# Patient Record
Sex: FEMALE | Race: WHITE | NOT HISPANIC OR LATINO | Employment: UNEMPLOYED | URBAN - METROPOLITAN AREA
[De-identification: names, ages, dates, MRNs, and addresses within clinical notes are randomized per-mention and may not be internally consistent; named-entity substitution may affect disease eponyms.]

---

## 2017-01-01 ENCOUNTER — APPOINTMENT (OUTPATIENT)
Dept: GENERAL RADIOLOGY | Facility: CLINIC | Age: 32
End: 2017-01-01
Attending: FAMILY MEDICINE
Payer: COMMERCIAL

## 2017-01-01 ENCOUNTER — HOSPITAL ENCOUNTER (EMERGENCY)
Facility: CLINIC | Age: 32
Discharge: HOME | End: 2017-01-01
Attending: FAMILY MEDICINE | Admitting: FAMILY MEDICINE
Payer: COMMERCIAL

## 2017-01-01 VITALS
RESPIRATION RATE: 16 BRPM | HEART RATE: 108 BPM | DIASTOLIC BLOOD PRESSURE: 69 MMHG | OXYGEN SATURATION: 100 % | TEMPERATURE: 97.3 F | SYSTOLIC BLOOD PRESSURE: 108 MMHG

## 2017-01-01 DIAGNOSIS — M54.9 UPPER BACK PAIN ON RIGHT SIDE: ICD-10-CM

## 2017-01-01 PROCEDURE — 99283 EMERGENCY DEPT VISIT LOW MDM: CPT | Performed by: FAMILY MEDICINE

## 2017-01-01 PROCEDURE — 25000132 ZZH RX MED GY IP 250 OP 250 PS 637: Performed by: FAMILY MEDICINE

## 2017-01-01 PROCEDURE — 99283 EMERGENCY DEPT VISIT LOW MDM: CPT | Mod: 25

## 2017-01-01 PROCEDURE — 71020 XR CHEST 2 VW: CPT

## 2017-01-01 RX ORDER — OXYCODONE AND ACETAMINOPHEN 5; 325 MG/1; MG/1
2 TABLET ORAL ONCE
Status: COMPLETED | OUTPATIENT
Start: 2017-01-01 | End: 2017-01-01

## 2017-01-01 RX ORDER — OXYCODONE AND ACETAMINOPHEN 5; 325 MG/1; MG/1
1-2 TABLET ORAL EVERY 4 HOURS PRN
Qty: 20 TABLET | Refills: 0 | Status: SHIPPED | OUTPATIENT
Start: 2017-01-01 | End: 2017-01-03

## 2017-01-01 RX ADMIN — OXYCODONE HYDROCHLORIDE AND ACETAMINOPHEN 2 TABLET: 5; 325 TABLET ORAL at 20:50

## 2017-01-01 ASSESSMENT — ENCOUNTER SYMPTOMS
DIARRHEA: 0
CHILLS: 0
DIZZINESS: 0
JOINT SWELLING: 0
NECK STIFFNESS: 0
NERVOUS/ANXIOUS: 0
SHORTNESS OF BREATH: 0
BACK PAIN: 1
WEAKNESS: 0
LIGHT-HEADEDNESS: 0
CONFUSION: 0
MYALGIAS: 0
WHEEZING: 0
CHEST TIGHTNESS: 0
PALPITATIONS: 0
COUGH: 0
ARTHRALGIAS: 0
SORE THROAT: 0
ABDOMINAL PAIN: 0
HEADACHES: 0
FEVER: 0
VOMITING: 0
NAUSEA: 0
NECK PAIN: 0

## 2017-01-01 NOTE — ED AVS SNAPSHOT
[facility] Emergency Department    [address] [facility] [address]    Phone:  [phone]    Fax:  [phone]                                       Will Dodson   MRN: [id number]    Department:  [facility] Emergency Department   Date of Visit:  1/1/2017           Patient Information     Date Of Birth          1985        Your diagnoses for this visit were:     Upper back pain on right side        You were seen by Letty, Mart GONZALEZ MD.      Follow-up Information     Follow up with Clinic, [facility].    Why:  As needed    Contact information:    [facility] [id number] [phone]          Follow up with [facility] Emergency Department.    Specialty:  EMERGENCY MEDICINE    Why:  If symptoms worsen    Contact information:    [address] [place] [id number] [phone]    Additional information:    The medical center is located at   [address] [place] [address] (between [facility] and   [place] [address] in [place], [place] north   of [place]).        Discharge Instructions         Neck/Back Pain: General    Both neck and back pain are usually caused by injury to the muscles or ligaments of the spine. Sometimes the disks that separate each bone of the spine may cause pain by pressing on a nearby nerve. Back and neck pain may appear after a sudden twisting/bending force (such as in a car accident), or sometimes after a simple awkward movement. In either case, muscle spasm is often present and adds to the pain.  Acute neck and back pain usually gets better in 1 to 2 weeks. Pain related to disk disease, arthritis in the spinal joints or spinal stenosis (narrowing of the spinal canal) can become chronic and last for months or years.  Back and neck pain are common problems. Most people feel better in 1 or 2 weeks, and most of the rest in 1 to 2 months. Most people can remain active.  Symptoms  People experience and describe pain differently.    Pain  "can be sharp, stabbing, shooting, aching, cramping, or burning    Movement, standing, bending, lifting, sitting, or walking may worsen the pain    Pain can be localized to one spot or area, or it can be more generalized    Pain can spread or radiate upwards, downwards, to the front, or go down your arms    Muscle spasm may occur.  Cause  Most of the time \"mechanical problems\" with the muscles or spine cause the pain. it is usually caused by an injury, whether known or not, to the muscles or ligaments. While illnesses can cause back pain, it is usually not caused by a serious illness. Pain is usually related to physical activity, whether sports, exercise, work, or normal activity. Sometimes it can occur without an identifiable cause. This can happen simply by stretching or moving wrong, without noting pain at the time. Other causes include:    Overexertion, lifting, pushing, pulling incorrectly or too aggressively.    Sudden twisting, bending or stretching from an accident (car or fall), or accidental movement.    Poor posture    Poor conditioning, lack of regular exercise    Spinal disc disease or arthritis    Stress    Pregnancy, or illness like appendicitis, bladder or kidney infection, pelvic infections   Home care    For neck pain: Use a comfortable pillow that supports the head and keeps the spine in a neutral position. The position of the head should not be tilted forward or backward.    When in bed, try to find a position of comfort. A firm mattress is best. Try lying flat on your back with pillows under your knees. You can also try lying on your side with your knees bent up towards your chest and a pillow between your knees.    At first, do not try to stretch out the sore spots. If there is a strain, it is not like the good soreness you get after exercising without an injury. In this case, stretching may make it worse.    Avoid prolonged sitting, long car rides or travel. This puts more stress on the lower " back than standing or walking.    During the first 24 to 72 hours after an injury, apply an ice pack to the painful area for 20 minutes and then remove it for 20 minutes over a period of 60 to 90 minutes or several times a day. As a safety precaution, do not use a heating pad at bedtime. Sleeping with a heating pad can lead to skin burns or tissue damage.    Ice and heat therapies can be alternated. Talk with your health care provider about the best treatment for your back or neck pain.    Therapeutic massage can help relax the back and neck muscles without stretching them.    Be aware of safe lifting methods and do not lift anything over 15 pounds until all the pain is gone.  Medications  Talk to your health care provider before using medications, especially if you have other medical problems or are taking other medicines.    You may use acetaminophen (such as Tylenol) or ibuprofen (such as Advil or Motrin) to control pain, unless another pain medicine was prescribed. If you have chronic conditions like diabetes, liver or kidney disease, stomach ulcers,  gastrointestinal bleeding, or are taking blood thinner medications.    Be careful if you are given pain medicines, narcotics, or medication for muscle spasm. They can cause drowsiness, and can affect your coordination, reflexes, and judgment. Do not drive or operate heavy machinery.  Follow-up care  Follow up with your health care provider if your symptoms do not start to improve after one week. Physical therapy or further tests may be needed.  If X-rays were taken, they will be reviewed by a radiologist. You will be notified of any new findings that may affect your care.  Call 911  Seek emergency medical care if any of the following occur:    Trouble breathing    Confusion    Very drowsy or trouble awakening    Fainting or loss of consciousness    Rapid or very slow heart rate    Loss of bowel or bladder control  When to seek medical care  Get prompt medical  attention if any of the following occur:    Pain becomes worse or spreads into your arms or legs    Weakness, numbness or pain in one or both arms or legs    Numbness in the groin area    Difficulty walking    Fever of 100.4 F (38 C) or higher, or as directed by your healthcare provider    6351-5447 [employer]. [address] [place] [address]. All rights reserved. This information is not intended as a substitute for professional medical care. Always follow your healthcare professional's instructions.          Future Appointments        Provider Department Dept Phone Center    1/17/2017 2:00 PM Jose Matta [facility] [phone] [facility]    1/24/2017 5:00 PM Jose [facility]ton [facility] [phone] [facility]      24 Hour Appointment Hotline       To make an appointment at any [facility], call [phone] ([phone]). If you don't have a family doctor or clinic, we will help you find one. [facility] clinics are conveniently located to serve the needs of you and your family.             Review of your medicines      START taking        Dose / Directions Last dose taken    oxyCODONE-acetaminophen 5-325 MG per tablet   Commonly known as:  PERCOCET   Dose:  1-2 tablet   Quantity:  20 tablet        Take 1-2 tablets by mouth every 4 hours as needed for moderate to severe pain   Refills:  0          Our records show that you are taking the medicines listed below. If these are incorrect, please call your family doctor or clinic.        Dose / Directions Last dose taken    albuterol 108 (90 BASE) MCG/ACT Inhaler   Commonly known as:  albuterol   Dose:  2 puff   Quantity:  1 Inhaler        Inhale 2 puffs into the lungs every 4 hours as needed for shortness of breath / dyspnea   Refills:  0        guaiFENesin-codeine 100-10 MG/5ML Soln solution   Commonly known as:  ROBITUSSIN AC   Dose:  1 tsp.   Quantity:  120 mL        Take 5 mLs  by mouth every 4 hours as needed for cough   Refills:  0        LORazepam 0.5 MG tablet   Commonly known as:  ATIVAN   Dose:  0.25-0.5 mg   Quantity:  20 tablet        Take 0.5-1 tablets (0.25-0.5 mg) by mouth every 8 hours as needed for anxiety   Refills:  0        TRINESSA (28) 0.18/0.215/0.25 MG-35 MCG per tablet   Dose:  1 tablet   Generic drug:  norgestim-eth estrad triphasic        Take 1 tablet by mouth daily   Refills:  0        venlafaxine 37.5 MG tablet   Commonly known as:  EFFEXOR   Quantity:  90 tablet        Take 1 tablet daily for 3 days, then 1 tablet twice daily for 14 days, then 2 tablets twice daily   Refills:  1                Prescriptions were sent or printed at these locations (1 Prescription)                   Other Prescriptions                Printed at Department/Unit printer (1 of 1)         oxyCODONE-acetaminophen (PERCOCET) 5-325 MG per tablet                Procedures and tests performed during your visit     XR Chest 2 Views      Orders Needing Specimen Collection     None      Pending Results     Date and Time Order Name Status Description    1/1/2017 2047 XR Chest 2 Views Preliminary        Test Results from your hospital stay           1/1/2017  9:24 PM - Interface, Radiant Ib      Narrative     CHEST TWO VIEWS  1/1/2017 9:18 PM     COMPARISON: Two view chest x-ray 6/2/2016    HISTORY: Short of air    FINDINGS: The cardiac silhouette, pulmonary vasculature, lungs and  pleural spaces are within normal limits.        Impression     IMPRESSION: Clear lungs.                Thank you for choosing [facility]       Thank you for choosing [facility] for your care. Our goal is always to provide you with excellent care. Hearing back from our patients is one way we can continue to improve our services. Please take a few minutes to complete the written survey that you may receive in the mail after you visit with us. Thank you!        [employer] Information     [employer] lets you send messages to your  "doctor, view your test results, renew your prescriptions, schedule appointments and more. To sign up, go to [email or web address] . Click on \"Log in\" on the left side of the screen, which will take you to the Welcome page. Then click on \"Sign up Now\" on the right side of the page.     You will be asked to enter the access code listed below, as well as some personal information. Please follow the directions to create your username and password.     Your access code is: [email or web address]  Expires: 2017  7:06 AM     Your access code will  in 90 days. If you need help or a new code, please call your [facility] clinic or [phone].        After Visit Summary       This is your record. Keep this with you and show to your community pharmacist(s) and doctor(s) at your next visit.                  "

## 2017-01-01 NOTE — ED AVS SNAPSHOT
[facility] Emergency Department    [address] [facility] [address]    Phone:  [phone]    Fax:  [phone]                                       Will Dodson   MRN: [id number]    Department:  [facility] Emergency Department   Date of Visit:  1/1/2017           After Visit Summary Signature Page     I have received my discharge instructions, and my questions have been answered. I have discussed any challenges I see with this plan with the nurse or doctor.    ..........................................................................................................................................  Patient/Patient Representative Signature      ..........................................................................................................................................  Patient Representative Print Name and Relationship to Patient    ..................................................               ................................................  Date                                            Time    ..........................................................................................................................................  Reviewed by Signature/Title    ...................................................              ..............................................  Date                                                            Time

## 2017-01-02 NOTE — DISCHARGE INSTRUCTIONS
"  Neck/Back Pain: General    Both neck and back pain are usually caused by injury to the muscles or ligaments of the spine. Sometimes the disks that separate each bone of the spine may cause pain by pressing on a nearby nerve. Back and neck pain may appear after a sudden twisting/bending force (such as in a car accident), or sometimes after a simple awkward movement. In either case, muscle spasm is often present and adds to the pain.  Acute neck and back pain usually gets better in 1 to 2 weeks. Pain related to disk disease, arthritis in the spinal joints or spinal stenosis (narrowing of the spinal canal) can become chronic and last for months or years.  Back and neck pain are common problems. Most people feel better in 1 or 2 weeks, and most of the rest in 1 to 2 months. Most people can remain active.  Symptoms  People experience and describe pain differently.    Pain can be sharp, stabbing, shooting, aching, cramping, or burning    Movement, standing, bending, lifting, sitting, or walking may worsen the pain    Pain can be localized to one spot or area, or it can be more generalized    Pain can spread or radiate upwards, downwards, to the front, or go down your arms    Muscle spasm may occur.  Cause  Most of the time \"mechanical problems\" with the muscles or spine cause the pain. it is usually caused by an injury, whether known or not, to the muscles or ligaments. While illnesses can cause back pain, it is usually not caused by a serious illness. Pain is usually related to physical activity, whether sports, exercise, work, or normal activity. Sometimes it can occur without an identifiable cause. This can happen simply by stretching or moving wrong, without noting pain at the time. Other causes include:    Overexertion, lifting, pushing, pulling incorrectly or too aggressively.    Sudden twisting, bending or stretching from an accident (car or fall), or accidental movement.    Poor posture    Poor conditioning, " lack of regular exercise    Spinal disc disease or arthritis    Stress    Pregnancy, or illness like appendicitis, bladder or kidney infection, pelvic infections   Home care    For neck pain: Use a comfortable pillow that supports the head and keeps the spine in a neutral position. The position of the head should not be tilted forward or backward.    When in bed, try to find a position of comfort. A firm mattress is best. Try lying flat on your back with pillows under your knees. You can also try lying on your side with your knees bent up towards your chest and a pillow between your knees.    At first, do not try to stretch out the sore spots. If there is a strain, it is not like the good soreness you get after exercising without an injury. In this case, stretching may make it worse.    Avoid prolonged sitting, long car rides or travel. This puts more stress on the lower back than standing or walking.    During the first 24 to 72 hours after an injury, apply an ice pack to the painful area for 20 minutes and then remove it for 20 minutes over a period of 60 to 90 minutes or several times a day. As a safety precaution, do not use a heating pad at bedtime. Sleeping with a heating pad can lead to skin burns or tissue damage.    Ice and heat therapies can be alternated. Talk with your health care provider about the best treatment for your back or neck pain.    Therapeutic massage can help relax the back and neck muscles without stretching them.    Be aware of safe lifting methods and do not lift anything over 15 pounds until all the pain is gone.  Medications  Talk to your health care provider before using medications, especially if you have other medical problems or are taking other medicines.    You may use acetaminophen (such as Tylenol) or ibuprofen (such as Advil or Motrin) to control pain, unless another pain medicine was prescribed. If you have chronic conditions like diabetes, liver or kidney disease, stomach  ulcers,  gastrointestinal bleeding, or are taking blood thinner medications.    Be careful if you are given pain medicines, narcotics, or medication for muscle spasm. They can cause drowsiness, and can affect your coordination, reflexes, and judgment. Do not drive or operate heavy machinery.  Follow-up care  Follow up with your health care provider if your symptoms do not start to improve after one week. Physical therapy or further tests may be needed.  If X-rays were taken, they will be reviewed by a radiologist. You will be notified of any new findings that may affect your care.  Call 911  Seek emergency medical care if any of the following occur:    Trouble breathing    Confusion    Very drowsy or trouble awakening    Fainting or loss of consciousness    Rapid or very slow heart rate    Loss of bowel or bladder control  When to seek medical care  Get prompt medical attention if any of the following occur:    Pain becomes worse or spreads into your arms or legs    Weakness, numbness or pain in one or both arms or legs    Numbness in the groin area    Difficulty walking    Fever of 100.4 F (38 C) or higher, or as directed by your healthcare provider    2254-9360 [employer]. [address] [place] [address]. All rights reserved. This information is not intended as a substitute for professional medical care. Always follow your healthcare professional's instructions.

## 2017-01-02 NOTE — ED PROVIDER NOTES
History     Chief Complaint   Patient presents with     Back Pain     upper R side, started 2 hours ago     HPI  Will Dodson is a 31 year old female who presents to the emergency room with sudden onset of back pain. She has a long history of back pains. She does not recall any fall or injury or repetitive use recently. She was sitting when this started. It feels like a back spasm near underneath her right shoulder blade. She is having no difficulty breathing. No pain with deep inspiration. No fever chills cough or other respiratory symptoms. She has no history of DVT or PE and has no family history of such. She has not had any prolonged sitting or other risk for PE.    Patient Active Problem List   Diagnosis     Prenatal care, subsequent pregnancy     H/O:      Generalized anxiety disorder     Panic attack     No current facility-administered medications for this encounter.     Current Outpatient Prescriptions   Medication     oxyCODONE-acetaminophen (PERCOCET) 5-325 MG per tablet     guaiFENesin-codeine (ROBITUSSIN AC) 100-10 MG/5ML SOLN     albuterol (ALBUTEROL) 108 (90 BASE) MCG/ACT inhaler     venlafaxine (EFFEXOR) 37.5 MG tablet     LORazepam (ATIVAN) 0.5 MG tablet     norgestim-eth estrad triphasic (TRINESSA, 28,) 0.18/0.215/0.25 MG-35 MCG per tablet     Allergies   Allergen Reactions     Vicodin [Hydrocodone-Acetaminophen] Nausea and Vomiting     Social History     Social History     Marital Status: Single     Spouse Name: N/A     Number of Children: N/A     Years of Education: N/A     Occupational History     Not on file.     Social History Main Topics     Smoking status: Former Smoker -- 0.25 packs/day     Start date: 2015     Smokeless tobacco: Never Used     Alcohol Use: 0.0 oz/week     0 Standard drinks or equivalent per week      Comment: occas- quit with pregnancy     Drug Use: No     Sexual Activity: Yes     Other Topics Concern     Parent/Sibling W/ Cabg, Mi Or Angioplasty Before  65f 55m? No     Social History Narrative         I have reviewed the Medications, Allergies, Past Medical and Surgical History, and Social History in the Epic system.    Review of Systems   Constitutional: Negative for fever and chills.   HENT: Negative for congestion and sore throat.    Respiratory: Negative for cough, chest tightness, shortness of breath and wheezing.    Cardiovascular: Negative for chest pain and palpitations.   Gastrointestinal: Negative for nausea, vomiting, abdominal pain and diarrhea.   Musculoskeletal: Positive for back pain. Negative for myalgias, joint swelling, arthralgias, neck pain and neck stiffness.   Skin: Negative for rash.   Neurological: Negative for dizziness, weakness, light-headedness and headaches.   Psychiatric/Behavioral: Negative for confusion. The patient is not nervous/anxious.        Physical Exam   BP: 108/69 mmHg  Pulse: 108  Temp: 97.3  F (36.3  C)  Resp: 20  SpO2: 99 %  Physical Exam   Constitutional: She is oriented to person, place, and time. She appears well-developed and well-nourished. She appears distressed.   Alert female who appears to be having back spasms with sudden paroxysms of wincing and pain.   HENT:   Head: Normocephalic and atraumatic.   Eyes: Conjunctivae are normal.   Neck: Normal range of motion. Neck supple.   Cardiovascular: Normal rate, regular rhythm and normal heart sounds.    Pulmonary/Chest: Effort normal and breath sounds normal. No respiratory distress. She has no wheezes. She exhibits no tenderness.   Abdominal: Soft. She exhibits no distension. There is no tenderness.   Musculoskeletal: She exhibits tenderness.        Cervical back: She exhibits tenderness, pain and spasm. She exhibits no deformity.        Back:    Neurological: She is alert and oriented to person, place, and time.   Skin: Skin is warm and dry. No rash noted.   No evidence of rash-herpes zoster-shingles   Psychiatric: Her behavior is normal. Her mood appears anxious.    Nursing note and vitals reviewed.      ED Course   Procedures           Results for orders placed or performed during the hospital encounter of 01/01/17 (from the past 48 hour(s))   XR Chest 2 Views    Narrative    CHEST TWO VIEWS  1/1/2017 9:18 PM     COMPARISON: Two view chest x-ray 6/2/2016    HISTORY: Short of air    FINDINGS: The cardiac silhouette, pulmonary vasculature, lungs and  pleural spaces are within normal limits.      Impression    IMPRESSION: Clear lungs.       Critical Care time:  none               Labs Ordered and Resulted from Time of ED Arrival Up to the Time of Departure from the ED - No data to display    Assessments & Plan (with Medical Decision Making)   MDM--31-year-old female who presents with suspected back spasms around her right scapular upper back area. This area is tender to palpation. Pain characteristics consistent with a muscle spasm. She is having no breathing difficulties cough congestion or pleuritic-like symptoms. She is afebrile. She was medicated with Percocet 2 tablets in the emergency room. Chest x-ray shows no infiltrate or pneumothorax. Will discharge patient home with alternating ice packs and heating pad. Percocet for severe pain and spasms. Gentle stretching. Follow up in the clinic in the next couple of days if symptoms persist. Return to ER if she develops a fever or any difficulty breathing or any new symptoms.  I have reviewed the nursing notes.    I have reviewed the findings, diagnosis, plan and need for follow up with the patient.    New Prescriptions    OXYCODONE-ACETAMINOPHEN (PERCOCET) 5-325 MG PER TABLET    Take 1-2 tablets by mouth every 4 hours as needed for moderate to severe pain       Final diagnoses:   Upper back pain on right side       1/1/2017   [facility] EMERGENCY DEPARTMENT      Letty, Mart GONZALEZ MD  01/01/17 3882

## 2017-01-03 ENCOUNTER — HOSPITAL ENCOUNTER (EMERGENCY)
Facility: CLINIC | Age: 32
Discharge: HOME | End: 2017-01-03
Attending: FAMILY MEDICINE | Admitting: FAMILY MEDICINE
Payer: COMMERCIAL

## 2017-01-03 ENCOUNTER — APPOINTMENT (OUTPATIENT)
Dept: GENERAL RADIOLOGY | Facility: CLINIC | Age: 32
End: 2017-01-03
Attending: FAMILY MEDICINE
Payer: COMMERCIAL

## 2017-01-03 VITALS
OXYGEN SATURATION: 100 % | HEART RATE: 83 BPM | RESPIRATION RATE: 18 BRPM | DIASTOLIC BLOOD PRESSURE: 71 MMHG | TEMPERATURE: 98.1 F | SYSTOLIC BLOOD PRESSURE: 103 MMHG

## 2017-01-03 DIAGNOSIS — K59.00 CONSTIPATION, UNSPECIFIED CONSTIPATION TYPE: ICD-10-CM

## 2017-01-03 PROCEDURE — 99283 EMERGENCY DEPT VISIT LOW MDM: CPT

## 2017-01-03 PROCEDURE — 99283 EMERGENCY DEPT VISIT LOW MDM: CPT | Performed by: FAMILY MEDICINE

## 2017-01-03 PROCEDURE — 74020 XR ABDOMEN 2 VW: CPT

## 2017-01-03 RX ORDER — LACTULOSE 10 G/15ML
20 SOLUTION ORAL 2 TIMES DAILY
Qty: 300 ML | Refills: 0 | Status: SHIPPED | OUTPATIENT
Start: 2017-01-03 | End: 2017-01-08

## 2017-01-03 ASSESSMENT — ENCOUNTER SYMPTOMS
CONSTIPATION: 1
ALLERGIC/IMMUNOLOGIC NEGATIVE: 1
EYES NEGATIVE: 1
MUSCULOSKELETAL NEGATIVE: 1
ENDOCRINE NEGATIVE: 1
PSYCHIATRIC NEGATIVE: 1
ABDOMINAL PAIN: 1
RESPIRATORY NEGATIVE: 1
CARDIOVASCULAR NEGATIVE: 1
CONSTITUTIONAL NEGATIVE: 1
NEUROLOGICAL NEGATIVE: 1
HEMATOLOGIC/LYMPHATIC NEGATIVE: 1

## 2017-01-03 NOTE — ED AVS SNAPSHOT
[facility]    [address]    [facility] [place] [address]    Phone:  [phone]    Fax:  [phone]                                       Will Dodson   MRN: [id number]    Department:  [facility]   Date of Visit:  1/3/2017           Patient Information     Date Of Birth          1985        Your diagnoses for this visit were:     Constipation, unspecified constipation type        You were seen by Ced Neumann MD.      Follow-up Information     Schedule an appointment as soon as possible for a visit with Clinic, [facility].    Why:  As needed, If symptoms worsen    Contact information:    [address]  [facility] [place] [address] [phone]          Discharge Instructions         Constipation (Adult)  Constipation means that you have bowel movements that are less frequent than usual. Stools often become very hard and difficult to pass.  Constipation is very common. At some point in life it affects almost everyone. Since everyone's bowel habits are different, what is constipation to one person may not be to another. Your healthcare provider may do tests to diagnose constipation. It depends on what he or she finds when evaluating you.    Symptoms of constipation include:    Abdominal pain    Bloating    Vomiting    Painful bowel movements    Itching, swelling, bleeding, or pain around the anus  Causes  Constipation can have many causes. These include:    Diet low in fiber    Too much dairy    Not drinking enough liquids    Lack of exercise or physical activity. This is especially true for older adults.    Changes in lifestyle or daily routine, including pregnancy, aging, work, and travel    Frequent use or misuse of laxatives    Ignoring the urge to have a bowel movement or delaying it until later    Medicines, such as certain prescription pain medicines, iron supplements, antacids, certain antidepressants, and calcium  supplements    Diseases like irritable bowel syndrome, bowel obstructions, stroke, diabetes, thyroid disease, Parkinson disease, hemorrhoids, and colon cancer  Complications  Potential complications of constipation can include:    Hemorrhoids    Rectal bleeding from hemorrhoids or anal fissures (skin tears)    Hernias    Dependency on laxatives    Chronic constipation    Fecal impaction    Bowel obstruction or perforation  Home care  All treatment should be done after talking with your healthcare provider. This is especially true if you have another medical problems, are taking prescription medicines, or are an older adult. Treatment most often involves lifestyle changes. You may also need medicines. Your healthcare provider will tell you which will work best for you. Follow the advice below to help avoid this problem in the future.  Lifestyle changes  These lifestyle changes can help prevent constipation:    Diet. Eat a high-fiber diet, with fresh fruit and vegetables, and reduce dairy intake, meats, and processed foods    Fluids. It's important to get enough fluids each day. Drink plenty of water when you eat more fiber. If you are on diet that limits the amount of fluid you can have, talk about this with your healthcare provider.    Regular exercise. Check with your healthcare provider first.  Medications  Take any medicines as directed. Some laxatives are safe to use only every now and then. Others can be taken on a regular basis. Talk with your doctor or pharmacist if you have questions.  Prescription pain medicines can cause constipation. If you are taking this kind of medicine, ask your healthcare provider if you should also take a stool softener.  Medicines you may take to treat constipation include:    Fiber supplements    Stool softeners    Laxatives    Enemas    Rectal suppositories  Follow-up care  Follow up with your healthcare provider if symptoms don't get better in the next few days. You may need to  have more tests or see a specialist.  Call 911  Call 911 if any of these occur:    Trouble breathing    Stiff, rigid abdomen that is severely painful to touch    Confusion    Fainting or loss of consciousness    Rapid heart rate    Chest pain  When to seek medical advice  Call your healthcare provider right away if any of these occur:    Fever over 100.4 F (38 C)    Failure to resume normal bowel movements    Pain in your abdomen or back gets worse    Nausea or vomiting    Swelling in your abdomen    Blood in the stool    Black, tarry stool    Involuntary weight loss    Weakness    2387-8320 [employer]. [address] [place] [address] 45750. All rights reserved. This information is not intended as a substitute for professional medical care. Always follow your healthcare professional's instructions.          Future Appointments        Provider Department Dept Phone Center    1/17/2017 2:00 PM [phone] [facility]    1/24/2017 5:00 PM Jose WALDROP [facility] [phone] [facility]      24 Hour Appointment Hotline       To make an appointment at any [facility], call [phone] ([phone]). If you don't have a family doctor or clinic, we will help you find one. [facility] clinics are conveniently located to serve the needs of you and your family.             Review of your medicines      START taking        Dose / Directions Last dose taken    lactulose 10 GM/15ML solution   Commonly known as:  CHRONULAC   Dose:  20 g   Quantity:  300 mL        Take 30 mLs (20 g) by mouth 2 times daily for 10 doses   Refills:  0          Our records show that you are taking the medicines listed below. If these are incorrect, please call your family doctor or clinic.        Dose / Directions Last dose taken    albuterol 108 (90 BASE) MCG/ACT Inhaler   Commonly known as:  albuterol   Dose:  2 puff   Quantity:  1 Inhaler         Inhale 2 puffs into the lungs every 4 hours as needed for shortness of breath / dyspnea   Refills:  0        LORazepam 0.5 MG tablet   Commonly known as:  ATIVAN   Dose:  0.25-0.5 mg   Quantity:  20 tablet        Take 0.5-1 tablets (0.25-0.5 mg) by mouth every 8 hours as needed for anxiety   Refills:  0        oxyCODONE-acetaminophen 5-325 MG per tablet   Commonly known as:  PERCOCET   Dose:  1-2 tablet   Quantity:  20 tablet        Take 1-2 tablets by mouth every 4 hours as needed for moderate to severe pain   Refills:  0        SENNA LAXATIVE 25 MG Tabs   Dose:  1 tablet   Generic drug:  Sennosides        Take 1 tablet by mouth once   Refills:  0        TRINESSA (28) 0.18/0.215/0.25 MG-35 MCG per tablet   Dose:  1 tablet   Generic drug:  norgestim-eth estrad triphasic        Take 1 tablet by mouth daily   Refills:  0                Prescriptions were sent or printed at these locations (1 Prescription)                   Other Prescriptions                Printed at Department/Unit printer (1 of 1)         lactulose (CHRONULAC) 10 GM/15ML solution                Procedures and tests performed during your visit     Tap water enema    XR Abdomen 2 Views      Orders Needing Specimen Collection     None      Pending Results     No orders found from 1/2/2017 to 1/4/2017.            Pending Culture Results     No orders found from 1/2/2017 to 1/4/2017.       Test Results from your hospital stay           1/3/2017  9:04 PM - Interface, Radiant Ib      Narrative     ABDOMEN 2 VIEWS   1/3/2017 8:51 PM     HISTORY: Constipation.    COMPARISON: CT of the abdomen and pelvis performed 4/21/2014.        Impression     IMPRESSION: Moderate amount of stool in the sigmoid colon. Bowel gas  pattern is otherwise within normal limits. No convincing radiographic  evidence for bowel obstruction. No free intraperitoneal air.    SILVINO JEFFERS MD                Thank you for choosing [facility]       Thank you for choosing [facility]  "for your care. Our goal is always to provide you with excellent care. Hearing back from our patients is one way we can continue to improve our services. Please take a few minutes to complete the written survey that you may receive in the mail after you visit with us. Thank you!        [employer] Information     [employer] lets you send messages to your doctor, view your test results, renew your prescriptions, schedule appointments and more. To sign up, go to [email or web address] . Click on \"Log in\" on the left side of the screen, which will take you to the Welcome page. Then click on \"Sign up Now\" on the right side of the page.     You will be asked to enter the access code listed below, as well as some personal information. Please follow the directions to create your username and password.     Your access code is: [email or web address]  Expires: 2017  7:06 AM     Your access code will  in 90 days. If you need help or a new code, please call your [place] clinic or [phone].        Care EveryWhere ID     This is your Care EveryWhere ID. This could be used by other organizations to access your [facility] medical records  [id number]        After Visit Summary       This is your record. Keep this with you and show to your community pharmacist(s) and doctor(s) at your next visit.                  "

## 2017-01-03 NOTE — ED AVS SNAPSHOT
[facility] Emergency Department    [address] [address]    Phone:  [phone]    Fax:  [phone]                                       Will Dodson   MRN: [id number]    Department:  [facility] Emergency Department   Date of Visit:  1/3/2017           After Visit Summary Signature Page     I have received my discharge instructions, and my questions have been answered. I have discussed any challenges I see with this plan with the nurse or doctor.    ..........................................................................................................................................  Patient/Patient Representative Signature      ..........................................................................................................................................  Patient Representative Print Name and Relationship to Patient    ..................................................               ................................................  Date                                            Time    ..........................................................................................................................................  Reviewed by Signature/Title    ...................................................              ..............................................  Date                                                            Time

## 2017-01-04 NOTE — DISCHARGE INSTRUCTIONS

## 2017-01-04 NOTE — ED PROVIDER NOTES
History     Chief Complaint   Patient presents with     Abdominal Pain     constipation. took stool softeners today     HPI  Will Dodson is a 31 year old female, past medical history is significant for generalized anxiety disorder, panic attacks, nephrolithiasis, depression, persistently emergency department with concerns of constipation of approximately one week's duration but much worse over the last 3 days.  Patient has been eating and drinking normally, voiding without difficulty or abnormality.  She has had a few very small pellet-like stools but no substantial bowel movement in last week.  She tried a Senna based laxative earlier today and began having some cramping after it.  Prior to that she did not have abdominal pain.  There is been no fever chills or sweats.  I note from review of her chart and also from talking to her that she was seen 2 days ago in this emergency department with concerns of thoracic back pain and was prescribed Percocet which he has been taking.  She is aware that this is potentially constipating.     Active Ambulatory Problems     Diagnosis Date Noted     Prenatal care, subsequent pregnancy 2016     H/O:  2016     Generalized anxiety disorder 2016     Panic attack 10/25/2016     Resolved Ambulatory Problems     Diagnosis Date Noted     No Resolved Ambulatory Problems     Past Medical History   Diagnosis Date     Kidney stone      Chickenpox      Depression      Past Surgical History   Procedure Laterality Date     Gyn surgery       C/section, low transverse       , Low Transverse     Cystoscopy,remv calculus,simple       Social History     Social History     Marital Status: Single     Spouse Name: N/A     Number of Children: N/A     Years of Education: N/A     Occupational History     Not on file.     Social History Main Topics     Smoking status: Former Smoker -- 0.25 packs/day     Start date: 2015     Smokeless tobacco: Never Used      Alcohol Use: 0.0 oz/week     0 Standard drinks or equivalent per week      Comment: occas- quit with pregnancy     Drug Use: No     Sexual Activity: Yes     Other Topics Concern     Parent/Sibling W/ Cabg, Mi Or Angioplasty Before 65f 55m? No     Social History Narrative     Family History   Problem Relation Age of Onset     Breast Cancer Mother      Other - See Comments Sister      epilepsy     Substance Abuse Sister      A&D     Hypertension Father      Colon Cancer Paternal Grandfather      Colon Cancer Maternal Grandfather      Depression Mother      No current facility-administered medications for this encounter.     Current Outpatient Prescriptions   Medication     Sennosides (SENNA LAXATIVE) 25 MG TABS     lactulose (CHRONULAC) 10 GM/15ML solution     albuterol (ALBUTEROL) 108 (90 BASE) MCG/ACT inhaler     oxyCODONE-acetaminophen (PERCOCET) 5-325 MG per tablet     LORazepam (ATIVAN) 0.5 MG tablet     norgestim-eth estrad triphasic (TRINESSA, 28,) 0.18/0.215/0.25 MG-35 MCG per tablet        Allergies   Allergen Reactions     Blood-Group Specific Substance      Patient has Probable passiive anti D Antibody. Blood Product orders may be delayed.  Draw one red top and two purple top tubes for ALL Type and Screen/ Type and Crossmatch orders.       Vicodin [Hydrocodone-Acetaminophen] Nausea and Vomiting       I have reviewed the Medications, Allergies, Past Medical and Surgical History, and Social History in the Epic system.    Review of Systems   Constitutional: Negative.    HENT: Negative.    Eyes: Negative.    Respiratory: Negative.    Cardiovascular: Negative.    Gastrointestinal: Positive for abdominal pain and constipation.   Endocrine: Negative.    Genitourinary: Negative.    Musculoskeletal: Negative.    Skin: Negative.    Allergic/Immunologic: Negative.    Neurological: Negative.    Hematological: Negative.    Psychiatric/Behavioral: Negative.        Physical Exam   BP: 103/71 mmHg  Pulse: 83  Temp: 98.1  F  (36.7  C)  Resp: 18  SpO2: 100 %  Physical Exam   Constitutional: She is oriented to person, place, and time. She appears well-developed and well-nourished.   HENT:   Head: Normocephalic and atraumatic.   Right Ear: External ear normal.   Left Ear: External ear normal.   Nose: Nose normal.   Mouth/Throat: Oropharynx is clear and moist.   Eyes: Conjunctivae and EOM are normal. Pupils are equal, round, and reactive to light.   Neck: Normal range of motion. Neck supple.   Cardiovascular: Normal rate, regular rhythm, normal heart sounds and intact distal pulses.    Pulmonary/Chest: Effort normal and breath sounds normal.   Abdominal: Soft. She exhibits no distension. There is no tenderness. There is no rebound and no guarding.   Musculoskeletal: Normal range of motion.   Neurological: She is alert and oriented to person, place, and time.   Skin: Skin is warm and dry.   Psychiatric: She has a normal mood and affect. Her behavior is normal.   Nursing note and vitals reviewed.      ED Course   Procedures         10:44 PM  Excellent result with tapwater enema.  Results for orders placed or performed during the hospital encounter of 01/03/17   XR Abdomen 2 Views    Narrative    ABDOMEN 2 VIEWS   1/3/2017 8:51 PM     HISTORY: Constipation.    COMPARISON: CT of the abdomen and pelvis performed 4/21/2014.      Impression    IMPRESSION: Moderate amount of stool in the sigmoid colon. Bowel gas  pattern is otherwise within normal limits. No convincing radiographic  evidence for bowel obstruction. No free intraperitoneal air.    SILVINO JEFFERS MD     10:44 PM  Patient is requesting discharge to home.    Critical Care time:  none               Labs Ordered and Resulted from Time of ED Arrival Up to the Time of Departure from the ED - No data to display    Assessments & Plan (with Medical Decision Making)   31-year-old female presents with concerns of constipation as discussed in the HPI.  Physical exam reveals a benign abdominal  exam x-ray is consistent with moderate fecal burden primarily in the sigmoid colon.  Tapwater enema was successful in relieving much of the patient's discomfort.  Lactulose as prescribed to be used over the next 5 days at home for complete evacuation.  The patient is once again cautioned about the use of narcotic pain medication and the impact on intestinal motility and potential for constipation.  All questions were answered discharged home in stable condition.    Disclaimer: This note consists of symbols derived from keyboarding, dictation and/or voice recognition software. As a result, there may be errors in the script that have gone undetected. Please consider this when interpreting information found in this chart.      I have reviewed the nursing notes.    I have reviewed the findings, diagnosis, plan and need for follow up with the patient.    New Prescriptions    LACTULOSE (CHRONULAC) 10 GM/15ML SOLUTION    Take 30 mLs (20 g) by mouth 2 times daily for 10 doses       Final diagnoses:   Constipation, unspecified constipation type       1/3/2017   [facility] EMERGENCY DEPARTMENT      Ced Neumann MD  01/03/17 9542

## 2017-01-17 ENCOUNTER — OFFICE VISIT (OUTPATIENT)
Dept: PSYCHOLOGY | Facility: CLINIC | Age: 32
End: 2017-01-17
Attending: FAMILY MEDICINE
Payer: COMMERCIAL

## 2017-01-17 DIAGNOSIS — F90.2 ADHD (ATTENTION DEFICIT HYPERACTIVITY DISORDER), COMBINED TYPE: Primary | ICD-10-CM

## 2017-01-17 PROCEDURE — 90834 PSYTX W PT 45 MINUTES: CPT | Performed by: PSYCHOLOGIST

## 2017-01-19 ENCOUNTER — OFFICE VISIT (OUTPATIENT)
Dept: FAMILY MEDICINE | Facility: CLINIC | Age: 32
End: 2017-01-19
Payer: COMMERCIAL

## 2017-01-19 VITALS
HEART RATE: 83 BPM | WEIGHT: 121.8 LBS | HEIGHT: 60 IN | OXYGEN SATURATION: 98 % | SYSTOLIC BLOOD PRESSURE: 108 MMHG | DIASTOLIC BLOOD PRESSURE: 71 MMHG | BODY MASS INDEX: 23.91 KG/M2 | TEMPERATURE: 98.2 F

## 2017-01-19 DIAGNOSIS — Z30.430 ENCOUNTER FOR IUD INSERTION: Primary | ICD-10-CM

## 2017-01-19 DIAGNOSIS — Z11.3 SCREEN FOR STD (SEXUALLY TRANSMITTED DISEASE): ICD-10-CM

## 2017-01-19 PROBLEM — F90.2 ADHD (ATTENTION DEFICIT HYPERACTIVITY DISORDER), COMBINED TYPE: Status: ACTIVE | Noted: 2017-01-19

## 2017-01-19 LAB — BETA HCG QUAL IFA URINE: NEGATIVE

## 2017-01-19 PROCEDURE — 84703 CHORIONIC GONADOTROPIN ASSAY: CPT | Performed by: FAMILY MEDICINE

## 2017-01-19 PROCEDURE — 58300 INSERT INTRAUTERINE DEVICE: CPT | Performed by: FAMILY MEDICINE

## 2017-01-19 PROCEDURE — 87591 N.GONORRHOEAE DNA AMP PROB: CPT | Performed by: FAMILY MEDICINE

## 2017-01-19 PROCEDURE — 87491 CHLMYD TRACH DNA AMP PROBE: CPT | Performed by: FAMILY MEDICINE

## 2017-01-19 NOTE — NURSING NOTE
Chief Complaint   Patient presents with     Consult     IUD; patient is wanting to discuss placement of Mirena; she is currently using condoms but has had the Mirena before about 3 years ago       Initial /71 mmHg  Pulse 83  Temp(Src) 98.2  F (36.8  C) (Tympanic)  Ht 5' (1.524 m)  Wt 121 lb 12.8 oz (55.248 kg)  BMI 23.79 kg/m2  SpO2 98%  LMP 12/17/2016 (Approximate) Estimated body mass index is 23.79 kg/(m^2) as calculated from the following:    Height as of this encounter: 5' (1.524 m).    Weight as of this encounter: 121 lb 12.8 oz (55.248 kg).  BP completed using cuff size: regular

## 2017-01-19 NOTE — PATIENT INSTRUCTIONS
Thank you for choosing [facility].  You may be receiving a survey in the mail from Ian Purvis regarding your visit today.  Please take a few minutes to complete and return the survey to let us know how we are doing.      If you have questions or concerns, please contact us via [employer] or you can contact your care team at [phone].    Our Clinic hours are:  Monday 6:40 am  to 7:00 pm  Tuesday -Friday 6:40 am to 5:00 pm    The [place] outpatient lab hours are:  Monday - Friday 6:10 am to 4:45 pm  Saturdays 7:00 am to 11:00 am  Appointments are required, call [phone]    If you have clinical questions after hours or would like to schedule an appointment,  call the clinic at [phone].

## 2017-01-19 NOTE — PROGRESS NOTES
SUBJECTIVE:                                                    Will Dodson is a 31 year old female who presents to clinic today for the following health issues:      Chief Complaint   Patient presents with     Consult     IUD; patient is wanting to discuss placement of Mirena; she is currently using condoms but has had the Mirena before about 3 years ago     Will Dodson is a 31 year old  female   Here for contraception.  Periods are regular occuring every 30 days  Last Menstrual Period Patient's last menstrual period was 12/17/2016 (approximate).  Is sexually active, with male partner.  Has used Mirena 3 years ago and condoms recently in the past.  Denies hx of migraine, seizure, liver disease, high blood pressure or blood clots.   non Smoker.    Denies fam hx of cancers or blood clots.         Review of Systems:   CV: no chest pain, no palpitations, no new or worsening peripheral edema  GI: no nausea, no vomiting, no constipation, no diarrhea  : no frequency, no dysuria, no hematuria, no vaginal discharge or pain  CONSTITUTIONAL: no fever, no chills, no fatigue, no unexpected change in weight  SKIN: no worrisome rashes, no worrisome lesions          Physical Exam:     Filed Vitals:    01/19/17 0937   BP: 108/71   Pulse: 83   Temp: 98.2  F (36.8  C)   TempSrc: Tympanic   Height: 5' (1.524 m)   Weight: 121 lb 12.8 oz (55.248 kg)   SpO2: 98%     Body mass index is 23.79 kg/(m^2).  GENERAL:: healthy, alert and no distress  - female: cervix- normal,  no discharge    PROCEDURE: Mirena IUD Insertion.  Mirena and Fv consent signed. Time out performed.  Speculum placed. Cervix bathed with betadine three times. Tenaculum placed for cervical stabilization. Uterus sounded to 7cm.  Mirena IUD placed into the uterus.  String cut to 2cm from the os.  Patient tolerated procedure well.  No complications. Minimal bleeding.    Results for orders placed or performed in visit on 01/19/17 (from the past 24 hour(s))   Beta HCG  qual IFA urine   Result Value Ref Range    Beta HCG Qual IFA Urine Negative NEG     Assessment and Plan   Will was seen today for consult.    Diagnoses and all orders for this visit:    Encounter for IUD insertion  -     HC LEVONORGESTREL IU 52MG 5 YR  -     levonorgestrel (MIRENA) 20 MCG/24HR IUD; 1 each (20 mcg) by Intrauterine route continuous  -     INSERTION INTRAUTERINE DEVICE    Screen for STD (sexually transmitted disease)  -     Chlamydia trachomatis PCR  -     Neisseria gonorrhoeae PCR    Other orders  -     Beta HCG qual IFA urine      Patient interested in birth control.  Reviewed risks, benefits, of choices including abstinence, OCP, Patch, Nuvaring, Depo-Provera, IUD, and condoms.  Reviewed need for back-up contraception for the first month of hormonal methods. Reviewed that only abstinence and condoms provide protection from STD's.    Options for treatment and follow-up care were reviewed with the patient and/or guardian. Will Dodson and/or guardian engaged in the decision making process and verbalized understanding of the options discussed and agreed with the final plan.  Return to clinic as needed.  Norbert Wooten MD  [facility]

## 2017-01-19 NOTE — PROGRESS NOTES
Progress Note - Initial Session  Disclaimer: This note consists of symbols derived from keyboarding, dictation and/or voice recognition software. As a result, there may be errors in the script that have gone undetected. Please consider this when interpreting information found in this chart.    Client Name:  Will Dodson Date: 1/17/17         Service Type: Individual      Session Start Time: 2:00 PM  Session End Time: 2:45 PM      Session Length: 38 - 52      Session #: 1     Attendees: Client attended alone         Diagnostic Assessment in progress.  Unable to complete documentation at the conclusion of the first session due to meeting more information. Client is presenting for ADHD evaluation reports dropping out of school at age 18, currently working on her GED. She notes her son has been diagnosed with ADHD trouble with focus completing tasks becoming easily distracted  Problems with disorganization misplacing items managing her temper.      Mental Status Assessment:  Appearance:   Appropriate   Eye Contact:   Good   Psychomotor Behavior: Normal  Restless   Attitude:   Cooperative   Orientation:   All  Speech   Rate / Production: Normal    Volume:  Normal   Mood:    Normal  Affect:    Appropriate   Thought Content:  Clear   Thought Form:  Coherent  Logical   Insight:    Good       Safety Issues and Plan for Safety and Risk Management:  Client denies current fears or concerns for personal safety.  Client denies current or recent suicidal ideation or behaviors.  Client denies current or recent homicidal ideation or behaviors.  Client denies current or recent self injurious behavior or ideation.  Client denies other safety concerns.  A safety and risk management plan has not been developed at this time, however client was given the after-hours number / 911 should there be a change in any of these risk factors.  Client reports there are no firearms in the house.      Diagnostic Criteria:  Combined  Type       DSM5 Diagnoses: (Sustained by DSM5 Criteria Listed Above)  Diagnoses: Attention-Deficit/Hyperactivity Disorder  314.01 (F90.2) Combined presentation  Psychosocial & Contextual Factors: single mother of 2 children  WHODAS 2.0 (12 item)            This questionnaire asks about difficulties due to health conditions. Health conditions  include  disease or illnesses, other health problems that may be short or long lasting,  injuries, mental health or emotional problems, and problems with alcohol or drugs.                     Think back over the past 30 days and answer these questions, thinking about how much  difficulty you had doing the following activities. For each question, please [place] only  one response.    S1 Standing for long periods such as 30 minutes? None =         1   S2 Taking care of household responsibilities? Severe =       4   S3 Learning a new task, for example, learning how to get to a new place? Moderate =   3   S4 How much of a problem do you have joining community activities (for example, festivals, Rastafarian or other activities) in the same way as anyone else can? Mild =           2   S5 How much have you been emotionally affected by your health problems? Severe =       4     In the past 30 days, how much difficulty did you have in:   S6 Concentrating on doing something for ten minutes? Severe =       4   S7 Walking a long distance such as a kilometer (or equivalent)? None =         1   S8 Washing your whole body? None =         1   S9 Getting dressed? None =         1   S10 Dealing with people you do not know? Moderate =   3   S11 Maintaining a friendship? Mild =           2   S12 Your day to day work? Moderate =   3     H1 Overall, in the past 30 days, how many days were these difficulties present? Record number of days 30   H2 In the past 30 days, for how many days were you totally unable to carry out your usual activities or work because of any health condition? Record number of  days  3   H3 In the past 30 days, not counting the days that you were totally unable, for how many days did you cut back or reduce your usual activities or work because of any health condition? Record number of days 2       Collateral Reports Completed:  Not Applicable      PLAN: (Homework, other):  Client stated that she may follow up for ongoing services with [facility].  Follow-up appointment set for next week.      Jose Matta

## 2017-01-20 LAB
C TRACH DNA SPEC QL NAA+PROBE: NORMAL
N GONORRHOEA DNA SPEC QL NAA+PROBE: NORMAL
SPECIMEN SOURCE: NORMAL
SPECIMEN SOURCE: NORMAL

## 2017-01-21 ENCOUNTER — HOSPITAL ENCOUNTER (EMERGENCY)
Facility: CLINIC | Age: 32
Discharge: HOME | End: 2017-01-22
Attending: FAMILY MEDICINE | Admitting: FAMILY MEDICINE
Payer: COMMERCIAL

## 2017-01-21 ENCOUNTER — APPOINTMENT (OUTPATIENT)
Dept: CT IMAGING | Facility: CLINIC | Age: 32
End: 2017-01-21
Attending: FAMILY MEDICINE
Payer: COMMERCIAL

## 2017-01-21 DIAGNOSIS — R10.11 RUQ ABDOMINAL PAIN: ICD-10-CM

## 2017-01-21 LAB
ALBUMIN SERPL-MCNC: 3.7 G/DL (ref 3.4–5)
ALP SERPL-CCNC: 70 U/L (ref 40–150)
ALT SERPL W P-5'-P-CCNC: 16 U/L (ref 0–50)
ANION GAP SERPL CALCULATED.3IONS-SCNC: 10 MMOL/L (ref 3–14)
AST SERPL W P-5'-P-CCNC: 18 U/L (ref 0–45)
BASOPHILS # BLD AUTO: 0 10E9/L (ref 0–0.2)
BASOPHILS NFR BLD AUTO: 0.2 %
BILIRUB SERPL-MCNC: 0.1 MG/DL (ref 0.2–1.3)
BUN SERPL-MCNC: 15 MG/DL (ref 7–30)
CALCIUM SERPL-MCNC: 9.3 MG/DL (ref 8.5–10.1)
CHLORIDE SERPL-SCNC: 106 MMOL/L (ref 94–109)
CO2 SERPL-SCNC: 24 MMOL/L (ref 20–32)
CREAT SERPL-MCNC: 0.57 MG/DL (ref 0.52–1.04)
CRP SERPL-MCNC: 54.3 MG/L (ref 0–8)
DIFFERENTIAL METHOD BLD: NORMAL
EOSINOPHIL # BLD AUTO: 0.2 10E9/L (ref 0–0.7)
EOSINOPHIL NFR BLD AUTO: 1.4 %
ERYTHROCYTE [DISTWIDTH] IN BLOOD BY AUTOMATED COUNT: 13 % (ref 10–15)
GFR SERPL CREATININE-BSD FRML MDRD: ABNORMAL ML/MIN/1.7M2
GLUCOSE SERPL-MCNC: 77 MG/DL (ref 70–99)
HCT VFR BLD AUTO: 39.9 % (ref 35–47)
HGB BLD-MCNC: 13.3 G/DL (ref 11.7–15.7)
IMM GRANULOCYTES # BLD: 0 10E9/L (ref 0–0.4)
IMM GRANULOCYTES NFR BLD: 0.1 %
LIPASE SERPL-CCNC: 200 U/L (ref 73–393)
LYMPHOCYTES # BLD AUTO: 2.7 10E9/L (ref 0.8–5.3)
LYMPHOCYTES NFR BLD AUTO: 24.9 %
MCH RBC QN AUTO: 30.1 PG (ref 26.5–33)
MCHC RBC AUTO-ENTMCNC: 33.3 G/DL (ref 31.5–36.5)
MCV RBC AUTO: 90 FL (ref 78–100)
MONOCYTES # BLD AUTO: 1.1 10E9/L (ref 0–1.3)
MONOCYTES NFR BLD AUTO: 10 %
NEUTROPHILS # BLD AUTO: 6.8 10E9/L (ref 1.6–8.3)
NEUTROPHILS NFR BLD AUTO: 63.4 %
PLATELET # BLD AUTO: 269 10E9/L (ref 150–450)
POTASSIUM SERPL-SCNC: 3.7 MMOL/L (ref 3.4–5.3)
PROT SERPL-MCNC: 7.9 G/DL (ref 6.8–8.8)
RBC # BLD AUTO: 4.42 10E12/L (ref 3.8–5.2)
SODIUM SERPL-SCNC: 140 MMOL/L (ref 133–144)
WBC # BLD AUTO: 10.7 10E9/L (ref 4–11)

## 2017-01-21 PROCEDURE — 86140 C-REACTIVE PROTEIN: CPT | Performed by: FAMILY MEDICINE

## 2017-01-21 PROCEDURE — 25000125 ZZHC RX 250: Performed by: FAMILY MEDICINE

## 2017-01-21 PROCEDURE — 25000128 H RX IP 250 OP 636: Performed by: FAMILY MEDICINE

## 2017-01-21 PROCEDURE — 99284 EMERGENCY DEPT VISIT MOD MDM: CPT | Performed by: FAMILY MEDICINE

## 2017-01-21 PROCEDURE — 99285 EMERGENCY DEPT VISIT HI MDM: CPT | Mod: 25

## 2017-01-21 PROCEDURE — 80053 COMPREHEN METABOLIC PANEL: CPT | Performed by: FAMILY MEDICINE

## 2017-01-21 PROCEDURE — 85025 COMPLETE CBC W/AUTO DIFF WBC: CPT | Performed by: FAMILY MEDICINE

## 2017-01-21 PROCEDURE — 96361 HYDRATE IV INFUSION ADD-ON: CPT

## 2017-01-21 PROCEDURE — 83690 ASSAY OF LIPASE: CPT | Performed by: FAMILY MEDICINE

## 2017-01-21 PROCEDURE — 96375 TX/PRO/DX INJ NEW DRUG ADDON: CPT

## 2017-01-21 PROCEDURE — 96374 THER/PROPH/DIAG INJ IV PUSH: CPT

## 2017-01-21 PROCEDURE — 74176 CT ABD & PELVIS W/O CONTRAST: CPT

## 2017-01-21 RX ORDER — SODIUM CHLORIDE 9 MG/ML
1000 INJECTION, SOLUTION INTRAVENOUS CONTINUOUS
Status: DISCONTINUED | OUTPATIENT
Start: 2017-01-21 | End: 2017-01-22 | Stop reason: HOSPADM

## 2017-01-21 RX ORDER — ONDANSETRON 2 MG/ML
4 INJECTION INTRAMUSCULAR; INTRAVENOUS
Status: COMPLETED | OUTPATIENT
Start: 2017-01-21 | End: 2017-01-21

## 2017-01-21 RX ORDER — KETOROLAC TROMETHAMINE 30 MG/ML
30 INJECTION, SOLUTION INTRAMUSCULAR; INTRAVENOUS ONCE
Status: COMPLETED | OUTPATIENT
Start: 2017-01-21 | End: 2017-01-21

## 2017-01-21 RX ADMIN — KETOROLAC TROMETHAMINE 30 MG: 30 INJECTION, SOLUTION INTRAMUSCULAR at 23:24

## 2017-01-21 RX ADMIN — SODIUM CHLORIDE 1000 ML: 9 INJECTION, SOLUTION INTRAVENOUS at 23:24

## 2017-01-21 RX ADMIN — ONDANSETRON 4 MG: 2 INJECTION INTRAMUSCULAR; INTRAVENOUS at 23:25

## 2017-01-21 ASSESSMENT — ENCOUNTER SYMPTOMS
NEUROLOGICAL NEGATIVE: 1
ABDOMINAL PAIN: 1
RESPIRATORY NEGATIVE: 1
BACK PAIN: 1
ALLERGIC/IMMUNOLOGIC NEGATIVE: 1
PSYCHIATRIC NEGATIVE: 1
ENDOCRINE NEGATIVE: 1
HEMATOLOGIC/LYMPHATIC NEGATIVE: 1
CONSTITUTIONAL NEGATIVE: 1
CARDIOVASCULAR NEGATIVE: 1
EYES NEGATIVE: 1

## 2017-01-21 NOTE — ED AVS SNAPSHOT
[facility] Emergency Department    [address] [address]    Phone:  [phone]    Fax:  [phone]                                       Will Dodson   MRN: [id number]    Department:  [facility] Emergency Department   Date of Visit:  1/21/2017           After Visit Summary Signature Page     I have received my discharge instructions, and my questions have been answered. I have discussed any challenges I see with this plan with the nurse or doctor.    ..........................................................................................................................................  Patient/Patient Representative Signature      ..........................................................................................................................................  Patient Representative Print Name and Relationship to Patient    ..................................................               ................................................  Date                                            Time    ..........................................................................................................................................  Reviewed by Signature/Title    ...................................................              ..............................................  Date                                                            Time

## 2017-01-21 NOTE — ED AVS SNAPSHOT
[facility] Emergency Department    [address] [address]    Phone:  [phone]    Fax:  [phone]                                       Will Dodson   MRN: [id number]    Department:  [facility] Emergency Department   Date of Visit:  1/21/2017           Patient Information     Date Of Birth          1985        Your diagnoses for this visit were:     RUQ abdominal pain        You were seen by Ced Neumann MD.      Follow-up Information     Follow up with Clinic, [facility]. Schedule an appointment as soon as possible for a visit in 3 days.    Contact information:    [address] [address] [phone]          Discharge Instructions         *Abdominal Pain, Unknown Cause (Female)    The exact cause of your abdominal (stomach) pain is not certain. This does not mean that this is something to worry about, or the right tests were not done. Everyone likes to know the exact cause of the problem, but sometimes with abdominal pain, there is no clear-cut cause, and this could be a good thing. The good news is that your symptoms can be treated, and you will feel better.   Your condition does not seem serious now; however, sometimes the signs of a serious problem may take more time to appear. For this reason, it is important for you to watch for any new symptoms, problems, or worsening of your condition.  Over the next few days, the abdominal pain may come and go, or be continuous. Other common symptoms can include nausea and vomiting. Sometimes it can be difficult to tell if you feel nauseous, you may just feel bad and not associate that feeling with nausea. Constipation, diarrhea, and a fever may go along with the pain.  The pain may continue even if treated correctly over the following days. Depending on how things go, sometimes the cause can become clear and may require further or different treatment. Additional evaluations,  medications, or tests may be needed.  Home care  Your health care provider may prescribe medications for pain, symptoms, or an infection.  Follow the health care provider's instructions for taking these medications.  General care    Rest until your next exam. No strenuous activities.    Try to find positions that ease discomfort. A small pillow placed on the abdomen may help relieve pain.    Something warm on your abdomen (such as a heating pad) may help, but be careful not to burn yourself.  Diet    Do not force yourself to eat, especially if having cramps, vomiting, or diarrhea.    Water is important so you do not get dehydrated. Soup may also be good. Sports drinks may also help, especially if they are not too acidic. Make sure you don't drink sugary drinks as this can make things worse. Take liquids in small amounts. Do not guzzle them.    Caffeine sometimes makes the pain and cramping worse.    Avoid dairy products if you have vomiting or diarrhea.    Don't eat large amounts at a time. Wait a few minutes between bites.    Eat a diet low in fiber (called a low-residue diet). Foods allowed include refined breads, white rice, fruit and vegetable juices without pulp, tender meats. These foods will pass more easily through the intestine.    Avoid fried or fatty foods, dairy, alcohol and spicy foods until your symptoms go away.  Follow-up care  Follow up with your health care provider as instructed, or if your pain does not begin to improve in the next 24 hours.  When to seek medical care  Seek prompt medical care if any of the following occur:    Pain gets worse or moves to the right lower abdomen    New or worsening vomiting or diarrhea    Swelling of the abdomen    Unable to pass stool for more than three days    New fever over 101  F (38.3 C), or rising fever    Blood in vomit or bowel movements (dark red or black color)    Jaundice (yellow color of eyes and skin)    Weakness, dizziness    Chest, arm, back, neck  or jaw pain    Unexpected vaginal bleeding or missed period  Call 911  Call emergency services if any of the following occur:    Trouble breathing    Confusion    Fainting or loss of consciousness    Rapid heart rate    Seizure    [id number] [employer], [address], [place] [address]. All rights reserved. This information is not intended as a substitute for professional medical care. Always follow your healthcare professional's instructions.      Naproxen 500 mg 2 times daily with food.  Warm pack to affected area.  Return if not improving or worse.      Future Appointments        Provider Department Dept Phone Center    1/24/2017 5:00 PM Jose WRIGHTKevin [facility] [phone] [facility]    2/3/2017 10:45 AM Noah Deshpande MD [facility] [phone] [facility]      24 Hour Appointment Hotline       To make an appointment at any [facility], call [phone] ([phone]). If you don't have a family doctor or clinic, we will help you find one. [facility] are conveniently located to serve the needs of you and your family.             Review of your medicines      START taking        Dose / Directions Last dose taken    naproxen 500 MG tablet   Commonly known as:  NAPROSYN   Dose:  500 mg   Quantity:  20 tablet        Take 1 tablet (500 mg) by mouth 2 times daily (with meals) for 10 days   Refills:  0          Our records show that you are taking the medicines listed below. If these are incorrect, please call your family doctor or clinic.        Dose / Directions Last dose taken    albuterol 108 (90 BASE) MCG/ACT Inhaler   Commonly known as:  albuterol   Dose:  2 puff   Quantity:  1 Inhaler        Inhale 2 puffs into the lungs every 4 hours as needed for shortness of breath / dyspnea   Refills:  0        levonorgestrel 20 MCG/24HR IUD   Commonly known as:  MIRENA   Dose:  1 each        1 each (20 mcg) by Intrauterine route continuous    Refills:  0                Prescriptions were sent or printed at these locations (1 Prescription)                   Other Prescriptions                Printed at Department/Unit printer (1 of 1)         naproxen (NAPROSYN) 500 MG tablet                Procedures and tests performed during your visit     CBC with platelets differential    CRP inflammation    CT Abdomen Pelvis w/o Contrast    Comprehensive metabolic panel    Lipase      Orders Needing Specimen Collection     None      Pending Results     Date and Time Order Name Status Description    1/21/2017 7185 CT Abdomen Pelvis w/o Contrast Preliminary             Pending Culture Results     No orders found for last 2 day(s).       Test Results from your hospital stay           1/21/2017 11:36 PM - Interface, Flexilab Results      Component Results     Component Value Ref Range & Units Status    WBC 10.7 4.0 - 11.0 10e9/L Final    RBC Count 4.42 3.8 - 5.2 10e12/L Final    Hemoglobin 13.3 11.7 - 15.7 g/dL Final    Hematocrit 39.9 35.0 - 47.0 % Final    MCV 90 78 - 100 fl Final    MCH 30.1 26.5 - 33.0 pg Final    MCHC 33.3 31.5 - 36.5 g/dL Final    RDW 13.0 10.0 - 15.0 % Final    Platelet Count 269 150 - 450 10e9/L Final    Diff Method Automated Method  Final    % Neutrophils 63.4 % Final    % Lymphocytes 24.9 % Final    % Monocytes 10.0 % Final    % Eosinophils 1.4 % Final    % Basophils 0.2 % Final    % Immature Granulocytes 0.1 % Final    Absolute Neutrophil 6.8 1.6 - 8.3 10e9/L Final    Absolute Lymphocytes 2.7 0.8 - 5.3 10e9/L Final    Absolute Monocytes 1.1 0.0 - 1.3 10e9/L Final    Absolute Eosinophils 0.2 0.0 - 0.7 10e9/L Final    Absolute Basophils 0.0 0.0 - 0.2 10e9/L Final    Abs Immature Granulocytes 0.0 0 - 0.4 10e9/L Final         1/21/2017 11:54 PM - Interface, Flexilab Results      Component Results     Component Value Ref Range & Units Status    CRP Inflammation 54.3 (H) 0.0 - 8.0 mg/L Final         1/21/2017 11:54 PM - Interface, Flexilab  Results      Component Results     Component Value Ref Range & Units Status    Sodium 140 133 - 144 mmol/L Final    Potassium 3.7 3.4 - 5.3 mmol/L Final    Chloride 106 94 - 109 mmol/L Final    Carbon Dioxide 24 20 - 32 mmol/L Final    Anion Gap 10 3 - 14 mmol/L Final    Glucose 77 70 - 99 mg/dL Final    Urea Nitrogen 15 7 - 30 mg/dL Final    Creatinine 0.57 0.52 - 1.04 mg/dL Final    GFR Estimate >90  Non  GFR Calc   >60 mL/min/1.7m2 Final    GFR Estimate If Black >90   GFR Calc   >60 mL/min/1.7m2 Final    Calcium 9.3 8.5 - 10.1 mg/dL Final    Bilirubin Total 0.1 (L) 0.2 - 1.3 mg/dL Final    Albumin 3.7 3.4 - 5.0 g/dL Final    Protein Total 7.9 6.8 - 8.8 g/dL Final    Alkaline Phosphatase 70 40 - 150 U/L Final    ALT 16 0 - 50 U/L Final    AST 18 0 - 45 U/L Final         1/21/2017 11:51 PM - Interface, Flexilab Results      Component Results     Component Value Ref Range & Units Status    Lipase 200 73 - 393 U/L Final         1/22/2017 12:03 AM - Interface, Radiant Ib      Narrative     CT ABDOMEN PELVIS WITHOUT CONTRAST   1/21/2017 11:57 PM      HISTORY:  Right lateral abdominal pain.    TECHNIQUE:  Imaging performed from the diaphragm to the base of the  bladder using the renal stone protocol.  No oral or intravenous  contrast. Radiation dose for this scan was reduced using automated  exposure control, adjustment of the mA and/or kV according to patient  size, or iterative reconstruction technique.     COMPARISON:  4/21/2014.    FINDINGS:  Abdomen:  There is no renal or ureteral stone on either side. No  hydronephrosis. The kidneys appear normal on this noncontrast exam.    The lung bases are unremarkable. Evaluation of the solid abdominal  organs is limited by the lack of intravenous contrast. The liver,  spleen, pancreas and adrenal glands are normal in appearance. The  gallbladder is contracted. There is no abdominal or pelvic lymph node  enlargement.    Pelvis: There is a left  "ovarian cyst measuring 4.4 x 4.3 cm. There is  an IUD in place. No right adnexal mass. Bowel appears normal without  obstruction or inflammation. The appendix is normal. No free  intraperitoneal gas or fluid.        Impression     IMPRESSION:  1. No urinary stone or hydronephrosis.  2. 4.4 cm left ovarian cyst.  3. IUD in place.                Thank you for choosing [facility]       Thank you for choosing [facility] for your care. Our goal is always to provide you with excellent care. Hearing back from our patients is one way we can continue to improve our services. Please take a few minutes to complete the written survey that you may receive in the mail after you visit with us. Thank you!        [facility] Information     [employer] lets you send messages to your doctor, view your test results, renew your prescriptions, schedule appointments and more. To sign up, go to [email or web address] . Click on \"Log in\" on the left side of the screen, which will take you to the Welcome page. Then click on \"Sign up Now\" on the right side of the page.     You will be asked to enter the access code listed below, as well as some personal information. Please follow the directions to create your username and password.     Your access code is: [email or web address]  Expires: 2017  7:06 AM     Your access code will  in 90 days. If you need help or a new code, please call your [facility] clinic or [phone].        Care EveryWhere ID     This is your Care EveryWhere ID. This could be used by other organizations to access your [facility] medical records  [id number]        After Visit Summary       This is your record. Keep this with you and show to your community pharmacist(s) and doctor(s) at your next visit.                  "

## 2017-01-22 VITALS
SYSTOLIC BLOOD PRESSURE: 106 MMHG | WEIGHT: 121 LBS | RESPIRATION RATE: 18 BRPM | TEMPERATURE: 97.3 F | DIASTOLIC BLOOD PRESSURE: 81 MMHG | HEART RATE: 112 BPM | BODY MASS INDEX: 23.75 KG/M2 | HEIGHT: 60 IN | OXYGEN SATURATION: 97 %

## 2017-01-22 PROCEDURE — 25000125 ZZHC RX 250: Performed by: FAMILY MEDICINE

## 2017-01-22 RX ORDER — HYDROMORPHONE HYDROCHLORIDE 1 MG/ML
0.5 INJECTION, SOLUTION INTRAMUSCULAR; INTRAVENOUS; SUBCUTANEOUS ONCE
Status: COMPLETED | OUTPATIENT
Start: 2017-01-22 | End: 2017-01-22

## 2017-01-22 RX ORDER — NAPROXEN 500 MG/1
500 TABLET ORAL 2 TIMES DAILY WITH MEALS
Qty: 20 TABLET | Refills: 0 | Status: SHIPPED | OUTPATIENT
Start: 2017-01-22 | End: 2017-02-01

## 2017-01-22 RX ADMIN — HYDROMORPHONE HYDROCHLORIDE 0.5 MG: 1 INJECTION, SOLUTION INTRAMUSCULAR; INTRAVENOUS; SUBCUTANEOUS at 00:06

## 2017-01-22 NOTE — ED PROVIDER NOTES
History     Chief Complaint   Patient presents with     Abdominal Pain     RUQ.  x 2 days.  pt states she had IUD placed x 2 days ago and has had R flank and RUQ pain since later that day.     HPI  Will Dodson is a 31 year old female, past mental history is significant for generalized anxiety disorder, panic attack, ADHD, depression, nephrolithiasis, presents to the emergency department with concerns of right upper quadrant/right CVA area pain of sudden onset shortly after having an IUD placed 2 days ago with Dr. Wooten.  She states moderate discomfort with placement of the IUD but denies any vaginal discharge or bleeding since that time the procedure and has no lower abdominal discomfort whatsoever.  She had a normal bowel movement earlier today.  Denies any constipation or diarrhea recently.  The pain is described as sharp stabbing intermittent and does have some worsening with movement.  Eating actually made it feel better.  Has taken acetaminophen with minimal discomfort improvement.  No fever or chills or sweats.  She denies urinary frequency urgency or dysuria.  No hematuria.     Active Ambulatory Problems     Diagnosis Date Noted     Prenatal care, subsequent pregnancy 2016     H/O:  2016     Generalized anxiety disorder 2016     Panic attack 10/25/2016     ADHD (attention deficit hyperactivity disorder), combined type 2017     Resolved Ambulatory Problems     Diagnosis Date Noted     No Resolved Ambulatory Problems     Past Medical History   Diagnosis Date     Kidney stone      Chickenpox      Depression      Past Surgical History   Procedure Laterality Date     Gyn surgery       C/section, low transverse       , Low Transverse     Cystoscopy,remv calculus,simple       Social History     Social History     Marital Status: Single     Spouse Name: N/A     Number of Children: N/A     Years of Education: N/A     Occupational History     Not on file.     Social History  Main Topics     Smoking status: Former Smoker -- 0.25 packs/day     Start date: 06/09/2015     Smokeless tobacco: Never Used     Alcohol Use: 0.0 oz/week     0 Standard drinks or equivalent per week      Comment: occas- quit with pregnancy     Drug Use: No     Sexual Activity: Yes     Birth Control/ Protection: Condom     Other Topics Concern     Parent/Sibling W/ Cabg, Mi Or Angioplasty Before 65f 55m? No     Social History Narrative     Family History   Problem Relation Age of Onset     Breast Cancer Mother      Other - See Comments Sister      epilepsy     Substance Abuse Sister      A&D     Hypertension Father      Colon Cancer Paternal Grandfather      Colon Cancer Maternal Grandfather      Depression Mother      No current facility-administered medications for this encounter.     Current Outpatient Prescriptions   Medication     naproxen (NAPROSYN) 500 MG tablet     ciprofloxacin (CIPRO) 500 MG tablet     levonorgestrel (MIRENA) 20 MCG/24HR IUD     albuterol (ALBUTEROL) 108 (90 BASE) MCG/ACT inhaler        Allergies   Allergen Reactions     Blood-Group Specific Substance      Patient has Probable passiive anti D Antibody. Blood Product orders may be delayed.  Draw one red top and two purple top tubes for ALL Type and Screen/ Type and Crossmatch orders.       Vicodin [Hydrocodone-Acetaminophen] Nausea and Vomiting       I have reviewed the Medications, Allergies, Past Medical and Surgical History, and Social History in the Epic system.    Review of Systems   Constitutional: Negative.    HENT: Negative.    Eyes: Negative.    Respiratory: Negative.    Cardiovascular: Negative.    Gastrointestinal: Positive for abdominal pain.   Endocrine: Negative.    Genitourinary: Negative.    Musculoskeletal: Positive for back pain.   Skin: Negative.    Allergic/Immunologic: Negative.    Neurological: Negative.    Hematological: Negative.    Psychiatric/Behavioral: Negative.        Physical Exam   BP: 116/78 mmHg  Pulse:  112  Temp: 97.3  F (36.3  C)  Resp: 18  Height: 152.4 cm (5')  Weight: 54.885 kg (121 lb)  SpO2: 97 %  Physical Exam   Constitutional: She is oriented to person, place, and time. She appears well-developed and well-nourished.   HENT:   Head: Normocephalic and atraumatic.   Right Ear: External ear normal.   Left Ear: External ear normal.   Nose: Nose normal.   Mouth/Throat: Oropharynx is clear and moist.   Eyes: Conjunctivae and EOM are normal. Pupils are equal, round, and reactive to light.   Neck: Normal range of motion. Neck supple.   Cardiovascular: Normal rate, regular rhythm, normal heart sounds and intact distal pulses.    Pulmonary/Chest: Effort normal and breath sounds normal.   Abdominal: Soft. Bowel sounds are normal.   She has no tenderness to palpation in the right upper quadrant.  There is no CVA tenderness.   Musculoskeletal: Normal range of motion.   Neurological: She is alert and oriented to person, place, and time.   Skin: Skin is warm and dry.   Psychiatric: She has a normal mood and affect. Her behavior is normal.   Nursing note and vitals reviewed.      ED Course   Procedures             Critical Care time:  none     Results for orders placed or performed during the hospital encounter of 01/21/17   CT Abdomen Pelvis w/o Contrast    Narrative    CT ABDOMEN PELVIS WITHOUT CONTRAST   1/21/2017 11:57 PM      HISTORY:  Right lateral abdominal pain.    TECHNIQUE:  Imaging performed from the diaphragm to the base of the  bladder using the renal stone protocol.  No oral or intravenous  contrast. Radiation dose for this scan was reduced using automated  exposure control, adjustment of the mA and/or kV according to patient  size, or iterative reconstruction technique.     COMPARISON:  4/21/2014.    FINDINGS:  Abdomen:  There is no renal or ureteral stone on either side. No  hydronephrosis. The kidneys appear normal on this noncontrast exam.    The lung bases are unremarkable. Evaluation of the solid  abdominal  organs is limited by the lack of intravenous contrast. The liver,  spleen, pancreas and adrenal glands are normal in appearance. The  gallbladder is contracted. There is no abdominal or pelvic lymph node  enlargement.    Pelvis: There is a left ovarian cyst measuring 4.4 x 4.3 cm. There is  an IUD in place. No right adnexal mass. Bowel appears normal without  obstruction or inflammation. The appendix is normal. No free  intraperitoneal gas or fluid.      Impression    IMPRESSION:  1. No urinary stone or hydronephrosis.  2. 4.4 cm left ovarian cyst.  3. IUD in place.                 Labs Ordered and Resulted from Time of ED Arrival Up to the Time of Departure from the ED   CRP INFLAMMATION - Abnormal; Notable for the following:     CRP Inflammation 54.3 (*)     All other components within normal limits   COMPREHENSIVE METABOLIC PANEL - Abnormal; Notable for the following:     Bilirubin Total 0.1 (*)     All other components within normal limits   CBC WITH PLATELETS DIFFERENTIAL   LIPASE     12:34 AM  Results reviewed with the patient.  Her pain has had minimal improvement with medication in the emergency department.  Her abdominal examination is benign, she appears comfortable.   Assessments & Plan (with Medical Decision Making)   31-year-old female past medical history reviewed above, presentation the emergency department with concerns of right upper quadrant abdominal pain after placement of IUD approximately 2 days ago with her primary care provider.  Note for that visit was reviewed.  The patient has no lower abdominal pain at all.  She has no vaginal bleeding or unusual vaginal discharge.  She has no urinary tract symptoms. Lab diagnostics show a moderately elevated CRP but without elevation of CBC, CMP or lipase.  The patient had a negative pregnancy test 2 days prior to the emergency department visit and at the time of her IUD placement.  CT scan was obtained for suspicion of possible urolithiasis  based upon the acuity of her symptoms.  The location of pain and the behavior would not be consistent with placement of the IUD.  I would not expect symptoms in the area that she is described related to the IUD and I find no evidence for this.  CT scan was obtained for evaluation of possible urolithiasis primarily and is reviewed as above showing no evidence of urolithiasis, IUD in place and a 4.4 cm left ovarian cyst that would not explain her pain.    At this point I do not have a clear-cut explanation for the patient's right upper quadrant abdominal pain but there is no evidence for an acute abdomen at the present time.  I asked the patient to keep close track of the symptoms, use nonnarcotic pain medication and to return if not improving or worse.      Disclaimer: This note consists of symbols derived from keyboarding, dictation and/or voice recognition software. As a result, there may be errors in the script that have gone undetected. Please consider this when interpreting information found in this chart.      I have reviewed the nursing notes.    I have reviewed the findings, diagnosis, plan and need for follow up with the patient.    Discharge Medication List as of 1/22/2017 12:52 AM      START taking these medications    Details   naproxen (NAPROSYN) 500 MG tablet Take 1 tablet (500 mg) by mouth 2 times daily (with meals) for 10 days, Disp-20 tablet, R-0, Local Print             Final diagnoses:   RUQ abdominal pain       1/21/2017   [facility] EMERGENCY DEPARTMENT      Ced Neumann MD  01/27/17 [id number]

## 2017-01-22 NOTE — DISCHARGE INSTRUCTIONS
*Abdominal Pain, Unknown Cause (Female)    The exact cause of your abdominal (stomach) pain is not certain. This does not mean that this is something to worry about, or the right tests were not done. Everyone likes to know the exact cause of the problem, but sometimes with abdominal pain, there is no clear-cut cause, and this could be a good thing. The good news is that your symptoms can be treated, and you will feel better.   Your condition does not seem serious now; however, sometimes the signs of a serious problem may take more time to appear. For this reason, it is important for you to watch for any new symptoms, problems, or worsening of your condition.  Over the next few days, the abdominal pain may come and go, or be continuous. Other common symptoms can include nausea and vomiting. Sometimes it can be difficult to tell if you feel nauseous, you may just feel bad and not associate that feeling with nausea. Constipation, diarrhea, and a fever may go along with the pain.  The pain may continue even if treated correctly over the following days. Depending on how things go, sometimes the cause can become clear and may require further or different treatment. Additional evaluations, medications, or tests may be needed.  Home care  Your health care provider may prescribe medications for pain, symptoms, or an infection.  Follow the health care provider's instructions for taking these medications.  General care    Rest until your next exam. No strenuous activities.    Try to find positions that ease discomfort. A small pillow placed on the abdomen may help relieve pain.    Something warm on your abdomen (such as a heating pad) may help, but be careful not to burn yourself.  Diet    Do not force yourself to eat, especially if having cramps, vomiting, or diarrhea.    Water is important so you do not get dehydrated. Soup may also be good. Sports drinks may also help, especially if they are not too acidic. Make sure you  don't drink sugary drinks as this can make things worse. Take liquids in small amounts. Do not guzzle them.    Caffeine sometimes makes the pain and cramping worse.    Avoid dairy products if you have vomiting or diarrhea.    Don't eat large amounts at a time. Wait a few minutes between bites.    Eat a diet low in fiber (called a low-residue diet). Foods allowed include refined breads, white rice, fruit and vegetable juices without pulp, tender meats. These foods will pass more easily through the intestine.    Avoid fried or fatty foods, dairy, alcohol and spicy foods until your symptoms go away.  Follow-up care  Follow up with your health care provider as instructed, or if your pain does not begin to improve in the next 24 hours.  When to seek medical care  Seek prompt medical care if any of the following occur:    Pain gets worse or moves to the right lower abdomen    New or worsening vomiting or diarrhea    Swelling of the abdomen    Unable to pass stool for more than three days    New fever over 101  F (38.3 C), or rising fever    Blood in vomit or bowel movements (dark red or black color)    Jaundice (yellow color of eyes and skin)    Weakness, dizziness    Chest, arm, back, neck or jaw pain    Unexpected vaginal bleeding or missed period  Call 911  Call emergency services if any of the following occur:    Trouble breathing    Confusion    Fainting or loss of consciousness    Rapid heart rate    Seizure    [id number] [facility], [address] [place] [address], [place] [address]. All rights reserved. This information is not intended as a substitute for professional medical care. Always follow your healthcare professional's instructions.      Naproxen 500 mg 2 times daily with food.  Warm pack to affected area.  Return if not improving or worse.

## 2017-01-22 NOTE — ED NOTES
Pt presents to ER w c/o abdominal pain x 2 days.  pt states she had IUD placed x 2 days ago and has had R flank and RUQ pain since later that day.

## 2017-01-24 ENCOUNTER — OFFICE VISIT (OUTPATIENT)
Dept: PSYCHOLOGY | Facility: CLINIC | Age: 32
End: 2017-01-24
Attending: FAMILY MEDICINE
Payer: COMMERCIAL

## 2017-01-24 DIAGNOSIS — F90.2 ADHD (ATTENTION DEFICIT HYPERACTIVITY DISORDER), COMBINED TYPE: Primary | ICD-10-CM

## 2017-01-24 PROCEDURE — 90791 PSYCH DIAGNOSTIC EVALUATION: CPT | Performed by: PSYCHOLOGIST
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
## 2017-01-26 ENCOUNTER — TELEPHONE (OUTPATIENT)
Dept: FAMILY MEDICINE | Facility: CLINIC | Age: 32
End: 2017-01-26

## 2017-01-26 ENCOUNTER — HOSPITAL ENCOUNTER (OUTPATIENT)
Dept: CT IMAGING | Facility: CLINIC | Age: 32
End: 2017-01-26
Attending: FAMILY MEDICINE
Payer: COMMERCIAL

## 2017-01-26 ENCOUNTER — HOSPITAL ENCOUNTER (OUTPATIENT)
Dept: ULTRASOUND IMAGING | Facility: CLINIC | Age: 32
End: 2017-01-26
Attending: FAMILY MEDICINE
Payer: COMMERCIAL

## 2017-01-26 ENCOUNTER — OFFICE VISIT (OUTPATIENT)
Dept: FAMILY MEDICINE | Facility: CLINIC | Age: 32
End: 2017-01-26
Payer: COMMERCIAL

## 2017-01-26 VITALS
WEIGHT: 123.8 LBS | HEART RATE: 99 BPM | SYSTOLIC BLOOD PRESSURE: 96 MMHG | TEMPERATURE: 99.5 F | DIASTOLIC BLOOD PRESSURE: 60 MMHG | HEIGHT: 60 IN | OXYGEN SATURATION: 98 % | BODY MASS INDEX: 24.3 KG/M2

## 2017-01-26 DIAGNOSIS — R10.11 RUQ ABDOMINAL PAIN: ICD-10-CM

## 2017-01-26 DIAGNOSIS — R10.11 RUQ ABDOMINAL PAIN: Primary | ICD-10-CM

## 2017-01-26 DIAGNOSIS — N10 ACUTE PYELONEPHRITIS: Primary | ICD-10-CM

## 2017-01-26 DIAGNOSIS — N10 ACUTE PYELONEPHRITIS: ICD-10-CM

## 2017-01-26 LAB
ALBUMIN SERPL-MCNC: 3.5 G/DL (ref 3.4–5)
ALP SERPL-CCNC: 56 U/L (ref 40–150)
ALT SERPL W P-5'-P-CCNC: 13 U/L (ref 0–50)
ANION GAP SERPL CALCULATED.3IONS-SCNC: 11 MMOL/L (ref 3–14)
AST SERPL W P-5'-P-CCNC: 16 U/L (ref 0–45)
BASOPHILS # BLD AUTO: 0 10E9/L (ref 0–0.2)
BASOPHILS NFR BLD AUTO: 0.1 %
BILIRUB SERPL-MCNC: 0.4 MG/DL (ref 0.2–1.3)
BUN SERPL-MCNC: 6 MG/DL (ref 7–30)
CALCIUM SERPL-MCNC: 8.5 MG/DL (ref 8.5–10.1)
CHLORIDE SERPL-SCNC: 105 MMOL/L (ref 94–109)
CO2 SERPL-SCNC: 22 MMOL/L (ref 20–32)
CREAT SERPL-MCNC: 0.63 MG/DL (ref 0.52–1.04)
DIFFERENTIAL METHOD BLD: ABNORMAL
EOSINOPHIL # BLD AUTO: 0 10E9/L (ref 0–0.7)
EOSINOPHIL NFR BLD AUTO: 0.2 %
ERYTHROCYTE [DISTWIDTH] IN BLOOD BY AUTOMATED COUNT: 12.8 % (ref 10–15)
GFR SERPL CREATININE-BSD FRML MDRD: ABNORMAL ML/MIN/1.7M2
GLUCOSE SERPL-MCNC: 86 MG/DL (ref 70–99)
HCT VFR BLD AUTO: 36.3 % (ref 35–47)
HGB BLD-MCNC: 12 G/DL (ref 11.7–15.7)
LYMPHOCYTES # BLD AUTO: 2.1 10E9/L (ref 0.8–5.3)
LYMPHOCYTES NFR BLD AUTO: 13.9 %
MCH RBC QN AUTO: 30.5 PG (ref 26.5–33)
MCHC RBC AUTO-ENTMCNC: 33.1 G/DL (ref 31.5–36.5)
MCV RBC AUTO: 92 FL (ref 78–100)
MONOCYTES # BLD AUTO: 1.9 10E9/L (ref 0–1.3)
MONOCYTES NFR BLD AUTO: 12.6 %
NEUTROPHILS # BLD AUTO: 11.2 10E9/L (ref 1.6–8.3)
NEUTROPHILS NFR BLD AUTO: 73.2 %
PLATELET # BLD AUTO: 262 10E9/L (ref 150–450)
POTASSIUM SERPL-SCNC: 3.5 MMOL/L (ref 3.4–5.3)
PROT SERPL-MCNC: 7.1 G/DL (ref 6.8–8.8)
RBC # BLD AUTO: 3.94 10E12/L (ref 3.8–5.2)
SODIUM SERPL-SCNC: 138 MMOL/L (ref 133–144)
WBC # BLD AUTO: 15.3 10E9/L (ref 4–11)

## 2017-01-26 PROCEDURE — 76705 ECHO EXAM OF ABDOMEN: CPT

## 2017-01-26 PROCEDURE — 25500064 ZZH RX 255 OP 636: Performed by: RADIOLOGY

## 2017-01-26 PROCEDURE — 25000125 ZZHC RX 250: Performed by: RADIOLOGY

## 2017-01-26 PROCEDURE — 85025 COMPLETE CBC W/AUTO DIFF WBC: CPT | Performed by: FAMILY MEDICINE

## 2017-01-26 PROCEDURE — 99214 OFFICE O/P EST MOD 30 MIN: CPT | Performed by: FAMILY MEDICINE

## 2017-01-26 PROCEDURE — 36415 COLL VENOUS BLD VENIPUNCTURE: CPT | Performed by: FAMILY MEDICINE

## 2017-01-26 PROCEDURE — 80053 COMPREHEN METABOLIC PANEL: CPT | Performed by: FAMILY MEDICINE

## 2017-01-26 PROCEDURE — 74177 CT ABD & PELVIS W/CONTRAST: CPT

## 2017-01-26 RX ORDER — CIPROFLOXACIN 500 MG/1
500 TABLET, FILM COATED ORAL 2 TIMES DAILY
Qty: 20 TABLET | Refills: 0 | Status: SHIPPED | OUTPATIENT
Start: 2017-01-26 | End: 2017-02-03

## 2017-01-26 RX ORDER — CIPROFLOXACIN 500 MG/1
500 TABLET, FILM COATED ORAL 2 TIMES DAILY
Qty: 20 TABLET | Refills: 0 | Status: SHIPPED | OUTPATIENT
Start: 2017-01-26 | End: 2017-01-26

## 2017-01-26 RX ADMIN — IOPAMIDOL 61 ML: 755 INJECTION, SOLUTION INTRAVENOUS at 15:33

## 2017-01-26 RX ADMIN — SODIUM CHLORIDE 55 ML: 9 INJECTION, SOLUTION INTRAVENOUS at 15:33

## 2017-01-26 NOTE — NURSING NOTE
Chief Complaint   Patient presents with     Abdominal Pain     patient has been experiencing pain in her right upper quadrant of abdomen and radiating to back beginning 1/19/17; she had an IUD placed that day; went to ER 1/21/17 for similair pain with no relief - continues to have pain        Initial BP 96/60 mmHg  Pulse 99  Temp(Src) 99.5  F (37.5  C) (Tympanic)  Ht 5' (1.524 m)  Wt 123 lb 12.8 oz (56.155 kg)  BMI 24.18 kg/m2  SpO2 98%  LMP 12/17/2016 (Approximate) Estimated body mass index is 24.18 kg/(m^2) as calculated from the following:    Height as of this encounter: 5' (1.524 m).    Weight as of this encounter: 123 lb 12.8 oz (56.155 kg).  BP completed using cuff size: regular

## 2017-01-26 NOTE — PATIENT INSTRUCTIONS
To lab for blood work  Ultrasound at noon (don't eat) Diagnostic imaging  If that is normal then we'll do the CT scan at 3:30pm (Start drinking contrast 2 hours before 1:30 drink half of it then at 2:30 drink the second half)  If it is not normal then I'll call you to see the general surgeon to get the gallbladder out.      Thank you for choosing [facility].  You may be receiving a survey in the mail from [employer] regarding your visit today.  Please take a few minutes to complete and return the survey to let us know how we are doing.      If you have questions or concerns, please contact us via [employer] or you can contact your care team at [phone].    Our Clinic hours are:  Monday 6:40 am  to 7:00 pm  Tuesday -Friday 6:40 am to 5:00 pm    The [place] outpatient lab hours are:  Monday - Friday 6:10 am to 4:45 pm  Saturdays 7:00 am to 11:00 am  Appointments are required, call [phone]    If you have clinical questions after hours or would like to schedule an appointment,  call the clinic at [phone].

## 2017-01-26 NOTE — MR AVS SNAPSHOT
After Visit Summary   1/26/2017    Will Dodson    MRN: [id number]           Patient Information     Date Of Birth          1985        Visit Information        Provider Department      1/26/2017 9:40 AM Norbert Wooten MD [facility]        Today's Diagnoses     RUQ abdominal pain    -  1       Care Instructions    To lab for blood work  Ultrasound at noon (don't eat) Diagnostic imaging  If that is normal then we'll do the CT scan at 3:30pm (Start drinking contrast 2 hours before 1:30 drink half of it then at 2:30 drink the second half)  If it is not normal then I'll call you to see the general surgeon to get the gallbladder out.      Thank you for choosing [facility].  You may be receiving a survey in the mail from Ian Purvis regarding your visit today.  Please take a few minutes to complete and return the survey to let us know how we are doing.      If you have questions or concerns, please contact us via [employer]ode or you can contact your care team at [phone].    Our Clinic hours are:  Monday 6:40 am  to 7:00 pm  Tuesday -Friday 6:40 am to 5:00 pm    The [place] outpatient lab hours are:  Monday - Friday 6:10 am to 4:45 pm  Saturdays 7:00 am to 11:00 am  Appointments are required, call [phone]    If you have clinical questions after hours or would like to schedule an appointment,  call the clinic at [phone].        Follow-ups after your visit        Your next 10 appointments already scheduled     Jan 26, 2017 12:00 PM   US ABDOMEN LIMITED with [id number] [place] [address] Ultrasound ([facility])    [address] [facility] [address]   [phone]           Please bring a list of your medicines (including vitamins, minerals and over-the-counter drugs). Also, tell your doctor about any allergies you may have. Wear comfortable clothes and leave your valuables at home.  Adults: No eating or drinking for 8 hours before the exam. You may  take medicine with a small sip of water.  Children: - Children 6+ years: No food or drink for 6 hours before exam. - Children 1-5 years: No food or drink for 4 hours before exam. - Infants, breast-fed: may have breast milk up to 2 hours before exam. - Infants, formula: may have bottle until 4 hours before exam.  Please call the Imaging Department at your exam site with any questions.            Jan 26, 2017  3:30 PM   CT ABDOMEN PELVIS W CONTRAST with [id number]   [facility] CT ([facility])    [address] [facility] [facility] [address]   [phone]           Please bring any scans or X-rays taken at other hospitals, if similar tests were done. Also bring a list of your medicines, including vitamins, minerals and over-the-counter drugs. It is safest to leave personal items at home.  Be sure to tell your doctor:   If you have any allergies.   If there s any chance you are pregnant.   If you are breastfeeding.   If you have any special needs.  You may have contrast for this exam. To prepare:   Do not eat or drink for 2 hours before your exam. If you need to take medicine, you may take it with small sips of water. (We may ask you to take liquid medicine as well.)   The day before your exam, drink extra fluids at least six 8-ounce glasses (unless your doctor tells you to restrict your fluids).  Patients over 70 or patients with diabetes or kidney problems:   If you haven t had a blood test (creatinine test) within the last 30 days, go to your clinic or Diagnostic Imaging Department for this test.  If you have diabetes:   If your kidney function is normal, continue taking your metformin (Avandamet, Glucophage, Glucovance, Metaglip) on the day of your exam.   If your kidney function is abnormal, wait 48 hours before restarting this medicine.  You will have oral contrast for this exam:   You will drink the contrast at home. Get this from your clinic or Diagnostic Imaging Department. Please follow the  directions given.  Please wear loose clothing, such as a sweat suit or jogging clothes. Avoid snaps, zippers and other metal. We may ask you to undress and put on a hospital gown.  If you have any questions, please call the Imaging Department where you will have your exam.            Feb 03, 2017 10:45 AM   Office Visit with Noah Deshpande MD   [facility]    [address] [facility] [address] [phone]           Bring a current list of meds and any records pertaining to this visit.  For Physicals, please bring immunization records and any forms needing to be filled out.  Please arrive 10 minutes early to complete paperwork.            Feb 22, 2017  2:00 PM   Return Visit with Jose Matta   [facility] ([facility])    [address] [address]   [phone]              Future tests that were ordered for you today     Open Future Orders        Priority Expected Expires Ordered    US Abdomen Limited STAT  1/26/2018 1/26/2017    CT Abdomen Pelvis w Contrast STAT  1/26/2018 1/26/2017            Who to contact     If you have questions or need follow up information about today's clinic visit or your schedule please contact [facility] directly at [phone].  Normal or non-critical lab and imaging results will be communicated to you by Sepatonhart, letter or phone within 4 business days after the clinic has received the results. If you do not hear from us within 7 days, please contact the clinic through Sepatonhart or phone. If you have a critical or abnormal lab result, we will notify you by phone as soon as possible.  Submit refill requests through FoodyDirect or call your pharmacy and they will forward the refill request to us. Please allow 3 business days for your refill to be completed.          Additional Information About Your Visit        FoodyDirect Information     [employer]Direct lets you send messages  "to your doctor, view your test results, renew your prescriptions, schedule appointments and more. To sign up, go to [email or web address] . Click on \"Log in\" on the left side of the screen, which will take you to the Welcome page. Then click on \"Sign up Now\" on the right side of the page.     You will be asked to enter the access code listed below, as well as some personal information. Please follow the directions to create your username and password.     Your access code is: [email or web address]  Expires: 2017 11:05 AM     Your access code will  in 90 days. If you need help or a new code, please call your [facility] clinic or [phone].        Care EveryWhere ID     This is your Care EveryWhere ID. This could be used by other organizations to access your [facility] medical records  [id number]        Your Vitals Were     Pulse Temperature Height BMI (Body Mass Index) Pulse Oximetry Last Period    99 99.5  F (37.5  C) (Tympanic) 5' (1.524 m) 24.18 kg/m2 98% 2016 (Approximate)       Blood Pressure from Last 3 Encounters:   17 96/60   17 106/81   17 108/71    Weight from Last 3 Encounters:   17 123 lb 12.8 oz (56.155 kg)   17 121 lb (54.885 kg)   17 121 lb 12.8 oz (55.248 kg)              We Performed the Following     CBC with platelets differential     Comprehensive metabolic panel        Primary Care Provider Office Phone #    [facility] [phone] [address] [facility] [address]        Thank you!     Thank you for choosing [facility]  for your care. Our goal is always to provide you with excellent care. Hearing back from our patients is one way we can continue to improve our services. Please take a few minutes to complete the written survey that you may receive in the mail after your visit with us. Thank you!             Your Updated Medication List - Protect others around you: Learn how to safely use, store and " throw away your medicines at www.disposemymeds.org.          This list is accurate as of: 1/26/17 11:05 AM.  Always use your most recent med list.                   Brand Name Dispense Instructions for use    albuterol 108 (90 BASE) MCG/ACT Inhaler    albuterol    1 Inhaler    Inhale 2 puffs into the lungs every 4 hours as needed for shortness of breath / dyspnea       levonorgestrel 20 MCG/24HR IUD    MIRENA     1 each (20 mcg) by Intrauterine route continuous       naproxen 500 MG tablet    NAPROSYN    20 tablet    Take 1 tablet (500 mg) by mouth 2 times daily (with meals) for 10 days

## 2017-01-26 NOTE — PROGRESS NOTES
SUBJECTIVE:                                                    Will Dodson is a 31 year old female who presents to clinic today for the following health issues:      ABDOMINAL and FLANK PAIN     Onset: 1/19/17     Description:   Character: Dull ache that is constant and Stabbing intermittent pain worse with laying on side  Location: right upper quadrant right flank  Radiation: Back    Intensity: moderate, severe    Progression of Symptoms:  worsening and constant    Accompanying Signs & Symptoms:  Fever/Chills?: no   Gas/Bloating: no   Nausea: no   Vomitting: no   Diarrhea?: YES  Constipation:no   Dysuria or Hematuria: no    History:   Trauma: no   Previous similar pain: no    Previous tests done: CT    Precipitating factors:   Does the pain change with:     Food: no      BM: no     Urination: no     Alleviating factors:  none    Therapies Tried and outcome: heating pad, naproxen with very mild relief    LMP:  not applicable   Pain started 1/19/17 right under the rib on the right, now right flank and lower to groin and back now.   Last ate last night.  Had a little coffee this morning.    Problem list and histories reviewed & adjusted, as indicated.  Additional history: as documented    Problem list, Medication list, Allergies, and Medical/Social/Surgical histories reviewed in [facility] and updated as appropriate.    ROS:  C: NEGATIVE for fever, chills, change in weight  E/M: NEGATIVE for ear, mouth and throat problems  R: NEGATIVE for significant cough or SOB  CV: NEGATIVE for chest pain, palpitations or peripheral edema    OBJECTIVE:                                                    BP 96/60 mmHg  Pulse 99  Temp(Src) 99.5  F (37.5  C) (Tympanic)  Ht 5' (1.524 m)  Wt 123 lb 12.8 oz (56.155 kg)  BMI 24.18 kg/m2  SpO2 98%  LMP 12/17/2016 (Approximate)  Body mass index is 24.18 kg/(m^2).  GENERAL: healthy, alert and no distress  RESP: lungs clear to auscultation - no rales, rhonchi or wheezes  CV: regular rate  and rhythm, normal S1 S2, no S3 or S4, no murmur, click or rub, no peripheral edema and peripheral pulses strong  ABDOMEN: soft, nontender, no hepatosplenomegaly, positive Lazcano's sign no masses and bowel sounds normal     Has right Involuntary muscle guarding with abdominal palpation  No Rovsing sign (pain in the right lower quadrant with palpation of the left side)  No Obturator sign (pain on flexion and internal rotation of the right hip, which is seen when the inflamed appendix lies in the pelvis and causes irritation of the obturator internus muscle)  Has positive Iliopsoas sign (pain on extension of the right hip, which is found in retrocecal appendicitis)  No Rebound tenderness (elicited by the examiner placing steady pressure with his or her hand in the right lower quadrant for 10 to 15 seconds and then suddenly releasing the pressure; a positive finding consists of increased pain with removal of pressure)     Diagnostic Test Results:  Results for orders placed or performed in visit on 01/26/17 (from the past 24 hour(s))   Comprehensive metabolic panel   Result Value Ref Range    Sodium 138 133 - 144 mmol/L    Potassium 3.5 3.4 - 5.3 mmol/L    Chloride 105 94 - 109 mmol/L    Carbon Dioxide 22 20 - 32 mmol/L    Anion Gap 11 3 - 14 mmol/L    Glucose 86 70 - 99 mg/dL    Urea Nitrogen 6 (L) 7 - 30 mg/dL    Creatinine 0.63 0.52 - 1.04 mg/dL    GFR Estimate >90  Non  GFR Calc   >60 mL/min/1.7m2    GFR Estimate If Black >90   GFR Calc   >60 mL/min/1.7m2    Calcium 8.5 8.5 - 10.1 mg/dL    Bilirubin Total 0.4 0.2 - 1.3 mg/dL    Albumin 3.5 3.4 - 5.0 g/dL    Protein Total 7.1 6.8 - 8.8 g/dL    Alkaline Phosphatase 56 40 - 150 U/L    ALT 13 0 - 50 U/L    AST 16 0 - 45 U/L   CBC with platelets differential   Result Value Ref Range    WBC 15.3 (H) 4.0 - 11.0 10e9/L    RBC Count 3.94 3.8 - 5.2 10e12/L    Hemoglobin 12.0 11.7 - 15.7 g/dL    Hematocrit 36.3 35.0 - 47.0 %    MCV 92 78 - 100  fl    MCH 30.5 26.5 - 33.0 pg    MCHC 33.1 31.5 - 36.5 g/dL    RDW 12.8 10.0 - 15.0 %    Platelet Count 262 150 - 450 10e9/L    Diff Method Automated Method     % Neutrophils 73.2 %    % Lymphocytes 13.9 %    % Monocytes 12.6 %    % Eosinophils 0.2 %    % Basophils 0.1 %    Absolute Neutrophil 11.2 (H) 1.6 - 8.3 10e9/L    Absolute Lymphocytes 2.1 0.8 - 5.3 10e9/L    Absolute Monocytes 1.9 (H) 0.0 - 1.3 10e9/L    Absolute Eosinophils 0.0 0.0 - 0.7 10e9/L    Absolute Basophils 0.0 0.0 - 0.2 10e9/L        ASSESSMENT/PLAN:                                                        Will was seen today for abdominal pain.    Diagnoses and all orders for this visit:    RUQ abdominal pain: concern for gallbladder with RUQ tenderness or appendicitis with Right iliopsoas sign and right flank pain  -US normal gallbladder  -has elevated white count, normal liver tests.  -CT showed inflammation in renal pelvis suspicious for acute pyelonephritis  -patient not having any UTI symptoms  -after hours now so plan to treat acute pyelonephritis empirically without urine  -call to recheck in 5 days. Warned if any vomiting, fevers, unable to take medication or severe worsening pain then go to ER.  -     Comprehensive metabolic panel  -     CBC with platelets differential  -     US Abdomen Limited; Future  -     CT Abdomen Pelvis w Contrast; Future          Norbert Wooten MD  [facility]

## 2017-01-27 RX ORDER — SULFAMETHOXAZOLE/TRIMETHOPRIM 800-160 MG
1 TABLET ORAL 2 TIMES DAILY
Qty: 14 TABLET | Refills: 0 | Status: SHIPPED | OUTPATIENT
Start: 2017-01-27 | End: 2017-02-03

## 2017-01-27 RX ORDER — OXYCODONE AND ACETAMINOPHEN 5; 325 MG/1; MG/1
1 TABLET ORAL EVERY 4 HOURS PRN
Qty: 12 TABLET | Refills: 0 | Status: SHIPPED | OUTPATIENT
Start: 2017-01-27 | End: 2017-02-03

## 2017-01-27 NOTE — TELEPHONE ENCOUNTER
Ok please call patient.  Stop the cipro.  Start bactrim antibiotic one pill twice a day for 7 days.  I printed a prescription for percocet (allergy to vicodin)  Take 1/2 to 1 pill if needed for pain.  She will need to come  the pain medication Rx.  When she come please have her leave a clean catch urine sample as well.    Thanks,  Norbert Wooten MD

## 2017-01-27 NOTE — TELEPHONE ENCOUNTER
Dr. Wooten,    Patient reporting medication reaction after one dose of cipro.  Please advise,. Karishma AMEZQUITA RN

## 2017-01-27 NOTE — TELEPHONE ENCOUNTER
S-(situation): Will calling to report side effects from Cipro.    B-(background): Read in package insert, to contact  If any numbness of fingers/toes experienced.  I did not note any mention of this as a side effect in [facility].    A-(assessment): Toes and fingers turned white and numb after being outside (for only 10 seconds).  Submerged in warm water and color returned, remain slightly numb, able to move them normally, no swelling.    R-(recommendations): Advised to hold med (only took 1 dose so far) until advised otherwise.  If new script ordered, would like to [facility] in [place].  Could you please call and advise what Dr. Wooten would like her to do ?      Thanks  Viola Ventura RN  [facility]

## 2017-01-27 NOTE — TELEPHONE ENCOUNTER
Pt calling stating she is in pain and is wanting something for the pain.  Call transferred to SHALONDA Downey  [facility] [place]

## 2017-01-27 NOTE — PROGRESS NOTES
Quick Note:    Discussed after clinic visit.  Treat for pyelo and leave UA  Norbert Wooten MD    ______

## 2017-02-03 ENCOUNTER — OFFICE VISIT (OUTPATIENT)
Dept: FAMILY MEDICINE | Facility: CLINIC | Age: 32
End: 2017-02-03
Payer: COMMERCIAL

## 2017-02-03 VITALS
HEIGHT: 60 IN | TEMPERATURE: 97.3 F | SYSTOLIC BLOOD PRESSURE: 97 MMHG | OXYGEN SATURATION: 98 % | BODY MASS INDEX: 22.93 KG/M2 | HEART RATE: 84 BPM | WEIGHT: 116.8 LBS | DIASTOLIC BLOOD PRESSURE: 62 MMHG

## 2017-02-03 DIAGNOSIS — Z01.818 PREOP GENERAL PHYSICAL EXAM: Primary | ICD-10-CM

## 2017-02-03 DIAGNOSIS — Z67.91 RHD NEGATIVE: ICD-10-CM

## 2017-02-03 DIAGNOSIS — N64.89 SMALL BREAST: ICD-10-CM

## 2017-02-03 PROCEDURE — 99213 OFFICE O/P EST LOW 20 MIN: CPT | Performed by: FAMILY MEDICINE

## 2017-02-03 NOTE — PATIENT INSTRUCTIONS
To lab for blood work  Before Your Surgery      Call your surgeon if there is any change in your health. This includes signs of a cold or flu (such as a sore throat, runny nose, cough, rash or fever).    Do not smoke, drink alcohol or take over the counter medicine (unless your surgeon or primary care doctor tells you to) for the 24 hours before and after surgery.    If you take prescribed drugs: Follow your doctor s orders about which medicines to take and which to stop until after surgery.    Eating and drinking prior to surgery: follow the instructions from your surgeon  Take a shower or bath the night before surgery. Use the soap your surgeon gave you to gently clean your skin. If you do not have soap from your surgeon, use your regular soap. Do not shave or scrub the surgery site.  Wear clean pajamas and have clean sheets on your bed.           Thank you for choosing [facility].  You may be receiving a survey in the mail from [facility] your visit today.  Please take a few minutes to complete and return the survey to let us know how we are doing.      If you have questions or concerns, please contact us via Tailster or you can contact your care team at [phone].    Our Clinic hours are:  Monday 6:40 am  to 7:00 pm  Tuesday -Friday 6:40 am to 5:00 pm    The [place] outpatient lab hours are:  Monday - Friday 6:10 am to 4:45 pm  Saturdays 7:00 am to 11:00 am  Appointments are required, call [phone]    If you have clinical questions after hours or would like to schedule an appointment,  call the clinic at [phone].

## 2017-02-03 NOTE — PROGRESS NOTES
[facility]  [address] [address]  [phone]  Dept: [phone]    PRE-OP EVALUATION:  Today's date: 2/3/2017    Will Dodson (: 1985) presents for pre-operative evaluation assessment as requested by Dr. Vázquez.  She requires evaluation and anesthesia risk assessment prior to undergoing surgery/procedure for treatment of breast augmentation .  Proposed procedure: breast augmentation    Date of Surgery/ Procedure: 17  Time of Surgery/ Procedure: 9:30 am  Hospital/Surgical Facility: [facility]  Fax number for surgical facility: [phone]  Primary Physician: Aaliyah [facility]  Type of Anesthesia Anticipated: General    Patient has a Health Care Directive or Living Will:  NO    1. NO - Do you have a history of heart attack, stroke, stent, bypass or surgery on an artery in the head, neck, heart or legs?  2. NO - Do you ever have any pain or discomfort in your chest?  3. NO - Do you have a history of  Heart Failure?  4. NO - Are you troubled by shortness of breath when: walking on the level, up a slight hill or at night?  5. YES - Do you currently have a cold, bronchitis or other respiratory infection? Cold slightly runny nose started 4 days ago  6. NO - Do you have a cough, shortness of breath or wheezing?  7. NO - Do you sometimes get pains in the calves of your legs when you walk?  8. NO - Do you or anyone in your family have previous history of blood clots?  9. NO - Do you or does anyone in your family have a serious bleeding problem such as prolonged bleeding following surgeries or cuts?  10. NO - Have you ever had problems with anemia or been told to take iron pills?  11. NO - Have you had any abnormal blood loss such as black, tarry or bloody stools, or abnormal vaginal bleeding?  12. NO - Have you ever had a blood transfusion?  13. NO - Have you or any of your relatives ever had problems with anesthesia?  14. NO - Do you have sleep  apnea, excessive snoring or daytime drowsiness?  15. NO - Do you have any prosthetic heart valves?  16. NO - Do you have prosthetic joints?  17. NO - Is there any chance that you may be pregnant? Patient's last menstrual period was 2016 (approximate).  Mirena IUD    HPI:                                                      Brief HPI related to upcoming procedure: Desiring breast augmentation for the last 3 years since after having kids and weight loss.    MEDICAL HISTORY:                                                      Patient Active Problem List    Diagnosis Date Noted     ADHD (attention deficit hyperactivity disorder), combined type 2017     Priority: Medium     Panic attack 10/25/2016     Priority: Medium     Generalized anxiety disorder 2016     Priority: Medium     Prenatal care, subsequent pregnancy 2016     Priority: Medium     FOB- Leonid Pina       H/O:  2016     C/sec x 2        Past Medical History   Diagnosis Date     Kidney stone      Chickenpox      Depression      Past Surgical History   Procedure Laterality Date     Gyn surgery       C/section, low transverse       , Low Transverse     Cystoscopy,remv calculus,simple       Current Outpatient Prescriptions   Medication Sig Dispense Refill     levonorgestrel (MIRENA) 20 MCG/24HR IUD 1 each (20 mcg) by Intrauterine route continuous       albuterol (ALBUTEROL) 108 (90 BASE) MCG/ACT inhaler Inhale 2 puffs into the lungs every 4 hours as needed for shortness of breath / dyspnea 1 Inhaler 0     OTC products: None, except as noted above    Allergies   Allergen Reactions     Blood-Group Specific Substance      Patient has Probable passiive anti D Antibody. Blood Product orders may be delayed.  Draw one red top and two purple top tubes for ALL Type and Screen/ Type and Crossmatch orders.       Vicodin [Hydrocodone-Acetaminophen] Nausea and Vomiting      Latex Allergy: NO    Social History   Substance  Use Topics     Smoking status: Former Smoker -- 0.25 packs/day     Start date: 06/09/2015     Smokeless tobacco: Never Used     Alcohol Use: 0.0 oz/week     0 Standard drinks or equivalent per week      Comment: occas- quit with pregnancy     History   Drug Use No       REVIEW OF SYSTEMS:                                                    C: NEGATIVE for fever, chills, change in weight  I: NEGATIVE for worrisome rashes, moles or lesions  E: NEGATIVE for vision changes or irritation  E/M: NEGATIVE for ear, mouth and throat problems  R: NEGATIVE for significant cough or SOB  B: NEGATIVE for masses, tenderness or discharge  CV: NEGATIVE for chest pain, palpitations or peripheral edema  GI: NEGATIVE for nausea, abdominal pain, heartburn, or change in bowel habits  : NEGATIVE for frequency, dysuria, or hematuria  M: NEGATIVE for significant arthralgias or myalgia  N: NEGATIVE for weakness, dizziness or paresthesias  E: NEGATIVE for temperature intolerance, skin/hair changes  H: NEGATIVE for bleeding problems  P: NEGATIVE for changes in mood or affect    EXAM:                                                    BP 97/62 mmHg  Pulse 84  Temp(Src) 97.3  F (36.3  C) (Tympanic)  Ht 5' (1.524 m)  Wt 116 lb 12.8 oz (52.98 kg)  BMI 22.81 kg/m2  SpO2 98%  LMP 12/17/2016 (Approximate)    GENERAL APPEARANCE: healthy, alert and no distress     EYES: EOMI,- PERRL     HENT: ear canals and TM's normal and nose and mouth without ulcers or lesions     NECK: no adenopathy, no asymmetry, masses, or scars and thyroid normal to palpation     RESP: lungs clear to auscultation - no rales, rhonchi or wheezes     CV: regular rates and rhythm, normal S1 S2, no S3 or S4 and no murmur, click or rub -     ABDOMEN:  soft, nontender, no HSM or masses and bowel sounds normal     MS: extremities normal- no gross deformities noted, no evidence of inflammation in joints, FROM in all extremities.     SKIN: no suspicious lesions or rashes     NEURO:  Normal strength and tone, sensory exam grossly normal, mentation intact and speech normal     PSYCH: mentation appears normal. and affect normal/bright     LYMPHATICS: No axillary, cervical, inguinal, or supraclavicular nodes    DIAGNOSTICS:                                                    EKG: Not indicated due to non-vascular surgery and low risk of event (age <65 and without cardiac risk factors)    Recent Labs   Lab Test  01/26/17   1111  01/21/17   2320   HGB  12.0  13.3   PLT  262  269   NA  138  140   POTASSIUM  3.5  3.7   CR  0.63  0.57      IMPRESSION:                                                    Reason for surgery/procedure: Breast Augmentation   Diagnosis/reason for consult: Pre-op    The proposed surgical procedure is considered INTERMEDIATE risk.    REVISED CARDIAC RISK INDEX  The patient has the following serious cardiovascular risks for perioperative complications such as (MI, PE, VFib and 3  AV Block):  No serious cardiac risks  INTERPRETATION: 0 risks: Class I (very low risk - 0.4% complication rate)    The patient has the following additional risks for perioperative complications:  No identified additional risks      ICD-10-CM    1. Preop general physical exam Z01.818    2. Small breast N64.89    3. RhD negative Z67.91  with passive Antibody, so if requiring blood transfusion needs to be screened       RECOMMENDATIONS:                                                        --Patient is to take all scheduled medications on the day of surgery EXCEPT for modifications listed below.    APPROVAL GIVEN to proceed with proposed procedure, without further diagnostic evaluation       Signed Electronically by: Norbert Wooten MD    Copy of this evaluation report is provided to requesting physician.    [place] Preop Guidelines

## 2017-02-03 NOTE — NURSING NOTE
Chief Complaint   Patient presents with     Pre-Op Exam     surgery on 2/13/17 at [facility] surgery       Initial BP 97/62 mmHg  Pulse 84  Temp(Src) 97.3  F (36.3  C) (Tympanic)  Ht 5' (1.524 m)  Wt 116 lb 12.8 oz (52.98 kg)  BMI 22.81 kg/m2  SpO2 98%  LMP 12/17/2016 (Approximate) Estimated body mass index is 22.81 kg/(m^2) as calculated from the following:    Height as of this encounter: 5' (1.524 m).    Weight as of this encounter: 116 lb 12.8 oz (52.98 kg).  BP completed using cuff size: regular

## 2017-02-03 NOTE — MR AVS SNAPSHOT
After Visit Summary   2/3/2017    Will Dodson    MRN: [id number]           Patient Information     Date Of Birth          1985        Visit Information        Provider Department      2/3/2017 7:20 AM Norbert Wooten MD [facility]        Today's Diagnoses     Preop general physical exam    -  1     Small breast         RhD negative           Care Instructions    To lab for blood work  Before Your Surgery      Call your surgeon if there is any change in your health. This includes signs of a cold or flu (such as a sore throat, runny nose, cough, rash or fever).    Do not smoke, drink alcohol or take over the counter medicine (unless your surgeon or primary care doctor tells you to) for the 24 hours before and after surgery.    If you take prescribed drugs: Follow your doctor s orders about which medicines to take and which to stop until after surgery.    Eating and drinking prior to surgery: follow the instructions from your surgeon  Take a shower or bath the night before surgery. Use the soap your surgeon gave you to gently clean your skin. If you do not have soap from your surgeon, use your regular soap. Do not shave or scrub the surgery site.  Wear clean pajamas and have clean sheets on your bed.           Thank you for choosing [facility].  You may be receiving a survey in the mail from Dr. Z regarding your visit today.  Please take a few minutes to complete and return the survey to let us know how we are doing.      If you have questions or concerns, please contact us via [employer] or you can contact your care team at [phone].    Our Clinic hours are:  Monday 6:40 am  to 7:00 pm  Tuesday -Friday 6:40 am to 5:00 pm    The [place] outpatient lab hours are:  Monday - Friday 6:10 am to 4:45 pm  Saturdays 7:00 am to 11:00 am  Appointments are required, call [phone]    If you have clinical questions after hours or would like to schedule an appointment,  call  "the clinic at [phone].        Follow-ups after your visit        Your next 10 appointments already scheduled     Feb 03, 2017 10:45 AM   [facility]    [address] [address] [phone]           Bring a current list of meds and any records pertaining to this visit.  For Physicals, please bring immunization records and any forms needing to be filled out.  Please arrive 10 minutes early to complete paperwork.            Feb 22, 2017  2:00 PM   Return Visit with Jose Matta   [facility] ([facility])    [address] [address] [phone]              Who to contact     If you have questions or need follow up information about today's clinic visit or your schedule please contact [facility] directly at [phone].  Normal or non-critical lab and imaging results will be communicated to you by [employer], letter or phone within 4 business days after the clinic has received the results. If you do not hear from us within 7 days, please contact the clinic through [employer] or phone. If you have a critical or abnormal lab result, we will notify you by phone as soon as possible.  Submit refill requests through [employer] or call your pharmacy and they will forward the refill request to us. Please allow 3 business days for your refill to be completed.          Additional Information About Your Visit        [employer] Information     [employer] lets you send messages to your doctor, view your test results, renew your prescriptions, schedule appointments and more. To sign up, go to [email or web address] . Click on \"Log in\" on the left side of the screen, which will take you to the Welcome page. Then click on \"Sign up Now\" on the right side of the page.     You will be asked to enter the access code listed below, as well as some personal information. " Please follow the directions to create your username and password.     Your access code is: [email or web address]  Expires: 2017 11:05 AM     Your access code will  in 90 days. If you need help or a new code, please call your [place] clinic or [phone].        Care EveryWhere ID     This is your Care EveryWhere ID. This could be used by other organizations to access your [place] medical records  [id number]        Your Vitals Were     Pulse Temperature Height BMI (Body Mass Index) Pulse Oximetry Last Period    84 97.3  F (36.3  C) (Tympanic) 5' (1.524 m) 22.81 kg/m2 98% 2016 (Approximate)       Blood Pressure from Last 3 Encounters:   17 97/62   17 96/60   17 106/81    Weight from Last 3 Encounters:   17 116 lb 12.8 oz (52.98 kg)   17 123 lb 12.8 oz (56.155 kg)   17 121 lb (54.885 kg)              We Performed the Following     Hemoglobin          Today's Medication Changes          These changes are accurate as of: 2/3/17  7:51 AM.  If you have any questions, ask your nurse or doctor.               Stop taking these medicines if you haven't already. Please contact your care team if you have questions.     ciprofloxacin 500 MG tablet   Commonly known as:  CIPRO   Stopped by:  Norbert Wooten MD           sulfamethoxazole-trimethoprim 800-160 MG per tablet   Commonly known as:  BACTRIM DS/SEPTRA DS   Stopped by:  Norbert Wooten MD                    Primary Care Provider Office Phone #    [facility] [phone] [address] [address]        Thank you!     Thank you for choosing [facility]  for your care. Our goal is always to provide you with excellent care. Hearing back from our patients is one way we can continue to improve our services. Please take a few minutes to complete the written survey that you may receive in the mail after your visit with us. Thank you!             Your Updated  Medication List - Protect others around you: Learn how to safely use, store and throw away your medicines at www.disposemymeds.org.          This list is accurate as of: 2/3/17  7:51 AM.  Always use your most recent med list.                   Brand Name Dispense Instructions for use    albuterol 108 (90 BASE) MCG/ACT Inhaler    albuterol    1 Inhaler    Inhale 2 puffs into the lungs every 4 hours as needed for shortness of breath / dyspnea       levonorgestrel 20 MCG/24HR IUD    MIRENA     1 each (20 mcg) by Intrauterine route continuous       oxyCODONE-acetaminophen 5-325 MG per tablet    PERCOCET    12 tablet    Take 1 tablet by mouth every 4 hours as needed for pain maximum 4 tablet(s) per day

## 2017-02-22 ENCOUNTER — OFFICE VISIT (OUTPATIENT)
Dept: PSYCHOLOGY | Facility: CLINIC | Age: 32
End: 2017-02-22
Payer: COMMERCIAL

## 2017-02-22 ENCOUNTER — DOCUMENTATION ONLY (OUTPATIENT)
Dept: PSYCHOLOGY | Facility: CLINIC | Age: 32
End: 2017-02-22
Payer: COMMERCIAL

## 2017-02-22 DIAGNOSIS — F90.2 ADHD (ATTENTION DEFICIT HYPERACTIVITY DISORDER), COMBINED TYPE: Primary | ICD-10-CM

## 2017-02-22 PROCEDURE — 90834 PSYTX W PT 45 MINUTES: CPT | Performed by: PSYCHOLOGIST

## 2017-02-22 PROCEDURE — 96101 HC PSYCHOLOGICAL TEST BY PSYCHOLOGIST/MD, PER HR: CPT | Performed by: PSYCHOLOGIST

## 2017-02-22 NOTE — MR AVS SNAPSHOT
"                  MRN:[id number]                      After Visit Summary   2017    Will Dodson    MRN: [id number]           Visit Information        Provider Department      2017 2:00 PM Jose Matta [facility] ADHD      MyChart Information     MyChart lets you send messages to your doctor, view your test results, renew your prescriptions, schedule appointments and more. To sign up, go to [email or web address] . Click on \"Log in\" on the left side of the screen, which will take you to the Welcome page. Then click on \"Sign up Now\" on the right side of the page.     You will be asked to enter the access code listed below, as well as some personal information. Please follow the directions to create your username and password.     Your access code is: [email or web address]  Expires: 2017 11:05 AM     Your access code will  in 90 days. If you need help or a new code, please call your [place] clinic or [phone].        Care EveryWhere ID     This is your Care EveryWhere ID. This could be used by other organizations to access your [place] medical records  [id number]        "

## 2017-02-23 ENCOUNTER — FCC EXTENDED DOCUMENTATION (OUTPATIENT)
Dept: PSYCHOLOGY | Facility: CLINIC | Age: 32
End: 2017-02-23

## 2017-02-23 NOTE — PROGRESS NOTES
Progress Note  Disclaimer: This note consists of symbols derived from keyboarding, dictation and/or voice recognition software. As a result, there may be errors in the script that have gone undetected. Please consider this when interpreting information found in this chart.    Client Name: Will Dodson  Date: 2/22/2017         Service Type: Individual      Session Start Time: 2:00 PM  Session End Time: 2:45 PM      Session Length: 45     Session #: 3     Attendees: Client attended alone         DATA   ADHD Assessment feedback session. Reviewed results of testing. See [facility] extended documentation for results. Explained diagnosis and treatment options. Recommended medication evaluation be scheduled with PCP. Also provided and reviewed ADHD patient handout. Pt will schedule follow-up with me after seeing PCP.   Progress Since Last Session (Related to Symptoms / Goals / Homework):   Symptoms: Stable    Homework: Achieved / completed to satisfaction      Episode of Care Goals: Satisfactory progress - PREPARATION (Decided to change - considering how); Intervened by negotiating a change plan and determining options / strategies for behavior change, identifying triggers, exploring social supports, and working towards setting a date to begin behavior change     Current / Ongoing Stressors and Concerns:   mother of 2     Treatment Objective(s) Addressed in This Session:   reviewed testing  and made recommendations       Intervention:   psychoeducation        ASSESSMENT: Current Emotional / Mental Status (status of significant symptoms):   Risk status (Self / Other harm or suicidal ideation)   Client denies current fears or concerns for personal safety.   Client denies current or recent suicidal ideation or behaviors.   Client denies current or recent homicidal ideation or behaviors.   Client denies current or recent self injurious behavior or ideation.   Client denies other safety  concerns.   A safety and risk management plan has not been developed at this time, however client was given the after-hours number / 911 should there be a change in any of these risk factors.     Appearance:   Appropriate    Eye Contact:   Good    Psychomotor Behavior: Hyperactive    Attitude:   Cooperative    Orientation:   All   Speech    Rate / Production: Normal     Volume:  Normal    Mood:    Normal   Affect:    Appropriate    Thought Content:  Clear    Thought Form:  Coherent  Logical    Insight:    Good      Medication Review:   No current psychiatric medications prescribed     Medication Compliance:   NA     Changes in Health Issues:   None reported     Chemical Use Review:   Substance Use: Chemical use reviewed, no active concerns identified      Tobacco Use: No current tobacco use.       Collateral Reports Completed:   Not Applicable    PLAN: (Client Tasks / Therapist Tasks / Other)  Client to schedule medication evaluation with PCP.Plan to see me in about 6 weeks after starting medications..        Jose Matta

## 2017-03-09 ENCOUNTER — OFFICE VISIT (OUTPATIENT)
Dept: FAMILY MEDICINE | Facility: CLINIC | Age: 32
End: 2017-03-09
Payer: COMMERCIAL

## 2017-03-09 VITALS
TEMPERATURE: 98.1 F | SYSTOLIC BLOOD PRESSURE: 102 MMHG | HEART RATE: 77 BPM | WEIGHT: 126 LBS | OXYGEN SATURATION: 98 % | BODY MASS INDEX: 24.74 KG/M2 | HEIGHT: 60 IN | DIASTOLIC BLOOD PRESSURE: 70 MMHG

## 2017-03-09 DIAGNOSIS — F90.2 ADHD (ATTENTION DEFICIT HYPERACTIVITY DISORDER), COMBINED TYPE: Primary | ICD-10-CM

## 2017-03-09 PROCEDURE — 99213 OFFICE O/P EST LOW 20 MIN: CPT | Performed by: FAMILY MEDICINE

## 2017-03-09 RX ORDER — LISDEXAMFETAMINE DIMESYLATE 30 MG/1
30 CAPSULE ORAL EVERY MORNING
Qty: 30 CAPSULE | Refills: 0 | Status: SHIPPED | OUTPATIENT
Start: 2017-03-09 | End: 2017-03-16

## 2017-03-09 NOTE — NURSING NOTE
Chief Complaint   Patient presents with     Medication Request     ADHD meds; was seen by Dr. Matta for evaluation       Initial /70  Pulse 77  Temp 98.1  F (36.7  C) (Tympanic)  Ht 5' (1.524 m)  Wt 126 lb (57.2 kg)  SpO2 98%  BMI 24.61 kg/m2 Estimated body mass index is 24.61 kg/(m^2) as calculated from the following:    Height as of this encounter: 5' (1.524 m).    Weight as of this encounter: 126 lb (57.2 kg).  Medication Reconciliation: complete

## 2017-03-09 NOTE — PROGRESS NOTES
SUBJECTIVE:                                                    Will Dodson is a 31 year old female who presents to clinic today for the following health issues:      Chief Complaint   Patient presents with     Medication Request     ADHD meds; was seen by Dr. Matta for evaluation     Sister takes Vyvanse and works well for her.    Did ADHD adult assessment with Jose Matta 2017 diagnosed with ADHD combined.  Current symptoms include difficulty focusing and are present in the context of social, work, and home.      Patient Active Problem List    Diagnosis Date Noted     RhD negative 2017     Priority: Medium     With passive D antibody       ADHD (attention deficit hyperactivity disorder), combined type 2017     Priority: Medium     Panic attack 10/25/2016     Priority: Medium     Generalized anxiety disorder 2016     Priority: Medium     H/O:  2016     Priority: Medium     C/sec x 2       Prenatal care, subsequent pregnancy 2016     Priority: Medium     RACHELE Pina                Review of Systems:   EYES: no acute vision problems or changes  CV: no chest pain, no palpitations, no new or worsening peripheral edema  GI: no nausea, no vomiting, no constipation, no diarrhea  NEURO: no weakness, no dizziness, no syncope, no headaches, no tremors, no tics  PSYCHIATRIC: NEGATIVE for changes in mood or affect  CONSTITUTIONAL: no fever, no chills, no fatigue, no unexpected change in weight, no appetite changes.  SKIN: no worrisome rashes, no worrisome lesions       Current Outpatient Prescriptions   Medication Sig Dispense Refill     lisdexamfetamine (VYVANSE) 30 MG capsule Take 1 capsule (30 mg) by mouth every morning 30 capsule 0     levonorgestrel (MIRENA) 20 MCG/24HR IUD 1 each (20 mcg) by Intrauterine route continuous       albuterol (ALBUTEROL) 108 (90 BASE) MCG/ACT inhaler Inhale 2 puffs into the lungs every 4 hours as needed for shortness of breath / dyspnea  1 Inhaler 0        Allergies   Allergen Reactions     Blood-Group Specific Substance      Patient has Probable passiive anti D Antibody. Blood Product orders may be delayed.  Draw one red top and two purple top tubes for ALL Type and Screen/ Type and Crossmatch orders.       Vicodin [Hydrocodone-Acetaminophen] Nausea and Vomiting          Physical Exam:     Vitals:    03/09/17 0837   BP: 102/70   Pulse: 77   Temp: 98.1  F (36.7  C)   TempSrc: Tympanic   SpO2: 98%   Weight: 126 lb (57.2 kg)   Height: 5' (1.524 m)     Body mass index is 24.61 kg/(m^2).  GENERAL:: healthy, alert and no distress  CV: regular rates and rhythm, normal S1 S2, no S3 or S4 and no murmur, no click or rub   NEURO: strength and tone- normal, sensory exam- grossly normal, no tremor.  PSYCH: Alert and oriented times 3; speech- coherent , normal rate and volume; able to articulate logical thoughts, able to abstract reason, no tangential thoughts, no hallucinations or delusions, affect- normal  MENTAL STATUS EXAM:  Appearance/Behavior: No apparent distress, Casually groomed and Appears stated age  Speech: Normal  Mood/Affect: normal affect  Insight: Adequate  Assessment and Plan   Will was seen today for medication request.    Diagnoses and all orders for this visit:    ADHD (attention deficit hyperactivity disorder), combined type: new diagnosis after assessment 1/24/17  -plan to start vyvanse  --discussed risks, benefits, and side effects of this new medication. Patient understands and is in agreement.  Call if insurance will not cover  -next visit contract  -     lisdexamfetamine (VYVANSE) 30 MG capsule; Take 1 capsule (30 mg) by mouth every morning        Options for treatment and follow-up care were reviewed with the patient and/or guardian. Will Dodson and/or guardian engaged in the decision making process and verbalized understanding of the options discussed and agreed with the final plan.    Norbert Wooten MD  [facility]  [place]

## 2017-03-09 NOTE — PATIENT INSTRUCTIONS
Plan to start Vyvanse at the 30mg dose.  Next clinic visit in 1 months, call sooner if any issues with medication or insurance.  Have [employer] call if not covered by insurance.      Thank you for choosing [facility].  You may be receiving a survey in the mail from Ian Purvis regarding your visit today.  Please take a few minutes to complete and return the survey to let us know how we are doing.      If you have questions or concerns, please contact us via [employer] or you can contact your care team at [phone].    Our Clinic hours are:  Monday 6:40 am  to 7:00 pm  Tuesday -Friday 6:40 am to 5:00 pm    The [place] outpatient lab hours are:  Monday - Friday 6:10 am to 4:45 pm  Saturdays 7:00 am to 11:00 am  Appointments are required, call [phone]    If you have clinical questions after hours or would like to schedule an appointment,  call the clinic at [phone].

## 2017-03-09 NOTE — MR AVS SNAPSHOT
After Visit Summary   3/9/2017    Will Dodson    MRN: [id number]           Patient Information     Date Of Birth          1985        Visit Information        Provider Department      3/9/2017 8:20 AM Norbert Wooten MD [facility]        Today's Diagnoses     ADHD (attention deficit hyperactivity disorder), combined type    -  1      Care Instructions    Plan to start Vyvanse at the 30mg dose.  Next clinic visit in 1 months, call sooner if any issues with medication or insurance.  Have [employer] call if not covered by insurance.      Thank you for choosing [facility].  You may be receiving a survey in the mail from [employer] regarding your visit today.  Please take a few minutes to complete and return the survey to let us know how we are doing.      If you have questions or concerns, please contact us via [facility] or you can contact your care team at [phone].    Our Clinic hours are:  Monday 6:40 am  to 7:00 pm  Tuesday -Friday 6:40 am to 5:00 pm    The [place] outpatient lab hours are:  Monday - Friday 6:10 am to 4:45 pm  Saturdays 7:00 am to 11:00 am  Appointments are required, call [phone]    If you have clinical questions after hours or would like to schedule an appointment,  call the clinic at [phone].        Follow-ups after your visit        Who to contact     If you have questions or need follow up information about today's clinic visit or your schedule please contact [facility] directly at [phone].  Normal or non-critical lab and imaging results will be communicated to you by [employer], letter or phone within 4 business days after the clinic has received the results. If you do not hear from us within 7 days, please contact the clinic through [facility] or phone. If you have a critical or abnormal lab result, we will notify you by phone as soon as possible.  Submit refill requests through [facility] or call your pharmacy and they will  "forward the refill request to us. Please allow 3 business days for your refill to be completed.          Additional Information About Your Visit        EXENDISharAudanika Information     [employer] lets you send messages to your doctor, view your test results, renew your prescriptions, schedule appointments and more. To sign up, go to [email or web address] . Click on \"Log in\" on the left side of the screen, which will take you to the Welcome page. Then click on \"Sign up Now\" on the right side of the page.     You will be asked to enter the access code listed below, as well as some personal information. Please follow the directions to create your username and password.     Your access code is: [email or web address]  Expires: 2017 11:05 AM     Your access code will  in 90 days. If you need help or a new code, please call your [place] clinic or [phone].        Care EveryWhere ID     This is your Care EveryWhere ID. This could be used by other organizations to access your [place] medical records  [id number]        Your Vitals Were     Pulse Temperature Height Pulse Oximetry BMI (Body Mass Index)       77 98.1  F (36.7  C) (Tympanic) 5' (1.524 m) 98% 24.61 kg/m2        Blood Pressure from Last 3 Encounters:   17 102/70   17 97/62   17 96/60    Weight from Last 3 Encounters:   17 126 lb (57.2 kg)   17 116 lb 12.8 oz (53 kg)   17 123 lb 12.8 oz (56.2 kg)              Today, you had the following     No orders found for display         Today's Medication Changes          These changes are accurate as of: 3/9/17  9:11 AM.  If you have any questions, ask your nurse or doctor.               Start taking these medicines.        Dose/Directions    lisdexamfetamine 30 MG capsule   Commonly known as:  VYVANSE   Used for:  ADHD (attention deficit hyperactivity disorder), combined type   Started by:  Norbert Wooten MD        Dose:  30 mg   Take 1 capsule (30 mg) by mouth every morning "   Quantity:  30 capsule   Refills:  0            Where to get your medicines      Some of these will need a paper prescription and others can be bought over the counter.  Ask your nurse if you have questions.     Bring a paper prescription for each of these medications     lisdexamfetamine 30 MG capsule                Primary Care Provider Office Phone #    [facility] [phone] [address] [place]  [facility] [address]        Thank you!     Thank you for choosing [facility]  for your care. Our goal is always to provide you with excellent care. Hearing back from our patients is one way we can continue to improve our services. Please take a few minutes to complete the written survey that you may receive in the mail after your visit with us. Thank you!             Your Updated Medication List - Protect others around you: Learn how to safely use, store and throw away your medicines at www.disposemymeds.org.          This list is accurate as of: 3/9/17  9:11 AM.  Always use your most recent med list.                   Brand Name Dispense Instructions for use    albuterol 108 (90 BASE) MCG/ACT Inhaler    albuterol    1 Inhaler    Inhale 2 puffs into the lungs every 4 hours as needed for shortness of breath / dyspnea       levonorgestrel 20 MCG/24HR IUD    MIRENA     1 each (20 mcg) by Intrauterine route continuous       lisdexamfetamine 30 MG capsule    VYVANSE    30 capsule    Take 1 capsule (30 mg) by mouth every morning

## 2017-03-13 ENCOUNTER — TELEPHONE (OUTPATIENT)
Dept: FAMILY MEDICINE | Facility: CLINIC | Age: 32
End: 2017-03-13

## 2017-03-13 DIAGNOSIS — F90.2 ADHD (ATTENTION DEFICIT HYPERACTIVITY DISORDER), COMBINED TYPE: Primary | ICD-10-CM

## 2017-03-14 ENCOUNTER — MEDICAL CORRESPONDENCE (OUTPATIENT)
Dept: HEALTH INFORMATION MANAGEMENT | Facility: CLINIC | Age: 32
End: 2017-03-14

## 2017-03-15 NOTE — TELEPHONE ENCOUNTER
Notify patient.  Awaiting PA response currently to know what they will cover.  Thanks,  Norbert Wooten MD

## 2017-03-15 NOTE — TELEPHONE ENCOUNTER
Pt calling stating she received a call saying this will not be covered, looking for alternative medication, please advise.

## 2017-03-16 RX ORDER — DEXTROAMPHETAMINE SACCHARATE, AMPHETAMINE ASPARTATE MONOHYDRATE, DEXTROAMPHETAMINE SULFATE AND AMPHETAMINE SULFATE 2.5; 2.5; 2.5; 2.5 MG/1; MG/1; MG/1; MG/1
10 CAPSULE, EXTENDED RELEASE ORAL DAILY
Qty: 30 CAPSULE | Refills: 0 | Status: SHIPPED | OUTPATIENT
Start: 2017-03-16 | End: 2017-04-07

## 2017-03-16 NOTE — TELEPHONE ENCOUNTER
Spoke with pt and gave her message below   She gave verbal understanding and signed Rx was placed at  for    With apt reminder     Ava Kaur  Clinic Station

## 2017-03-16 NOTE — TELEPHONE ENCOUNTER
PA for Vyvanse has been denied.  Patient needs to have tried and failed amphetamine-dextroamphetamine extended -release, Methylphenidate extended-release, Dexmethylphenidate, Dextroamphetamine extended-release.

## 2017-03-16 NOTE — TELEPHONE ENCOUNTER
Please call patient.  Insurance will not cover Vyvanse, but will cover Adderall XR, or Ritalin XR.  I did print a prescription for the Adderall XR, start with one pill in the morning the first week, then if needed can increase to 2 pills in the morning (20mg).  Then see me in follow up in about 3 weeks, before run out of medication.  Notify patient when Rx ready to  from .  Thanks,  Norbert Wooten MD

## 2017-04-07 ENCOUNTER — TELEPHONE (OUTPATIENT)
Dept: FAMILY MEDICINE | Facility: CLINIC | Age: 32
End: 2017-04-07

## 2017-04-07 DIAGNOSIS — F90.2 ADHD (ATTENTION DEFICIT HYPERACTIVITY DISORDER), COMBINED TYPE: ICD-10-CM

## 2017-04-07 RX ORDER — DEXTROAMPHETAMINE SACCHARATE, AMPHETAMINE ASPARTATE MONOHYDRATE, DEXTROAMPHETAMINE SULFATE AND AMPHETAMINE SULFATE 2.5; 2.5; 2.5; 2.5 MG/1; MG/1; MG/1; MG/1
10 CAPSULE, EXTENDED RELEASE ORAL DAILY
Qty: 5 CAPSULE | Refills: 0 | Status: SHIPPED | OUTPATIENT
Start: 2017-04-07 | End: 2019-04-22

## 2017-04-07 NOTE — TELEPHONE ENCOUNTER
Pt calling wondering if she could get enough pills until her appt with you on Tuesday April 11th. Pt is coming in with her son and was wondering if she could pick this up yet today.    amphetamine-dextroamphetamine (ADDERALL XR) 10 MG per 24 hr capsule        Last Written Prescription Date:  03/16/17  Last Fill Quantity: 30,   # refills: 0  Last Office Visit with [facility] prescribing provider: 03/09/17  Future Office visit:    Next 5 appointments (look out 90 days)     Apr 11, 2017  7:40 AM CDT   Office Visit with Norbert Wooten MD   [facility]    [address] [address]   [phone]                   Routing refill request to provider for review/approval because:  Drug not on the [facility] [employer] refill protocol or controlled substance    [facility]

## 2017-04-07 NOTE — TELEPHONE ENCOUNTER
Prescription walked to the  for -tried pt but it says number changed or disconnected.    [facility]

## 2017-04-13 ENCOUNTER — OFFICE VISIT (OUTPATIENT)
Dept: FAMILY MEDICINE | Facility: CLINIC | Age: 32
End: 2017-04-13
Payer: COMMERCIAL

## 2017-04-13 VITALS
HEART RATE: 102 BPM | TEMPERATURE: 98.3 F | OXYGEN SATURATION: 100 % | BODY MASS INDEX: 22.62 KG/M2 | HEIGHT: 60 IN | DIASTOLIC BLOOD PRESSURE: 81 MMHG | SYSTOLIC BLOOD PRESSURE: 121 MMHG | WEIGHT: 115.2 LBS

## 2017-04-13 DIAGNOSIS — F90.2 ADHD (ATTENTION DEFICIT HYPERACTIVITY DISORDER), COMBINED TYPE: Primary | ICD-10-CM

## 2017-04-13 PROCEDURE — 99213 OFFICE O/P EST LOW 20 MIN: CPT | Performed by: FAMILY MEDICINE

## 2017-04-13 RX ORDER — DEXTROAMPHETAMINE SACCHARATE, AMPHETAMINE ASPARTATE MONOHYDRATE, DEXTROAMPHETAMINE SULFATE AND AMPHETAMINE SULFATE 7.5; 7.5; 7.5; 7.5 MG/1; MG/1; MG/1; MG/1
30 CAPSULE, EXTENDED RELEASE ORAL DAILY
Qty: 30 CAPSULE | Refills: 0 | Status: SHIPPED | OUTPATIENT
Start: 2017-04-13 | End: 2017-07-20

## 2017-04-13 NOTE — PATIENT INSTRUCTIONS
Next clinic visit in 1 month    ADHD (attention deficit hyperactivity disorder), combined type  -     amphetamine-dextroamphetamine (ADDERALL XR) 30 MG per 24 hr capsule; Take 1 capsule (30 mg) by mouth daily          Thank you for choosing [facility].  You may be receiving a survey in the mail from Ian Purvis regarding your visit today.  Please take a few minutes to complete and return the survey to let us know how we are doing.      If you have questions or concerns, please contact us via [employer] or you can contact your care team at [phone].    Our Clinic hours are:  Monday 6:40 am  to 7:00 pm  Tuesday -Friday 6:40 am to 5:00 pm    The [place] outpatient lab hours are:  Monday - Friday 6:10 am to 4:45 pm  Saturdays 7:00 am to 11:00 am  Appointments are required, call [phone]    If you have clinical questions after hours or would like to schedule an appointment,  call the clinic at [phone].

## 2017-04-13 NOTE — NURSING NOTE
Chief Complaint   Patient presents with     Recheck Medication     Adderall       Initial /81  Pulse 102  Temp 98.3  F (36.8  C) (Tympanic)  Ht 5' (1.524 m)  Wt 115 lb 3.2 oz (52.3 kg)  SpO2 100%  BMI 22.5 kg/m2 Estimated body mass index is 22.5 kg/(m^2) as calculated from the following:    Height as of this encounter: 5' (1.524 m).    Weight as of this encounter: 115 lb 3.2 oz (52.3 kg).  Medication Reconciliation: complete

## 2017-04-13 NOTE — PROGRESS NOTES
SUBJECTIVE:                                                    Will Dodson is a 31 year old female who presents to clinic today for the following health issues:      Medication Followup of Adderall XR    Taking Medication as prescribed: yes    Side Effects:  Dry mouth    Medication Helping Symptoms:  Yes when taking 2 tablets daily    Taking 20mg daily up from the 10mg     Did ADHD adult assessment with Jose Brasherifton 1/24/2017 diagnosed with ADHD combined.  Current symptoms include difficulty focusing and are present in the context of social, work, and home.    Problem list and histories reviewed & adjusted, as indicated.  Additional history: as documented    Reviewed and updated as needed this visit by clinical staff  Tobacco  Allergies  Med Hx  Surg Hx  Fam Hx  Soc Hx      Reviewed and updated as needed this visit by Provider         ROS:  C: NEGATIVE for fever, chills, change in weight, no appetite changes  E/M: NEGATIVE for ear, mouth and throat problems  R: NEGATIVE for significant cough or SOB  CV: NEGATIVE for chest pain, palpitations or peripheral edema  GI: NEGATIVE for nausea, abdominal pain, heartburn, or change in bowel habits    OBJECTIVE:                                                    /81  Pulse 102  Temp 98.3  F (36.8  C) (Tympanic)  Ht 5' (1.524 m)  Wt 115 lb 3.2 oz (52.3 kg)  SpO2 100%  BMI 22.5 kg/m2  Body mass index is 22.5 kg/(m^2).  GENERAL: healthy, alert and no distress  CV: regular rate and rhythm, normal S1 S2, no S3 or S4, no murmur, click or rub, no peripheral edema and peripheral pulses strong  PSYCH: mentation appears normal, affect normal/bright    Diagnostic Test Results:  none      ASSESSMENT/PLAN:                                                        Will was seen today for recheck medication.    Diagnoses and all orders for this visit:    ADHD (attention deficit hyperactivity disorder), combined type: feeling better control on 20mg XR, but wondering if could  get more improvement on the 30mg, so plan to increase.  -next clinic visit in 1 month  -     amphetamine-dextroamphetamine (ADDERALL XR) 30 MG per 24 hr capsule; Take 1 capsule (30 mg) by mouth daily          Norbert Wooten MD  [facility]

## 2017-05-02 ENCOUNTER — HOSPITAL ENCOUNTER (EMERGENCY)
Facility: CLINIC | Age: 32
Discharge: HOME | End: 2017-05-02
Attending: PHYSICIAN ASSISTANT | Admitting: PHYSICIAN ASSISTANT
Payer: COMMERCIAL

## 2017-05-02 VITALS
DIASTOLIC BLOOD PRESSURE: 82 MMHG | HEIGHT: 60 IN | HEART RATE: 90 BPM | SYSTOLIC BLOOD PRESSURE: 109 MMHG | TEMPERATURE: 97.6 F | WEIGHT: 110 LBS | RESPIRATION RATE: 18 BRPM | OXYGEN SATURATION: 98 % | BODY MASS INDEX: 21.6 KG/M2

## 2017-05-02 DIAGNOSIS — K08.89 PAIN, DENTAL: Primary | ICD-10-CM

## 2017-05-02 PROCEDURE — 99213 OFFICE O/P EST LOW 20 MIN: CPT | Performed by: PHYSICIAN ASSISTANT

## 2017-05-02 PROCEDURE — 99213 OFFICE O/P EST LOW 20 MIN: CPT

## 2017-05-02 RX ORDER — OXYCODONE AND ACETAMINOPHEN 5; 325 MG/1; MG/1
1-2 TABLET ORAL EVERY 6 HOURS PRN
Qty: 8 TABLET | Refills: 0 | Status: SHIPPED | OUTPATIENT
Start: 2017-05-02 | End: 2017-07-20

## 2017-05-02 RX ORDER — PENICILLIN V POTASSIUM 500 MG/1
500 TABLET, FILM COATED ORAL 4 TIMES DAILY
Qty: 28 TABLET | Refills: 0 | Status: SHIPPED | OUTPATIENT
Start: 2017-05-02 | End: 2017-05-09

## 2017-05-02 ASSESSMENT — ENCOUNTER SYMPTOMS
CONSTITUTIONAL NEGATIVE: 1
FACIAL SWELLING: 1
RESPIRATORY NEGATIVE: 1

## 2017-05-02 NOTE — ED AVS SNAPSHOT
[facility]    [address] [address]    Phone:  [phone]    Fax:  [phone]                                       Will Dodson   MRN: [id number]    Department:  [facility]   Date of Visit:  5/2/2017           After Visit Summary Signature Page     I have received my discharge instructions, and my questions have been answered. I have discussed any challenges I see with this plan with the nurse or doctor.    ..........................................................................................................................................  Patient/Patient Representative Signature      ..........................................................................................................................................  Patient Representative Print Name and Relationship to Patient    ..................................................               ................................................  Date                                            Time    ..........................................................................................................................................  Reviewed by Signature/Title    ...................................................              ..............................................  Date                                                            Time

## 2017-05-02 NOTE — ED AVS SNAPSHOT
[facility]    [address] [address]    Phone:  [phone]    Fax:  [phone]                                       Will Dodson   MRN: [id number]    Department:  [facility]   Date of Visit:  5/2/2017           Patient Information     Date Of Birth          1985        Your diagnoses for this visit were:     Pain, dental        You were seen by Izabella Alvarado PA-C.      Follow-up Information     Schedule an appointment as soon as possible for a visit with Dentist.        Follow up with [facility] Emergency [facility].    Specialty:  EMERGENCY MEDICINE    Why:  As needed, If symptoms worsen    Contact information:    [address] [address] [phone]    Additional information:    The medical center is located at   [address] in [place], [place] miles north   of [place]).        Discharge Instructions       Many of these clinics offer a sliding fee option for patients that qualify, and see patients on a walk-in or same day basis. Please call each clinic directly. As services, hours, fees and policies vary greatly.          [facility]  [phone]  Sees no insurance  [facility] [place]  [phone]  Preventive services only  [facility] (mult loc) [phone]  [facility]    ([facility]  [phone]  [facility]       [phone]  Preventive services only  [facility]  [address]  Accepts MA & sees no ins  [facility],      Accepts MA & sees no ins   [place]   [phone]; [phone]  [facility]   Accepts MA & sees no ins       [phone]  [facility]  [phone]   Accepts MA emergencies  [facility] [phone]  [facility],     Accepts [place]   [place]   [phone]    [facility] [phone]  Accepts MA & sees no ins   [facility]   Dental Clinic    [phone]  [facility]  [phone]   [facility] [phone]  [facility]  Preventive services only   [place]   [phone]  [facility]) [phone]  [facility]  [phone]  Same day [facility] [phone]  Same day [facility],      Same day [facility]   [phone]    [facility] [place] [phone]  Free clinic, walk-in only  [facility] (multiple locations) [phone]      [facility] [phone]    [facility] [phone]  Free clinic, walk-in only  [facility]  [phone]  [facility] [phone] (adults)       [phone] (children)  [facility] [phone]    Also, referral service for low cost dental and healthcare: [phone]  And 6-717-Dentist        24 Hour Appointment Hotline       To make an appointment at any [facility] call [phone]). If you don't have a family doctor or clinic, we will help you find one. [facility] are conveniently located to serve the needs of you and your family.             Review of your medicines      START taking        Dose / Directions Last dose taken    oxyCODONE-acetaminophen 5-325 MG per tablet   Commonly known as:  PERCOCET   Dose:  1-2 tablet   Quantity:  8 tablet        Take 1-2 tablets by mouth every 6 hours as needed for pain   Refills:  0        penicillin V potassium 500 MG tablet   Commonly known as:  VEETID   Dose:  500 mg   Quantity:  28 tablet        Take 1 tablet (500 mg) by mouth 4 times daily for 7 days   Refills:  0          Our records show that you are taking the  medicines listed below. If these are incorrect, please call your family doctor or clinic.        Dose / Directions Last dose taken    albuterol 108 (90 BASE) MCG/ACT Inhaler   Commonly known as:  albuterol   Dose:  2 puff   Quantity:  1 Inhaler        Inhale 2 puffs into the lungs every 4 hours as needed for shortness of breath / dyspnea   Refills:  0        amphetamine-dextroamphetamine 30 MG per 24 hr capsule   Commonly known as:  ADDERALL XR   Dose:  30 mg   Quantity:  30 capsule        Take 1 capsule (30 mg) by mouth daily   Refills:  0        levonorgestrel 20 MCG/24HR IUD   Commonly known as:  MIRENA   Dose:  1 each        1 each (20 mcg) by Intrauterine route continuous   Refills:  0                Prescriptions were sent or printed at these locations (2 Prescriptions)                   [facility] [address] [place] [address] [address]    [address] [facility] [address]    Telephone:  [phone]   Fax:  [phone]   Hours:                  E-Prescribed (1 of 2)         penicillin V potassium (VEETID) 500 MG tablet                 Printed at Department/Unit printer (1 of 2)         oxyCODONE-acetaminophen (PERCOCET) 5-325 MG per tablet                Orders Needing Specimen Collection     None      Pending Results     No orders found from 4/30/2017 to 5/3/2017.            Pending Culture Results     No orders found from 4/30/2017 to 5/3/2017.            Pending Results Instructions     If you had any lab results that were not finalized at the time of your Discharge, you can call the ED Lab Result RN at [phone]. You will be contacted by this team for any positive Lab results or changes in treatment. The nurses are available 7 days a week from 10A to 6:30P.  You can leave a message 24 hours per day and they will return your call.        Test Results From Your Hospital Stay               Thank you for choosing [facility]       Thank you for choosing [facility] for your care. Our goal is  "always to provide you with excellent care. Hearing back from our patients is one way we can continue to improve our services. Please take a few minutes to complete the written survey that you may receive in the mail after you visit with us. Thank you!        [employer] Information     [employer] lets you send messages to your doctor, view your test results, renew your prescriptions, schedule appointments and more. To sign up, go to [email or web address] . Click on \"Log in\" on the left side of the screen, which will take you to the Welcome page. Then click on \"Sign up Now\" on the right side of the page.     You will be asked to enter the access code listed below, as well as some personal information. Please follow the directions to create your username and password.     Your access code is: [email or web address]  Expires: 2017  6:48 PM     Your access code will  in 90 days. If you need help or a new code, please call your [place] clinic or [phone].        Care EveryWhere ID     This is your Care EveryWhere ID. This could be used by other organizations to access your [place] medical records  [id number]        After Visit Summary       This is your record. Keep this with you and show to your community pharmacist(s) and doctor(s) at your next visit.                  "

## 2017-05-02 NOTE — DISCHARGE INSTRUCTIONS
Many of these clinics offer a sliding fee option for patients that qualify, and see patients on a walk-in or same day basis. Please call each clinic directly. As services, hours, fees and policies vary greatly.          [facility], [facility]  [phone]  Sees no insurance  [facility], [place]  [phone]  Preventive services only  [facility] (mult loc) [phone]  [facility]    ([facility]), [facility]  [phone]  [facility], [place]       [phone]  Preventive services only  [facility]  [phone]  Accepts MA & sees no ins  [facility],      Accepts MA & sees no ins   [place]   [phone]; [phone]  [facility], [facility]   Accepts MA & sees no ins       [phone]  [facility], [facility]  [phone]   Accepts MA emergencies  [facility], [place] [phone]  [facility],     Accepts MA   [place]   [phone]    [facility] [phone]  Accepts MA & sees no ins   [facility]   Dental Clinic    [phone]  [facility], [facility]  [phone]   [facility] [phone]  [facility]  Preventive services only   [place]   [phone]  [facility] [phone]  [facility], [place]  [phone]  Same day [facility] [phone]  Same day [facility],      Same day [facility]   [phone]    [facility], [facility] [phone]  [facility], walk-in only  [facility] (multiple locations) [phone]      [facility] , [facility] [phone]    [facility] [phone]  Free clinic, walk-in only  [facility]  [phone]  [facility] [phone] (adults)       [phone]  (children)  [facility] [phone]    Also, referral service for low cost dental and healthcare: [phone]  And [phone]

## 2017-05-02 NOTE — ED PROVIDER NOTES
History     Chief Complaint   Patient presents with     Dental Pain     left lower  tooth pain   Patent pain off and on since noon       HPI  Will Dodson is a 31 year old female who presents with complaints of left lower tooth pain today.  States she has been intermittently dealing with pain in this tooth for quite come time now but pain became much more severe today.  She describes associated left-sided facial swelling over this lower jaw.  Denies neck pain/stiffness or difficulties handling secretions.  Denies gingival swelling.  Denies fevers, chills, sore throat, or cough.  She does not have a dentist.    I have reviewed the Medications, Allergies, Past Medical and Surgical History, and Social History in the Epic system.    Review of Systems   Constitutional: Negative.    HENT: Positive for dental problem and facial swelling.    Respiratory: Negative.    Skin: Negative.    All other systems reviewed and are negative.      Physical Exam   BP: 109/82  Pulse: 90  Temp: 97.6  F (36.4  C)  Resp: 18  Height: 152.4 cm (5')  Weight: 49.9 kg (110 lb)  SpO2: 98 %  Physical Exam   Constitutional: She is oriented to person, place, and time. She appears well-developed and well-nourished. No distress.   HENT:   Head: Normocephalic and atraumatic.   Right Ear: Tympanic membrane, external ear and ear canal normal.   Left Ear: Tympanic membrane, external ear and ear canal normal.   Nose: Nose normal. No rhinorrhea.   Mouth/Throat: Uvula is midline, oropharynx is clear and moist and mucous membranes are normal. No trismus in the jaw. No dental abscesses or uvula swelling. No oropharyngeal exudate, posterior oropharyngeal edema, posterior oropharyngeal erythema or tonsillar abscesses.   Tooth #17 is erupting through the gingiva and is tender to percussion.  No evidence of dental abscess.  Small amount of localized swelling overlying the left mandible.    Eyes: Conjunctivae and EOM are normal. Pupils are equal, round, and  reactive to light.   Neck: Normal range of motion. Neck supple. No rigidity.   Cardiovascular: Normal rate, regular rhythm and normal heart sounds.    Pulmonary/Chest: Effort normal and breath sounds normal. No stridor. No respiratory distress. She has no wheezes.   Lymphadenopathy:     She has no cervical adenopathy.   Neurological: She is alert and oriented to person, place, and time.   Skin: Skin is warm and dry.       ED Course     ED Course     Procedures      Assessments & Plan (with Medical Decision Making)     DDx: pulpitis, dental abscess, trauma, dry socket, gingivitis, Julián's Angina    Pt is a 31 year old female who presents with complaints of left lower tooth pain today.  States she has been intermittently dealing with pain in this tooth for quite come time now but pain became much more severe today.  She describes associated left-sided facial swelling over this lower jaw.  Pt is afebrile on arrival.  On exam, tooth #17 is erupting through the gingiva and is tender to percussion.  No evidence of dental abscess.  Small amount of localized swelling overlying the left mandible.  Hand-outs provided.    Patient was sent with PCN and Percocet #8 and was instructed to follow-up with a dentist within the next 2 days for continued care and management (she was given a list of local dentists to follow-up with).  She is to return to the ED for persistent and/or worsening symptoms.  Patient expressed understanding of the diagnosis and plan and was discharged home.    I have reviewed the nursing notes.    I have reviewed the findings, diagnosis, plan and need for follow up with the patient.      New Prescriptions    OXYCODONE-ACETAMINOPHEN (PERCOCET) 5-325 MG PER TABLET    Take 1-2 tablets by mouth every 6 hours as needed for pain    PENICILLIN V POTASSIUM (VEETID) 500 MG TABLET    Take 1 tablet (500 mg) by mouth 4 times daily for 7 days       Final diagnoses:   Pain, dental       5/2/2017   [facility]  DEPARTMENT     Izabella Alvarado PA-C  05/02/17 [id number]

## 2017-05-18 ENCOUNTER — OFFICE VISIT (OUTPATIENT)
Dept: FAMILY MEDICINE | Facility: CLINIC | Age: 32
End: 2017-05-18
Payer: COMMERCIAL

## 2017-05-18 VITALS
OXYGEN SATURATION: 100 % | WEIGHT: 114.4 LBS | SYSTOLIC BLOOD PRESSURE: 115 MMHG | DIASTOLIC BLOOD PRESSURE: 81 MMHG | BODY MASS INDEX: 22.46 KG/M2 | HEART RATE: 109 BPM | TEMPERATURE: 98 F | HEIGHT: 60 IN

## 2017-05-18 DIAGNOSIS — F33.1 MODERATE EPISODE OF RECURRENT MAJOR DEPRESSIVE DISORDER (H): ICD-10-CM

## 2017-05-18 DIAGNOSIS — F90.2 ADHD (ATTENTION DEFICIT HYPERACTIVITY DISORDER), COMBINED TYPE: Primary | ICD-10-CM

## 2017-05-18 DIAGNOSIS — F43.10 POSTTRAUMATIC STRESS DISORDER: ICD-10-CM

## 2017-05-18 DIAGNOSIS — F41.1 GENERALIZED ANXIETY DISORDER: ICD-10-CM

## 2017-05-18 PROCEDURE — 99214 OFFICE O/P EST MOD 30 MIN: CPT | Performed by: FAMILY MEDICINE

## 2017-05-18 RX ORDER — FLUOXETINE 20 MG/1
10 TABLET, FILM COATED ORAL DAILY
Qty: 60 TABLET | Refills: 1 | Status: SHIPPED | OUTPATIENT
Start: 2017-05-18 | End: 2017-07-20

## 2017-05-18 RX ORDER — DEXTROAMPHETAMINE SACCHARATE, AMPHETAMINE ASPARTATE MONOHYDRATE, DEXTROAMPHETAMINE SULFATE AND AMPHETAMINE SULFATE 7.5; 7.5; 7.5; 7.5 MG/1; MG/1; MG/1; MG/1
30 CAPSULE, EXTENDED RELEASE ORAL DAILY
Qty: 30 CAPSULE | Refills: 0 | Status: SHIPPED | OUTPATIENT
Start: 2017-06-18 | End: 2017-07-18

## 2017-05-18 RX ORDER — DEXTROAMPHETAMINE SACCHARATE, AMPHETAMINE ASPARTATE MONOHYDRATE, DEXTROAMPHETAMINE SULFATE AND AMPHETAMINE SULFATE 7.5; 7.5; 7.5; 7.5 MG/1; MG/1; MG/1; MG/1
30 CAPSULE, EXTENDED RELEASE ORAL DAILY
Qty: 30 CAPSULE | Refills: 0 | Status: SHIPPED | OUTPATIENT
Start: 2017-07-19 | End: 2017-08-18

## 2017-05-18 RX ORDER — DEXTROAMPHETAMINE SACCHARATE, AMPHETAMINE ASPARTATE MONOHYDRATE, DEXTROAMPHETAMINE SULFATE AND AMPHETAMINE SULFATE 7.5; 7.5; 7.5; 7.5 MG/1; MG/1; MG/1; MG/1
30 CAPSULE, EXTENDED RELEASE ORAL DAILY
Qty: 30 CAPSULE | Refills: 0 | Status: SHIPPED | OUTPATIENT
Start: 2017-05-18 | End: 2017-10-20 | Stop reason: ALTCHOICE

## 2017-05-18 RX ORDER — DEXTROAMPHETAMINE SACCHARATE, AMPHETAMINE ASPARTATE MONOHYDRATE, DEXTROAMPHETAMINE SULFATE AND AMPHETAMINE SULFATE 7.5; 7.5; 7.5; 7.5 MG/1; MG/1; MG/1; MG/1
30 CAPSULE, EXTENDED RELEASE ORAL DAILY
Qty: 30 CAPSULE | Refills: 0 | Status: CANCELLED | OUTPATIENT
Start: 2017-05-18

## 2017-05-18 RX ORDER — PRAZOSIN HYDROCHLORIDE 1 MG/1
1 CAPSULE ORAL AT BEDTIME
Qty: 90 CAPSULE | Refills: 1 | Status: ON HOLD | OUTPATIENT
Start: 2017-05-18 | End: 2017-08-31

## 2017-05-18 ASSESSMENT — ANXIETY QUESTIONNAIRES
5. BEING SO RESTLESS THAT IT IS HARD TO SIT STILL: SEVERAL DAYS
GAD7 TOTAL SCORE: 17
2. NOT BEING ABLE TO STOP OR CONTROL WORRYING: NEARLY EVERY DAY
1. FEELING NERVOUS, ANXIOUS, OR ON EDGE: MORE THAN HALF THE DAYS
7. FEELING AFRAID AS IF SOMETHING AWFUL MIGHT HAPPEN: MORE THAN HALF THE DAYS
3. WORRYING TOO MUCH ABOUT DIFFERENT THINGS: NEARLY EVERY DAY
6. BECOMING EASILY ANNOYED OR IRRITABLE: NEARLY EVERY DAY

## 2017-05-18 ASSESSMENT — PATIENT HEALTH QUESTIONNAIRE - PHQ9: 5. POOR APPETITE OR OVEREATING: NEARLY EVERY DAY

## 2017-05-18 NOTE — PROGRESS NOTES
SUBJECTIVE:                                                    Will Dodson is a 31 year old female who presents to clinic today for the following health issues:    Medication Followup of Adderall    Taking Medication as prescribed: NO-has been out    Side Effects:  None    Medication Helping Symptoms:  Yes    Has been helping with focus until about 2-3pm. Does not seem to make anxiety worse  Did ADHD adult assessment with Jose Paxton 1/24/2017 diagnosed with ADHD combined.  Current symptoms include difficulty focusing and are present in the context of social, work, and home.        Depression and Anxiety Follow-Up    Status since last visit: Worsened     Other associated symptoms:None    Complicating factors:     Significant life event: Yes-  Job loss in October, death in family     Current substance abuse: None    Anxiety gets worse at the end of the day    Has had depression in the past and had been on zoloft, paxil, prozac and most recently effexor and buspar without improvement.    Sleep is hard because having nightmares from PTSD    PHQ-9 SCORE 9/9/2015 8/26/2016 1/17/2017   Total Score - - 14   Total Score 20 8 -     ERYN-7 SCORE 8/11/2016 8/26/2016 10/25/2016   Total Score 11 19 20        PHQ-9  English      PHQ-9   Any Language     GAD7       Amount of exercise or physical activity: 2-3 days/week for an average of 30-45 minutes    Problems taking medications regularly: NA    Medication side effects: NA    Diet: regular (no restrictions)      Problem list and histories reviewed & adjusted, as indicated.  Additional history: as documented    Reviewed and updated as needed this visit by clinical staff  Tobacco  Allergies  Med Hx  Surg Hx  Fam Hx  Soc Hx      Reviewed and updated as needed this visit by Provider         ROS:  C: NEGATIVE for fever, chills, change in weight  R: NEGATIVE for significant cough or SOB  CV: NEGATIVE for chest pain, palpitations or peripheral edema    OBJECTIVE:                                                     /81  Pulse 109  Temp 98  F (36.7  C) (Tympanic)  Ht 5' (1.524 m)  Wt 114 lb 6.4 oz (51.9 kg)  SpO2 100%  BMI 22.34 kg/m2  Body mass index is 22.34 kg/(m^2).  GENERAL: healthy, alert and no distress  PSYCH: mentation appears normal, affect flat, tearful, anxious and judgement and insight intact    Diagnostic Test Results:  none      ASSESSMENT/PLAN:                                                        Will was seen today for recheck medication.    Diagnoses and all orders for this visit:    ADHD (attention deficit hyperactivity disorder), combined type: well controlled at 30mg XR, refill 3 months  -     amphetamine-dextroamphetamine (ADDERALL XR) 30 MG per 24 hr capsule; Take 1 capsule (30 mg) by mouth daily  -     amphetamine-dextroamphetamine (ADDERALL XR) 30 MG per 24 hr capsule; Take 1 capsule (30 mg) by mouth daily  -     amphetamine-dextroamphetamine (ADDERALL XR) 30 MG per 24 hr capsule; Take 1 capsule (30 mg) by mouth daily    Generalized anxiety disorder; worsened with situation    Moderate episode of recurrent major depressive disorder (H): worsened with life stress and death in family  -plan to restart SSRI, thinks prozac worked well in the past  --discussed risks, benefits, and side effects of this new medication. Patient understands and is in agreement.  -Has appt with therapy weekly and new psychiatrist in 2 weeks, so recheck medications then  -     FLUoxetine 20 MG tablet; Take 0.5 tablets (10 mg) by mouth daily For 8 days then increase to a full pill and stay at 20mgs daily    Posttraumatic stress disorder: worsening with nightmares 5-7 times per night  -     prazosin (MINIPRESS) 1 MG capsule; Take 1 capsule (1 mg) by mouth At Bedtime Can increase every 3 nights by one pill if no side effects. Up to 5mg (5 pills)        See Patient Instructions    Norbert Wooten MD  [facility]

## 2017-05-18 NOTE — NURSING NOTE
Chief Complaint   Patient presents with     Recheck Medication       Initial /81  Pulse 109  Temp 98  F (36.7  C) (Tympanic)  Ht 5' (1.524 m)  Wt 114 lb 6.4 oz (51.9 kg)  SpO2 100%  BMI 22.34 kg/m2 Estimated body mass index is 22.34 kg/(m^2) as calculated from the following:    Height as of this encounter: 5' (1.524 m).    Weight as of this encounter: 114 lb 6.4 oz (51.9 kg).  Medication Reconciliation: complete

## 2017-05-18 NOTE — PATIENT INSTRUCTIONS
Start prozac 20mg pill start with 1/2 pill for 8 days then increase to the full pill daily.  This gets your body used to the medication and minimized some of the common starting side effects.    Common starting side effects that usually wear off after 1-2 weeks when your body is used to the medication are nausea, diarrhea, and headaches.  Sometimes happen side effects: weight gain, low libido, restless legs, headaches, fatigue  Very rare but serious side effects: severe mood changes, psychosis, if these happen stop the medication right away and call me.    Start with taking it in the morning for 3 days but if it makes you too sleepy switch to taking it before bedtime.  This medication takes 3-4 weeks to start to see improvement and 6 weeks to see full effect at that dose.    Please schedule either a phone or Mychart or clinic visit in 2 weeks for check about side effect (you already have psychiatry visit so just keep that)  Please schedule a clinic visit in 6 weeks for recheck medication dosing.        ADHD (attention deficit hyperactivity disorder), combined type  I printed 3 months of ADHD medications so bring all 3 to the pharmacy and then get refills from pharmacy every month  -next clinic visit in 3 months for prescription    Generalized anxiety disorder    Moderate episode of recurrent major depressive disorder (H)  -     FLUoxetine 20 MG tablet; Take 0.5 tablets (10 mg) by mouth daily For 8 days then increase to a full pill and stay at 20mgs daily    Posttraumatic stress disorder  -     prazosin (MINIPRESS) 1 MG capsule; Take 1 capsule (1 mg) by mouth At Bedtime Can increase every 3 nights by one pill if no side effects. Up to 5mg (5 pills)      Thank you for choosing [facility].  You may be receiving a survey in the mail from [employer] regarding your visit today.  Please take a few minutes to complete and return the survey to let us know how we are doing.      If you have questions or concerns,  please contact us via [employer] or you can contact your care team at [phone].    Our Clinic hours are:  Monday 6:40 am  to 7:00 pm  Tuesday -Friday 6:40 am to 5:00 pm    The [place] outpatient lab hours are:  Monday - Friday 6:10 am to 4:45 pm  Saturdays 7:00 am to 11:00 am  Appointments are required, call [phone]    If you have clinical questions after hours or would like to schedule an appointment,  call the clinic at [phone].

## 2017-05-18 NOTE — MR AVS SNAPSHOT
After Visit Summary   5/18/2017    Will Dodson    MRN: [id number]           Patient Information     Date Of Birth          1985        Visit Information        Provider Department      5/18/2017 7:00 AM Norbert Wooten MD [facility]        Today's Diagnoses     ADHD (attention deficit hyperactivity disorder), combined type    -  1    Generalized anxiety disorder        Moderate episode of recurrent major depressive disorder (H)        Posttraumatic stress disorder          Care Instructions      Start prozac 20mg pill start with 1/2 pill for 8 days then increase to the full pill daily.  This gets your body used to the medication and minimized some of the common starting side effects.    Common starting side effects that usually wear off after 1-2 weeks when your body is used to the medication are nausea, diarrhea, and headaches.  Sometimes happen side effects: weight gain, low libido, restless legs, headaches, fatigue  Very rare but serious side effects: severe mood changes, psychosis, if these happen stop the medication right away and call me.    Start with taking it in the morning for 3 days but if it makes you too sleepy switch to taking it before bedtime.  This medication takes 3-4 weeks to start to see improvement and 6 weeks to see full effect at that dose.    Please schedule either a phone or Mychart or clinic visit in 2 weeks for check about side effect (you already have psychiatry visit so just keep that)  Please schedule a clinic visit in 6 weeks for recheck medication dosing.        ADHD (attention deficit hyperactivity disorder), combined type  I printed 3 months of ADHD medications so bring all 3 to the pharmacy and then get refills from pharmacy every month  -next clinic visit in 3 months for prescription    Generalized anxiety disorder    Moderate episode of recurrent major depressive disorder (H)  -     FLUoxetine 20 MG tablet; Take 0.5 tablets (10 mg) by mouth  daily For 8 days then increase to a full pill and stay at 20mgs daily    Posttraumatic stress disorder  -     prazosin (MINIPRESS) 1 MG capsule; Take 1 capsule (1 mg) by mouth At Bedtime Can increase every 3 nights by one pill if no side effects. Up to 5mg (5 pills)      Thank you for choosing [facility].  You may be receiving a survey in the mail from Ian Purvis regarding your visit today.  Please take a few minutes to complete and return the survey to let us know how we are doing.      If you have questions or concerns, please contact us via RealGravity or you can contact your care team at [phone].    Our Clinic hours are:  Monday 6:40 am  to 7:00 pm  Tuesday -Friday 6:40 am to 5:00 pm    The [place] outpatient lab hours are:  Monday - Friday 6:10 am to 4:45 pm  Saturdays 7:00 am to 11:00 am  Appointments are required, call [phone]    If you have clinical questions after hours or would like to schedule an appointment,  call the clinic at [phone].        Follow-ups after your visit        Who to contact     If you have questions or need follow up information about today's clinic visit or your schedule please contact [facility] directly at [phone].  Normal or non-critical lab and imaging results will be communicated to you by WeDuchart, letter or phone within 4 business days after the clinic has received the results. If you do not hear from us within 7 days, please contact the clinic through CloudAptitudet or phone. If you have a critical or abnormal lab result, we will notify you by phone as soon as possible.  Submit refill requests through RealGravity or call your pharmacy and they will forward the refill request to us. Please allow 3 business days for your refill to be completed.          Additional Information About Your Visit        RealGravity Information     RealGravity lets you send messages to your doctor, view your test results, renew your prescriptions, schedule appointments and more. To  "sign up, go to [email or web address] . Click on \"Log in\" on the left side of the screen, which will take you to the Welcome page. Then click on \"Sign up Now\" on the right side of the page.     You will be asked to enter the access code listed below, as well as some personal information. Please follow the directions to create your username and password.     Your access code is: [email or web address]  Expires: 2017  6:48 PM     Your access code will  in 90 days. If you need help or a new code, please call your [place] clinic or [phone].        Care EveryWhere ID     This is your Care EveryWhere ID. This could be used by other organizations to access your [place] medical records  [id number]        Your Vitals Were     Pulse Temperature Height Pulse Oximetry BMI (Body Mass Index)       109 98  F (36.7  C) (Tympanic) 5' (1.524 m) 100% 22.34 kg/m2        Blood Pressure from Last 3 Encounters:   17 115/81   17 109/82   17 121/81    Weight from Last 3 Encounters:   17 114 lb 6.4 oz (51.9 kg)   17 110 lb (49.9 kg)   17 115 lb 3.2 oz (52.3 kg)              Today, you had the following     No orders found for display         Today's Medication Changes          These changes are accurate as of: 17  7:46 AM.  If you have any questions, ask your nurse or doctor.               Start taking these medicines.        Dose/Directions    FLUoxetine 20 MG tablet   Used for:  Moderate episode of recurrent major depressive disorder (H)   Started by:  Norbert Wooten MD        Dose:  10 mg   Take 0.5 tablets (10 mg) by mouth daily For 8 days then increase to a full pill and stay at 20mgs daily   Quantity:  60 tablet   Refills:  1       prazosin 1 MG capsule   Commonly known as:  MINIPRESS   Used for:  Posttraumatic stress disorder   Started by:  Norbert Wooten MD        Dose:  1 mg   Take 1 capsule (1 mg) by mouth At Bedtime Can increase every 3 nights by one pill if no side " effects. Up to 5mg (5 pills)   Quantity:  90 capsule   Refills:  1         These medicines have changed or have updated prescriptions.        Dose/Directions    * amphetamine-dextroamphetamine 30 MG per 24 hr capsule   Commonly known as:  ADDERALL XR   This may have changed:  Another medication with the same name was added. Make sure you understand how and when to take each.   Used for:  ADHD (attention deficit hyperactivity disorder), combined type   Changed by:  Norbert Wooten MD        Dose:  30 mg   Take 1 capsule (30 mg) by mouth daily   Quantity:  30 capsule   Refills:  0       * amphetamine-dextroamphetamine 30 MG per 24 hr capsule   Commonly known as:  ADDERALL XR   This may have changed:  You were already taking a medication with the same name, and this prescription was added. Make sure you understand how and when to take each.   Used for:  ADHD (attention deficit hyperactivity disorder), combined type   Changed by:  Norbert Wooten MD        Dose:  30 mg   Take 1 capsule (30 mg) by mouth daily   Quantity:  30 capsule   Refills:  0       * amphetamine-dextroamphetamine 30 MG per 24 hr capsule   Commonly known as:  ADDERALL XR   This may have changed:  You were already taking a medication with the same name, and this prescription was added. Make sure you understand how and when to take each.   Used for:  ADHD (attention deficit hyperactivity disorder), combined type   Changed by:  Norbert Wooten MD        Dose:  30 mg   Start taking on:  6/18/2017   Take 1 capsule (30 mg) by mouth daily   Quantity:  30 capsule   Refills:  0       * amphetamine-dextroamphetamine 30 MG per 24 hr capsule   Commonly known as:  ADDERALL XR   This may have changed:  You were already taking a medication with the same name, and this prescription was added. Make sure you understand how and when to take each.   Used for:  ADHD (attention deficit hyperactivity disorder), combined type   Changed by:  Norbert Wooten  MD Marisela        Dose:  30 mg   Start taking on:  7/19/2017   Take 1 capsule (30 mg) by mouth daily   Quantity:  30 capsule   Refills:  0       * Notice:  This list has 4 medication(s) that are the same as other medications prescribed for you. Read the directions carefully, and ask your doctor or other care provider to review them with you.         Where to get your medicines      These medications were sent to [facility] [place], [place] - [address]   [address] [facility] [address]     Phone:  [phone]     FLUoxetine 20 MG tablet    prazosin 1 MG capsule         Some of these will need a paper prescription and others can be bought over the counter.  Ask your nurse if you have questions.     Bring a paper prescription for each of these medications     amphetamine-dextroamphetamine 30 MG per 24 hr capsule    amphetamine-dextroamphetamine 30 MG per 24 hr capsule    amphetamine-dextroamphetamine 30 MG per 24 hr capsule                Primary Care Provider Office Phone #    [facility] [phone] [address] [facility] [address]        Thank you!     Thank you for choosing [facility]  for your care. Our goal is always to provide you with excellent care. Hearing back from our patients is one way we can continue to improve our services. Please take a few minutes to complete the written survey that you may receive in the mail after your visit with us. Thank you!             Your Updated Medication List - Protect others around you: Learn how to safely use, store and throw away your medicines at www.disposemymeds.org.          This list is accurate as of: 5/18/17  7:46 AM.  Always use your most recent med list.                   Brand Name Dispense Instructions for use    albuterol 108 (90 BASE) MCG/ACT Inhaler    albuterol    1 Inhaler    Inhale 2 puffs into the lungs every 4 hours as needed for shortness of breath / dyspnea       *  amphetamine-dextroamphetamine 30 MG per 24 hr capsule    ADDERALL XR    30 capsule    Take 1 capsule (30 mg) by mouth daily       * amphetamine-dextroamphetamine 30 MG per 24 hr capsule    ADDERALL XR    30 capsule    Take 1 capsule (30 mg) by mouth daily       * amphetamine-dextroamphetamine 30 MG per 24 hr capsule   Start taking on:  6/18/2017    ADDERALL XR    30 capsule    Take 1 capsule (30 mg) by mouth daily       * amphetamine-dextroamphetamine 30 MG per 24 hr capsule   Start taking on:  7/19/2017    ADDERALL XR    30 capsule    Take 1 capsule (30 mg) by mouth daily       FLUoxetine 20 MG tablet     60 tablet    Take 0.5 tablets (10 mg) by mouth daily For 8 days then increase to a full pill and stay at 20mgs daily       levonorgestrel 20 MCG/24HR IUD    MIRENA     1 each (20 mcg) by Intrauterine route continuous       oxyCODONE-acetaminophen 5-325 MG per tablet    PERCOCET    8 tablet    Take 1-2 tablets by mouth every 6 hours as needed for pain       prazosin 1 MG capsule    MINIPRESS    90 capsule    Take 1 capsule (1 mg) by mouth At Bedtime Can increase every 3 nights by one pill if no side effects. Up to 5mg (5 pills)       * Notice:  This list has 4 medication(s) that are the same as other medications prescribed for you. Read the directions carefully, and ask your doctor or other care provider to review them with you.

## 2017-05-19 ASSESSMENT — ANXIETY QUESTIONNAIRES: GAD7 TOTAL SCORE: 17

## 2017-05-19 ASSESSMENT — PATIENT HEALTH QUESTIONNAIRE - PHQ9: SUM OF ALL RESPONSES TO PHQ QUESTIONS 1-9: 21

## 2017-05-25 ENCOUNTER — TELEPHONE (OUTPATIENT)
Dept: FAMILY MEDICINE | Facility: CLINIC | Age: 32
End: 2017-05-25

## 2017-05-25 DIAGNOSIS — F33.1 MODERATE EPISODE OF RECURRENT MAJOR DEPRESSIVE DISORDER (H): ICD-10-CM

## 2017-05-25 DIAGNOSIS — F41.1 GENERALIZED ANXIETY DISORDER: Primary | ICD-10-CM

## 2017-05-26 NOTE — TELEPHONE ENCOUNTER
Ok please call patient.  Insurance will not cover prozac.  They will cover zoloft so plan to start with a 1/2 tab for 1-2 weeks then increase to the full pill. Still follow up with psychiatry or with me in 2 weeks for side effect check.    -     sertraline (ZOLOFT) 50 MG tablet; Take 1/2 tablet (25 mg) for 1-2 weeks, then increase to 1 tablet orally daily        Norbert Wooten MD  [facility]

## 2017-05-26 NOTE — TELEPHONE ENCOUNTER
Can you please review the below information and advise.    This medication was prescribed during a 5/18/17 office visit. See visit notes below:  Generalized anxiety disorder; worsened with situation     Moderate episode of recurrent major depressive disorder (H): worsened with life stress and death in family  -plan to restart SSRI, thinks prozac worked well in the past  --discussed risks, benefits, and side effects of this new medication. Patient understands and is in agreement.  -Has appt with therapy weekly and new psychiatrist in 2 weeks, so recheck medications then  -     FLUoxetine 20 MG tablet; Take 0.5 tablets (10 mg) by mouth daily For 8 days then increase to a full pill and stay at 20mgs daily    Thank you,  Zeny Maya RN

## 2017-05-26 NOTE — TELEPHONE ENCOUNTER
Patient's insurance does not cover Fluoxetine.  Formulary alternatives include escitalopram oxalate, paroxetine hcl, sertraline hcl.

## 2017-06-08 ENCOUNTER — TRANSFERRED RECORDS (OUTPATIENT)
Dept: HEALTH INFORMATION MANAGEMENT | Facility: CLINIC | Age: 32
End: 2017-06-08

## 2017-06-20 ENCOUNTER — TRANSFERRED RECORDS (OUTPATIENT)
Dept: HEALTH INFORMATION MANAGEMENT | Facility: CLINIC | Age: 32
End: 2017-06-20

## 2017-07-20 ENCOUNTER — HOSPITAL ENCOUNTER (EMERGENCY)
Facility: CLINIC | Age: 32
Discharge: HOME | End: 2017-07-20
Attending: STUDENT IN AN ORGANIZED HEALTH CARE EDUCATION/TRAINING PROGRAM | Admitting: STUDENT IN AN ORGANIZED HEALTH CARE EDUCATION/TRAINING PROGRAM
Payer: COMMERCIAL

## 2017-07-20 VITALS
RESPIRATION RATE: 16 BRPM | WEIGHT: 116 LBS | OXYGEN SATURATION: 98 % | DIASTOLIC BLOOD PRESSURE: 82 MMHG | SYSTOLIC BLOOD PRESSURE: 116 MMHG | TEMPERATURE: 97.6 F | BODY MASS INDEX: 22.65 KG/M2

## 2017-07-20 DIAGNOSIS — H11.421 CHEMOSIS OF RIGHT CONJUNCTIVA: ICD-10-CM

## 2017-07-20 PROCEDURE — 99284 EMERGENCY DEPT VISIT MOD MDM: CPT | Performed by: STUDENT IN AN ORGANIZED HEALTH CARE EDUCATION/TRAINING PROGRAM

## 2017-07-20 PROCEDURE — 25000125 ZZHC RX 250: Performed by: EMERGENCY MEDICINE

## 2017-07-20 PROCEDURE — 99283 EMERGENCY DEPT VISIT LOW MDM: CPT

## 2017-07-20 RX ORDER — POLYMYXIN B SULFATE AND TRIMETHOPRIM 1; 10000 MG/ML; [USP'U]/ML
1 SOLUTION OPHTHALMIC
Qty: 1 BOTTLE | Refills: 0 | Status: SHIPPED | OUTPATIENT
Start: 2017-07-20 | End: 2017-07-27

## 2017-07-20 RX ORDER — TETRACAINE HYDROCHLORIDE 5 MG/ML
1-2 SOLUTION OPHTHALMIC ONCE
Status: COMPLETED | OUTPATIENT
Start: 2017-07-20 | End: 2017-07-20

## 2017-07-20 RX ADMIN — TETRACAINE HYDROCHLORIDE 2 DROP: 5 SOLUTION OPHTHALMIC at 13:56

## 2017-07-20 ASSESSMENT — VISUAL ACUITY
OD: 20/200
OS: 20/200

## 2017-07-20 NOTE — ED PROVIDER NOTES
History     Chief Complaint   Patient presents with     Eye Pain     started this am     HPI  Will Dodson is a 32 year old female who presents for complaint of right eye irritation and foreign body sensation which she noted upon awakening this morning. She explains that she awoke to the right eye irritation, exacerbated with blinking her right eye. She does wear active glasses but not contact lenses. She does not recall any foreign body or debris getting into her eye, no ocular complaint last evening. Patient also denies pain with extraocular movements, periorbital swelling, headache, double vision, or diminished visual acuity from baseline.    I have reviewed the Medications, Allergies, Past Medical and Surgical History, and Social History in the Epic system.    Patient Active Problem List   Diagnosis     H/O:      Generalized anxiety disorder     Panic attack     ADHD (attention deficit hyperactivity disorder), combined type     RhD negative     Moderate episode of recurrent major depressive disorder (H)       Past Surgical History:   Procedure Laterality Date     C/SECTION, LOW TRANSVERSE  ,     , Low Transverse     CYSTOSCOPY,REMV CALCULUS,SIMPLE         Social History     Social History     Marital status: Single     Spouse name: N/A     Number of children: N/A     Years of education: N/A     Occupational History     Not on file.     Social History Main Topics     Smoking status: Former Smoker     Packs/day: 0.25     Start date: 2015     Smokeless tobacco: Never Used     Alcohol use 0.0 oz/week     0 Standard drinks or equivalent per week      Comment: occas- quit with pregnancy     Drug use: No     Sexual activity: Yes     Birth control/ protection: Condom     Other Topics Concern     Parent/Sibling W/ Cabg, Mi Or Angioplasty Before 65f 55m? No     Social History Narrative       Family History   Problem Relation Age of Onset     Breast Cancer Mother      Depression Mother       Hypertension Father      Colon Cancer Maternal Grandfather      Colon Cancer Paternal Grandfather      Other - See Comments Sister      epilepsy     Substance Abuse Sister      A&D       Most Recent Immunizations   Administered Date(s) Administered     Hepatitis A Vac Ped/Adol-2 Dose 03/01/2004     Influenza Vaccine IM 3yrs+ 4 Valent IIV4 10/25/2016     Tdap (Adacel,Boostrix) 06/09/2016     Typhoid IM 03/01/2004         Review of Systems  Constitutional: Negative for fever or recent illness.  Eye: Positive for irritation of right eye.  Musculoskeletal: Negative for recent injuries.  Neurological: Negative for headache.    All others reviewed and are negative.      Physical Exam   BP: 116/82  Heart Rate: 92  Temp: 97.6  F (36.4  C)  Resp: 16  Weight: 52.6 kg (116 lb)  SpO2: 98 %  Physical Exam  Constitutional: Well developed, well nourished. Appears nontoxic and in no acute distress.   Head: Normocephalic and atraumatic. Symmetrical in appearance.  Eye: Vision is 20/200 of each individual eye without corrective lenses. Eyelids appear symmetrical without ptosis. No proptosis or subconjunctival hemorrhage. Mild right-sided erythema without ocular discharge. EOMI and patient denies diplopia or pain with movement. PERRLA without pain, no consensual photophobia. Anterior chamber depth equal bilaterally. No FB with eyelid eversion. Tetracaine completely resolved discomfort, Fluorescein stain does not reveal abrasion/ulceration but with ecchymosis. Cornea appears clear without hyphema or hypopyon via slit lamp, subtle ciliary flushing. Intraocular pressure average 21 x2.  Neck: Neck supple.  Cardiovascular: No cyanosis.   Respiratory/Chest: Effort normal, no respiratory distress.   Musculoskeletal: Moves all extremities spontaneously and without complaint.  Neuro: Patient is alert.  Skin: Skin is warm and dry, not diaphoretic.  Psych: Appears to have a normal mood and affect.      ED Course     ED Course      Procedures             Critical Care time:  none               Labs Ordered and Resulted from Time of ED Arrival Up to the Time of Departure from the ED - No data to display    Assessments & Plan (with Medical Decision Making)   Will Dodson is a 32 year old female who presents to the department for complaint of right eye irritation and foreign body sensation upon awakening this morning. Patient does not have typical signs/symptoms of optic neuritis, foreign body, acute glaucoma, hyphema, hypopyon, corneal abrasion or ulceration. She does have chemosis of the eye as well as faint ciliary flushing, but without consensual photophobia, hypopyon or floaters. Clinical impression is that she appears to be suffering from early conjunctivitis but we did discuss symptoms and concern for developing uveitis. She will be prescribed ophthalmologic antibiotic drops Polytrim but also instructed to return to the emergency department or rectally to ophthalmology clinic if she develops deep boring ocular pain, red ring around her iris, photophobia or consensual photophobia. She seems comfortable with the discharge plan we discussed including follow up.    Disclaimer: This note consists of symbols derived from keyboarding, dictation, and/or voice recognition software. As a result, there may be errors in the script that have gone undetected.  Please consider this when interpreting information found in the chart.        I have reviewed the nursing notes.    I have reviewed the findings, diagnosis, plan and need for follow up with the patient.       New Prescriptions    TRIMETHOPRIM-POLYMYXIN B (POLYTRIM) OPHTHALMIC SOLUTION    Apply 1 drop to eye every 3 hours for 7 days       Final diagnoses:   Chemosis of right conjunctiva       7/20/2017   [facility] EMERGENCY DEPARTMENT     Paul Driver,   07/20/17 6700

## 2017-07-20 NOTE — ED AVS SNAPSHOT
[facility] Emergency Department    [address] [address]    Phone:  [phone]    Fax:  [phone]                                       Will Dodson   MRN: [id number]    Department:  [facility] Emergency Department   Date of Visit:  7/20/2017           After Visit Summary Signature Page     I have received my discharge instructions, and my questions have been answered. I have discussed any challenges I see with this plan with the nurse or doctor.    ..........................................................................................................................................  Patient/Patient Representative Signature      ..........................................................................................................................................  Patient Representative Print Name and Relationship to Patient    ..................................................               ................................................  Date                                            Time    ..........................................................................................................................................  Reviewed by Signature/Title    ...................................................              ..............................................  Date                                                            Time

## 2017-07-20 NOTE — ED AVS SNAPSHOT
[facility]    [address] [facility] [address]    Phone:  [phone]    Fax:  [phone]                                       Will Dodson   MRN: [id number]    Department:  [facility]   Date of Visit:  7/20/2017           Patient Information     Date Of Birth          1985        Your diagnoses for this visit were:     Chemosis of right conjunctiva        You were seen by Paul Driver DO.      Follow-up Information     Follow up with [facility]. Schedule an appointment as soon as possible for a visit in 2 days.    Why:  Followup for reevaluation if symptoms persist.    Contact information:    [address] [facility] [address] [phone]      Discharge References/Attachments     CONJUNCTIVITIS, NON-SPECIFIC (ENGLISH)      24 Hour Appointment Hotline       To make an appointment at any [place] clinic, call [phone] ([phone]). If you don't have a family doctor or clinic, we will help you find one. [place] clinics are conveniently located to serve the needs of you and your family.             Review of your medicines      START taking        Dose / Directions Last dose taken    trimethoprim-polymyxin b ophthalmic solution   Commonly known as:  POLYTRIM   Dose:  1 drop   Quantity:  1 Bottle        Apply 1 drop to eye every 3 hours for 7 days   Refills:  0          Our records show that you are taking the medicines listed below. If these are incorrect, please call your family doctor or clinic.        Dose / Directions Last dose taken    albuterol 108 (90 BASE) MCG/ACT Inhaler   Commonly known as:  albuterol   Dose:  2 puff   Quantity:  1 Inhaler        Inhale 2 puffs into the lungs every 4 hours as needed for shortness of breath / dyspnea   Refills:  0        amphetamine-dextroamphetamine 30 MG per 24 hr capsule   Commonly known as:  ADDERALL XR   Dose:  30 mg   Quantity:  30 capsule        Take 1 capsule (30  mg) by mouth daily   Refills:  0        levonorgestrel 20 MCG/24HR IUD   Commonly known as:  MIRENA   Dose:  1 each        1 each (20 mcg) by Intrauterine route continuous   Refills:  0        prazosin 1 MG capsule   Commonly known as:  MINIPRESS   Dose:  1 mg   Quantity:  90 capsule        Take 1 capsule (1 mg) by mouth At Bedtime Can increase every 3 nights by one pill if no side effects. Up to 5mg (5 pills)   Refills:  1        sertraline 50 MG tablet   Commonly known as:  ZOLOFT   Quantity:  30 tablet        Take 1/2 tablet (25 mg) for 1-2 weeks, then increase to 1 tablet orally daily   Refills:  1                Prescriptions were sent or printed at these locations (1 Prescription)                   [facility] [place], [place] - [address] [facility]   [address] [facility] [address]    Telephone:  [phone]   Fax:  [phone]   Hours:                  E-Prescribed (1 of 1)         trimethoprim-polymyxin b (POLYTRIM) ophthalmic solution                Orders Needing Specimen Collection     None      Pending Results     No orders found from 7/18/2017 to 7/21/2017.            Pending Culture Results     No orders found from 7/18/2017 to 7/21/2017.            Pending Results Instructions     If you had any lab results that were not finalized at the time of your Discharge, you can call the ED Lab Result RN at [phone]. You will be contacted by this team for any positive Lab results or changes in treatment. The nurses are available 7 days a week from 10A to 6:30P.  You can leave a message 24 hours per day and they will return your call.        Test Results From Your Hospital Stay               Thank you for choosing [facility]       Thank you for choosing [facility] for your care. Our goal is always to provide you with excellent care. Hearing back from our patients is one way we can continue to improve our services. Please take a few minutes to complete the written survey that you may receive in  "the mail after you visit with us. Thank you!        [employer] Information     [employer] lets you send messages to your doctor, view your test results, renew your prescriptions, schedule appointments and more. To sign up, go to [email or web address] . Click on \"Log in\" on the left side of the screen, which will take you to the Welcome page. Then click on \"Sign up Now\" on the right side of the page.     You will be asked to enter the access code listed below, as well as some personal information. Please follow the directions to create your username and password.     Your access code is: [email or web address]  Expires: 2017  6:48 PM     Your access code will  in 90 days. If you need help or a new code, please call your [facility] clinic or [phone].        Care EveryWhere ID     This is your Care EveryWhere ID. This could be used by other organizations to access your [facility] medical records  [id number]        Equal Access to Services     ELLYN SALINAS : Hadii jessica quinno Sozita, waaxda luqadaha, qaybta kaalmada aderossy, ashley campos . So [facility] [phone].    ATENCIÓN: Si habla español, tiene a jarrett disposición servicios gratuitos de asistencia lingüística. Llame al [phone].    We comply with applicable federal civil rights laws and [place] laws. We do not discriminate on the basis of race, color, national origin, age, disability sex, sexual orientation or gender identity.            After Visit Summary       This is your record. Keep this with you and show to your community pharmacist(s) and doctor(s) at your next visit.                  "

## 2017-08-11 ENCOUNTER — HOSPITAL ENCOUNTER (EMERGENCY)
Facility: CLINIC | Age: 32
Discharge: HOME | End: 2017-08-11
Attending: EMERGENCY MEDICINE | Admitting: EMERGENCY MEDICINE
Payer: COMMERCIAL

## 2017-08-11 ENCOUNTER — APPOINTMENT (OUTPATIENT)
Dept: ULTRASOUND IMAGING | Facility: CLINIC | Age: 32
End: 2017-08-11
Attending: EMERGENCY MEDICINE
Payer: COMMERCIAL

## 2017-08-11 VITALS
TEMPERATURE: 98.6 F | DIASTOLIC BLOOD PRESSURE: 88 MMHG | RESPIRATION RATE: 16 BRPM | OXYGEN SATURATION: 98 % | SYSTOLIC BLOOD PRESSURE: 112 MMHG

## 2017-08-11 DIAGNOSIS — R10.11 ABDOMINAL PAIN, RIGHT UPPER QUADRANT: ICD-10-CM

## 2017-08-11 LAB
ALBUMIN SERPL-MCNC: 3.6 G/DL (ref 3.4–5)
ALBUMIN UR-MCNC: NEGATIVE MG/DL
ALP SERPL-CCNC: 88 U/L (ref 40–150)
ALT SERPL W P-5'-P-CCNC: 48 U/L (ref 0–50)
ANION GAP SERPL CALCULATED.3IONS-SCNC: 7 MMOL/L (ref 3–14)
APPEARANCE UR: CLEAR
AST SERPL W P-5'-P-CCNC: 31 U/L (ref 0–45)
BASOPHILS # BLD AUTO: 0.1 10E9/L (ref 0–0.2)
BASOPHILS NFR BLD AUTO: 1.6 %
BILIRUB SERPL-MCNC: 0.2 MG/DL (ref 0.2–1.3)
BILIRUB UR QL STRIP: NEGATIVE
BUN SERPL-MCNC: 15 MG/DL (ref 7–30)
CALCIUM SERPL-MCNC: 8.7 MG/DL (ref 8.5–10.1)
CHLORIDE SERPL-SCNC: 105 MMOL/L (ref 94–109)
CO2 SERPL-SCNC: 26 MMOL/L (ref 20–32)
COLOR UR AUTO: NORMAL
CREAT SERPL-MCNC: 0.71 MG/DL (ref 0.52–1.04)
DIFFERENTIAL METHOD BLD: ABNORMAL
EOSINOPHIL # BLD AUTO: 0.1 10E9/L (ref 0–0.7)
EOSINOPHIL NFR BLD AUTO: 1.4 %
ERYTHROCYTE [DISTWIDTH] IN BLOOD BY AUTOMATED COUNT: 13.3 % (ref 10–15)
GFR SERPL CREATININE-BSD FRML MDRD: NORMAL ML/MIN/1.7M2
GLUCOSE SERPL-MCNC: 94 MG/DL (ref 70–99)
GLUCOSE UR STRIP-MCNC: NEGATIVE MG/DL
HCG UR QL: NEGATIVE
HCT VFR BLD AUTO: 39.1 % (ref 35–47)
HGB BLD-MCNC: 13.4 G/DL (ref 11.7–15.7)
HGB UR QL STRIP: NEGATIVE
IMM GRANULOCYTES # BLD: 0 10E9/L (ref 0–0.4)
IMM GRANULOCYTES NFR BLD: 0.3 %
KETONES UR STRIP-MCNC: NEGATIVE MG/DL
LEUKOCYTE ESTERASE UR QL STRIP: NEGATIVE
LIPASE SERPL-CCNC: 332 U/L (ref 73–393)
LYMPHOCYTES # BLD AUTO: 0.7 10E9/L (ref 0.8–5.3)
LYMPHOCYTES NFR BLD AUTO: 17.7 %
MCH RBC QN AUTO: 30.9 PG (ref 26.5–33)
MCHC RBC AUTO-ENTMCNC: 34.3 G/DL (ref 31.5–36.5)
MCV RBC AUTO: 90 FL (ref 78–100)
MONOCYTES # BLD AUTO: 0.8 10E9/L (ref 0–1.3)
MONOCYTES NFR BLD AUTO: 21.5 %
NEUTROPHILS # BLD AUTO: 2.1 10E9/L (ref 1.6–8.3)
NEUTROPHILS NFR BLD AUTO: 57.5 %
NITRATE UR QL: NEGATIVE
PH UR STRIP: 6 PH (ref 5–7)
PLATELET # BLD AUTO: 240 10E9/L (ref 150–450)
PLATELET # BLD EST: NORMAL 10*3/UL
POTASSIUM SERPL-SCNC: 4 MMOL/L (ref 3.4–5.3)
PROT SERPL-MCNC: 6.9 G/DL (ref 6.8–8.8)
RBC # BLD AUTO: 4.33 10E12/L (ref 3.8–5.2)
RBC MORPH BLD: NORMAL
SODIUM SERPL-SCNC: 138 MMOL/L (ref 133–144)
SP GR UR STRIP: 1.01 (ref 1–1.03)
URN SPEC COLLECT METH UR: NORMAL
UROBILINOGEN UR STRIP-MCNC: NORMAL MG/DL (ref 0–2)
WBC # BLD AUTO: 3.7 10E9/L (ref 4–11)

## 2017-08-11 PROCEDURE — 99284 EMERGENCY DEPT VISIT MOD MDM: CPT | Performed by: EMERGENCY MEDICINE

## 2017-08-11 PROCEDURE — 96375 TX/PRO/DX INJ NEW DRUG ADDON: CPT

## 2017-08-11 PROCEDURE — 96374 THER/PROPH/DIAG INJ IV PUSH: CPT

## 2017-08-11 PROCEDURE — 81003 URINALYSIS AUTO W/O SCOPE: CPT | Performed by: EMERGENCY MEDICINE

## 2017-08-11 PROCEDURE — 76705 ECHO EXAM OF ABDOMEN: CPT

## 2017-08-11 PROCEDURE — 80053 COMPREHEN METABOLIC PANEL: CPT | Performed by: EMERGENCY MEDICINE

## 2017-08-11 PROCEDURE — 85025 COMPLETE CBC W/AUTO DIFF WBC: CPT | Performed by: EMERGENCY MEDICINE

## 2017-08-11 PROCEDURE — 25000128 H RX IP 250 OP 636: Performed by: EMERGENCY MEDICINE

## 2017-08-11 PROCEDURE — 96361 HYDRATE IV INFUSION ADD-ON: CPT

## 2017-08-11 PROCEDURE — 99284 EMERGENCY DEPT VISIT MOD MDM: CPT | Mod: 25

## 2017-08-11 PROCEDURE — 81025 URINE PREGNANCY TEST: CPT | Performed by: EMERGENCY MEDICINE

## 2017-08-11 PROCEDURE — 83690 ASSAY OF LIPASE: CPT | Performed by: EMERGENCY MEDICINE

## 2017-08-11 RX ORDER — KETOROLAC TROMETHAMINE 15 MG/ML
15 INJECTION, SOLUTION INTRAMUSCULAR; INTRAVENOUS ONCE
Status: COMPLETED | OUTPATIENT
Start: 2017-08-11 | End: 2017-08-11

## 2017-08-11 RX ORDER — HYDROCODONE BITARTRATE AND ACETAMINOPHEN 5; 325 MG/1; MG/1
1-2 TABLET ORAL EVERY 6 HOURS PRN
Qty: 10 TABLET | Refills: 0 | Status: SHIPPED | OUTPATIENT
Start: 2017-08-11 | End: 2017-09-12

## 2017-08-11 RX ORDER — SODIUM CHLORIDE 9 MG/ML
1000 INJECTION, SOLUTION INTRAVENOUS CONTINUOUS
Status: DISCONTINUED | OUTPATIENT
Start: 2017-08-11 | End: 2017-08-11 | Stop reason: HOSPADM

## 2017-08-11 RX ORDER — ONDANSETRON 2 MG/ML
4 INJECTION INTRAMUSCULAR; INTRAVENOUS EVERY 30 MIN PRN
Status: DISCONTINUED | OUTPATIENT
Start: 2017-08-11 | End: 2017-08-11 | Stop reason: HOSPADM

## 2017-08-11 RX ORDER — ONDANSETRON 4 MG/1
4 TABLET, ORALLY DISINTEGRATING ORAL EVERY 6 HOURS PRN
Qty: 10 TABLET | Refills: 0 | Status: SHIPPED | OUTPATIENT
Start: 2017-08-11 | End: 2017-08-14

## 2017-08-11 RX ADMIN — ONDANSETRON 4 MG: 2 INJECTION INTRAMUSCULAR; INTRAVENOUS at 20:07

## 2017-08-11 RX ADMIN — KETOROLAC TROMETHAMINE 15 MG: 15 INJECTION, SOLUTION INTRAMUSCULAR; INTRAVENOUS at 20:07

## 2017-08-11 RX ADMIN — SODIUM CHLORIDE 1000 ML: 9 INJECTION, SOLUTION INTRAVENOUS at 20:07

## 2017-08-11 NOTE — ED AVS SNAPSHOT
[facility] Emergency [facility]    [address] [place] [address]    Phone:  [phone]    Fax:  [phone]                                       Will Dodson   MRN: [id number]    Department:  [facility]   Date of Visit:  8/11/2017           Patient Information     Date Of Birth          1985        Your diagnoses for this visit were:     Abdominal pain, right upper quadrant        You were seen by Evans Lu MD.      Follow-up Information     Follow up with Clinic, [facility].    Contact information:    [address] [place] [id number] [phone]          Discharge Instructions         *Abdominal Pain, Unknown Cause (Female)    The exact cause of your abdominal (stomach) pain is not certain. This does not mean that this is something to worry about, or the right tests were not done. Everyone likes to know the exact cause of the problem, but sometimes with abdominal pain, there is no clear-cut cause, and this could be a good thing. The good news is that your symptoms can be treated, and you will feel better.   Your condition does not seem serious now; however, sometimes the signs of a serious problem may take more time to appear. For this reason, it is important for you to watch for any new symptoms, problems, or worsening of your condition.  Over the next few days, the abdominal pain may come and go, or be continuous. Other common symptoms can include nausea and vomiting. Sometimes it can be difficult to tell if you feel nauseous, you may just feel bad and not associate that feeling with nausea. Constipation, diarrhea, and a fever may go along with the pain.  The pain may continue even if treated correctly over the following days. Depending on how things go, sometimes the cause can become clear and may require further or different treatment. Additional evaluations, medications, or tests may be needed.  Home care  Your health care  provider may prescribe medications for pain, symptoms, or an infection.  Follow the health care provider's instructions for taking these medications.  General care    Rest until your next exam. No strenuous activities.    Try to find positions that ease discomfort. A small pillow placed on the abdomen may help relieve pain.    Something warm on your abdomen (such as a heating pad) may help, but be careful not to burn yourself.  Diet    Do not force yourself to eat, especially if having cramps, vomiting, or diarrhea.    Water is important so you do not get dehydrated. Soup may also be good. Sports drinks may also help, especially if they are not too acidic. Make sure you don't drink sugary drinks as this can make things worse. Take liquids in small amounts. Do not guzzle them.    Caffeine sometimes makes the pain and cramping worse.    Avoid dairy products if you have vomiting or diarrhea.    Don't eat large amounts at a time. Wait a few minutes between bites.    Eat a diet low in fiber (called a low-residue diet). Foods allowed include refined breads, white rice, fruit and vegetable juices without pulp, tender meats. These foods will pass more easily through the intestine.    Avoid fried or fatty foods, dairy, alcohol and spicy foods until your symptoms go away.  Follow-up care  Follow up with your health care provider as instructed, or if your pain does not begin to improve in the next 24 hours.  When to seek medical care  Seek prompt medical care if any of the following occur:    Pain gets worse or moves to the right lower abdomen    New or worsening vomiting or diarrhea    Swelling of the abdomen    Unable to pass stool for more than three days    New fever over 101  F (38.3 C), or rising fever    Blood in vomit or bowel movements (dark red or black color)    Jaundice (yellow color of eyes and skin)    Weakness, dizziness    Chest, arm, back, neck or jaw pain    Unexpected vaginal bleeding or missed period  Call  911  Call emergency services if any of the following occur:    Trouble breathing    Confusion    Fainting or loss of consciousness    Rapid heart rate    Seizure    0730-2008 [employer], [address] [place] [address], [place] [address]. All rights reserved. This information is not intended as a substitute for professional medical care. Always follow your healthcare professional's instructions.      Norco for pain, Zofran for nausea, drink plenty of clear liquids, call [phone] to schedule gallbladder functional study, then follow up with primary care.    24 Hour Appointment Hotline       To make an appointment at any [facility] clinic, call [phone] ([phone]). If you don't have a family doctor or clinic, we will help you find one. [facility] clinics are conveniently located to serve the needs of you and your family.          ED Discharge Orders     NM Hepatobiliary Scan w GB EF                    Review of your medicines      START taking        Dose / Directions Last dose taken    HYDROcodone-acetaminophen 5-325 MG per tablet   Commonly known as:  NORCO   Dose:  1-2 tablet   Quantity:  10 tablet        Take 1-2 tablets by mouth every 6 hours as needed for moderate to severe pain   Refills:  0        ondansetron 4 MG ODT tab   Commonly known as:  ZOFRAN ODT   Dose:  4 mg   Quantity:  10 tablet        Take 1 tablet (4 mg) by mouth every 6 hours as needed for nausea   Refills:  0          Our records show that you are taking the medicines listed below. If these are incorrect, please call your family doctor or clinic.        Dose / Directions Last dose taken    albuterol 108 (90 BASE) MCG/ACT Inhaler   Commonly known as:  albuterol   Dose:  2 puff   Quantity:  1 Inhaler        Inhale 2 puffs into the lungs every 4 hours as needed for shortness of breath / dyspnea   Refills:  0        amphetamine-dextroamphetamine 30 MG per 24 hr capsule   Commonly known as:  ADDERALL XR   Dose:  30 mg   Quantity:  30 capsule         Take 1 capsule (30 mg) by mouth daily   Refills:  0        levonorgestrel 20 MCG/24HR IUD   Commonly known as:  MIRENA   Dose:  1 each        1 each (20 mcg) by Intrauterine route continuous   Refills:  0        prazosin 1 MG capsule   Commonly known as:  MINIPRESS   Dose:  1 mg   Quantity:  90 capsule        Take 1 capsule (1 mg) by mouth At Bedtime Can increase every 3 nights by one pill if no side effects. Up to 5mg (5 pills)   Refills:  1        sertraline 50 MG tablet   Commonly known as:  ZOLOFT   Quantity:  30 tablet        Take 1/2 tablet (25 mg) for 1-2 weeks, then increase to 1 tablet orally daily   Refills:  1                Prescriptions were sent or printed at these locations (2 Prescriptions)                   Other Prescriptions                Printed at Department/Unit printer (2 of 2)         ondansetron (ZOFRAN ODT) 4 MG ODT tab               HYDROcodone-acetaminophen (NORCO) 5-325 MG per tablet                Procedures and tests performed during your visit     Abdomen US, limited (RUQ only)    CBC with platelets differential    Comprehensive metabolic panel    HCG qualitative urine    Lipase    UA reflex to Microscopic and Culture      Orders Needing Specimen Collection     None      Pending Results     Date and Time Order Name Status Description    8/11/2017 1952 CBC with platelets differential In process             Pending Culture Results     No orders found from 8/9/2017 to 8/12/2017.            Pending Results Instructions     If you had any lab results that were not finalized at the time of your Discharge, you can call the ED Lab Result RN at [phone]. You will be contacted by this team for any positive Lab results or changes in treatment. The nurses are available 7 days a week from 10A to 6:30P.  You can leave a message 24 hours per day and they will return your call.        Test Results From Your Hospital Stay        8/11/2017  7:57 PM      Component Results     Component Value  Ref Range & Units Status    Color Urine Light Yellow  Final    Appearance Urine Clear  Final    Glucose Urine Negative NEG mg/dL Final    Bilirubin Urine Negative NEG Final    Ketones Urine Negative NEG mg/dL Final    Specific Gravity Urine 1.012 1.003 - 1.035 Final    Blood Urine Negative NEG Final    pH Urine 6.0 5.0 - 7.0 pH Final    Protein Albumin Urine Negative NEG mg/dL Final    Urobilinogen mg/dL Normal 0.0 - 2.0 mg/dL Final    Nitrite Urine Negative NEG Final    Leukocyte Esterase Urine Negative NEG Final    Source Midstream Urine  Final         8/11/2017  8:00 PM      Component Results     Component Value Ref Range & Units Status    HCG Qual Urine Negative NEG Final         8/11/2017  8:20 PM      Component Results     Component Value Ref Range & Units Status    WBC 3.7 (L) 4.0 - 11.0 10e9/L Final    RBC Count 4.33 3.8 - 5.2 10e12/L Final    Hemoglobin 13.4 11.7 - 15.7 g/dL Final    Hematocrit 39.1 35.0 - 47.0 % Final    MCV 90 78 - 100 fl Final    MCH 30.9 26.5 - 33.0 pg Final    MCHC 34.3 31.5 - 36.5 g/dL Final    RDW 13.3 10.0 - 15.0 % Final    Platelet Count 240 150 - 450 10e9/L Final    Diff Method Pending  Incomplete         8/11/2017  8:33 PM      Component Results     Component Value Ref Range & Units Status    Sodium 138 133 - 144 mmol/L Final    Potassium 4.0 3.4 - 5.3 mmol/L Final    Chloride 105 94 - 109 mmol/L Final    Carbon Dioxide 26 20 - 32 mmol/L Final    Anion Gap 7 3 - 14 mmol/L Final    Glucose 94 70 - 99 mg/dL Final    Urea Nitrogen 15 7 - 30 mg/dL Final    Creatinine 0.71 0.52 - 1.04 mg/dL Final    GFR Estimate >90  Non  GFR Calc   >60 mL/min/1.7m2 Final    GFR Estimate If Black >90   GFR Calc   >60 mL/min/1.7m2 Final    Calcium 8.7 8.5 - 10.1 mg/dL Final    Bilirubin Total 0.2 0.2 - 1.3 mg/dL Final    Albumin 3.6 3.4 - 5.0 g/dL Final    Protein Total 6.9 6.8 - 8.8 g/dL Final    Alkaline Phosphatase 88 40 - 150 U/L Final    ALT 48 0 - 50 U/L Final     "AST 31 0 - 45 U/L Final         2017  8:32 PM      Component Results     Component Value Ref Range & Units Status    Lipase 332 73 - 393 U/L Final         2017  8:49 PM      Narrative     US ABDOMEN LIMITED   2017 8:37 PM     HISTORY: Right quadrant tenderness, history of kidney stone, family  history of gallbladder disease.    COMPARISON: None.    FINDINGS:    Gallbladder: Contracted-appearing gallbladder. Negative sonographic  Lazcano's sign. No visualized gallstones.    Bile ducts: CHD is normal diameter. No intrahepatic biliary  dilatation.    Liver: Normal.     Pancreas: Partially obscured but grossly unremarkable.     Right kidney: Normal.     Aorta and IVC: Not specifically assessed.         Impression     IMPRESSION: Contracted gallbladder. No acute abnormality is seen.    KIN FIELD MD                Thank you for choosing [facility]       Thank you for choosing [facility] for your care. Our goal is always to provide you with excellent care. Hearing back from our patients is one way we can continue to improve our services. Please take a few minutes to complete the written survey that you may receive in the mail after you visit with us. Thank you!        [employer] Information     [employer] lets you send messages to your doctor, view your test results, renew your prescriptions, schedule appointments and more. To sign up, go to [email or web address] . Click on \"Log in\" on the left side of the screen, which will take you to the Welcome page. Then click on \"Sign up Now\" on the right side of the page.     You will be asked to enter the access code listed below, as well as some personal information. Please follow the directions to create your username and password.     Your access code is: [email or web address]  Expires: 2017  8:57 PM     Your access code will  in 90 days. If you need help or a new code, please call your [facility] clinic or [phone].        Care EveryWhere ID     This is your Care " EveryWhere ID. This could be used by other organizations to access your [facility] medical records  [id number]        Equal Access to Services     JEFFERY SALINAS : Vidhi Andino, nathaniel bazzi, kevin hoffman, ashley quigley. So [facility] [phone].    ATENCIÓN: Si habla español, tiene a jarrett disposición servicios gratuitos de asistencia lingüística. Llame al [phone].    We comply with applicable federal civil rights laws and [place] laws. We do not discriminate on the basis of race, color, national origin, age, disability sex, sexual orientation or gender identity.            After Visit Summary       This is your record. Keep this with you and show to your community pharmacist(s) and doctor(s) at your next visit.

## 2017-08-11 NOTE — ED AVS SNAPSHOT
[facility]    [address] [facility] [address]    Phone:  [phone]    Fax:  [phone]                                       Will Dodson   MRN: [id number]    Department:  [facility]   Date of Visit:  8/11/2017           After Visit Summary Signature Page     I have received my discharge instructions, and my questions have been answered. I have discussed any challenges I see with this plan with the nurse or doctor.    ..........................................................................................................................................  Patient/Patient Representative Signature      ..........................................................................................................................................  Patient Representative Print Name and Relationship to Patient    ..................................................               ................................................  Date                                            Time    ..........................................................................................................................................  Reviewed by Signature/Title    ...................................................              ..............................................  Date                                                            Time

## 2017-08-12 NOTE — DISCHARGE INSTRUCTIONS
*Abdominal Pain, Unknown Cause (Female)    The exact cause of your abdominal (stomach) pain is not certain. This does not mean that this is something to worry about, or the right tests were not done. Everyone likes to know the exact cause of the problem, but sometimes with abdominal pain, there is no clear-cut cause, and this could be a good thing. The good news is that your symptoms can be treated, and you will feel better.   Your condition does not seem serious now; however, sometimes the signs of a serious problem may take more time to appear. For this reason, it is important for you to watch for any new symptoms, problems, or worsening of your condition.  Over the next few days, the abdominal pain may come and go, or be continuous. Other common symptoms can include nausea and vomiting. Sometimes it can be difficult to tell if you feel nauseous, you may just feel bad and not associate that feeling with nausea. Constipation, diarrhea, and a fever may go along with the pain.  The pain may continue even if treated correctly over the following days. Depending on how things go, sometimes the cause can become clear and may require further or different treatment. Additional evaluations, medications, or tests may be needed.  Home care  Your health care provider may prescribe medications for pain, symptoms, or an infection.  Follow the health care provider's instructions for taking these medications.  General care    Rest until your next exam. No strenuous activities.    Try to find positions that ease discomfort. A small pillow placed on the abdomen may help relieve pain.    Something warm on your abdomen (such as a heating pad) may help, but be careful not to burn yourself.  Diet    Do not force yourself to eat, especially if having cramps, vomiting, or diarrhea.    Water is important so you do not get dehydrated. Soup may also be good. Sports drinks may also help, especially if they are not too acidic. Make sure you  don't drink sugary drinks as this can make things worse. Take liquids in small amounts. Do not guzzle them.    Caffeine sometimes makes the pain and cramping worse.    Avoid dairy products if you have vomiting or diarrhea.    Don't eat large amounts at a time. Wait a few minutes between bites.    Eat a diet low in fiber (called a low-residue diet). Foods allowed include refined breads, white rice, fruit and vegetable juices without pulp, tender meats. These foods will pass more easily through the intestine.    Avoid fried or fatty foods, dairy, alcohol and spicy foods until your symptoms go away.  Follow-up care  Follow up with your health care provider as instructed, or if your pain does not begin to improve in the next 24 hours.  When to seek medical care  Seek prompt medical care if any of the following occur:    Pain gets worse or moves to the right lower abdomen    New or worsening vomiting or diarrhea    Swelling of the abdomen    Unable to pass stool for more than three days    New fever over 101  F (38.3 C), or rising fever    Blood in vomit or bowel movements (dark red or black color)    Jaundice (yellow color of eyes and skin)    Weakness, dizziness    Chest, arm, back, neck or jaw pain    Unexpected vaginal bleeding or missed period  Call 911  Call emergency services if any of the following occur:    Trouble breathing    Confusion    Fainting or loss of consciousness    Rapid heart rate    Seizure    0409-9812 [facility], [address] [place] [address], [place] [address]. All rights reserved. This information is not intended as a substitute for professional medical care. Always follow your healthcare professional's instructions.      Norco for pain, Zofran for nausea, drink plenty of clear liquids, call [phone] to schedule gallbladder functional study, then follow up with primary care.

## 2017-08-12 NOTE — ED PROVIDER NOTES
History     Chief Complaint   Patient presents with     Abdominal Pain     started two days ago with abd/flank pain, was seen in July with kidney infection and feels its the same thing      Back Pain     HPI  Will Dodson is a 32 year old female who presents from home complaining of bilateral flank pain nausea vomiting.  Symptoms began approximately a week ago without inciting trauma or strain.  Seem to feel better but then worsened over the past 2 days.  Denies associated fever.  She's had watery stools ×4 daily for the past week.  Denies black or bloody component.  Past medical history significant for pyelonephritis, kidney stone.  She denies urinary frequency or urgency or any dysuria.  Denies vaginal bleeding or discharge.  She is monogamous and sexually active with IUD, no risks for STI.  Nonsmoker, infrequent alcohol use denies illicit drug use.  She reports taking a left over antibiotic prescription several weeks ago for dental pain, she believes it may have been penicillin.    I have reviewed the Medications, Allergies, Past Medical and Surgical History, and Social History in the Epic system.    Allergies:   Allergies   Allergen Reactions     Blood-Group Specific Substance      Patient has Probable passiive anti D Antibody. Blood Product orders may be delayed.  Draw one red top and two purple top tubes for ALL Type and Screen/ Type and Crossmatch orders.       Vicodin [Hydrocodone-Acetaminophen] Nausea and Vomiting         No current facility-administered medications on file prior to encounter.   Current Outpatient Prescriptions on File Prior to Encounter:  sertraline (ZOLOFT) 50 MG tablet Take 1/2 tablet (25 mg) for 1-2 weeks, then increase to 1 tablet orally daily   amphetamine-dextroamphetamine (ADDERALL XR) 30 MG per 24 hr capsule Take 1 capsule (30 mg) by mouth daily   prazosin (MINIPRESS) 1 MG capsule Take 1 capsule (1 mg) by mouth At Bedtime Can increase every 3 nights by one pill if no side  effects. Up to 5mg (5 pills)   levonorgestrel (MIRENA) 20 MCG/24HR IUD 1 each (20 mcg) by Intrauterine route continuous   albuterol (ALBUTEROL) 108 (90 BASE) MCG/ACT inhaler Inhale 2 puffs into the lungs every 4 hours as needed for shortness of breath / dyspnea       Patient Active Problem List   Diagnosis     H/O:      Generalized anxiety disorder     Panic attack     ADHD (attention deficit hyperactivity disorder), combined type     RhD negative     Moderate episode of recurrent major depressive disorder (H)       Past Surgical History:   Procedure Laterality Date     C/SECTION, LOW TRANSVERSE  ,     , Low Transverse     CYSTOSCOPY,REMV CALCULUS,SIMPLE         Social History   Substance Use Topics     Smoking status: Former Smoker     Packs/day: 0.25     Start date: 2015     Smokeless tobacco: Never Used     Alcohol use 0.0 oz/week     0 Standard drinks or equivalent per week      Comment: occas- quit with pregnancy       Most Recent Immunizations   Administered Date(s) Administered     Hepatitis A Vac Ped/Adol-2 Dose 2004     Influenza Vaccine IM 3yrs+ 4 Valent IIV4 10/25/2016     Tdap (Adacel,Boostrix) 2016     Typhoid IM 2004       BMI: Estimated body mass index is 22.65 kg/(m^2) as calculated from the following:    Height as of 17: 1.524 m (5').    Weight as of 17: 52.6 kg (116 lb).      Review of Systems  ROS: All other systems reviewed and are negative.    Physical Exam   BP: 115/81  Heart Rate: 98  Temp: 98.6  F (37  C)  Resp: 16  SpO2: 100 %  Physical Exam  Nontoxic appearing no respiratory distress alert and oriented ×3  Head atraumatic normocephalic  Conjunctiva noninjected, oropharynx moist without lesions or erythema  Neck supple full active painless range of motion  Lungs clear to auscultation  Heart regular no murmur  Mild bilateral CVA tenderness, and bilateral paralumbar muscular tenderness  Abdomen soft moderate right upper  quadrant/epigastric tenderness without guarding or rebound bowel sounds positive no masses or HSM  Strength and sensation grossly intact throughout the extremities, gait and station normal  Speech is fluent, good eye contact, thought processes are rational    ED Course     ED Course     Procedures             Critical Care time:  none               Labs Ordered and Resulted from Time of ED Arrival Up to the Time of Departure from the ED   CBC WITH PLATELETS DIFFERENTIAL - Abnormal; Notable for the following:        Result Value    WBC 3.7 (*)     All other components within normal limits   UA MACROSCOPIC WITH REFLEX TO MICRO AND CULTURE   HCG QUALITATIVE URINE   COMPREHENSIVE METABOLIC PANEL   LIPASE       Assessments & Plan (with Medical Decision Making)  32-year-old female presents with bilateral flank pain, right upper quadrant tenderness.  Family history of gallbladder disease in sister.  Right upper quadrant ultrasound shows contracted gallbladder, lab workup unremarkable.  Findings consistent with biliary colic.  Ordered gallbladder functional study outpatient.  Prescription for 10 hydrocodone, recommend Zofran for nausea.  Return criteria reviewed.       I have reviewed the nursing notes.    I have reviewed the findings, diagnosis, plan and need for follow up with the patient.       New Prescriptions    HYDROCODONE-ACETAMINOPHEN (NORCO) 5-325 MG PER TABLET    Take 1-2 tablets by mouth every 6 hours as needed for moderate to severe pain    ONDANSETRON (ZOFRAN ODT) 4 MG ODT TAB    Take 1 tablet (4 mg) by mouth every 6 hours as needed for nausea       Final diagnoses:   Abdominal pain, right upper quadrant       8/11/2017   [facility] EMERGENCY DEPARTMENT     Evans Lu MD  08/11/17 5845

## 2017-08-12 NOTE — ED NOTES
Pt states her pain started a week ago and went away and now the past 2 days she started with low back pain and abd pain, she now states this happened in Feb, not this last July. She has abd pain with urination. She is eating and drinking normal but eating increases the pain. Is having some nausea on and off mainly due to pain. Pt drove herself here

## 2017-08-22 ENCOUNTER — HOSPITAL ENCOUNTER (OUTPATIENT)
Dept: NUCLEAR MEDICINE | Facility: CLINIC | Age: 32
Setting detail: NUCLEAR MEDICINE
Discharge: HOME | End: 2017-08-22
Attending: EMERGENCY MEDICINE | Admitting: EMERGENCY MEDICINE
Payer: COMMERCIAL

## 2017-08-22 VITALS — WEIGHT: 120 LBS | BODY MASS INDEX: 23.44 KG/M2

## 2017-08-22 DIAGNOSIS — R10.11 ABDOMINAL PAIN, RIGHT UPPER QUADRANT: ICD-10-CM

## 2017-08-22 PROCEDURE — 78227 HEPATOBIL SYST IMAGE W/DRUG: CPT

## 2017-08-22 PROCEDURE — 34300033 ZZH RX 343: Performed by: EMERGENCY MEDICINE

## 2017-08-22 PROCEDURE — 25000128 H RX IP 250 OP 636: Performed by: EMERGENCY MEDICINE

## 2017-08-22 PROCEDURE — A9537 TC99M MEBROFENIN: HCPCS | Performed by: EMERGENCY MEDICINE

## 2017-08-22 RX ORDER — KIT FOR THE PREPARATION OF TECHNETIUM TC 99M MEBROFENIN 45 MG/10ML
5 INJECTION, POWDER, LYOPHILIZED, FOR SOLUTION INTRAVENOUS ONCE
Status: COMPLETED | OUTPATIENT
Start: 2017-08-22 | End: 2017-08-22

## 2017-08-22 RX ADMIN — SODIUM CHLORIDE 1.1 MCG: 9 INJECTION INTRAMUSCULAR; INTRAVENOUS; SUBCUTANEOUS at 09:34

## 2017-08-22 RX ADMIN — MEBROFENIN 6 MILLICURIE: 45 INJECTION, POWDER, LYOPHILIZED, FOR SOLUTION INTRAVENOUS at 08:10

## 2017-08-23 ENCOUNTER — TELEPHONE (OUTPATIENT)
Dept: FAMILY MEDICINE | Facility: CLINIC | Age: 32
End: 2017-08-23

## 2017-08-23 DIAGNOSIS — K82.8 DYSFUNCTIONAL GALLBLADDER: Primary | ICD-10-CM

## 2017-08-23 NOTE — TELEPHONE ENCOUNTER
Dr. Wooten,    Patient had these DI tests done.  Looks like they were ordered through ED. Please advise. Karishma AMEZQUITA RN

## 2017-08-23 NOTE — TELEPHONE ENCOUNTER
Reason for Call:  Request for results:    Name of test or procedure: Ultrasound - Pt had an US of her gallbladder yesterday and she is calling today for the results.      Date of test of procedure: 08/22/17    Location of the test or procedure: [facility] to leave the result message on voice mail or with a family member? YES    Phone number Patient can be reached at:  Home number on file [phone] (home)    Additional comments: any    Call taken on 8/23/2017 at 3:42 PM by Mallorie Herrera

## 2017-08-24 NOTE — TELEPHONE ENCOUNTER
Patient returns our call and she is given the number to call to follow up on her GB. Karishma AMEZQUITA RN

## 2017-08-24 NOTE — TELEPHONE ENCOUNTER
The HIDA scan which shows the function of the gallbladder showed that yours is not functioning well.  Following CCK  administration, there was only 10.8% gallbladder ejection. This was  measured over 24 minutes. No other abnormal radiotracer accumulation  was seen.     IMPRESSION: Decreased contractility of the gallbladder suggesting  gallbladder dysfunction. The remainder of the scan is unremarkable.     The next step is to schedule a clinic visit with the general surgeon to discuss getting the gallbladder removed surgically.    Please call [facility] [phone] to schedule.  [email or web address]""[email or web address]/    Norbert Wooten MD

## 2017-08-29 ENCOUNTER — OFFICE VISIT (OUTPATIENT)
Dept: SURGERY | Facility: CLINIC | Age: 32
End: 2017-08-29
Payer: COMMERCIAL

## 2017-08-29 VITALS
HEIGHT: 60 IN | HEART RATE: 93 BPM | BODY MASS INDEX: 23.56 KG/M2 | WEIGHT: 120 LBS | TEMPERATURE: 98.4 F | DIASTOLIC BLOOD PRESSURE: 67 MMHG | SYSTOLIC BLOOD PRESSURE: 112 MMHG

## 2017-08-29 DIAGNOSIS — G89.18 POST-OP PAIN: Primary | ICD-10-CM

## 2017-08-29 PROCEDURE — 99204 OFFICE O/P NEW MOD 45 MIN: CPT | Performed by: SURGERY

## 2017-08-29 RX ORDER — OXYCODONE AND ACETAMINOPHEN 5; 325 MG/1; MG/1
1 TABLET ORAL EVERY 4 HOURS PRN
Qty: 18 TABLET | Refills: 0 | Status: SHIPPED | OUTPATIENT
Start: 2017-08-29 | End: 2017-09-12

## 2017-08-29 RX ORDER — ONDANSETRON 8 MG/1
8 TABLET, FILM COATED ORAL EVERY 8 HOURS PRN
Qty: 20 TABLET | Refills: 0 | Status: SHIPPED | OUTPATIENT
Start: 2017-08-29 | End: 2017-09-20

## 2017-08-29 NOTE — NURSING NOTE
Initial /67 (BP Location: Right arm, Patient Position: Chair, Cuff Size: Adult Regular)  Pulse 93  Temp 98.4  F (36.9  C) (Oral)  Ht 1.524 m (5')  Wt 54.4 kg (120 lb)  BMI 23.44 kg/m2 Estimated body mass index is 23.44 kg/(m^2) as calculated from the following:    Height as of this encounter: 1.524 m (5').    Weight as of this encounter: 54.4 kg (120 lb). .

## 2017-08-29 NOTE — LETTER
SURGERYPLANNING/SCHEDULING WORKSHEET                              [facility]  [address]  [facility] [address] [phone]                          Will Dodson                :  1985  MRN:  [id number]  Phone: [phone] (home)     Same Day Surgery   Surgeon: Carson Rojas MD  Diagnosis:   Biliary dyskinesia  Allergies:  Blood-group specific substance and Vicodin [hydrocodone-acetaminophen]   A preoperative evaluation and physical will be done by this office.   ====================================================  Surgical Procedure:  General Surgery:laparoscopic cholecystectomy  Length of Procedure:  45  Type of anesthesia:  General  The proposed surgical procedure is considered INTERMEDIATE risk.  Date of Procedure:___17____    Time: __8:45am___________________       Special Equipment: None  Informed Consent Obtained and Signed:  NO  ====================================================  Instructions to Same Day Surgery Staff  LAWRENCE Stockings Knee High  Preop Antibiotic:  Ancef 2 gm IV  pre-op within one hour prior to incision For > 80 kg  Preop Pain Meds:  None  Preop Orders:  Routine Standing Orders.  ====================================================  Instructions to the patient:  Preop physical exam scheduled (within 30 days or 7 days prior) with:  Dr. [facility]:  ____________________                                         Date______________Time_________________________  Come to the hospital at: __[facility] will call with arrival time____  HOME PREPARATION:   Shower with Hibiclens the night before or the morning of surgery, gently cleaning skin from neck to feet  Bathe and brush teeth the morning of surgery.  Take medications with a sip of water the morning of surgery:   Check with  if taking insulin.  May have  a light meal, toast and clear liquids, up to 8 hrs before surgery  May have clear liquids  (liquids one can read through) up to 4 hrs before surgery  NOTHING after 4 hrs before surgery  Stop aspirin 7-10 days before surgery  Stop NSAIDS (Ibuproven, Naproxen, etc) 5 days before surgery  Stop Plavix 7-10 days before surgery  Need to arrange for a     Carson Rojas MD    8/29/2017  This form was electronically signed at chart closure                                                                        Chart Copy

## 2017-08-29 NOTE — MR AVS SNAPSHOT
"              After Visit Summary   8/29/2017    Will Dodson    MRN: [id number]           Patient Information     Date Of Birth          1985        Visit Information        Provider Department      8/29/2017 1:30 PM Carson Rojas MD [facility]        Today's Diagnoses     Post-op pain    -  1      Care Instructions    Per Dr. Rojas's instructions          Follow-ups after your visit        Your next 10 appointments already scheduled     Aug 31, 2017   Procedure with Carson Rojas MD   [facility] (--)    [address] [address] [phone]           The medical center is located at [address] (between [address] in [place], [place] north of [place]).              Who to contact     If you have questions or need follow up information about today's clinic visit or your schedule please contact [facility] directly at [phone].  Normal or non-critical lab and imaging results will be communicated to you by [employer]art, letter or phone within 4 business days after the clinic has received the results. If you do not hear from us within 7 days, please contact the clinic through [employer] or phone. If you have a critical or abnormal lab result, we will notify you by phone as soon as possible.  Submit refill requests through [employer] or call your pharmacy and they will forward the refill request to us. Please allow 3 business days for your refill to be completed.          Additional Information About Your Visit        STEGOSYSTEMSharDatameer Information     [employer] lets you send messages to your doctor, view your test results, renew your prescriptions, schedule appointments and more. To sign up, go to [email or web address] . Click on \"Log in\" on the left side of the screen, which will take you to the Welcome page. Then click on \"Sign up Now\" on the right side of the page.     You will be asked to enter the access code listed " below, as well as some personal information. Please follow the directions to create your username and password.     Your access code is: [email or web address]  Expires: 2017  8:57 PM     Your access code will  in 90 days. If you need help or a new code, please call your [facility] or [phone].        Care EveryWhere ID     This is your Care EveryWhere ID. This could be used by other organizations to access your [place] medical records  [id number]        Your Vitals Were     Pulse Temperature Height BMI (Body Mass Index)          93 98.4  F (36.9  C) (Oral) 1.524 m (5') 23.44 kg/m2         Blood Pressure from Last 3 Encounters:   17 112/67   17 112/88   17 116/82    Weight from Last 3 Encounters:   17 54.4 kg (120 lb)   17 54.4 kg (120 lb)   17 52.6 kg (116 lb)              Today, you had the following     No orders found for display         Today's Medication Changes          These changes are accurate as of: 17  2:27 PM.  If you have any questions, ask your nurse or doctor.               Start taking these medicines.        Dose/Directions    ondansetron 8 MG tablet   Commonly known as:  ZOFRAN   Used for:  Post-op pain   Started by:  Carson Rojas MD        Dose:  8 mg   Take 1 tablet (8 mg) by mouth every 8 hours as needed for nausea   Quantity:  20 tablet   Refills:  0       oxyCODONE-acetaminophen 5-325 MG per tablet   Commonly known as:  PERCOCET   Used for:  Post-op pain   Started by:  Carson Rojas MD        Dose:  1 tablet   Take 1 tablet by mouth every 4 hours as needed for pain   Quantity:  18 tablet   Refills:  0            Where to get your medicines      These medications were sent to [facility] [address] [place] [address] - [address] [facility] [address]     Phone:  [phone]     ondansetron 8 MG tablet         Some of these will need a paper prescription and others can be bought over the counter.   Ask your nurse if you have questions.     Bring a paper prescription for each of these medications     oxyCODONE-acetaminophen 5-325 MG per tablet                Primary Care Provider Office Phone #    [facility] [phone] [address] [address]        Equal Access to Services     JEFFERY SALINAS : Vidhi whatley hadasho Soomaali, waaxda luqadaha, qaybta kaalmada adeegyada, ashley quigley. So [facility] [phone].    ATENCIÓN: Si habla español, tiene a jarrett disposición servicios gratuitos de asistencia lingüística. Clementina al [phone].    We comply with applicable federal civil rights laws and [place] laws. We do not discriminate on the basis of race, color, national origin, age, disability sex, sexual orientation or gender identity.            Thank you!     Thank you for choosing [facility]  for your care. Our goal is always to provide you with excellent care. Hearing back from our patients is one way we can continue to improve our services. Please take a few minutes to complete the written survey that you may receive in the mail after your visit with us. Thank you!             Your Updated Medication List - Protect others around you: Learn how to safely use, store and throw away your medicines at www.disposemymeds.org.          This list is accurate as of: 8/29/17  2:27 PM.  Always use your most recent med list.                   Brand Name Dispense Instructions for use Diagnosis    albuterol 108 (90 BASE) MCG/ACT Inhaler    PROAIR HFA    1 Inhaler    Inhale 2 puffs into the lungs every 4 hours as needed for shortness of breath / dyspnea        HYDROcodone-acetaminophen 5-325 MG per tablet    NORCO    10 tablet    Take 1-2 tablets by mouth every 6 hours as needed for moderate to severe pain        levonorgestrel 20 MCG/24HR IUD    MIRENA     1 each (20 mcg) by Intrauterine route continuous    Encounter for IUD insertion        ondansetron 8 MG tablet    ZOFRAN    20 tablet    Take 1 tablet (8 mg) by mouth every 8 hours as needed for nausea    Post-op pain       oxyCODONE-acetaminophen 5-325 MG per tablet    PERCOCET    18 tablet    Take 1 tablet by mouth every 4 hours as needed for pain    Post-op pain       prazosin 1 MG capsule    MINIPRESS    90 capsule    Take 1 capsule (1 mg) by mouth At Bedtime Can increase every 3 nights by one pill if no side effects. Up to 5mg (5 pills)    Posttraumatic stress disorder       sertraline 50 MG tablet    ZOLOFT    30 tablet    Take 1/2 tablet (25 mg) for 1-2 weeks, then increase to 1 tablet orally daily    Generalized anxiety disorder, Moderate episode of recurrent major depressive disorder (H)

## 2017-08-29 NOTE — PROGRESS NOTES
Asked by Will Wooten MD to see this patient with symptomatic biliary dyskinesia.  She has had RUQ pain associated with nausea for a few months.  Originally the symptoms would come on with eating, and now she has the symptoms even without eating.    She had an U/S that was negative, and a HIDA scan that showed biliary dyskinesia and reproduced her symptoms.    Past Medical History:   Diagnosis Date     Chickenpox      Depression      Kidney stone      Past Surgical History:   Procedure Laterality Date     C/SECTION, LOW TRANSVERSE  ,     , Low Transverse     CYSTOSCOPY,REMV CALCULUS,SIMPLE       Family History   Problem Relation Age of Onset     Breast Cancer Mother      Depression Mother      Hypertension Father      Colon Cancer Maternal Grandfather      Colon Cancer Paternal Grandfather      Other - See Comments Sister      epilepsy     Substance Abuse Sister      A&D     Social History     Social History     Marital status: Single     Spouse name: N/A     Number of children: N/A     Years of education: N/A     Occupational History     Not on file.     Social History Main Topics     Smoking status: Former Smoker     Packs/day: 0.25     Start date: 2015     Smokeless tobacco: Never Used     Alcohol use 0.0 oz/week     0 Standard drinks or equivalent per week      Comment: occas- quit with pregnancy     Drug use: No     Sexual activity: Yes     Birth control/ protection: Condom     Other Topics Concern     Parent/Sibling W/ Cabg, Mi Or Angioplasty Before 65f 55m? No     Social History Narrative     Current Outpatient Prescriptions   Medication     oxyCODONE-acetaminophen (PERCOCET) 5-325 MG per tablet     ondansetron (ZOFRAN) 8 MG tablet     HYDROcodone-acetaminophen (NORCO) 5-325 MG per tablet     sertraline (ZOLOFT) 50 MG tablet     prazosin (MINIPRESS) 1 MG capsule     levonorgestrel (MIRENA) 20 MCG/24HR IUD     albuterol (ALBUTEROL) 108 (90 BASE) MCG/ACT inhaler     No current  facility-administered medications for this visit.         Allergies   Allergen Reactions     Blood-Group Specific Substance      Patient has Probable passiive anti D Antibody. Blood Product orders may be delayed.  Draw one red top and two purple top tubes for ALL Type and Screen/ Type and Crossmatch orders.       Vicodin [Hydrocodone-Acetaminophen] Nausea and Vomiting      ROS: 10 point ROS neg other than the symptoms noted above in the HPI.    Physical Exam   Constitutional: She is oriented to person, place, and time and well-developed, well-nourished, and in no distress. No distress.   HENT:   Head: Normocephalic and atraumatic.   Eyes: EOM are normal.   Neck: Normal range of motion. No tracheal deviation present.   Cardiovascular: Normal rate, regular rhythm and normal heart sounds.    Pulmonary/Chest: Effort normal and breath sounds normal. No respiratory distress.   Abdominal: Soft. She exhibits no distension. There is no tenderness.   pfannenstiel incision from previous c-sections.   Musculoskeletal: Normal range of motion.   Neurological: She is alert and oriented to person, place, and time.   Skin: Skin is warm and dry. She is not diaphoretic.   Psychiatric: Mood, memory, affect and judgment normal.     Her U/S and HIDA scan were reviewed.    A/P:  Biliary dyskinesia.  I discussed laparoscopic cholecystectomy with her for treatment.  Risks and benefits were explained, including but not limited to bleeding, infection, and anesthesia. She seems to understand and consented to proceed with the procedure. Surgery will be scheduled as soon as possible.    She is cleared for anesthesia.    Delmer Rojas MD, FACS

## 2017-08-30 ENCOUNTER — ANESTHESIA EVENT (OUTPATIENT)
Dept: SURGERY | Facility: CLINIC | Age: 32
End: 2017-08-30
Payer: COMMERCIAL

## 2017-08-30 ASSESSMENT — LIFESTYLE VARIABLES: TOBACCO_USE: 1

## 2017-08-30 NOTE — ANESTHESIA PREPROCEDURE EVALUATION
Anesthesia Evaluation     . Pt has had prior anesthetic. Type: General and Regional           ROS/MED HX    ENT/Pulmonary:     (+)tobacco use, Past use , . .    Neurologic:  - neg neurologic ROS     Cardiovascular:     (+) ----. : . . . :. . Previous cardiac testing date:results:date: results:ECG reviewed date:6-2-16 results:SR; WNL date: results:          METS/Exercise Tolerance:  >4 METS   Hematologic:  - neg hematologic  ROS       Musculoskeletal:  - neg musculoskeletal ROS       GI/Hepatic: Comment: Biliary dyskinesia       (-) liver disease   Renal/Genitourinary:     (+) Nephrolithiasis ,       Endo:  - neg endo ROS       Psychiatric: Comment: ADHD  Panic attack    (+) psychiatric history anxiety and depression      Infectious Disease:  - neg infectious disease ROS       Malignancy:      - no malignancy   Other:    - neg other ROS                 Physical Exam  Normal systems: cardiovascular, pulmonary and dental    Airway   Mallampati: II    Dental     Cardiovascular       Pulmonary                     Anesthesia Plan      History & Physical Review  History and physical reviewed and following examination; no interval change.    ASA Status:  2 .    NPO Status:  > 6 hours    Plan for General with Intravenous and Propofol induction. Maintenance will be Inhalation.    PONV prophylaxis:  Ondansetron (or other 5HT-3) and Dexamethasone or Solumedrol  Additional equipment: Videolaryngoscope      Postoperative Care  Postoperative pain management:  IV analgesics.      Consents  Anesthetic plan, risks, benefits and alternatives discussed with:  Patient..                          .

## 2017-08-31 ENCOUNTER — HOSPITAL ENCOUNTER (OUTPATIENT)
Facility: CLINIC | Age: 32
Discharge: HOME | End: 2017-08-31
Attending: SURGERY | Admitting: SURGERY
Payer: COMMERCIAL

## 2017-08-31 ENCOUNTER — SURGERY (OUTPATIENT)
Age: 32
End: 2017-08-31

## 2017-08-31 ENCOUNTER — ANESTHESIA (OUTPATIENT)
Dept: SURGERY | Facility: CLINIC | Age: 32
End: 2017-08-31
Payer: COMMERCIAL

## 2017-08-31 VITALS
TEMPERATURE: 98.5 F | RESPIRATION RATE: 16 BRPM | OXYGEN SATURATION: 99 % | WEIGHT: 120 LBS | SYSTOLIC BLOOD PRESSURE: 100 MMHG | BODY MASS INDEX: 23.56 KG/M2 | DIASTOLIC BLOOD PRESSURE: 61 MMHG | HEIGHT: 60 IN

## 2017-08-31 DIAGNOSIS — G89.18 POST-OP PAIN: Primary | ICD-10-CM

## 2017-08-31 LAB — HCG UR QL: NEGATIVE

## 2017-08-31 PROCEDURE — 81025 URINE PREGNANCY TEST: CPT | Performed by: NURSE ANESTHETIST, CERTIFIED REGISTERED

## 2017-08-31 PROCEDURE — 25000564 ZZH DESFLURANE, EA 15 MIN: Performed by: SURGERY

## 2017-08-31 PROCEDURE — 71000027 ZZH RECOVERY PHASE 2 EACH 15 MINS: Performed by: SURGERY

## 2017-08-31 PROCEDURE — 40000306 ZZH STATISTIC PRE PROC ASSESS II: Performed by: SURGERY

## 2017-08-31 PROCEDURE — 36000058 ZZH SURGERY LEVEL 3 EA 15 ADDTL MIN: Performed by: SURGERY

## 2017-08-31 PROCEDURE — 25000125 ZZHC RX 250: Performed by: NURSE ANESTHETIST, CERTIFIED REGISTERED

## 2017-08-31 PROCEDURE — 25000125 ZZHC RX 250: Performed by: SURGERY

## 2017-08-31 PROCEDURE — 71000014 ZZH RECOVERY PHASE 1 LEVEL 2 FIRST HR: Performed by: SURGERY

## 2017-08-31 PROCEDURE — 37000009 ZZH ANESTHESIA TECHNICAL FEE, EACH ADDTL 15 MIN: Performed by: SURGERY

## 2017-08-31 PROCEDURE — 47562 LAPAROSCOPIC CHOLECYSTECTOMY: CPT | Mod: AS | Performed by: PHYSICIAN ASSISTANT

## 2017-08-31 PROCEDURE — 25000128 H RX IP 250 OP 636: Performed by: NURSE ANESTHETIST, CERTIFIED REGISTERED

## 2017-08-31 PROCEDURE — 36000056 ZZH SURGERY LEVEL 3 1ST 30 MIN: Performed by: SURGERY

## 2017-08-31 PROCEDURE — 27110028 ZZH OR GENERAL SUPPLY NON-STERILE: Performed by: SURGERY

## 2017-08-31 PROCEDURE — 71000015 ZZH RECOVERY PHASE 1 LEVEL 2 EA ADDTL HR: Performed by: SURGERY

## 2017-08-31 PROCEDURE — 88304 TISSUE EXAM BY PATHOLOGIST: CPT | Performed by: SURGERY

## 2017-08-31 PROCEDURE — 37000008 ZZH ANESTHESIA TECHNICAL FEE, 1ST 30 MIN: Performed by: SURGERY

## 2017-08-31 PROCEDURE — 27210794 ZZH OR GENERAL SUPPLY STERILE: Performed by: SURGERY

## 2017-08-31 PROCEDURE — 88304 TISSUE EXAM BY PATHOLOGIST: CPT | Mod: 26 | Performed by: SURGERY

## 2017-08-31 PROCEDURE — 25000132 ZZH RX MED GY IP 250 OP 250 PS 637: Performed by: NURSE ANESTHETIST, CERTIFIED REGISTERED

## 2017-08-31 PROCEDURE — 25000132 ZZH RX MED GY IP 250 OP 250 PS 637: Performed by: SURGERY

## 2017-08-31 PROCEDURE — 47562 LAPAROSCOPIC CHOLECYSTECTOMY: CPT | Performed by: SURGERY

## 2017-08-31 PROCEDURE — 25000128 H RX IP 250 OP 636: Performed by: SURGERY

## 2017-08-31 RX ORDER — NEOSTIGMINE METHYLSULFATE 1 MG/ML
VIAL (ML) INJECTION PRN
Status: DISCONTINUED | OUTPATIENT
Start: 2017-08-31 | End: 2017-08-31

## 2017-08-31 RX ORDER — ONDANSETRON 4 MG/1
4-8 TABLET, ORALLY DISINTEGRATING ORAL EVERY 8 HOURS PRN
Qty: 20 TABLET | Refills: 0 | Status: SHIPPED | OUTPATIENT
Start: 2017-08-31 | End: 2017-09-20

## 2017-08-31 RX ORDER — DEXAMETHASONE SODIUM PHOSPHATE 4 MG/ML
INJECTION, SOLUTION INTRA-ARTICULAR; INTRALESIONAL; INTRAMUSCULAR; INTRAVENOUS; SOFT TISSUE PRN
Status: DISCONTINUED | OUTPATIENT
Start: 2017-08-31 | End: 2017-08-31

## 2017-08-31 RX ORDER — FENTANYL CITRATE 50 UG/ML
INJECTION, SOLUTION INTRAMUSCULAR; INTRAVENOUS PRN
Status: DISCONTINUED | OUTPATIENT
Start: 2017-08-31 | End: 2017-08-31

## 2017-08-31 RX ORDER — SODIUM CHLORIDE, SODIUM LACTATE, POTASSIUM CHLORIDE, CALCIUM CHLORIDE 600; 310; 30; 20 MG/100ML; MG/100ML; MG/100ML; MG/100ML
INJECTION, SOLUTION INTRAVENOUS CONTINUOUS
Status: DISCONTINUED | OUTPATIENT
Start: 2017-08-31 | End: 2017-08-31 | Stop reason: HOSPADM

## 2017-08-31 RX ORDER — OXYCODONE AND ACETAMINOPHEN 5; 325 MG/1; MG/1
1-2 TABLET ORAL EVERY 4 HOURS PRN
Qty: 30 TABLET | Refills: 0 | Status: SHIPPED | OUTPATIENT
Start: 2017-08-31 | End: 2017-09-12

## 2017-08-31 RX ORDER — CEFAZOLIN SODIUM 2 G/100ML
2 INJECTION, SOLUTION INTRAVENOUS
Status: COMPLETED | OUTPATIENT
Start: 2017-08-31 | End: 2017-08-31

## 2017-08-31 RX ORDER — CHLORHEXIDINE GLUCONATE 40 MG/ML
SOLUTION TOPICAL ONCE
Status: DISCONTINUED | OUTPATIENT
Start: 2017-08-31 | End: 2017-08-31 | Stop reason: HOSPADM

## 2017-08-31 RX ORDER — FENTANYL CITRATE 50 UG/ML
25-50 INJECTION, SOLUTION INTRAMUSCULAR; INTRAVENOUS
Status: DISCONTINUED | OUTPATIENT
Start: 2017-08-31 | End: 2017-08-31 | Stop reason: HOSPADM

## 2017-08-31 RX ORDER — MEPERIDINE HYDROCHLORIDE 25 MG/ML
12.5 INJECTION INTRAMUSCULAR; INTRAVENOUS; SUBCUTANEOUS
Status: DISCONTINUED | OUTPATIENT
Start: 2017-08-31 | End: 2017-08-31 | Stop reason: HOSPADM

## 2017-08-31 RX ORDER — HYDROXYZINE HYDROCHLORIDE 50 MG/1
50 TABLET, FILM COATED ORAL ONCE
Status: COMPLETED | OUTPATIENT
Start: 2017-08-31 | End: 2017-08-31

## 2017-08-31 RX ORDER — CEFAZOLIN SODIUM 1 G/3ML
1 INJECTION, POWDER, FOR SOLUTION INTRAMUSCULAR; INTRAVENOUS SEE ADMIN INSTRUCTIONS
Status: DISCONTINUED | OUTPATIENT
Start: 2017-08-31 | End: 2017-08-31 | Stop reason: HOSPADM

## 2017-08-31 RX ORDER — ONDANSETRON 2 MG/ML
4 INJECTION INTRAMUSCULAR; INTRAVENOUS EVERY 30 MIN PRN
Status: DISCONTINUED | OUTPATIENT
Start: 2017-08-31 | End: 2017-08-31 | Stop reason: HOSPADM

## 2017-08-31 RX ORDER — KETOROLAC TROMETHAMINE 30 MG/ML
30 INJECTION, SOLUTION INTRAMUSCULAR; INTRAVENOUS ONCE
Status: COMPLETED | OUTPATIENT
Start: 2017-08-31 | End: 2017-08-31

## 2017-08-31 RX ORDER — ONDANSETRON 2 MG/ML
INJECTION INTRAMUSCULAR; INTRAVENOUS PRN
Status: DISCONTINUED | OUTPATIENT
Start: 2017-08-31 | End: 2017-08-31

## 2017-08-31 RX ORDER — HYDROMORPHONE HYDROCHLORIDE 1 MG/ML
.3-.5 INJECTION, SOLUTION INTRAMUSCULAR; INTRAVENOUS; SUBCUTANEOUS EVERY 10 MIN PRN
Status: DISCONTINUED | OUTPATIENT
Start: 2017-08-31 | End: 2017-08-31 | Stop reason: HOSPADM

## 2017-08-31 RX ORDER — OXYCODONE AND ACETAMINOPHEN 5; 325 MG/1; MG/1
1-2 TABLET ORAL
Status: COMPLETED | OUTPATIENT
Start: 2017-08-31 | End: 2017-08-31

## 2017-08-31 RX ORDER — GLYCOPYRROLATE 0.2 MG/ML
INJECTION, SOLUTION INTRAMUSCULAR; INTRAVENOUS PRN
Status: DISCONTINUED | OUTPATIENT
Start: 2017-08-31 | End: 2017-08-31

## 2017-08-31 RX ORDER — PROPOFOL 10 MG/ML
INJECTION, EMULSION INTRAVENOUS PRN
Status: DISCONTINUED | OUTPATIENT
Start: 2017-08-31 | End: 2017-08-31

## 2017-08-31 RX ORDER — ONDANSETRON 4 MG/1
4 TABLET, ORALLY DISINTEGRATING ORAL EVERY 30 MIN PRN
Status: DISCONTINUED | OUTPATIENT
Start: 2017-08-31 | End: 2017-08-31 | Stop reason: HOSPADM

## 2017-08-31 RX ORDER — BUPIVACAINE HYDROCHLORIDE AND EPINEPHRINE 5; 5 MG/ML; UG/ML
INJECTION, SOLUTION PERINEURAL PRN
Status: DISCONTINUED | OUTPATIENT
Start: 2017-08-31 | End: 2017-08-31 | Stop reason: HOSPADM

## 2017-08-31 RX ORDER — NALOXONE HYDROCHLORIDE 0.4 MG/ML
.1-.4 INJECTION, SOLUTION INTRAMUSCULAR; INTRAVENOUS; SUBCUTANEOUS
Status: DISCONTINUED | OUTPATIENT
Start: 2017-08-31 | End: 2017-08-31 | Stop reason: HOSPADM

## 2017-08-31 RX ORDER — LIDOCAINE 40 MG/G
CREAM TOPICAL
Status: DISCONTINUED | OUTPATIENT
Start: 2017-08-31 | End: 2017-08-31 | Stop reason: HOSPADM

## 2017-08-31 RX ADMIN — FENTANYL CITRATE 50 MCG: 50 INJECTION INTRAMUSCULAR; INTRAVENOUS at 09:43

## 2017-08-31 RX ADMIN — Medication 0.5 MG: at 09:51

## 2017-08-31 RX ADMIN — SODIUM CHLORIDE, POTASSIUM CHLORIDE, SODIUM LACTATE AND CALCIUM CHLORIDE: 600; 310; 30; 20 INJECTION, SOLUTION INTRAVENOUS at 08:18

## 2017-08-31 RX ADMIN — KETOROLAC TROMETHAMINE 30 MG: 30 INJECTION, SOLUTION INTRAMUSCULAR at 09:48

## 2017-08-31 RX ADMIN — GLYCOPYRROLATE 0.5 MG: 0.2 INJECTION, SOLUTION INTRAMUSCULAR; INTRAVENOUS at 09:17

## 2017-08-31 RX ADMIN — FENTANYL CITRATE 50 MCG: 50 INJECTION INTRAMUSCULAR; INTRAVENOUS at 09:35

## 2017-08-31 RX ADMIN — GLYCOPYRROLATE 0.2 MG: 0.2 INJECTION, SOLUTION INTRAMUSCULAR; INTRAVENOUS at 08:39

## 2017-08-31 RX ADMIN — LIDOCAINE HYDROCHLORIDE 1 ML: 10 INJECTION, SOLUTION EPIDURAL; INFILTRATION; INTRACAUDAL; PERINEURAL at 08:18

## 2017-08-31 RX ADMIN — ONDANSETRON 4 MG: 2 INJECTION INTRAMUSCULAR; INTRAVENOUS at 08:38

## 2017-08-31 RX ADMIN — Medication 2.5 MG: at 09:17

## 2017-08-31 RX ADMIN — MIDAZOLAM HYDROCHLORIDE 2 MG: 1 INJECTION, SOLUTION INTRAMUSCULAR; INTRAVENOUS at 08:35

## 2017-08-31 RX ADMIN — ROCURONIUM BROMIDE 30 MG: 10 INJECTION INTRAVENOUS at 08:40

## 2017-08-31 RX ADMIN — FENTANYL CITRATE 100 MCG: 50 INJECTION, SOLUTION INTRAMUSCULAR; INTRAVENOUS at 08:35

## 2017-08-31 RX ADMIN — CEFAZOLIN SODIUM 2 G: 2 INJECTION, SOLUTION INTRAVENOUS at 08:35

## 2017-08-31 RX ADMIN — FENTANYL CITRATE 50 MCG: 50 INJECTION, SOLUTION INTRAMUSCULAR; INTRAVENOUS at 09:15

## 2017-08-31 RX ADMIN — BUPIVACAINE HYDROCHLORIDE AND EPINEPHRINE BITARTRATE 40 ML: 5; .005 INJECTION, SOLUTION PERINEURAL at 09:15

## 2017-08-31 RX ADMIN — PROPOFOL 150 MG: 10 INJECTION, EMULSION INTRAVENOUS at 08:40

## 2017-08-31 RX ADMIN — FENTANYL CITRATE 100 MCG: 50 INJECTION, SOLUTION INTRAMUSCULAR; INTRAVENOUS at 08:51

## 2017-08-31 RX ADMIN — OXYCODONE HYDROCHLORIDE AND ACETAMINOPHEN 1 TABLET: 5; 325 TABLET ORAL at 10:07

## 2017-08-31 RX ADMIN — HYDROXYZINE HYDROCHLORIDE 50 MG: 50 TABLET, FILM COATED ORAL at 09:47

## 2017-08-31 RX ADMIN — DEXAMETHASONE SODIUM PHOSPHATE 4 MG: 4 INJECTION, SOLUTION INTRA-ARTICULAR; INTRALESIONAL; INTRAMUSCULAR; INTRAVENOUS; SOFT TISSUE at 08:38

## 2017-08-31 NOTE — IP AVS SNAPSHOT
MRN:[id number]                      After Visit Summary   8/31/2017    Will Dodson    MRN: [id number]           Thank you!     Thank you for choosing [facility] for your care. Our goal is always to provide you with excellent care. Hearing back from our patients is one way we can continue to improve our services. Please take a few minutes to complete the written survey that you may receive in the mail after you visit with us. Thank you!        Patient Information     Date Of Birth          1985        About your hospital stay     You were admitted on:  August 31, 2017 You last received care in the:  [facility] PreOP/Phase II    You were discharged on:  August 31, 2017       Who to Call     For medical emergencies, please call 911.  For non-urgent questions about your medical care, please call your primary care provider or clinic, [phone]  For questions related to your surgery, please call your surgery clinic        Attending Provider     Provider Specialty    Carsno Rojas MD General Surgery       Primary Care Provider Office Phone #    [facility] [phone]      After Care Instructions     Diet Instructions       Resume pre-procedure diet            Discharge Instructions       Patient to follow up with appointment in 1-2 weeks            No Alcohol       For 24 hours post procedure            No driving or operating machinery        until the day after procedure            Shower       No shower for 24 hours post procedure. May shower Postoperative Day (POD)  1            Weight bearing status - As tolerated                 Further instructions from your care team         Post-operative Infection  What is a wound infection?    watch for signs of infection. Signs that the wound/incision is infected include:   Pus or cloudy fluid draining from the incision.   A pimple or yellow crust forming on the incision   The scab is increasing in size.   Increasing  redness occurs around the incisions  A red streak is spreading from the wound toward the heart.   The incision has become extremely tender and/or hot   The lymph node draining that area of skin may become large and tender.   You may develop a fever over 100?F (37.8?C).     What is the cause?   Most skin infections follow breaks in the skin (for example, surgery,  from cuts, puncture wounds, animal bites, splinters, thorns, or burns). Bacteria (especially staphylococcus or streptococcus) then invade the wound and cause the infection.    Deeper wounds (like surgical incisions) are much more likely to become infected than superficial wounds (for example, scrapes).     What is the treatment?   Call your doctor's clinic if you feel you have the beginnings of an infection   Antibiotics You will probably need antibiotics prescribed by your healthcare provider. This medicine will kill the germs that are causing the wound infection. Try not to forget any of the doses.  Even if you feel better in a few days, take the antibiotic until it is completely gone to keep the infection from flaring up again.    Fever and pain relief Take acetaminophen or ibuprofen if you develop a fever over 102?F (39?C)    How can I help prevent infections?   Wash around all new incisions vigorously with soap and water for 5 to 10 minutes to remove dirt and bacteria.                  Breakthrough Bleeding    How/Why does it occur?   There are many causes of breakthrough bleeding onto your dressing. The two most common causes are increased activity and increased NSAID use.     How is it treated?   The treatment for breakthrough dressing bleeding depends on the cause. For simple problems such as a saturated dressing, you may need to reinforce the dressing with more gauze and tape and put slight pressure on the site.  Call your healthcare provider if something else is causing bleeding.        Breakthrough Bleeding    How/Why does it occur?   There are  many causes of breakthrough bleeding onto your dressing. The two most common causes are increased activity and increased NSAID use.                 Nausea and Vomiting  What are nausea and vomiting?   Nausea is the queasy feeling you usually have before you vomit. Vomiting is the forceful emptying (throwing up) of the stomach's contents through the mouth.   What causes nausea and vomiting?   Nausea and vomiting are symptoms that may occur with many conditions, such as:   Anesthesia medications   side effect of narcotic medicines  exposure to unpleasant odors or sights   stress and anxiety     How is it treated?   At first you should rest your stomach for a few hours by eating nothing solid and sipping only clear liquids. A little later you can eat soft bland foods that are easy to digest.   It is important to drink small amounts (1 to 4 ounces) often so that you do not become dehydrated. Gradually drink larger amounts of the clear fluids. If you vomit, wait an hour, then start over with a smaller amount of fluid.   Eat slowly and avoid foods that are acidic, spicy, fatty, or fibrous (such as meats, coarse grains, and raw vegetables). Also avoid extremely hot or cold food. In addition, avoid dairy products if you have diarrhea. You may start eating your normal diet again in 3 days or so, when all signs of illness have passed.   Rest as much as possible. Sit or lie down with your head propped up. Do not lie flat for at least 2 hours after eating. Nausea and vomiting usually last only a short period of time. If you have cramping or pain in your belly you can try putting a heating pad set at low or a covered hot water bottle on your belly. Never set a heating pad on high because you could get burned.   If you have been vomiting for more than a day or have had diarrhea for over 3 days, you may need to have an exam by your provider, including a check for dehydration. If you are very dehydrated, you may need to be given  fluids intravenously (IV). In children and older adults dehydration can quickly become life threatening.   When should I call my healthcare provider?   Talk with your provider if you are unable to keep fluids down for more than 12 hours or if you have any of the following symptoms with nausea and vomiting:   severe headache or neck ache, or stiff neck   severe abdominal pain   diarrhea and vomiting that last more than 24 hours   blood in the vomited material that may look red, brown, or black, or like coffee grounds   bloody diarrhea   very forceful vomiting   signs of dehydration such as dry mouth, excessive thirst, little or no urination, severe weakness, dizziness, or lightheadedness.   If you have nausea and pain in the jaw, arm, shoulder, chest, or back; sweating; shortness of breath; or lightheadedness; call 911 for emergency care.                       Same Day Surgery Discharge Instructions  Special Precautions After Surgery - Adult    1. It is not unusual to feel lightheaded or faint, up to 24 hours after surgery or while taking pain medication.  If you have these symptoms; sit for a few minutes before standing and have someone assist you when getting up.  2. You should rest and relax for the next 24 hours and must have someone stay with you for at least 24 hours after your discharge.  3. DO NOT DRIVE any vehicle or operate mechanical equipment for 24 hours following the end of your surgery.  DO NOT DRIVE while taking narcotic pain medications that have been prescribed by your physician.  If you had a limb operated on, you must be able to use it fully to drive.  4. DO NOT drink alcoholic beverages for 24 hours following surgery or while taking prescription pain medication.  5. Drink clear liquids (apple juice, ginger ale, broth, 7-Up, etc.).  Progress to your regular diet as you feel able.  6. Any questions call your physician and do not make important decisions for 24 hours.    ACTIVITY  ? Rest today.  "Activity as tolerated tomorrow.     INCISIONAL CARE    Keep clean and dry. May shower tomorrow. No tub baths. Pools. Lakes, hot-tubs until seen by Dr. Wilson.     Call for an appointment to return to the clinic Call to make your appt.    Medications:    Take as needed. Per Dr. Wilson's order.       __________________________________________________________________________________________________________________________________  IMPORTANT NUMBERS:    [facility] Main Number:  [phone], [phone]  Pharmacy:  [phone]  Same Day Surgery:  [phone], Monday - Friday until 8:30 p.m.  Urgent Care:  [phone]  Emergency Room:  [phone]      [facility]:  [phone]                                                                             [facility]:  [phone] option 1  [facility]:  [phone]     [facility] Clinic:  [phone]   Surgery Specialty Clinic:  [phone]   Home Medical Equipment: [phone]  [facility]:  [phone]        Pending Results     Date and Time Order Name Status Description    8/31/2017 0908 Surgical pathology exam In process             Admission Information     Date & Time Provider Department Dept. Phone    8/31/2017 Carson Rojas MD [facility] PreOP/Phase -[phone]      Your Vitals Were     Blood Pressure Temperature Respirations Height Weight Pulse Oximetry    99/60 98.5  F (36.9  C) (Oral) 16 1.524 m (5') 54.4 kg (120 lb) 98%    BMI (Body Mass Index)                   23.44 kg/m2           MyChart Information     [facility] lets you send messages to your doctor, view your test results, renew your prescriptions, schedule appointments and more. To sign up, go to [email or web address] . Click on \"Log in\" on the left side of the screen, which will take you to the Welcome page. Then click on \"Sign up Now\" on the right side of the page.     You will be asked to enter the access code listed below, as well " as some personal information. Please follow the directions to create your username and password.     Your access code is: [email or web address]  Expires: 2017  8:57 PM     Your access code will  in 90 days. If you need help or a new code, please call your [place] clinic or [phone].        Care EveryWhere ID     This is your Care EveryWhere ID. This could be used by other organizations to access your [place] medical records  [id number]        Equal Access to Services     JEFFERY SALINAS : Hadii jessica whatley hadasho Soomaali, waaxda luqadaha, qaybta kaalmada adeegyada, ashley campos . So [facility] [phone].    ATENCIÓN: Si habla español, tiene a jarrett disposición servicios gratuitos de asistencia lingüística. Llame al [phone].    We comply with applicable federal civil rights laws and [place] laws. We do not discriminate on the basis of race, color, national origin, age, disability sex, sexual orientation or gender identity.               Review of your medicines      START taking        Dose / Directions    ondansetron 4 MG ODT tab   Commonly known as:  ZOFRAN-ODT   Used for:  Post-op pain        Dose:  4-8 mg   Take 1-2 tablets (4-8 mg) by mouth every 8 hours as needed for nausea Dissolve ON the tongue.   Quantity:  20 tablet   Refills:  0         CONTINUE these medicines which may have CHANGED, or have new prescriptions. If we are uncertain of the size of tablets/capsules you have at home, strength may be listed as something that might have changed.        Dose / Directions    * oxyCODONE-acetaminophen 5-325 MG per tablet   Commonly known as:  PERCOCET   This may have changed:  Another medication with the same name was added. Make sure you understand how and when to take each.   Used for:  Post-op pain        Dose:  1 tablet   Take 1 tablet by mouth every 4 hours as needed for pain   Quantity:  18 tablet   Refills:  0       * oxyCODONE-acetaminophen 5-325 MG per tablet   Commonly  known as:  PERCOCET   This may have changed:  You were already taking a medication with the same name, and this prescription was added. Make sure you understand how and when to take each.   Used for:  Post-op pain        Dose:  1-2 tablet   Take 1-2 tablets by mouth every 4 hours as needed for pain (moderate to severe)   Quantity:  30 tablet   Refills:  0       * Notice:  This list has 2 medication(s) that are the same as other medications prescribed for you. Read the directions carefully, and ask your doctor or other care provider to review them with you.      CONTINUE these medicines which have NOT CHANGED        Dose / Directions    albuterol 108 (90 BASE) MCG/ACT Inhaler   Commonly known as:  PROAIR HFA        Dose:  2 puff   Inhale 2 puffs into the lungs every 4 hours as needed for shortness of breath / dyspnea   Quantity:  1 Inhaler   Refills:  0       HYDROcodone-acetaminophen 5-325 MG per tablet   Commonly known as:  NORCO        Dose:  1-2 tablet   Take 1-2 tablets by mouth every 6 hours as needed for moderate to severe pain   Quantity:  10 tablet   Refills:  0       levonorgestrel 20 MCG/24HR IUD   Commonly known as:  MIRENA   Used for:  Encounter for IUD insertion        Dose:  1 each   1 each (20 mcg) by Intrauterine route continuous   Refills:  0       ondansetron 8 MG tablet   Commonly known as:  ZOFRAN   Used for:  Post-op pain        Dose:  8 mg   Take 1 tablet (8 mg) by mouth every 8 hours as needed for nausea   Quantity:  20 tablet   Refills:  0       sertraline 50 MG tablet   Commonly known as:  ZOLOFT   Used for:  Generalized anxiety disorder, Moderate episode of recurrent major depressive disorder (H)        Take 1/2 tablet (25 mg) for 1-2 weeks, then increase to 1 tablet orally daily   Quantity:  30 tablet   Refills:  1            Where to get your medicines      These medications were sent to [facility] [place], [place] - [address]  [address] [address]      Phone:  [phone]     ondansetron 4 MG ODT tab         Some of these will need a paper prescription and others can be bought over the counter. Ask your nurse if you have questions.     Bring a paper prescription for each of these medications     oxyCODONE-acetaminophen 5-325 MG per tablet                Protect others around you: Learn how to safely use, store and throw away your medicines at www.disposemymeds.org.             Medication List: This is a list of all your medications and when to take them. Check marks below indicate your daily home schedule. Keep this list as a reference.      Medications           Morning Afternoon Evening Bedtime As Needed    albuterol 108 (90 BASE) MCG/ACT Inhaler   Commonly known as:  PROAIR HFA   Inhale 2 puffs into the lungs every 4 hours as needed for shortness of breath / dyspnea                                HYDROcodone-acetaminophen 5-325 MG per tablet   Commonly known as:  NORCO   Take 1-2 tablets by mouth every 6 hours as needed for moderate to severe pain                                levonorgestrel 20 MCG/24HR IUD   Commonly known as:  MIRENA   1 each (20 mcg) by Intrauterine route continuous                                ondansetron 4 MG ODT tab   Commonly known as:  ZOFRAN-ODT   Take 1-2 tablets (4-8 mg) by mouth every 8 hours as needed for nausea Dissolve ON the tongue.                                ondansetron 8 MG tablet   Commonly known as:  ZOFRAN   Take 1 tablet (8 mg) by mouth every 8 hours as needed for nausea                                * oxyCODONE-acetaminophen 5-325 MG per tablet   Commonly known as:  PERCOCET   Take 1 tablet by mouth every 4 hours as needed for pain   Last time this was given:  1 tablet on 8/31/2017 10:07 AM                                * oxyCODONE-acetaminophen 5-325 MG per tablet   Commonly known as:  PERCOCET   Take 1-2 tablets by mouth every 4 hours as needed for pain (moderate to severe)   Last time this was given:  1  tablet on 8/31/2017 10:07 AM                                sertraline 50 MG tablet   Commonly known as:  ZOLOFT   Take 1/2 tablet (25 mg) for 1-2 weeks, then increase to 1 tablet orally daily                                * Notice:  This list has 2 medication(s) that are the same as other medications prescribed for you. Read the directions carefully, and ask your doctor or other care provider to review them with you.

## 2017-08-31 NOTE — ANESTHESIA POSTPROCEDURE EVALUATION
Patient: Will Dodson    Procedure(s):  Laparoscopic Cholecystectomy - Wound Class: II-Clean Contaminated    Diagnosis:Biliary dyskinesia  Diagnosis Additional Information: No value filed.    Anesthesia Type:  General    Note:  Anesthesia Post Evaluation    Patient location during evaluation: Bedside  Patient participation: Able to fully participate in evaluation  Level of consciousness: awake and alert  Pain management: adequate  Airway patency: patent  Cardiovascular status: acceptable  Respiratory status: acceptable  Hydration status: acceptable  PONV: none     Anesthetic complications: None          Last vitals:  Vitals:    08/31/17 1045 08/31/17 1055 08/31/17 1103   BP: 93/53 95/60 98/62   Resp: 12 12 14   Temp:      SpO2: 95% 95% 96%         Electronically Signed By: YURI Rosario CRNA  August 31, 2017  11:10 AM

## 2017-08-31 NOTE — OP NOTE
Pre op diagnosis: Biliary dyskinesia   Post op diagnosis: Same  Procedure:   Laparoscopic cholecystectomy  Anesthesia:   BRIANA  Surgeon:   Bob  Assistant:  LOLIS Che.  His assistance was needed for the camera operation.    Indication for surgery.  This is a 32 year old female with symptomatic biliary disease.  I discussed lap olinda with her.  Risks and benefits were explained, including but not limited to bleeding, infection, and anesthesia. She seems to understand and consented to proceed with the procedure.     Under general anesthesia, the patient's abdomen was prepped and draped in the usual manner.  1% lidocaine was used to infiltrate above the umbilicus.  An incision was made and the veress needle was placed in the abdomen.  The abdomen was insufflated to a constant pressure and zero flow.  A 5 mm port was placed in this incision and a 5 mm 30% camera was placed.   The patient was positioned head up and right side up.  Three other ports were placed in the abdomen after infiltration of the incision sites in the right subcostal area, a 10 mm in the mid epigastric area, two 5 mm ports in the midclavicular site and the anterior axillary line.      The gallbladder was retracted superiorly and the neck retracted laterally.  The cystic duct was isolated and clips were placed.  The duct was divided with scissors.  The cystic artery was then isolated and clips were placed.  The artery was then divided with scissors.  The gallbladder was removed from the gallbladder bed using electrocautery and removed from the abdomen through the upper midline incision.    The abdomen was irrigated with saline and the fluid was evacuated.  The liver bed was inspected for hemostatis.  The upper midline port was removed and the incision was closed with a deep fascial stitch closure and all the other ports were removed under direct vision and the skin closed with 4-0 subcuticular vicryl sutures.  Dermabond was applied.  The  patient tolerated the procedure well and went to PACU in stable condition.    Delmer Rojas MD, FACS

## 2017-08-31 NOTE — IP AVS SNAPSHOT
[facility] PreOP/Phase II    [address] [address]    Phone:  [phone]    Fax:  [phone]                                       After Visit Summary   8/31/2017    Will Dodson    MRN: [id number]           After Visit Summary Signature Page     I have received my discharge instructions, and my questions have been answered. I have discussed any challenges I see with this plan with the nurse or doctor.    ..........................................................................................................................................  Patient/Patient Representative Signature      ..........................................................................................................................................  Patient Representative Print Name and Relationship to Patient    ..................................................               ................................................  Date                                            Time    ..........................................................................................................................................  Reviewed by Signature/Title    ...................................................              ..............................................  Date                                                            Time

## 2017-08-31 NOTE — ANESTHESIA CARE TRANSFER NOTE
Patient: Will Dodson    Procedure(s):  Laparoscopic Cholecystectomy - Wound Class: II-Clean Contaminated    Diagnosis: Biliary dyskinesia  Diagnosis Additional Information: No value filed.    Anesthesia Type:   General     Note:  Airway :Nasal Cannula  Patient transferred to:PACU        Vitals: (Last set prior to Anesthesia Care Transfer)    CRNA VITALS  8/31/2017 0859 - 8/31/2017 0931      8/31/2017             Pulse: 101    SpO2: 98 %    Resp Rate (observed): (!)  6                Electronically Signed By: Papo Goldstein CRNA, APRN CRNA  August 31, 2017  9:31 AM

## 2017-08-31 NOTE — DISCHARGE INSTRUCTIONS
Post-operative Infection  What is a wound infection?    watch for signs of infection. Signs that the wound/incision is infected include:   Pus or cloudy fluid draining from the incision.   A pimple or yellow crust forming on the incision   The scab is increasing in size.   Increasing redness occurs around the incisions  A red streak is spreading from the wound toward the heart.   The incision has become extremely tender and/or hot   The lymph node draining that area of skin may become large and tender.   You may develop a fever over 100?F (37.8?C).     What is the cause?   Most skin infections follow breaks in the skin (for example, surgery,  from cuts, puncture wounds, animal bites, splinters, thorns, or burns). Bacteria (especially staphylococcus or streptococcus) then invade the wound and cause the infection.    Deeper wounds (like surgical incisions) are much more likely to become infected than superficial wounds (for example, scrapes).     What is the treatment?   Call your doctor's clinic if you feel you have the beginnings of an infection   Antibiotics You will probably need antibiotics prescribed by your healthcare provider. This medicine will kill the germs that are causing the wound infection. Try not to forget any of the doses.  Even if you feel better in a few days, take the antibiotic until it is completely gone to keep the infection from flaring up again.    Fever and pain relief Take acetaminophen or ibuprofen if you develop a fever over 102?F (39?C)    How can I help prevent infections?   Wash around all new incisions vigorously with soap and water for 5 to 10 minutes to remove dirt and bacteria.                  Breakthrough Bleeding    How/Why does it occur?   There are many causes of breakthrough bleeding onto your dressing. The two most common causes are increased activity and increased NSAID use.     How is it treated?   The treatment for breakthrough dressing bleeding depends on the cause.  For simple problems such as a saturated dressing, you may need to reinforce the dressing with more gauze and tape and put slight pressure on the site.  Call your healthcare provider if something else is causing bleeding.        Breakthrough Bleeding    How/Why does it occur?   There are many causes of breakthrough bleeding onto your dressing. The two most common causes are increased activity and increased NSAID use.                 Nausea and Vomiting  What are nausea and vomiting?   Nausea is the queasy feeling you usually have before you vomit. Vomiting is the forceful emptying (throwing up) of the stomach's contents through the mouth.   What causes nausea and vomiting?   Nausea and vomiting are symptoms that may occur with many conditions, such as:   Anesthesia medications   side effect of narcotic medicines  exposure to unpleasant odors or sights   stress and anxiety     How is it treated?   At first you should rest your stomach for a few hours by eating nothing solid and sipping only clear liquids. A little later you can eat soft bland foods that are easy to digest.   It is important to drink small amounts (1 to 4 ounces) often so that you do not become dehydrated. Gradually drink larger amounts of the clear fluids. If you vomit, wait an hour, then start over with a smaller amount of fluid.   Eat slowly and avoid foods that are acidic, spicy, fatty, or fibrous (such as meats, coarse grains, and raw vegetables). Also avoid extremely hot or cold food. In addition, avoid dairy products if you have diarrhea. You may start eating your normal diet again in 3 days or so, when all signs of illness have passed.   Rest as much as possible. Sit or lie down with your head propped up. Do not lie flat for at least 2 hours after eating. Nausea and vomiting usually last only a short period of time. If you have cramping or pain in your belly you can try putting a heating pad set at low or a covered hot water bottle on your  belly. Never set a heating pad on high because you could get burned.   If you have been vomiting for more than a day or have had diarrhea for over 3 days, you may need to have an exam by your provider, including a check for dehydration. If you are very dehydrated, you may need to be given fluids intravenously (IV). In children and older adults dehydration can quickly become life threatening.   When should I call my healthcare provider?   Talk with your provider if you are unable to keep fluids down for more than 12 hours or if you have any of the following symptoms with nausea and vomiting:   severe headache or neck ache, or stiff neck   severe abdominal pain   diarrhea and vomiting that last more than 24 hours   blood in the vomited material that may look red, brown, or black, or like coffee grounds   bloody diarrhea   very forceful vomiting   signs of dehydration such as dry mouth, excessive thirst, little or no urination, severe weakness, dizziness, or lightheadedness.   If you have nausea and pain in the jaw, arm, shoulder, chest, or back; sweating; shortness of breath; or lightheadedness; call 911 for emergency care.                       Same Day Surgery Discharge Instructions  Special Precautions After Surgery - Adult    1. It is not unusual to feel lightheaded or faint, up to 24 hours after surgery or while taking pain medication.  If you have these symptoms; sit for a few minutes before standing and have someone assist you when getting up.  2. You should rest and relax for the next 24 hours and must have someone stay with you for at least 24 hours after your discharge.  3. DO NOT DRIVE any vehicle or operate mechanical equipment for 24 hours following the end of your surgery.  DO NOT DRIVE while taking narcotic pain medications that have been prescribed by your physician.  If you had a limb operated on, you must be able to use it fully to drive.  4. DO NOT drink alcoholic beverages for 24 hours following  surgery or while taking prescription pain medication.  5. Drink clear liquids (apple juice, ginger ale, broth, 7-Up, etc.).  Progress to your regular diet as you feel able.  6. Any questions call your physician and do not make important decisions for 24 hours.    ACTIVITY  ? Rest today. Activity as tolerated tomorrow.     INCISIONAL CARE    Keep clean and dry. May shower tomorrow. No tub baths. Pools. Lakes, hot-tubs until seen by Dr. Wilson.     Call for an appointment to return to the clinic Call to make your appt.    Medications:    Take as needed. Per Dr. Wilson's order.       __________________________________________________________________________________________________________________________________  IMPORTANT NUMBERS:    [facility] Main Number:  [phone], [phone]  Pharmacy:  [phone]  Same Day Surgery:  [phone], Monday - Friday until 8:30 p.m.  Urgent Care:  [phone]  Emergency Room:  [phone]      [facility]:  [phone]                                                                             [facility]:  [phone] option 1  [facility]:  [phone]     [facility]:  [phone]   [facility]:  [phone]   Home Medical Equipment: [phone]  [facility]:  [phone]

## 2017-09-01 LAB — COPATH REPORT: NORMAL

## 2017-09-06 ENCOUNTER — TELEPHONE (OUTPATIENT)
Dept: FAMILY MEDICINE | Facility: CLINIC | Age: 32
End: 2017-09-06

## 2017-09-06 DIAGNOSIS — F33.1 MODERATE EPISODE OF RECURRENT MAJOR DEPRESSIVE DISORDER (H): Primary | ICD-10-CM

## 2017-09-06 NOTE — TELEPHONE ENCOUNTER
Dr. Wooten,    Patient is contacted to verify what medication the patient is taking.  Patient is very very vague.  She does not have her bottles with her right now.  What ever she is taking she called [facility] for a refill.  Looks like [facility] wants prozac capsules.  Patient states she has a psychiatrist too that prescribes for her but Dr. Wooten does her refills.  Our records show we did prescribe prozac on 5/28/17 with an office visit but her insurance did not cover it so we changed it to zoloft.  Patient states her insurance has changed.  I called [facility] and the patient picked up prozac on 5/18/17, and again 7/18/17.  Per [facility] the patient never filled the zoloft.  Please advise.  Prozac capsules?  Requested the patient call us back with her medications to compare to our medication list. Karishma AMEZQUITA RN

## 2017-09-07 NOTE — TELEPHONE ENCOUNTER
Dr. Wooten,    Contacted the patient.  She is taking prozac 20 mg daily.  She is told of the small supply and the need to follow up in clinic. Karishma AMEZQUITA RN

## 2017-09-07 NOTE — TELEPHONE ENCOUNTER
I need to know what dosage of the prozac capsules.  I would need to see her in clinic again for refills, but could send a short course until she is seen.  Norbert Wooten MD

## 2017-09-12 ENCOUNTER — APPOINTMENT (OUTPATIENT)
Dept: GENERAL RADIOLOGY | Facility: CLINIC | Age: 32
End: 2017-09-12
Attending: EMERGENCY MEDICINE
Payer: COMMERCIAL

## 2017-09-12 ENCOUNTER — HOSPITAL ENCOUNTER (EMERGENCY)
Facility: CLINIC | Age: 32
Discharge: HOME | End: 2017-09-12
Attending: EMERGENCY MEDICINE | Admitting: EMERGENCY MEDICINE
Payer: COMMERCIAL

## 2017-09-12 VITALS
SYSTOLIC BLOOD PRESSURE: 110 MMHG | RESPIRATION RATE: 16 BRPM | BODY MASS INDEX: 24.19 KG/M2 | TEMPERATURE: 97.6 F | HEIGHT: 59 IN | DIASTOLIC BLOOD PRESSURE: 77 MMHG | WEIGHT: 120 LBS | OXYGEN SATURATION: 98 %

## 2017-09-12 DIAGNOSIS — R07.9 RIGHT-SIDED CHEST PAIN: ICD-10-CM

## 2017-09-12 DIAGNOSIS — R07.1 PAINFUL RESPIRATION: ICD-10-CM

## 2017-09-12 LAB
BASOPHILS # BLD AUTO: 0 10E9/L (ref 0–0.2)
BASOPHILS NFR BLD AUTO: 0.2 %
D DIMER PPP FEU-MCNC: <0.3 UG/ML FEU (ref 0–0.5)
DIFFERENTIAL METHOD BLD: NORMAL
EOSINOPHIL # BLD AUTO: 0.2 10E9/L (ref 0–0.7)
EOSINOPHIL NFR BLD AUTO: 2.3 %
ERYTHROCYTE [DISTWIDTH] IN BLOOD BY AUTOMATED COUNT: 12.9 % (ref 10–15)
HCT VFR BLD AUTO: 42.6 % (ref 35–47)
HGB BLD-MCNC: 14.4 G/DL (ref 11.7–15.7)
IMM GRANULOCYTES # BLD: 0 10E9/L (ref 0–0.4)
IMM GRANULOCYTES NFR BLD: 0 %
LYMPHOCYTES # BLD AUTO: 2.4 10E9/L (ref 0.8–5.3)
LYMPHOCYTES NFR BLD AUTO: 25.6 %
MCH RBC QN AUTO: 30.8 PG (ref 26.5–33)
MCHC RBC AUTO-ENTMCNC: 33.8 G/DL (ref 31.5–36.5)
MCV RBC AUTO: 91 FL (ref 78–100)
MONOCYTES # BLD AUTO: 0.8 10E9/L (ref 0–1.3)
MONOCYTES NFR BLD AUTO: 8.9 %
NEUTROPHILS # BLD AUTO: 5.9 10E9/L (ref 1.6–8.3)
NEUTROPHILS NFR BLD AUTO: 63 %
PLATELET # BLD AUTO: 305 10E9/L (ref 150–450)
RBC # BLD AUTO: 4.67 10E12/L (ref 3.8–5.2)
TROPONIN I SERPL-MCNC: <0.015 UG/L (ref 0–0.04)
WBC # BLD AUTO: 9.4 10E9/L (ref 4–11)

## 2017-09-12 PROCEDURE — 96375 TX/PRO/DX INJ NEW DRUG ADDON: CPT

## 2017-09-12 PROCEDURE — 93005 ELECTROCARDIOGRAM TRACING: CPT

## 2017-09-12 PROCEDURE — 85379 FIBRIN DEGRADATION QUANT: CPT | Performed by: EMERGENCY MEDICINE

## 2017-09-12 PROCEDURE — 85025 COMPLETE CBC W/AUTO DIFF WBC: CPT | Performed by: EMERGENCY MEDICINE

## 2017-09-12 PROCEDURE — 96376 TX/PRO/DX INJ SAME DRUG ADON: CPT

## 2017-09-12 PROCEDURE — 84484 ASSAY OF TROPONIN QUANT: CPT | Performed by: EMERGENCY MEDICINE

## 2017-09-12 PROCEDURE — 99285 EMERGENCY DEPT VISIT HI MDM: CPT | Mod: 25 | Performed by: EMERGENCY MEDICINE

## 2017-09-12 PROCEDURE — 71020 XR CHEST 2 VW: CPT

## 2017-09-12 PROCEDURE — 93010 ELECTROCARDIOGRAM REPORT: CPT | Performed by: EMERGENCY MEDICINE

## 2017-09-12 PROCEDURE — 99285 EMERGENCY DEPT VISIT HI MDM: CPT | Mod: 25

## 2017-09-12 PROCEDURE — 25000128 H RX IP 250 OP 636: Performed by: EMERGENCY MEDICINE

## 2017-09-12 PROCEDURE — 96374 THER/PROPH/DIAG INJ IV PUSH: CPT

## 2017-09-12 RX ORDER — KETOROLAC TROMETHAMINE 30 MG/ML
30 INJECTION, SOLUTION INTRAMUSCULAR; INTRAVENOUS ONCE
Status: COMPLETED | OUTPATIENT
Start: 2017-09-12 | End: 2017-09-12

## 2017-09-12 RX ORDER — HYDROMORPHONE HYDROCHLORIDE 1 MG/ML
0.5 INJECTION, SOLUTION INTRAMUSCULAR; INTRAVENOUS; SUBCUTANEOUS
Status: COMPLETED | OUTPATIENT
Start: 2017-09-12 | End: 2017-09-12

## 2017-09-12 RX ORDER — OXYCODONE AND ACETAMINOPHEN 5; 325 MG/1; MG/1
1-2 TABLET ORAL EVERY 4 HOURS PRN
Qty: 15 TABLET | Refills: 0 | Status: SHIPPED | OUTPATIENT
Start: 2017-09-12 | End: 2017-09-20

## 2017-09-12 RX ADMIN — HYDROMORPHONE HYDROCHLORIDE 0.5 MG: 1 INJECTION, SOLUTION INTRAMUSCULAR; INTRAVENOUS; SUBCUTANEOUS at 17:48

## 2017-09-12 RX ADMIN — KETOROLAC TROMETHAMINE 30 MG: 30 INJECTION, SOLUTION INTRAMUSCULAR at 17:19

## 2017-09-12 RX ADMIN — HYDROMORPHONE HYDROCHLORIDE 0.5 MG: 1 INJECTION, SOLUTION INTRAMUSCULAR; INTRAVENOUS; SUBCUTANEOUS at 17:18

## 2017-09-12 RX ADMIN — HYDROMORPHONE HYDROCHLORIDE 0.5 MG: 1 INJECTION, SOLUTION INTRAMUSCULAR; INTRAVENOUS; SUBCUTANEOUS at 18:30

## 2017-09-12 ASSESSMENT — ENCOUNTER SYMPTOMS
ABDOMINAL PAIN: 0
SHORTNESS OF BREATH: 1
PALPITATIONS: 0
FEVER: 0
ABDOMINAL DISTENTION: 0

## 2017-09-12 NOTE — ED AVS SNAPSHOT
[facility]    [address] [address]    Phone:  [phone]    Fax:  [phone]                                       Will Dodson   MRN: [id number]    Department:  [facility]   Date of Visit:  9/12/2017           After Visit Summary Signature Page     I have received my discharge instructions, and my questions have been answered. I have discussed any challenges I see with this plan with the nurse or doctor.    ..........................................................................................................................................  Patient/Patient Representative Signature      ..........................................................................................................................................  Patient Representative Print Name and Relationship to Patient    ..................................................               ................................................  Date                                            Time    ..........................................................................................................................................  Reviewed by Signature/Title    ...................................................              ..............................................  Date                                                            Time

## 2017-09-12 NOTE — ED PROVIDER NOTES
History     Chief Complaint   Patient presents with     Post-op Problem     Patient had  surgery   2 weeks ago  pain acute onset  pain in right upper  back up into shoulder blade  is getting worse  Difficulty  breathing  getting worse      HPI  Will Dodson is a 32 year old female who is 2 weeks status post laparoscopic cholecystectomy.  Abrupt onset of pleuritic type chest discomfort Saturday afternoon.  Right posterior mid thorax.  No hemoptysis.  Denies fever or chills.  No cough or congestion.  Is a smoker.  No previous spontaneous pneumothorax.  No calf or thigh swelling or tenderness in surgery.  States it's not painful to move her upper extremities or neck.  Very painful to take a deep breath in, cough, sneeze, laugh.    I have reviewed the Medications, Allergies, Past Medical and Surgical History, and Social History in the Epic system.    Allergies:   Allergies   Allergen Reactions     Blood-Group Specific Substance      Patient has Probable passiive anti D Antibody. Blood Product orders may be delayed.  Draw one red top and two purple top tubes for ALL Type and Screen/ Type and Crossmatch orders.       Vicodin [Hydrocodone-Acetaminophen] Nausea and Vomiting         No current facility-administered medications on file prior to encounter.   Current Outpatient Prescriptions on File Prior to Encounter:  FLUoxetine (PROZAC) 20 MG capsule Take 1 capsule (20 mg) by mouth daily   oxyCODONE-acetaminophen (PERCOCET) 5-325 MG per tablet Take 1-2 tablets by mouth every 4 hours as needed for pain (moderate to severe)   ondansetron (ZOFRAN-ODT) 4 MG ODT tab Take 1-2 tablets (4-8 mg) by mouth every 8 hours as needed for nausea Dissolve ON the tongue.   oxyCODONE-acetaminophen (PERCOCET) 5-325 MG per tablet Take 1 tablet by mouth every 4 hours as needed for pain   ondansetron (ZOFRAN) 8 MG tablet Take 1 tablet (8 mg) by mouth every 8 hours as needed for nausea   HYDROcodone-acetaminophen (NORCO) 5-325 MG per tablet Take  "1-2 tablets by mouth every 6 hours as needed for moderate to severe pain   sertraline (ZOLOFT) 50 MG tablet Take 1/2 tablet (25 mg) for 1-2 weeks, then increase to 1 tablet orally daily   levonorgestrel (MIRENA) 20 MCG/24HR IUD 1 each (20 mcg) by Intrauterine route continuous   albuterol (ALBUTEROL) 108 (90 BASE) MCG/ACT inhaler Inhale 2 puffs into the lungs every 4 hours as needed for shortness of breath / dyspnea       Patient Active Problem List   Diagnosis     H/O:      Generalized anxiety disorder     Panic attack     ADHD (attention deficit hyperactivity disorder), combined type     RhD negative     Moderate episode of recurrent major depressive disorder (H)       Past Surgical History:   Procedure Laterality Date     C/SECTION, LOW TRANSVERSE  ,     , Low Transverse     CYSTOSCOPY,REMV CALCULUS,SIMPLE       LAPAROSCOPIC CHOLECYSTECTOMY N/A 2017    Procedure: LAPAROSCOPIC CHOLECYSTECTOMY;  Laparoscopic Cholecystectomy;  Surgeon: Carson Rojas MD;  Location: [facility] OR       Social History   Substance Use Topics     Smoking status: Former Smoker     Packs/day: 0.25     Start date: 2015     Smokeless tobacco: Never Used     Alcohol use 0.0 oz/week     0 Standard drinks or equivalent per week      Comment: occas- quit with pregnancy       Most Recent Immunizations   Administered Date(s) Administered     HEPA 2004     Influenza Vaccine IM 3yrs+ 4 Valent IIV4 10/25/2016     Tdap (Adacel,Boostrix) 2016     Typhoid IM 2004       BMI: Estimated body mass index is 24.24 kg/(m^2) as calculated from the following:    Height as of this encounter: 1.499 m (4' 11\").    Weight as of this encounter: 54.4 kg (120 lb).      Review of Systems   Constitutional: Negative for fever.   HENT: Negative.    Respiratory: Positive for shortness of breath.    Cardiovascular: Positive for chest pain. Negative for palpitations and leg swelling.   Gastrointestinal: Negative for " "abdominal distention and abdominal pain.   All other systems reviewed and are negative.      Physical Exam   BP: 117/78  Heart Rate: 79  Temp: 97.6  F (36.4  C)  Resp: 20  Height: 149.9 cm (4' 11\")  Weight: 54.4 kg (120 lb)  SpO2: 98 %  Physical Exam  Head:  Normocephalic.    Eyes:  PERRLA, full EOM.  External exams normal.    Ears:  Normal pinnae, canals, and TM's.    Nose:  Patent, without deformity.    Throat:  Moist mucous membranes without lesions, erythema, or exudate.    Neck:  Supple, without masses, lymphadenopathy or tenderness.    Respiratory:  Normal respiratory effort.  Lungs are clear with good breath sounds.  Positive respiratory splinting  Heart:  RR without murmurs, rubs, or gallops.  No JVD  Abdomen: Upper scope incisions healing well with no erythema or drainage.  Abdomen is nontender in the right upper quadrant and in the epigastric area.  There is no guarding or rebound.        ED Course     ED Course     Procedures             EKG Interpretation:      Interpreted by Davey Lu  Time reviewed: 17:19  Symptoms at time of EKG: Pleuritic chest pain  Rhythm: normal sinus   Rate: normal  Axis: normal  Ectopy: none  Conduction: normal  ST Segments/ T Waves: No ST-T wave changes  Q Waves: none  Comparison to prior: Unchanged 2016    Clinical Impression: normal EKG         Assessments & Plan (with Medical Decision Making)32-year-old female -2 weeks status post laparoscopic cholecystectomy . presents with three-day history for pleuritic right posterior thoracic chest pain . no calf or thigh swelling or discomfort . cardiovascular examination normal . lungs were clear to auscultation except for the presence of respiratory splinting . calf compression was nontender bilaterally there's no swelling.  Negative Homans test bilaterally.    Postoperative setting with development of pleuritic type chest pain -fracture diagnosis includes pulmonary embolism , postoperative infection , spontaneous " pneumothorax , pleurisy .  6:27 PM  EKG normal . d-dimer normal . troponin normal . chest x-ray 2 view pending . repeat examination revealed no reproducible abdominal pain in the right upper quadrant epigastric area that could be causing referred pain into the chest/right periscapular area .  Plan is to discharge home chest x-ray is unremarkable . Norco available . recheck surgeon or her PMD in 3 days if symptoms persist .    Note: Uncertain etiology for her pain as a pleuritic type pain .  Chest x-ray reviewed and normal .     I have reviewed the nursing notes.    I have reviewed the findings, diagnosis, plan and need for follow up with the patient.      New Prescriptions    No medications on file       Final diagnoses:   Right-sided chest pain   Painful respiration       9/12/2017   [facility] EMERGENCY DEPARTMENT     Davey Lu DO  09/12/17 1900

## 2017-09-12 NOTE — ED AVS SNAPSHOT
[facility] Emergency Department    [address]    [facility] [place] [address]    Phone:  [phone]    Fax:  [phone]                                       Will Dodson   MRN: [id number]    Department:  [facility] Emergency Department   Date of Visit:  9/12/2017           Patient Information     Date Of Birth          1985        Your diagnoses for this visit were:     Right-sided chest pain     Painful respiration        You were seen by Davey Lu DO.      Follow-up Information     Follow up with Clinic, [facility]. Schedule an appointment as soon as possible for a visit in 3 days.    Why:  If symptoms worsen    Contact information:    [address]  [facility] [address] [phone]          Discharge Instructions       Chest pain evaluation:  Identified no cardiac concerns       EKG was normal       Cardiac enzyme troponin was normal       Cardiac examination normal    Identified no postoperative complications within the upper abdomen that would cause referred pain into chest    Chest x-ray was normal    Evaluation for blood clots/pulmonary embolism-screening blood test was negative( d-dimer)    Plan:    Avoid tobacco use  Activity as tolerated  Oxycodone as directed for severe pain  Monitor for fever, increased shortness of breath, leg swelling, increasing chest pain-if noted return    Future Appointments        Provider Department Dept Phone Center    9/14/2017 8:20 AM Norbert Wooten MD [facility] [phone] [facility]    9/19/2017 3:15 PM Carson Rojas MD [facility] [phone] [facility]      24 Hour Appointment Hotline       To make an appointment at any [facility], call [phone] ([phone]). If you don't have a family doctor or clinic, we will help you find one. [facility] are conveniently located to serve the needs of you and your family.             Review of your medicines      CONTINUE these  medicines which may have CHANGED, or have new prescriptions. If we are uncertain of the size of tablets/capsules you have at home, strength may be listed as something that might have changed.        Dose / Directions Last dose taken    oxyCODONE-acetaminophen 5-325 MG per tablet   Commonly known as:  PERCOCET   Dose:  1-2 tablet   What changed:  how much to take   Quantity:  15 tablet        Take 1-2 tablets by mouth every 4 hours as needed for pain   Refills:  0          Our records show that you are taking the medicines listed below. If these are incorrect, please call your family doctor or clinic.        Dose / Directions Last dose taken    albuterol 108 (90 BASE) MCG/ACT Inhaler   Commonly known as:  PROAIR HFA   Dose:  2 puff   Quantity:  1 Inhaler        Inhale 2 puffs into the lungs every 4 hours as needed for shortness of breath / dyspnea   Refills:  0        FLUoxetine 20 MG capsule   Commonly known as:  PROzac   Dose:  20 mg   Quantity:  90 capsule        Take 1 capsule (20 mg) by mouth daily   Refills:  0        levonorgestrel 20 MCG/24HR IUD   Commonly known as:  MIRENA   Dose:  1 each        1 each (20 mcg) by Intrauterine route continuous   Refills:  0        ondansetron 4 MG ODT tab   Commonly known as:  ZOFRAN-ODT   Dose:  4-8 mg   Quantity:  20 tablet        Take 1-2 tablets (4-8 mg) by mouth every 8 hours as needed for nausea Dissolve ON the tongue.   Refills:  0        ondansetron 8 MG tablet   Commonly known as:  ZOFRAN   Dose:  8 mg   Quantity:  20 tablet        Take 1 tablet (8 mg) by mouth every 8 hours as needed for nausea   Refills:  0                Prescriptions were sent or printed at these locations (1 Prescription)                   [facility] [place], [place] - [address] [facility]   [address] [place] [address]    Telephone:  [phone]   Fax:  [phone]   Hours:                  Printed at Department/Unit printer (1 of 1)         oxyCODONE-acetaminophen  (PERCOCET) 5-325 MG per tablet                Procedures and tests performed during your visit     CBC with platelets differential    D dimer quantitative    EKG 12-lead, tracing only    Troponin I    XR Chest 2 Views      Orders Needing Specimen Collection     None      Pending Results     Date and Time Order Name Status Description    9/12/2017 1824 XR Chest 2 Views Preliminary             Pending Culture Results     No orders found from 9/10/2017 to 9/13/2017.            Pending Results Instructions     If you had any lab results that were not finalized at the time of your Discharge, you can call the ED Lab Result RN at [phone]. You will be contacted by this team for any positive Lab results or changes in treatment. The nurses are available 7 days a week from 10A to 6:30P.  You can leave a message 24 hours per day and they will return your call.        Test Results From Your Hospital Stay        9/12/2017  5:52 PM      Component Results     Component Value Ref Range & Units Status    WBC 9.4 4.0 - 11.0 10e9/L Final    RBC Count 4.67 3.8 - 5.2 10e12/L Final    Hemoglobin 14.4 11.7 - 15.7 g/dL Final    Hematocrit 42.6 35.0 - 47.0 % Final    MCV 91 78 - 100 fl Final    MCH 30.8 26.5 - 33.0 pg Final    MCHC 33.8 31.5 - 36.5 g/dL Final    RDW 12.9 10.0 - 15.0 % Final    Platelet Count 305 150 - 450 10e9/L Final    Diff Method Automated Method  Final    % Neutrophils 63.0 % Final    % Lymphocytes 25.6 % Final    % Monocytes 8.9 % Final    % Eosinophils 2.3 % Final    % Basophils 0.2 % Final    % Immature Granulocytes 0.0 % Final    Absolute Neutrophil 5.9 1.6 - 8.3 10e9/L Final    Absolute Lymphocytes 2.4 0.8 - 5.3 10e9/L Final    Absolute Monocytes 0.8 0.0 - 1.3 10e9/L Final    Absolute Eosinophils 0.2 0.0 - 0.7 10e9/L Final    Absolute Basophils 0.0 0.0 - 0.2 10e9/L Final    Abs Immature Granulocytes 0.0 0 - 0.4 10e9/L Final         9/12/2017  6:03 PM      Component Results     Component Value Ref Range & Units  "Status    Troponin I ES <0.015 0.000 - 0.045 ug/L Final    The 99th percentile for upper reference range is 0.045 ug/L.  Troponin values   in the range of 0.045 - 0.120 ug/L may be associated with risks of adverse   clinical events.           9/12/2017  5:59 PM      Component Results     Component Value Ref Range & Units Status    D Dimer <0.3 0.0 - 0.50 ug/ml FEU Final    This D-dimer assay is intended for use in conjunction with a clinical pretest   probability assessment model to exclude pulmonary embolism (PE) and deep   venous thrombosis (DVT) in outpatients suspected of PE or DVT. The cut-off   value is 0.5 ug/mL FEU.           9/12/2017  6:51 PM      Narrative     CHEST TWO VIEW   9/12/2017 6:39 PM     INDICATION: Right-sided pleuritic chest pain.    COMPARISON: 1/1/2017        Impression     IMPRESSION: No infiltrates or other acute findings. Heart size is  within normal limits. No pneumothorax. Surgical clips in the right  upper quadrant of the abdomen.                Thank you for choosing [facility]       Thank you for choosing [facility] for your care. Our goal is always to provide you with excellent care. Hearing back from our patients is one way we can continue to improve our services. Please take a few minutes to complete the written survey that you may receive in the mail after you visit with us. Thank you!        howsimple Information     howsimple lets you send messages to your doctor, view your test results, renew your prescriptions, schedule appointments and more. To sign up, go to [email or web address] . Click on \"Log in\" on the left side of the screen, which will take you to the Welcome page. Then click on \"Sign up Now\" on the right side of the page.     You will be asked to enter the access code listed below, as well as some personal information. Please follow the directions to create your username and password.     Your access code is: [email or web address]  Expires: 11/9/2017  8:57 PM     Your access code " will  in 90 days. If you need help or a new code, please call your [facility] clinic or [phone].        Care EveryWhere ID     This is your Care EveryWhere ID. This could be used by other organizations to access your [facility] medical records  [id number]        Equal Access to Services     JEFFERY SALINAS : Vidhi Andino, waselvin luqadaha, qaybhalina kaalmasangita hoffman, ashley quigley. So [facility] [phone].    ATENCIÓN: Si habla español, tiene a jarrett disposición servicios gratuitos de asistencia lingüística. Llame al [phone].    We comply with applicable federal civil rights laws and [place] laws. We do not discriminate on the basis of race, color, national origin, age, disability sex, sexual orientation or gender identity.            After Visit Summary       This is your record. Keep this with you and show to your community pharmacist(s) and doctor(s) at your next visit.

## 2017-09-12 NOTE — ED NOTES
Pt presents to ED with complaints of right scapular pain. Pt denies trauma, no exertion out of the ordinary. Pt reports the pain is not on the surface, feels like it's deeper. Pt reports the pain prevents her from sleeping. Pt in tears during interview. Pain worse with deep breath. Pt reports she has been sedentary recently due to gallbladder surgery on Aug 31st.

## 2017-09-13 NOTE — DISCHARGE INSTRUCTIONS
Chest pain evaluation:  Identified no cardiac concerns       EKG was normal       Cardiac enzyme troponin was normal       Cardiac examination normal    Identified no postoperative complications within the upper abdomen that would cause referred pain into chest    Chest x-ray was normal    Evaluation for blood clots/pulmonary embolism-screening blood test was negative( d-dimer)    Plan:    Avoid tobacco use  Activity as tolerated  Oxycodone as directed for severe pain  Monitor for fever, increased shortness of breath, leg swelling, increasing chest pain-if noted return

## 2017-09-20 ENCOUNTER — OFFICE VISIT (OUTPATIENT)
Dept: FAMILY MEDICINE | Facility: CLINIC | Age: 32
End: 2017-09-20
Payer: COMMERCIAL

## 2017-09-20 VITALS
HEART RATE: 98 BPM | TEMPERATURE: 97.5 F | BODY MASS INDEX: 25.04 KG/M2 | DIASTOLIC BLOOD PRESSURE: 80 MMHG | OXYGEN SATURATION: 99 % | WEIGHT: 124 LBS | SYSTOLIC BLOOD PRESSURE: 102 MMHG

## 2017-09-20 DIAGNOSIS — J06.9 VIRAL URI WITH COUGH: Primary | ICD-10-CM

## 2017-09-20 PROCEDURE — 99213 OFFICE O/P EST LOW 20 MIN: CPT | Performed by: NURSE PRACTITIONER

## 2017-09-20 RX ORDER — BENZONATATE 100 MG/1
100 CAPSULE ORAL 3 TIMES DAILY PRN
Qty: 42 CAPSULE | Refills: 0 | Status: SHIPPED | OUTPATIENT
Start: 2017-09-20 | End: 2017-10-20

## 2017-09-20 RX ORDER — IPRATROPIUM BROMIDE 42 UG/1
2 SPRAY, METERED NASAL 4 TIMES DAILY PRN
Qty: 1 BOX | Refills: 1 | Status: SHIPPED | OUTPATIENT
Start: 2017-09-20 | End: 2017-10-10

## 2017-09-20 NOTE — MR AVS SNAPSHOT
After Visit Summary   9/20/2017    Will Dodson    MRN: [id number]           Patient Information     Date Of Birth          1985        Visit Information        Provider Department      9/20/2017 2:20 PM Reyna Rojas APRN [facility]        Today's Diagnoses     Viral URI with cough    -  1      Care Instructions    1. Drink plenty of fluids.  2. May use tylenol 1000 mg every 8 hours or ibuprofen 600 mg every 6 hours for any discomfort you are having.  3. Use Neti pot or nasal saline if you are having nasal or sinus congestion  4. For cough, dextromethorphan/guaifenesin combinations help loosen secretions and suppress cough safely without significant risk of sedation. Tessalon perles one capsule three times daily as needed.  5. For nasal congestion and sinus pressure, pseudoephedrine (Sudafed) or phenylephrine is often helpful but it can cause elevations in blood pressure and insomnia.  Use Coricidin as a decongestant if you have a history of hypertension.  6. For runny nose and nasal congestion, use over-the-counter oxymetazoline (i.e. Afrin - use 2 sprays of 0.05% in each nostril every 12 hours; stop after 3-5 days) and prescription nasal ipratropium (2 sprays of 0.06% in each nostril four times daily)  7. Suggest humidifier in room at night  8. Call clinic if no improvement in 1 week        Thank you for choosing [facility].  You may be receiving a survey in the mail from [facility] regarding your visit today.  Please take a few minutes to complete and return the survey to let us know how we are doing.      If you have questions or concerns, please contact us via [employer] or you can contact your care team at [phone].    Our Clinic hours are:  Monday 6:40 am  to 7:00 pm  Tuesday -Friday 6:40 am to 5:00 pm    The [place] outpatient lab hours are:  Monday - Friday 6:10 am to 4:45 pm  Saturdays 7:00 am to 11:00 am  Appointments are required, call  "[phone]    If you have clinical questions after hours or would like to schedule an appointment,  call the clinic at [phone].            Follow-ups after your visit        Who to contact     If you have questions or need follow up information about today's clinic visit or your schedule please contact [facility] directly at [phone].  Normal or non-critical lab and imaging results will be communicated to you by MyChart, letter or phone within 4 business days after the clinic has received the results. If you do not hear from us within 7 days, please contact the clinic through [employer] or phone. If you have a critical or abnormal lab result, we will notify you by phone as soon as possible.  Submit refill requests through [employer] or call your pharmacy and they will forward the refill request to us. Please allow 3 business days for your refill to be completed.          Additional Information About Your Visit        [employer] Information     [employer] lets you send messages to your doctor, view your test results, renew your prescriptions, schedule appointments and more. To sign up, go to [email or web address] . Click on \"Log in\" on the left side of the screen, which will take you to the Welcome page. Then click on \"Sign up Now\" on the right side of the page.     You will be asked to enter the access code listed below, as well as some personal information. Please follow the directions to create your username and password.     Your access code is: [email or web address]  Expires: 2017  8:57 PM     Your access code will  in 90 days. If you need help or a new code, please call your [facility] clinic or [phone].        Care EveryWhere ID     This is your Care EveryWhere ID. This could be used by other organizations to access your [facility] medical records  [id number]        Your Vitals Were     Pulse Temperature Pulse Oximetry BMI (Body Mass Index)          98 97.5  F (36.4  C) (Oral) 99% 25.04 " kg/m2         Blood Pressure from Last 3 Encounters:   09/20/17 102/80   09/12/17 110/77   08/31/17 100/61    Weight from Last 3 Encounters:   09/20/17 124 lb (56.2 kg)   09/12/17 120 lb (54.4 kg)   08/31/17 120 lb (54.4 kg)              Today, you had the following     No orders found for display         Today's Medication Changes          These changes are accurate as of: 9/20/17  2:40 PM.  If you have any questions, ask your nurse or doctor.               Start taking these medicines.        Dose/Directions    benzonatate 100 MG capsule   Commonly known as:  TESSALON   Used for:  Viral URI with cough   Started by:  Reyna Rojas APRN CNP        Dose:  100 mg   Take 1 capsule (100 mg) by mouth 3 times daily as needed   Quantity:  42 capsule   Refills:  0       ipratropium 0.06 % spray   Commonly known as:  ATROVENT   Used for:  Viral URI with cough   Started by:  Reyna Rojas APRN CNP        Dose:  2 spray   Spray 2 sprays into both nostrils 4 times daily as needed for rhinitis   Quantity:  1 Box   Refills:  1         Stop taking these medicines if you haven't already. Please contact your care team if you have questions.     ondansetron 8 MG tablet   Commonly known as:  ZOFRAN   Stopped by:  Reyna Rojas APRN CNP                Where to get your medicines      These medications were sent to [facility] [address] [place] [address] [address]   [address] [facility] [address]     Phone:  [phone]     benzonatate 100 MG capsule    ipratropium 0.06 % spray                Primary Care Provider Office Phone #    [facility] [phone] [address] [facility] [address]        Equal Access to Services     JEFFERY SALINAS AH: Vidhi Andino, waselvin bazzi, qageovannata kaalashley marin. So [facility] [phone].    ATENCIÓN: Si habla español, tiene a jarrett disposición  servicios gratuitos de asistencia lingüística. Clementina livingston [phone].    We comply with applicable federal civil rights laws and [place] laws. We do not discriminate on the basis of race, color, national origin, age, disability sex, sexual orientation or gender identity.            Thank you!     Thank you for choosing [facility]  for your care. Our goal is always to provide you with excellent care. Hearing back from our patients is one way we can continue to improve our services. Please take a few minutes to complete the written survey that you may receive in the mail after your visit with us. Thank you!             Your Updated Medication List - Protect others around you: Learn how to safely use, store and throw away your medicines at www.disposemymeds.org.          This list is accurate as of: 9/20/17  2:40 PM.  Always use your most recent med list.                   Brand Name Dispense Instructions for use Diagnosis    albuterol 108 (90 BASE) MCG/ACT Inhaler    PROAIR HFA    1 Inhaler    Inhale 2 puffs into the lungs every 4 hours as needed for shortness of breath / dyspnea        benzonatate 100 MG capsule    TESSALON    42 capsule    Take 1 capsule (100 mg) by mouth 3 times daily as needed    Viral URI with cough       FLUoxetine 20 MG capsule    PROzac    90 capsule    Take 1 capsule (20 mg) by mouth daily    Moderate episode of recurrent major depressive disorder (H)       ipratropium 0.06 % spray    ATROVENT    1 Box    Spray 2 sprays into both nostrils 4 times daily as needed for rhinitis    Viral URI with cough       levonorgestrel 20 MCG/24HR IUD    MIRENA     1 each (20 mcg) by Intrauterine route continuous    Encounter for IUD insertion

## 2017-09-20 NOTE — PROGRESS NOTES
SUBJECTIVE:   Will Dodson is a 32 year old female who presents to clinic today for the following health issues:      ENT Symptoms             Symptoms: cc Present Absent Comment   Fever/Chills  x  Chills last night   Fatigue       Muscle Aches   x    Eye Irritation   x    Sneezing  x     Nasal John/Drg x x     Sinus Pressure/Pain  x     Loss of smell       Dental pain       Sore Throat  x     Swollen Glands   x    Ear Pain/Fullness  x     Cough x x     Wheeze   x    Chest Pain  x  Chest hurts   Shortness of breath  x     Rash       Other         Symptom duration:  6 days   Symptom severity:  moderate to severe   Treatments tried:  nyquil and sudafed- helps her sleep   Contacts:  unknown           Problem list and histories reviewed & adjusted, as indicated.  Additional history: as documented      Reviewed and updated as needed this visit by clinical staff  Tobacco  Allergies  Meds       Reviewed and updated as needed this visit by Provider         ROS:  Constitutional, HEENT, cardiovascular, pulmonary, gi and gu systems are negative, except as otherwise noted.      OBJECTIVE:   /80  Pulse 98  Temp 97.5  F (36.4  C) (Oral)  Wt 124 lb (56.2 kg)  SpO2 99%  BMI 25.04 kg/m2  Body mass index is 25.04 kg/(m^2).  GENERAL: healthy, alert and no distress  HENT: ear canals and TM's normal, nose and mouth without ulcers or lesions  NECK: no adenopathy, no asymmetry, masses, or scars and thyroid normal to palpation  RESP: lungs clear to auscultation - no rales, rhonchi or wheezes  CV: regular rate and rhythm, normal S1 S2, no S3 or S4, no murmur, click or rub, no peripheral edema and peripheral pulses strong      ASSESSMENT/PLAN:       ICD-10-CM    1. Viral URI with cough J06.9 ipratropium (ATROVENT) 0.06 % spray    B97.89 benzonatate (TESSALON) 100 MG capsule       Patient Instructions   1. Drink plenty of fluids.  2. May use tylenol 1000 mg every 8 hours or ibuprofen 600 mg every 6 hours for any discomfort you  are having.  3. Use Neti pot or nasal saline if you are having nasal or sinus congestion  4. For cough, dextromethorphan/guaifenesin combinations help loosen secretions and suppress cough safely without significant risk of sedation. Tessalon perles one capsule three times daily as needed.  5. For nasal congestion and sinus pressure, pseudoephedrine (Sudafed) or phenylephrine is often helpful but it can cause elevations in blood pressure and insomnia.  Use Coricidin as a decongestant if you have a history of hypertension.  6. For runny nose and nasal congestion, use over-the-counter oxymetazoline (i.e. Afrin - use 2 sprays of 0.05% in each nostril every 12 hours; stop after 3-5 days) and prescription nasal ipratropium (2 sprays of 0.06% in each nostril four times daily)  7. Suggest humidifier in room at night  8. Call clinic if no improvement in 1 week      The risks, benefits and treatment options of prescribed medications or other treatments have been discussed with the patient. The patient verbalized their understanding and should call or follow up if no improvement or if they develop further problems.    YURI Gage [facility]

## 2017-09-20 NOTE — PATIENT INSTRUCTIONS
1. Drink plenty of fluids.  2. May use tylenol 1000 mg every 8 hours or ibuprofen 600 mg every 6 hours for any discomfort you are having.  3. Use Neti pot or nasal saline if you are having nasal or sinus congestion  4. For cough, dextromethorphan/guaifenesin combinations help loosen secretions and suppress cough safely without significant risk of sedation. Tessalon perles one capsule three times daily as needed.  5. For nasal congestion and sinus pressure, pseudoephedrine (Sudafed) or phenylephrine is often helpful but it can cause elevations in blood pressure and insomnia.  Use Coricidin as a decongestant if you have a history of hypertension.  6. For runny nose and nasal congestion, use over-the-counter oxymetazoline (i.e. Afrin - use 2 sprays of 0.05% in each nostril every 12 hours; stop after 3-5 days) and prescription nasal ipratropium (2 sprays of 0.06% in each nostril four times daily)  7. Suggest humidifier in room at night  8. Call clinic if no improvement in 1 week        Thank you for choosing [facility].  You may be receiving a survey in the mail from [facility] regarding your visit today.  Please take a few minutes to complete and return the survey to let us know how we are doing.      If you have questions or concerns, please contact us via [employer] or you can contact your care team at [phone].    Our Clinic hours are:  Monday 6:40 am  to 7:00 pm  Tuesday -Friday 6:40 am to 5:00 pm    The [place] outpatient lab hours are:  Monday - Friday 6:10 am to 4:45 pm  Saturdays 7:00 am to 11:00 am  Appointments are required, call [phone]    If you have clinical questions after hours or would like to schedule an appointment,  call the clinic at [phone].

## 2017-09-20 NOTE — LETTER
[facility]  [address] [facility] [address]  [phone]          September 20, 2017    RE:  Will Dodson                                                                                                                                                       [address] [facility] [address]            To whom it may concern:    Will Dodson was seen in my clinic today. Please excuse her from work yesterday for medical reasons.        Sincerely,        Reyna Rojas, CNP

## 2017-09-20 NOTE — NURSING NOTE
"Chief Complaint   Patient presents with     URI       Initial /80  Pulse 98  Temp 97.5  F (36.4  C) (Oral)  Wt 124 lb (56.2 kg)  SpO2 99%  BMI 25.04 kg/m2 Estimated body mass index is 25.04 kg/(m^2) as calculated from the following:    Height as of 9/12/17: 4' 11\" (1.499 m).    Weight as of this encounter: 124 lb (56.2 kg).  Medication Reconciliation: complete  "

## 2017-10-10 ENCOUNTER — HOSPITAL ENCOUNTER (EMERGENCY)
Facility: CLINIC | Age: 32
Discharge: HOME | End: 2017-10-10
Attending: EMERGENCY MEDICINE | Admitting: EMERGENCY MEDICINE
Payer: COMMERCIAL

## 2017-10-10 VITALS
WEIGHT: 120 LBS | OXYGEN SATURATION: 98 % | BODY MASS INDEX: 24.24 KG/M2 | DIASTOLIC BLOOD PRESSURE: 66 MMHG | RESPIRATION RATE: 18 BRPM | TEMPERATURE: 97.5 F | SYSTOLIC BLOOD PRESSURE: 95 MMHG

## 2017-10-10 DIAGNOSIS — G43.809 OTHER MIGRAINE WITHOUT STATUS MIGRAINOSUS, NOT INTRACTABLE: ICD-10-CM

## 2017-10-10 PROCEDURE — 99284 EMERGENCY DEPT VISIT MOD MDM: CPT | Mod: Z6 | Performed by: EMERGENCY MEDICINE

## 2017-10-10 PROCEDURE — 25000132 ZZH RX MED GY IP 250 OP 250 PS 637: Performed by: EMERGENCY MEDICINE

## 2017-10-10 PROCEDURE — 96375 TX/PRO/DX INJ NEW DRUG ADDON: CPT

## 2017-10-10 PROCEDURE — 96374 THER/PROPH/DIAG INJ IV PUSH: CPT

## 2017-10-10 PROCEDURE — 99284 EMERGENCY DEPT VISIT MOD MDM: CPT | Mod: 25

## 2017-10-10 PROCEDURE — 25000128 H RX IP 250 OP 636: Performed by: EMERGENCY MEDICINE

## 2017-10-10 RX ORDER — METOCLOPRAMIDE HYDROCHLORIDE 5 MG/ML
10 INJECTION INTRAMUSCULAR; INTRAVENOUS ONCE
Status: COMPLETED | OUTPATIENT
Start: 2017-10-10 | End: 2017-10-10

## 2017-10-10 RX ORDER — ACETAMINOPHEN 500 MG
1000 TABLET ORAL ONCE
Status: COMPLETED | OUTPATIENT
Start: 2017-10-10 | End: 2017-10-10

## 2017-10-10 RX ORDER — DIPHENHYDRAMINE HYDROCHLORIDE 50 MG/ML
25 INJECTION INTRAMUSCULAR; INTRAVENOUS ONCE
Status: COMPLETED | OUTPATIENT
Start: 2017-10-10 | End: 2017-10-10

## 2017-10-10 RX ORDER — SODIUM CHLORIDE 9 MG/ML
1000 INJECTION, SOLUTION INTRAVENOUS CONTINUOUS
Status: DISCONTINUED | OUTPATIENT
Start: 2017-10-10 | End: 2017-10-10 | Stop reason: HOSPADM

## 2017-10-10 RX ADMIN — DIPHENHYDRAMINE HYDROCHLORIDE 25 MG: 50 INJECTION, SOLUTION INTRAMUSCULAR; INTRAVENOUS at 19:37

## 2017-10-10 RX ADMIN — ACETAMINOPHEN 1000 MG: 500 TABLET ORAL at 19:41

## 2017-10-10 RX ADMIN — METOCLOPRAMIDE 10 MG: 5 INJECTION, SOLUTION INTRAMUSCULAR; INTRAVENOUS at 19:38

## 2017-10-10 RX ADMIN — SODIUM CHLORIDE 1000 ML: 9 INJECTION, SOLUTION INTRAVENOUS at 19:36

## 2017-10-10 NOTE — ED AVS SNAPSHOT
[facility] Emergency Department    [address] [address]    Phone:  [phone]    Fax:  [phone]                                       Will Dodson   MRN: [id number]    Department:  [facility] Emergency Department   Date of Visit:  10/10/2017           Patient Information     Date Of Birth          1985        Your diagnoses for this visit were:     Other migraine without status migrainosus, not intractable        You were seen by Evans Lu MD.      Follow-up Information     Follow up with Clinic, [facility].    Contact information:    [address] [facility] [address] [phone]          Discharge Instructions         * Migraine Headache  Migraine headaches are related to changes in blood flow to the brain. This causes throbbing or constant pain on one or both sides of the head. The pain may last from a few hours to several days. There is usually nausea, vomiting, sensitivity to light and sound, and blurred vision. A migraine attack may be triggered by emotional stress, hormone changes during the menstrual cycle, oral contraceptives, alcohol use, certain foods containing tyramine, eye strain, weather changes, missing meals, or too little or too much sleep.  Home Care For This Headache:  1) If you were given pain medicine for this headache, do not drive yourself home . Arrange for a ride, instead. When you get home, try to sleep. You should feel much better when you wake up.  2) Migraine headaches may improve with an ice pack on the forehead or at the base of the skull. Heat to the back of your neck may relieve any neck spasm.  3) Drink only clear liquids or eat a very light diet to avoid nausea/vomiting until symptoms improve.  Preventing Future Headaches:  1) Pay attention to those factors that seem to trigger your headache. Try to avoid them when you can. If you have frequent headaches, it is useful to keep a diary of what you were doing, feeling  or eating in the hours before each attack. Show this to your doctor to help find the cause of your headaches.  a) If you feel that stress is a factor in your headaches, look at the sources of stress in your life. Find ways to release the build-up of those stresses by using regular exercise, relaxation methods (yoga, meditation), bio-feedback or simply taking time-out for yourself. For more information about this, consult your doctor or go to a local bookstore and review books and tapes on this subject.  b) Tyramine is a substance present in the following foods : chocolate, yogurt, all cheeses except cottage cheese and cream cheese. smoked or pickled fish and meat (including herring, caviar, bologna, pepperoni, salami), liver, avocados, bananas, figs, raisins, and red wine. Be aware that these foods may trigger a migraine in some persons. Try taking these foods out of your diet for 1-2 months to see if this reduces headache frequency.  Treating Future Attacks:  1) At the first sign of a migraine headache, take a medicine to stop it if one has been prescribed for you. If not, take acetaminophen (Tylenol) or ibuprofen (Motrin, Advil) if you are able to take these. The sooner you take medicine, the better it will work.  2) You may also want to find a quiet, dark, comfortable place to sit or lie down. Let yourself relax or sleep.  3) An ice pack on the forehead or area of greatest pain may also help.   Follow Up  with your doctor if the headache is not better within the next 24 hours. If you have frequent headaches you should discuss a treatment plan with your primary care doctor. Ask if you can have medicine to take at home the next time you get a bad headache. Poorly controlled chronic headaches may require a referral to a neurologist (headache specialist).  Get Prompt Medical Attention  if any of the following occur:    Your head pain gets worse, or does not improve within 24 hours    Repeated vomiting (can t keep  liquids down)    Sinus or ear or throat pain (not already reported)    Fever of 101  F (38.3  C) or higher, or as directed by your healthcare provider    Stiff neck    Extreme drowsiness, confusion or fainting    Weakness of an arm or leg or one side of the face    Difficulty with speech or vision    [id number] [employer]. [address] [place] [address]. All rights reserved. This information is not intended as a substitute for professional medical care. Always follow your healthcare professional's instructions.          24 Hour Appointment Hotline       To make an appointment at any [facility], call [phone] ([phone]). If you don't have a family doctor or clinic, we will help you find one. [facility] clinics are conveniently located to serve the needs of you and your family.             Review of your medicines      Our records show that you are taking the medicines listed below. If these are incorrect, please call your family doctor or clinic.        Dose / Directions Last dose taken    albuterol 108 (90 BASE) MCG/ACT Inhaler   Commonly known as:  PROAIR HFA   Dose:  2 puff   Quantity:  1 Inhaler        Inhale 2 puffs into the lungs every 4 hours as needed for shortness of breath / dyspnea   Refills:  0        benzonatate 100 MG capsule   Commonly known as:  TESSALON   Dose:  100 mg   Quantity:  42 capsule        Take 1 capsule (100 mg) by mouth 3 times daily as needed   Refills:  0        FLUoxetine 20 MG capsule   Commonly known as:  PROzac   Dose:  20 mg   Quantity:  90 capsule        Take 1 capsule (20 mg) by mouth daily   Refills:  0        levonorgestrel 20 MCG/24HR IUD   Commonly known as:  MIRENA   Dose:  1 each        1 each (20 mcg) by Intrauterine route continuous   Refills:  0                Orders Needing Specimen Collection     None      Pending Results     No orders found from 10/8/2017 to 10/11/2017.            Pending Culture Results     No orders found from  "10/8/2017 to 10/11/2017.            Pending Results Instructions     If you had any lab results that were not finalized at the time of your Discharge, you can call the ED Lab Result RN at [phone]. You will be contacted by this team for any positive Lab results or changes in treatment. The nurses are available 7 days a week from 10A to 6:30P.  You can leave a message 24 hours per day and they will return your call.        Test Results From Your Hospital Stay               Thank you for choosing [facility]       Thank you for choosing [facility] for your care. Our goal is always to provide you with excellent care. Hearing back from our patients is one way we can continue to improve our services. Please take a few minutes to complete the written survey that you may receive in the mail after you visit with us. Thank you!        [employer] Information     [employer] lets you send messages to your doctor, view your test results, renew your prescriptions, schedule appointments and more. To sign up, go to [email or web address] . Click on \"Log in\" on the left side of the screen, which will take you to the Welcome page. Then click on \"Sign up Now\" on the right side of the page.     You will be asked to enter the access code listed below, as well as some personal information. Please follow the directions to create your username and password.     Your access code is: [email or web address]  Expires: 2017  8:57 PM     Your access code will  in 90 days. If you need help or a new code, please call your [facility] clinic or [phone].        Care EveryWhere ID     This is your Care EveryWhere ID. This could be used by other organizations to access your [facility] medical records  [id number]        Equal Access to Services     JEFFERY SALINAS : nathaniel Guadarrama, ashley babb. So [facility] [phone].    ATENCIÓN: Si habla español, tiene a jarrett disposición " servicios gratuitos de asistencia lingüística. Clementina livingston [phone].    We comply with applicable federal civil rights laws and [place] laws. We do not discriminate on the basis of race, color, national origin, age, disability, sex, sexual orientation, or gender identity.            After Visit Summary       This is your record. Keep this with you and show to your community pharmacist(s) and doctor(s) at your next visit.

## 2017-10-10 NOTE — ED AVS SNAPSHOT
[facility] Emergency Department    [address] [facility] [address]    Phone:  [phone]    Fax:  [phone]                                       Will Dodson   MRN: [id number]    Department:  [facility] Emergency Department   Date of Visit:  10/10/2017           After Visit Summary Signature Page     I have received my discharge instructions, and my questions have been answered. I have discussed any challenges I see with this plan with the nurse or doctor.    ..........................................................................................................................................  Patient/Patient Representative Signature      ..........................................................................................................................................  Patient Representative Print Name and Relationship to Patient    ..................................................               ................................................  Date                                            Time    ..........................................................................................................................................  Reviewed by Signature/Title    ...................................................              ..............................................  Date                                                            Time

## 2017-10-11 NOTE — ED PROVIDER NOTES
Chief complaint headache    32-year-old female history of migraine presents with headache post orbital bilateral that began this morning upon awakening.  She took 5 ibuprofen earlier today without relief.  She describes nausea with vomiting.  Denies any fever, neck pain or stiffness.  No recent head injury.  Denies focal neurologic change.  Similar to headaches in the past.    Past medical history, medications, allergies, social history, family history all reviewed.  Patient Active Problem List   Diagnosis     H/O:      Generalized anxiety disorder     Panic attack     ADHD (attention deficit hyperactivity disorder), combined type     RhD negative     Moderate episode of recurrent major depressive disorder (H)     ROS: All other systems reviewed and are negative.    /73  Temp 97.5  F (36.4  C) (Oral)  Resp 18  Wt 54.4 kg (120 lb)  SpO2 98%  BMI 24.24 kg/m2  Nontoxic-appearing no respiratory distress alert and oriented x3.  Head atraumatic normocephalic  Cranial nerves; vision baseline fields intact, PERRL, EOMI, facial sensation intact to light touch, facial muscle tone intact and symmetrical, hearing grossly intact,swallowing without difficulty, voice baseline, SCM  strength intact, tongue protrudes midline.  TM's unremarkable, EACs clear, oropharynx moist without lesions or erythema, palatal elevation symmetric, neck supple full active painless range of motion no posterior midline tenderness.  Lungs clear to auscultation no rales rhonchi or wheezes  Strength and sensation intact throughout the extremities, skin clear from rash or lesion.    Medications   0.9% sodium chloride BOLUS (0 mLs Intravenous Stopped 10/10/17 2055)     Followed by   0.9% sodium chloride infusion (not administered)   diphenhydrAMINE (BENADRYL) injection 25 mg (25 mg Intravenous Given 10/10/17 1937)   metoclopramide (REGLAN) injection 10 mg (10 mg Intravenous Given 10/10/17 1938)   acetaminophen (TYLENOL) tablet 1,000 mg  (1,000 mg Oral Given 10/10/17 1941)     MDM: 32-year-old female history of migraine presents with same.  Moderate relief with the above regimen.  Return criteria reviewed.    Impression: Migraine     Evans Lu MD  10/10/17 2058

## 2017-10-11 NOTE — DISCHARGE INSTRUCTIONS
* Migraine Headache  Migraine headaches are related to changes in blood flow to the brain. This causes throbbing or constant pain on one or both sides of the head. The pain may last from a few hours to several days. There is usually nausea, vomiting, sensitivity to light and sound, and blurred vision. A migraine attack may be triggered by emotional stress, hormone changes during the menstrual cycle, oral contraceptives, alcohol use, certain foods containing tyramine, eye strain, weather changes, missing meals, or too little or too much sleep.  Home Care For This Headache:  1) If you were given pain medicine for this headache, do not drive yourself home . Arrange for a ride, instead. When you get home, try to sleep. You should feel much better when you wake up.  2) Migraine headaches may improve with an ice pack on the forehead or at the base of the skull. Heat to the back of your neck may relieve any neck spasm.  3) Drink only clear liquids or eat a very light diet to avoid nausea/vomiting until symptoms improve.  Preventing Future Headaches:  1) Pay attention to those factors that seem to trigger your headache. Try to avoid them when you can. If you have frequent headaches, it is useful to keep a diary of what you were doing, feeling or eating in the hours before each attack. Show this to your doctor to help find the cause of your headaches.  a) If you feel that stress is a factor in your headaches, look at the sources of stress in your life. Find ways to release the build-up of those stresses by using regular exercise, relaxation methods (yoga, meditation), bio-feedback or simply taking time-out for yourself. For more information about this, consult your doctor or go to a local bookstore and review books and tapes on this subject.  b) Tyramine is a substance present in the following foods : chocolate, yogurt, all cheeses except cottage cheese and cream cheese. smoked or pickled fish and meat (including herring,  caviar, bologna, pepperoni, salami), liver, avocados, bananas, figs, raisins, and red wine. Be aware that these foods may trigger a migraine in some persons. Try taking these foods out of your diet for 1-2 months to see if this reduces headache frequency.  Treating Future Attacks:  1) At the first sign of a migraine headache, take a medicine to stop it if one has been prescribed for you. If not, take acetaminophen (Tylenol) or ibuprofen (Motrin, Advil) if you are able to take these. The sooner you take medicine, the better it will work.  2) You may also want to find a quiet, dark, comfortable place to sit or lie down. Let yourself relax or sleep.  3) An ice pack on the forehead or area of greatest pain may also help.   Follow Up  with your doctor if the headache is not better within the next 24 hours. If you have frequent headaches you should discuss a treatment plan with your primary care doctor. Ask if you can have medicine to take at home the next time you get a bad headache. Poorly controlled chronic headaches may require a referral to a neurologist (headache specialist).  Get Prompt Medical Attention  if any of the following occur:    Your head pain gets worse, or does not improve within 24 hours    Repeated vomiting (can t keep liquids down)    Sinus or ear or throat pain (not already reported)    Fever of 101  F (38.3  C) or higher, or as directed by your healthcare provider    Stiff neck    Extreme drowsiness, confusion or fainting    Weakness of an arm or leg or one side of the face    Difficulty with speech or vision    [id number] [employer]. [address] [place] [address], [place] [address]. All rights reserved. This information is not intended as a substitute for professional medical care. Always follow your healthcare professional's instructions.

## 2017-10-18 ENCOUNTER — TRANSFERRED RECORDS (OUTPATIENT)
Dept: HEALTH INFORMATION MANAGEMENT | Facility: CLINIC | Age: 32
End: 2017-10-18

## 2017-10-20 ENCOUNTER — OFFICE VISIT (OUTPATIENT)
Dept: FAMILY MEDICINE | Facility: CLINIC | Age: 32
End: 2017-10-20
Payer: COMMERCIAL

## 2017-10-20 VITALS
DIASTOLIC BLOOD PRESSURE: 82 MMHG | WEIGHT: 125 LBS | SYSTOLIC BLOOD PRESSURE: 126 MMHG | TEMPERATURE: 98.2 F | HEART RATE: 118 BPM | BODY MASS INDEX: 24.54 KG/M2 | HEIGHT: 60 IN

## 2017-10-20 DIAGNOSIS — F90.2 ADHD (ATTENTION DEFICIT HYPERACTIVITY DISORDER), COMBINED TYPE: Primary | ICD-10-CM

## 2017-10-20 DIAGNOSIS — J01.01 ACUTE RECURRENT MAXILLARY SINUSITIS: ICD-10-CM

## 2017-10-20 DIAGNOSIS — F33.1 MODERATE EPISODE OF RECURRENT MAJOR DEPRESSIVE DISORDER (H): ICD-10-CM

## 2017-10-20 PROCEDURE — 99214 OFFICE O/P EST MOD 30 MIN: CPT | Performed by: FAMILY MEDICINE

## 2017-10-20 RX ORDER — DEXTROAMPHETAMINE SACCHARATE, AMPHETAMINE ASPARTATE, DEXTROAMPHETAMINE SULFATE AND AMPHETAMINE SULFATE 5; 5; 5; 5 MG/1; MG/1; MG/1; MG/1
20 TABLET ORAL 2 TIMES DAILY
Qty: 60 TABLET | Refills: 0 | Status: SHIPPED | OUTPATIENT
Start: 2017-11-20 | End: 2017-12-20

## 2017-10-20 RX ORDER — FLUOXETINE 40 MG/1
40 CAPSULE ORAL DAILY
Qty: 90 CAPSULE | Refills: 3 | Status: SHIPPED | OUTPATIENT
Start: 2017-10-20 | End: 2018-03-01

## 2017-10-20 RX ORDER — DEXTROAMPHETAMINE SACCHARATE, AMPHETAMINE ASPARTATE, DEXTROAMPHETAMINE SULFATE AND AMPHETAMINE SULFATE 5; 5; 5; 5 MG/1; MG/1; MG/1; MG/1
20 TABLET ORAL 2 TIMES DAILY
Qty: 60 TABLET | Refills: 0 | Status: SHIPPED | OUTPATIENT
Start: 2017-12-21 | End: 2018-01-20

## 2017-10-20 RX ORDER — DEXTROAMPHETAMINE SACCHARATE, AMPHETAMINE ASPARTATE, DEXTROAMPHETAMINE SULFATE AND AMPHETAMINE SULFATE 5; 5; 5; 5 MG/1; MG/1; MG/1; MG/1
20 TABLET ORAL 2 TIMES DAILY
Qty: 60 TABLET | Refills: 0 | Status: SHIPPED | OUTPATIENT
Start: 2017-10-20 | End: 2017-12-19

## 2017-10-20 NOTE — NURSING NOTE
Chief Complaint   Patient presents with     Refill Request     adderall refill     Sick     cough and sinus pressure       Initial /82 (BP Location: Left arm, Patient Position: Chair, Cuff Size: Adult Regular)  Pulse 118  Temp 98.2  F (36.8  C) (Tympanic)  Ht 5' (1.524 m)  Wt 125 lb (56.7 kg)  BMI 24.41 kg/m2 Estimated body mass index is 24.41 kg/(m^2) as calculated from the following:    Height as of this encounter: 5' (1.524 m).    Weight as of this encounter: 125 lb (56.7 kg).  Medication Reconciliation: complete

## 2017-10-20 NOTE — MR AVS SNAPSHOT
After Visit Summary   10/20/2017    Will Dodson    MRN: [id number]           Patient Information     Date Of Birth          1985        Visit Information        Provider Department      10/20/2017 11:00 AM Norbert Wooten MD [facility]        Today's Diagnoses     ADHD (attention deficit hyperactivity disorder), combined type    -  1    Acute recurrent maxillary sinusitis          Care Instructions    Start augmentin antibiotic for sinusitis    Adderall to 20mg immediate release morning and noon/early afternoon    Common Cold/Viral Upper Respiratory Infection:   Last about 5 days and cough can last 2-3 weeks.  Come back to clinic or go to ER if difficulty breathing, persistent fevers over 101, or not able to stay hydrated.  Treating the Common Cold  Starting zinc lozenges (zinc acetate or zinc gluconate) within the first 24 hours of symptom onset reduces the severity and duration of cold.  Early use of Echinacea purpurea shortens duration and decreases severity of cold symptoms.  Decongestants With or Without Antihistamines: Pseudoephedrine(at the pharmacy counter) or phenylephrine decrease nasal swelling to improve air intake and antihistamine (like Benadryl, doxyllamine, or chloripramine) help decrease sneezing and cough, but can make you sleepy so these are commonly in the nighttime cold/flu medications.  Ibuprofen 200mg-400mg every 4 hours can help with body aches, fevers, cough, and sneezing.  Nasal saline rinse or NediPot for congestion, as often as needed.  Increase fluid intake, hot tea with honey can help with a sore throat or cough.  Cold Prevention:  Frequent hand washing can reduce the spread of respiratory viruses in all ages and can reduce transmission from children to other household members  The prophylactic use of vitamin C does not reduce the amount of colds a person gets, but decreases how long the cold lasts by about a day.              Thank you for  "choosing [facility].  You may be receiving a survey in the mail from Ian Purvis regarding your visit today.  Please take a few minutes to complete and return the survey to let us know how we are doing.      If you have questions or concerns, please contact us via [employer] or you can contact your care team at [phone].    Our Clinic hours are:  Monday 6:40 am  to 7:00 pm  Tuesday -Friday 6:40 am to 5:00 pm    The [place] outpatient lab hours are:  Monday - Friday 6:10 am to 4:45 pm  Saturdays 7:00 am to 11:00 am  Appointments are required, call [phone]    If you have clinical questions after hours or would like to schedule an appointment,  call the clinic at [phone].            Follow-ups after your visit        Who to contact     If you have questions or need follow up information about today's clinic visit or your schedule please contact [facility] directly at [phone].  Normal or non-critical lab and imaging results will be communicated to you by [employer], letter or phone within 4 business days after the clinic has received the results. If you do not hear from us within 7 days, please contact the clinic through [employer] or phone. If you have a critical or abnormal lab result, we will notify you by phone as soon as possible.  Submit refill requests through [employer] or call your pharmacy and they will forward the refill request to us. Please allow 3 business days for your refill to be completed.          Additional Information About Your Visit        [employer] Information     [employer] lets you send messages to your doctor, view your test results, renew your prescriptions, schedule appointments and more. To sign up, go to [email or web address] . Click on \"Log in\" on the left side of the screen, which will take you to the Welcome page. Then click on \"Sign up Now\" on the right side of the page.     You will be asked to enter the access code listed below, as well as some personal " information. Please follow the directions to create your username and password.     Your access code is: [email or web address]  Expires: 2017  8:57 PM     Your access code will  in 90 days. If you need help or a new code, please call your [place] clinic or [phone].        Care EveryWhere ID     This is your Care EveryWhere ID. This could be used by other organizations to access your [place] medical records  [id number]        Your Vitals Were     Pulse Temperature Height BMI (Body Mass Index)          118 98.2  F (36.8  C) (Tympanic) 5' (1.524 m) 24.41 kg/m2         Blood Pressure from Last 3 Encounters:   10/20/17 126/82   10/10/17 95/66   17 102/80    Weight from Last 3 Encounters:   10/20/17 125 lb (56.7 kg)   10/10/17 120 lb (54.4 kg)   17 124 lb (56.2 kg)              Today, you had the following     No orders found for display         Today's Medication Changes          These changes are accurate as of: 10/20/17 12:11 PM.  If you have any questions, ask your nurse or doctor.               Start taking these medicines.        Dose/Directions    amoxicillin-clavulanate 875-125 MG per tablet   Commonly known as:  AUGMENTIN   Used for:  Acute recurrent maxillary sinusitis   Started by:  Norbert Wooten MD        Dose:  1 tablet   Take 1 tablet by mouth 2 times daily for 10 days   Quantity:  20 tablet   Refills:  0       * amphetamine-dextroamphetamine 20 MG per tablet   Commonly known as:  ADDERALL   Used for:  ADHD (attention deficit hyperactivity disorder), combined type   Replaces:  amphetamine-dextroamphetamine 30 MG per 24 hr capsule   Started by:  Norbert Wooten MD        Dose:  20 mg   Take 1 tablet (20 mg) by mouth 2 times daily   Quantity:  60 tablet   Refills:  0       * amphetamine-dextroamphetamine 20 MG per tablet   Commonly known as:  ADDERALL   Used for:  ADHD (attention deficit hyperactivity disorder), combined type   Started by:  Norbert Wooten MD        Dose:   20 mg   Start taking on:  11/20/2017   Take 1 tablet (20 mg) by mouth 2 times daily   Quantity:  60 tablet   Refills:  0       * amphetamine-dextroamphetamine 20 MG per tablet   Commonly known as:  ADDERALL   Used for:  ADHD (attention deficit hyperactivity disorder), combined type   Started by:  Norbert Wooten MD        Dose:  20 mg   Start taking on:  12/21/2017   Take 1 tablet (20 mg) by mouth 2 times daily   Quantity:  60 tablet   Refills:  0       * Notice:  This list has 3 medication(s) that are the same as other medications prescribed for you. Read the directions carefully, and ask your doctor or other care provider to review them with you.      Stop taking these medicines if you haven't already. Please contact your care team if you have questions.     amphetamine-dextroamphetamine 30 MG per 24 hr capsule   Commonly known as:  ADDERALL XR   Replaced by:  amphetamine-dextroamphetamine 20 MG per tablet   Stopped by:  Norbert Wooten MD                Where to get your medicines      These medications were sent to [facility] [address] [place] [address] [address]   [address] [facility] [address]     Phone:  [phone]     amoxicillin-clavulanate 875-125 MG per tablet         Some of these will need a paper prescription and others can be bought over the counter.  Ask your nurse if you have questions.     Bring a paper prescription for each of these medications     amphetamine-dextroamphetamine 20 MG per tablet    amphetamine-dextroamphetamine 20 MG per tablet    amphetamine-dextroamphetamine 20 MG per tablet                Primary Care Provider Office Phone # Fax #    [facility] [phone] [address] [facility] [address]        Equal Access to Services     ELLYN SALINAS AH: Vidhi Andino, nathaniel bazzi, ashley babb. So [facility] [phone].    ATENCIÓN: Felipe galdamez,  tiene a jarrett disposición servicios gratuitos de asistencia lingüística. Clementina livingston [phone].    We comply with applicable federal civil rights laws and [place] laws. We do not discriminate on the basis of race, color, national origin, age, disability, sex, sexual orientation, or gender identity.            Thank you!     Thank you for choosing [facility]  for your care. Our goal is always to provide you with excellent care. Hearing back from our patients is one way we can continue to improve our services. Please take a few minutes to complete the written survey that you may receive in the mail after your visit with us. Thank you!             Your Updated Medication List - Protect others around you: Learn how to safely use, store and throw away your medicines at www.disposemymeds.org.          This list is accurate as of: 10/20/17 12:11 PM.  Always use your most recent med list.                   Brand Name Dispense Instructions for use Diagnosis    albuterol 108 (90 BASE) MCG/ACT Inhaler    PROAIR HFA    1 Inhaler    Inhale 2 puffs into the lungs every 4 hours as needed for shortness of breath / dyspnea        amoxicillin-clavulanate 875-125 MG per tablet    AUGMENTIN    20 tablet    Take 1 tablet by mouth 2 times daily for 10 days    Acute recurrent maxillary sinusitis       * amphetamine-dextroamphetamine 20 MG per tablet    ADDERALL    60 tablet    Take 1 tablet (20 mg) by mouth 2 times daily    ADHD (attention deficit hyperactivity disorder), combined type       * amphetamine-dextroamphetamine 20 MG per tablet   Start taking on:  11/20/2017    ADDERALL    60 tablet    Take 1 tablet (20 mg) by mouth 2 times daily    ADHD (attention deficit hyperactivity disorder), combined type       * amphetamine-dextroamphetamine 20 MG per tablet   Start taking on:  12/21/2017    ADDERALL    60 tablet    Take 1 tablet (20 mg) by mouth 2 times daily    ADHD (attention deficit hyperactivity disorder), combined  type       FLUoxetine 20 MG capsule    PROzac    90 capsule    Take 1 capsule (20 mg) by mouth daily    Moderate episode of recurrent major depressive disorder (H)       levonorgestrel 20 MCG/24HR IUD    MIRENA     1 each (20 mcg) by Intrauterine route continuous    Encounter for IUD insertion       * Notice:  This list has 3 medication(s) that are the same as other medications prescribed for you. Read the directions carefully, and ask your doctor or other care provider to review them with you.

## 2017-10-20 NOTE — PATIENT INSTRUCTIONS
Start augmentin antibiotic for sinusitis    Adderall to 20mg immediate release morning and noon/early afternoon    Common Cold/Viral Upper Respiratory Infection:   Last about 5 days and cough can last 2-3 weeks.  Come back to clinic or go to ER if difficulty breathing, persistent fevers over 101, or not able to stay hydrated.  Treating the Common Cold  Starting zinc lozenges (zinc acetate or zinc gluconate) within the first 24 hours of symptom onset reduces the severity and duration of cold.  Early use of Echinacea purpurea shortens duration and decreases severity of cold symptoms.  Decongestants With or Without Antihistamines: Pseudoephedrine(at the pharmacy counter) or phenylephrine decrease nasal swelling to improve air intake and antihistamine (like Benadryl, doxyllamine, or chloripramine) help decrease sneezing and cough, but can make you sleepy so these are commonly in the nighttime cold/flu medications.  Ibuprofen 200mg-400mg every 4 hours can help with body aches, fevers, cough, and sneezing.  Nasal saline rinse or NediPot for congestion, as often as needed.  Increase fluid intake, hot tea with honey can help with a sore throat or cough.  Cold Prevention:  Frequent hand washing can reduce the spread of respiratory viruses in all ages and can reduce transmission from children to other household members  The prophylactic use of vitamin C does not reduce the amount of colds a person gets, but decreases how long the cold lasts by about a day.              Thank you for choosing [facility].  You may be receiving a survey in the mail from [facility] regarding your visit today.  Please take a few minutes to complete and return the survey to let us know how we are doing.      If you have questions or concerns, please contact us via [employer] or you can contact your care team at [phone].    Our Clinic hours are:  Monday 6:40 am  to 7:00 pm  Tuesday -Friday 6:40 am to 5:00 pm    The [place] outpatient lab  hours are:  Monday - Friday 6:10 am to 4:45 pm  Saturdays 7:00 am to 11:00 am  Appointments are required, call [phone]    If you have clinical questions after hours or would like to schedule an appointment,  call the clinic at [phone].

## 2017-10-20 NOTE — PROGRESS NOTES
SUBJECTIVE:   Will Dodson is a 32 year old female who presents to clinic today for the following health issues:      Medication Followup of Adderall    Taking Medication as prescribed: yes taking 30mg XR adderall and does not seem to be doing much.    Side Effects:  None    Medication Helping Symptoms:  NO- would like to possibly increase dosage     Has been helping with focus until about 2-3pm. Does not seem to make anxiety worse  Did ADHD adult assessment with Jose Matta 1/24/2017 diagnosed with ADHD combined.  Current symptoms include difficulty focusing and are present in the context of social, work, and home      ENT Symptoms             Symptoms: cc Present Absent Comment   Fever/Chills  x  Chills at times   Fatigue  x     Muscle Aches   x    Eye Irritation   x    Sneezing       Nasal John/Drg  x     Sinus Pressure/Pain x x  A lot of facial pressure   Loss of smell  x     Dental pain   x    Sore Throat   x    Swollen Glands  x     Ear Pain/Fullness  x  Right ear pain   Cough  x  Dry cough   Wheeze   x    Chest Pain   x    Shortness of breath   x    Rash   x    Other         Symptom duration:  1 month   Symptom severity:  moderate   Treatments tried:  sudafed   Contacts:  none     Has had sinus infections before and this feels like one.    Problem list and histories reviewed & adjusted, as indicated.  Additional history: as documented    BP Readings from Last 3 Encounters:   10/20/17 126/82   10/10/17 95/66   09/20/17 102/80    Wt Readings from Last 3 Encounters:   10/20/17 125 lb (56.7 kg)   10/10/17 120 lb (54.4 kg)   09/20/17 124 lb (56.2 kg)             Reviewed and updated as needed this visit by clinical staffTobacco  Allergies       Reviewed and updated as needed this visit by Provider         ROS:  C: NEGATIVE for fever, chills, change in weight, normal appetite and sleep.  CV: NEGATIVE for chest pain, palpitations or peripheral edema  GI: diarrhea intermittently, LUQ pain  : no vaginal  discharge or itching.  NEURO: NEGATIVE for weakness, dizziness or paresthesias, no tremor    OBJECTIVE:     /82 (BP Location: Left arm, Patient Position: Chair, Cuff Size: Adult Regular)  Pulse 118  Temp 98.2  F (36.8  C) (Tympanic)  Ht 5' (1.524 m)  Wt 125 lb (56.7 kg)  BMI 24.41 kg/m2  Body mass index is 24.41 kg/(m^2).  GENERAL: healthy, alert and no distress  NECK: no adenopathy, no asymmetry, masses, or scars and thyroid normal to palpation  RESP: lungs clear to auscultation - no rales, rhonchi or wheezes  CV: regular rate and rhythm, normal S1 S2, no S3 or S4, no murmur, click or rub, no peripheral edema and peripheral pulses strong  ABDOMEN: soft, nontender, no hepatosplenomegaly, no masses and bowel sounds normal  MS: no gross musculoskeletal defects noted, no edema  NEURO: No tremor mentation intact and speech normal  PSYCH: mentation appears normal, affect normal/bright    Diagnostic Test Results:  none     ASSESSMENT/PLAN:       Will was seen today for refill request and sick.    Diagnoses and all orders for this visit:    ADHD (attention deficit hyperactivity disorder), combined type: not well controlled  -increase from 30mgxr to 20mg bid IR.  -     amphetamine-dextroamphetamine (ADDERALL) 20 MG per tablet; Take 1 tablet (20 mg) by mouth 2 times daily  -     amphetamine-dextroamphetamine (ADDERALL) 20 MG per tablet; Take 1 tablet (20 mg) by mouth 2 times daily  -     amphetamine-dextroamphetamine (ADDERALL) 20 MG per tablet; Take 1 tablet (20 mg) by mouth 2 times daily    Acute recurrent maxillary sinusitis: plan treatment with antibiotics as has maxed out conservative treatment.  -     amoxicillin-clavulanate (AUGMENTIN) 875-125 MG per tablet; Take 1 tablet by mouth 2 times daily for 10 days    Moderate episode of recurrent major depressive disorder (H): worsening, plan to increase up to 40mg  -     FLUoxetine (PROZAC) 40 MG capsule; Take 1 capsule (40 mg) by mouth daily        Patient  Instructions   Start augmentin antibiotic for sinusitis    Adderall to 20mg immediate release morning and noon/early afternoon    Common Cold/Viral Upper Respiratory Infection:   Last about 5 days and cough can last 2-3 weeks.  Come back to clinic or go to ER if difficulty breathing, persistent fevers over 101, or not able to stay hydrated.  Treating the Common Cold  Starting zinc lozenges (zinc acetate or zinc gluconate) within the first 24 hours of symptom onset reduces the severity and duration of cold.  Early use of Echinacea purpurea shortens duration and decreases severity of cold symptoms.  Decongestants With or Without Antihistamines: Pseudoephedrine(at the pharmacy counter) or phenylephrine decrease nasal swelling to improve air intake and antihistamine (like Benadryl, doxyllamine, or chloripramine) help decrease sneezing and cough, but can make you sleepy so these are commonly in the nighttime cold/flu medications.  Ibuprofen 200mg-400mg every 4 hours can help with body aches, fevers, cough, and sneezing.  Nasal saline rinse or NediPot for congestion, as often as needed.  Increase fluid intake, hot tea with honey can help with a sore throat or cough.  Cold Prevention:  Frequent hand washing can reduce the spread of respiratory viruses in all ages and can reduce transmission from children to other household members  The prophylactic use of vitamin C does not reduce the amount of colds a person gets, but decreases how long the cold lasts by about a day.        Norbert Wooten MD  [facility]

## 2017-11-08 ENCOUNTER — TRANSFERRED RECORDS (OUTPATIENT)
Dept: HEALTH INFORMATION MANAGEMENT | Facility: CLINIC | Age: 32
End: 2017-11-08

## 2017-11-17 ENCOUNTER — TELEPHONE (OUTPATIENT)
Dept: FAMILY MEDICINE | Facility: CLINIC | Age: 32
End: 2017-11-17

## 2017-11-17 NOTE — TELEPHONE ENCOUNTER
PHQ9 Due by:12/18/17    Please contact patient to complete follow up PHQ9 before their DUE DATE.     This is important feedback for your care team to monitor how you are doing while taking Prozac.     You completed this same questionnaire   PHQ-9 SCORE 5/18/2017   Total Score -   Total Score 21       MA STAFF: If upon calling patient and PHQ9 score is higher that 10 route to the provider. You may also seek an RN for review.

## 2017-11-17 NOTE — LETTER
[facility]  [address]  [facility] [address]  [phone]        November 30, 2017  Will Dodson  [address] [place] [id number]    Dear Will,    I care about your health and have reviewed your health plan. I have reviewed your medical conditions, medication list, and lab results and am making recommendations based on this review, to better manage your health.    You are in particular need of attention regarding:  Depression questionnaire    I am recommending that you:  Complete the attached questionnaire and return in the self-addressed stamped envelope.    Here is a list of Health Maintenance topics that are due now or due soon:  Health Maintenance Due   Topic Date Due     Depression Action Plan Review - yearly  07/19/2005     Flu Vaccine - yearly  09/01/2017     Depression Assessment - every 6 months  11/18/2017       Please call us at [phone] (or use [employer]) to address the above recommendations.     Thank you for trusting [facility] and we appreciate the opportunity to serve you.  We look forward to supporting your healthcare needs in the future.    Healthy Regards,    Dr. Norbert Wooten/abdirizak cma

## 2017-11-29 ENCOUNTER — TRANSFERRED RECORDS (OUTPATIENT)
Dept: HEALTH INFORMATION MANAGEMENT | Facility: CLINIC | Age: 32
End: 2017-11-29

## 2017-11-30 NOTE — TELEPHONE ENCOUNTER
Attempted call, mailbox full. Will send a letter along with PHQ9 questionnaire for patient to complete. CMA: please put in the orders from above correspondance b      Panel Management Review      Patient has the following on her problem list:     Depression / Dysthymia review    Measure:  Needs PHQ-9 score of 4 or less during index window.  Administer PHQ-9 and if score is 5 or more, send encounter to provider for next steps.        PHQ-9 SCORE 8/26/2016 1/17/2017 5/18/2017   Total Score - 14 -   Total Score 8 - 21       If PHQ-9 recheck is 5 or more, route to provider for next steps.    Patient is due for:  PHQ9        Composite cancer screening  Chart review shows that this patient is due/due soon for the following None  Summary:    Patient is due/failing the following:   PHQ9    Action needed:   Patient needs to do PHQ9.    Type of outreach:    Sent letter.    Questions for provider review:    None                                                                                                                                    Renetta Esquivel CMA

## 2017-12-04 ENCOUNTER — TELEPHONE (OUTPATIENT)
Dept: BEHAVIORAL HEALTH | Facility: CLINIC | Age: 32
End: 2017-12-04

## 2017-12-04 NOTE — TELEPHONE ENCOUNTER
12-4-17 Recv'd 22 page fax from [facility]/ Wanda Richardson fax [phone].  It is not clear what treatment she is being referred to.    Filed until someone calls intake.   Phone number listed was to a FAX. fb

## 2017-12-05 NOTE — TELEPHONE ENCOUNTER
Pt called seeking after care @ [facility]   [facility] 12/14  Clinical sent for review

## 2017-12-12 NOTE — TELEPHONE ENCOUNTER
12-12-17 recv;d 9 page fax from Wanda Richardson/[facility] [phone].  Forwarded to [facility] For client's eval. fb

## 2017-12-13 NOTE — TELEPHONE ENCOUNTER
"12/11/2017 per mn its online pt \"Will Duvall\" eff for dec with [employer] pmap     L/m with [facility] to confirm which is legal. And contact intake or ins co with that info.     "

## 2017-12-14 ENCOUNTER — HOSPITAL ENCOUNTER (OUTPATIENT)
Dept: BEHAVIORAL HEALTH | Facility: CLINIC | Age: 32
Discharge: HOME | End: 2017-12-14
Attending: SOCIAL WORKER | Admitting: SOCIAL WORKER
Payer: COMMERCIAL

## 2017-12-14 VITALS — BODY MASS INDEX: 24.74 KG/M2 | WEIGHT: 126 LBS | HEIGHT: 60 IN

## 2017-12-14 PROCEDURE — H0001 ALCOHOL AND/OR DRUG ASSESS: HCPCS

## 2017-12-14 ASSESSMENT — PAIN SCALES - GENERAL: PAINLEVEL: MODERATE PAIN (4)

## 2017-12-14 ASSESSMENT — ANXIETY QUESTIONNAIRES
GAD7 TOTAL SCORE: 20
4. TROUBLE RELAXING: NEARLY EVERY DAY
7. FEELING AFRAID AS IF SOMETHING AWFUL MIGHT HAPPEN: MORE THAN HALF THE DAYS
6. BECOMING EASILY ANNOYED OR IRRITABLE: NEARLY EVERY DAY
IF YOU CHECKED OFF ANY PROBLEMS ON THIS QUESTIONNAIRE, HOW DIFFICULT HAVE THESE PROBLEMS MADE IT FOR YOU TO DO YOUR WORK, TAKE CARE OF THINGS AT HOME, OR GET ALONG WITH OTHER PEOPLE: SOMEWHAT DIFFICULT
3. WORRYING TOO MUCH ABOUT DIFFERENT THINGS: NEARLY EVERY DAY
5. BEING SO RESTLESS THAT IT IS HARD TO SIT STILL: NEARLY EVERY DAY
1. FEELING NERVOUS, ANXIOUS, OR ON EDGE: NEARLY EVERY DAY
2. NOT BEING ABLE TO STOP OR CONTROL WORRYING: NEARLY EVERY DAY

## 2017-12-14 ASSESSMENT — PATIENT HEALTH QUESTIONNAIRE - PHQ9: SUM OF ALL RESPONSES TO PHQ QUESTIONS 1-9: 17
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
## 2017-12-14 NOTE — PROGRESS NOTES
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 having  while in treatment.           DIMENSION VI - Recovery Environment   1. Are you employed/attending school? Tell me about that.     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 enjoye it.  currently receiving child support, [facility] funding and income based housing through [facility]    2A. Describe a typical day; evening for you. Work, school, social, leisure, volunteer, spiritual practices. Include time spent obtaining, using, recovering from drugs or alcohol. (DSM)     Get kids off to school, I make my appointments and run errands, dinner, take them to sports, we watch movie and bed time    2B. How often do you spend more time than you planned using or use more than you planned? (DSM)     All the time    3. How important is using to your social connections? Do many of your family or friends use?     It's not important for both her social connections and family and friends    4A. Are you currently in a significant relationship?     Yes.  4B. How long? 2 1/2 years (off and on)    4C. Sexual Orientation:     Heterosexual    5A. Who do you live with?      My children and myself    5B. Tell me about their alcohol/drug use and mental health issues.     NA    5C. Are you concerned for your safety there? " No    5D. Are you concerned about the safety of anyone else who lives with you? No    6A. Do you have children who live with you?     Yes.  (Ask follow-up questions to determine the person s relationship and responsibility, both legal and care giving; and what arrangements are made for supervision for the children when the person is not available.) 10 and 9 year old children who live with client    6B. Do you have children who do not live with you?     No    7A. Who supports you in making changes in your alcohol or drug use? What are they willing to do to support you? Who is upset or angry about you making changes in your alcohol or drug use? How big a problem is this for you?      My family and my boyfriend    7B. This table is provided to record information about the person s relationships and available support It is not necessary to ask each item; only to get a comprehensive picture of their support system.  How often can you count on the following people when you need someone?   Partner / Spouse Usually supportive   Parent(s)/Aunt(s)/Uncle(s)/Grandparents Always supportive   Sibling(s)/Cousin(s) Usually supportive   Child(kwabena) Usually supportive   Other relative(s) Never supportive   Friend(s)/neighbor(s) Usually supportive   Child(kwabena) s father(s)/mother(s) Never supportive   Support group member(s) Always supportive   Community of [place] members Always supportive   /counselor/therapist/healer Usually supportive   Other (specify) No     8A. What is your current living situation?     Client reports living 3 bedroom town home in [place] (share program)      8B. What is your long term plan for where you will be living?     I am eventually going to move out and get my own house    8C. Tell me about your living environment/neighborhood? Ask enough follow up questions to determine safety, criminal activity, availability of alcohol and drugs, supportive or antagonistic to the person making changes.   "    In a safe environment, no use in the neighborhood    9. Criminal justice history: Gather current/recent history and any significant history related to substance use--Arrests? Convictions? Circumstances? Alcohol or drug involvement? Sentences? Still on probation or parole? Expectations of the court? Current court order? Any sex offenses - lifetime? What level? (DSM)    Minor consumption as a kid, disorderly conduct 2001, no probations, no DUI and legally able to drive    10. What obstacles exist to participating in treatment? (Time off work, childcare, funding, transportation, pending longterm time, living situation)         Dimension VI Ratings   Recovery environment - The placing authority must use the criteria in Dimension VI to determine a client s recovery environment.   RISK DESCRIPTIONS - Severity ratin Client has passive social  or family and significant other are not interested in the client's recovery.  The client is engaged in structured meaningful activity.     REASONS SEVERITY WAS ASSIGNED - (What support does the person have for making changes? What structure/stability does the person have in his or her daily life that will increase the likelihood that changes can be sustained? What problems exist in the person s environment that will jeopardize getting/staying clean and sober?)     Client is currently unemployed and currently living in sober housing.  Client is financilly supporting herself through child support and [facility].  Client reports living in a safe environment.   Client admits to having a \"dysfunctional family\" and reports having family history of MI and substance abuse.  Client is involved in a structured, meaningful activity.  Client denies being on probation and currently denies having any legal.  Client reported that she participates in sober support groups and attends [employer] every week.       Client Choice/Exceptions   Would you like services specific " to language, age, gender, culture, Mu-ism preference, race, ethnicity, sexual orientation or disability?  No    What particular treatment choices and options would you like to have? No    Do you have a preference for a particular treatment program? No    Criteria for Diagnosis     Criteria for Diagnosis  DSM-5 Criteria for Substance Use Disorder  Instructions: Determine whether the client currently meets the criteria for Substance Use Disorder using the diagnostic criteria in the DSM-V pp.489-587. Current means during the most recent 12 months outside a facility that controls access to substances    Category of Substance Severity (ICD-10 Code / DSM 5 Code)     Alcohol Use Disorder Severe  (10.20) (303.90)   Cannabis Use Disorder NA   Hallucinogen Use Disorder NA   Inhalant Use Disorder NA   Opioid Use Disorder NA   Sedative, Hypnotic, or Anxiolytic Use Disorder NA   Stimulant Related Disorder Severe   (F15.20) (304.40) Amphetamine type substance   Tobacco Use Disorder NA   Other (or unknown) Substance Use Disorder NA       Collateral Contact Summary   Number of contacts made: 2    Contact with referring person:  No.    If court related records were reviewed, summarize here: NA    Information from collateral contacts supported/largely agreed with information from the client and associated risk ratings.      Rule 25 Assessment Summary and Plan   's Recommendation    1.  Complete outpatient  treatment such as [facility] or [facility]/similar treatment facility   2.  Obtain a co-occurring mental health therapist  3.  Attend sober support group meetings    Collateral Contacts     Name:    Emelia Dodson   Relationship:    Mother   Phone Number:    [phone] Releases:    Yes     12/20/2017:  She doesn't drink all the time, when she does drink it is just not 1 or 2.  She had been doing meth in the past.  To be honest, I can't even tell you when the last time she used anything, it's been a  "while (3-6 months it's been).  I don't know if it is a drug problem, when she does drink, she would just get plastered, couple years back when she was doing meth, she was strung out and completely out of it.  Someone that you don't want to be around.  I haven't seen that for a long long time.  Now I have been seeing Will every day since I had hip surgery, other than that, 2x a week.  Does not believe that she has used in last 3-6 months, not around me or where I've noticed it.  No concerns or issues with Will.  When she had a job, she worked all the time, I don't think she was on it.  She lost her job because of her daughter, her daughter has fetal alcohol syndrome and PTSD and thinks that granddaughter might be bi-polar.  She would like to work again but can't until she gets Deneen straightened out.  Client has not had any legal problems.  Her kids, I haven't seen that for a long time.  Her drinking got out of control, she went to treatment and wants to do outpatient to stay on top of things.        Collateral Contacts     Name:    Faby    Relationship:    Drug Counselor from client's Share program   Phone Number:    [phone]   Releases:    Yes     12/20/2017:   talked with Faby who at that time worked for [employer].  Currently she is working for [employer] as a .  Faby stated that she has known client for \"Better part of a year.  I was a manager for her housing organization.  At one point she was homeless and chemically dependent.\"  To my knowledge when she came to the housing organization, she had some length of sobriety but relapsed due to being under a lot stress and unfortanate circumstances.  The lat time I talked with her, she was was supposed to be going into a residential treatment.  I don't know a ton because I don't have the file,  but I know that she was using methamphetamine and IV user.  We had suspected more use then that.  She tested positive for meth and I couldn't tell " you when without my records, but I remember she tested positive for meth.  To my knowledge she did not have legal.  I believe at one point in time, a report was filed while she was at one of the houses and I don't think anything came out of that.  That could have changed because I haven't worked with her for a while.  I think she has some difficult children, generally good person, does not have a lot community support and minimal support with her family.    ollateral Contacts      A problematic pattern of alcohol/drug use leading to clinically significant impairment or distress, as manifested by at least two of the following, occurring within a 12-month period:    Alcohol/drug is often taken in larger amounts or over a longer period than was intended.  There is a persistent desire or unsuccessful efforts to cut down or control alcohol/drug use  Craving, or a strong desire or urge to use alcohol/drug  Continued alcohol use despite having persistent or recurrent social or interpersonal problems caused or exacerbated by the effects of alcohol/drug.  Alcohol/drug use is continued despite knowledge of having a persistent or recurrent physical or psychological problem that is likely to have been caused or exacerbated by alcohol.  Tolerance, as defined by either of the following: A need for markedly increased amounts of alcohol/drug to achieve intoxication or desired effect.  Withdrawal, as manifested by either of the following: The characteristic withdrawal syndrome for alcohol/drug (refer to Criteria A and B of the criteria set for alcohol/drug withdrawal).      Specify if: In early remission:  After full criteria for alcohol/drug use disorder were previously met, none of the criteria for alcohol/drug use disorder have been met for at least 3 months but for less than 12 months (with the exception that Criterion A4,  Craving or a strong desire or urge to use alcohol/drug  may be met).     In sustained remission:   After  full criteria for alcohol use disorder were previously met, non of the criteria for alcohol/drug use disorder have been met at any time during a period of 12 months or longer (with the exception that Criterion A4,  Craving or strong desire or urge to use alcohol/drug  may be met).   Specify if:   This additional specifier is used if the individual is in an environment where access to alcohol is restricted.    Mild: Presence of 2-3 symptoms    Moderate: Presence of 4-5 symptoms    Severe: Presence of 6 or more symptoms

## 2017-12-15 ASSESSMENT — ANXIETY QUESTIONNAIRES: GAD7 TOTAL SCORE: 20

## 2017-12-20 ENCOUNTER — BEH TREATMENT PLAN (OUTPATIENT)
Dept: BEHAVIORAL HEALTH | Facility: CLINIC | Age: 32
End: 2017-12-20

## 2017-12-21 ENCOUNTER — OFFICE VISIT (OUTPATIENT)
Dept: FAMILY MEDICINE | Facility: CLINIC | Age: 32
End: 2017-12-21
Payer: COMMERCIAL

## 2017-12-21 VITALS
HEART RATE: 67 BPM | BODY MASS INDEX: 25.29 KG/M2 | HEIGHT: 60 IN | RESPIRATION RATE: 18 BRPM | SYSTOLIC BLOOD PRESSURE: 99 MMHG | WEIGHT: 128.8 LBS | DIASTOLIC BLOOD PRESSURE: 63 MMHG | OXYGEN SATURATION: 98 % | TEMPERATURE: 98.6 F

## 2017-12-21 DIAGNOSIS — G25.0 ESSENTIAL TREMOR: ICD-10-CM

## 2017-12-21 DIAGNOSIS — F41.1 GENERALIZED ANXIETY DISORDER: Primary | ICD-10-CM

## 2017-12-21 DIAGNOSIS — F43.10 POSTTRAUMATIC STRESS DISORDER: ICD-10-CM

## 2017-12-21 PROCEDURE — 99214 OFFICE O/P EST MOD 30 MIN: CPT | Performed by: FAMILY MEDICINE

## 2017-12-21 RX ORDER — CLONIDINE HYDROCHLORIDE 0.1 MG/1
0.1 TABLET ORAL AT BEDTIME
COMMUNITY
Start: 2017-12-21 | End: 2018-09-21

## 2017-12-21 RX ORDER — BUSPIRONE HYDROCHLORIDE 5 MG/1
TABLET ORAL
Qty: 150 TABLET | Refills: 0 | Status: SHIPPED | OUTPATIENT
Start: 2017-12-21 | End: 2018-03-01

## 2017-12-21 ASSESSMENT — ANXIETY QUESTIONNAIRES
5. BEING SO RESTLESS THAT IT IS HARD TO SIT STILL: NEARLY EVERY DAY
6. BECOMING EASILY ANNOYED OR IRRITABLE: MORE THAN HALF THE DAYS
1. FEELING NERVOUS, ANXIOUS, OR ON EDGE: NEARLY EVERY DAY
3. WORRYING TOO MUCH ABOUT DIFFERENT THINGS: NEARLY EVERY DAY
GAD7 TOTAL SCORE: 19
7. FEELING AFRAID AS IF SOMETHING AWFUL MIGHT HAPPEN: NEARLY EVERY DAY
2. NOT BEING ABLE TO STOP OR CONTROL WORRYING: NEARLY EVERY DAY

## 2017-12-21 ASSESSMENT — PATIENT HEALTH QUESTIONNAIRE - PHQ9: 5. POOR APPETITE OR OVEREATING: MORE THAN HALF THE DAYS

## 2017-12-21 NOTE — PROGRESS NOTES
"SBAR    Name:   Will Dodson YOB: 1985 Age:  32 year old Gender:  female   Insurance:   [facility]: Forms filled out and faxed to [facility]   Precipitating Event:   Treatment due to own awareness of need for help   DOC:   Alcohol and Meth/Amphetamines  Additional abused substances:   No   Medical:     Chronic low back pain   Mental Health:   Depression, Anxiety, PTSD, borderline personality  and History of trauma and/or abuse issues   Previous Detox admissions & CD treatments:  1 prior IP detoxification admission(s).  3 prior CD treatment(s).   Psychosocial:    2 minor child(kwabena)  Stable housing and no concerns  Minimal support network and No history of legal charges   Suicide Risk Status:    Emergent? No  Urgent / Non-Emergent?  No  Present / Non- Urgent? No  No Current Risk? Yes, Evaluation Counselors - Document in Epic / SBAR to counselor \"No identified risk\" and Treatment Counselors - Assess weekly in progress notes under Dimension 3 and summarize in Discharge / Treatment summary under Dimension 3.     Additional Info as needed: Client is currently living in sober housing, has a tendency of being homeless.  Client admitted to having \"dysfunctional family\".  It was reported that minor child has Fetal Alcohol Syndrome.  Client has a lot of stressors in her life.        "

## 2017-12-21 NOTE — PATIENT INSTRUCTIONS
Plan to start buspar.  Increase the dose every 3 days as you tolerate. If you get too many side effects like grogginess with increasing then go back down to the dose you had been doing before the increase.    The Buspar takes weeks to fully work.    Next clinic visit in 1 month to recheck medication either with Dr. Wooten or your psychiatrist.  Call sooner if any side effects or issues.      Thank you for choosing [facility].  You may be receiving a survey in the mail from Ian Purvis regarding your visit today.  Please take a few minutes to complete and return the survey to let us know how we are doing.      If you have questions or concerns, please contact us via [employer] or you can contact your care team at [phone].    Our Clinic hours are:  Monday 6:40 am  to 7:00 pm  Tuesday -Friday 6:40 am to 5:00 pm    The [place] outpatient lab hours are:  Monday - Friday 6:10 am to 4:45 pm  Saturdays 7:00 am to 11:00 am  Appointments are required, call [phone]    If you have clinical questions after hours or would like to schedule an appointment,  call the clinic at [phone].  Essential Tremor (ET)  What is essential tremor?  Essential tremor (ET) is a neurological disorder that causes your hands, head, trunk, voice, and/or legs to shake rhythmically. It is often confused with Parkinson disease.  ET is the most common trembling disorder that people experience. Everyone has some ET, but the movements usually cannot be seen or felt. When tremors are noticeable, the condition is classified as ET.  ET is most common among people older than age 65, but it can affect people at any age.  What causes ET?  ET can occur in different people for different reasons:    Familial essential tremor. In most people, the condition seems to be passed down from a parent to a child. If your parent has ET, there is a 50% chance that you or your children will inherit the gene responsible for the condition.    Essential tremor  related to another disorder. Sometimes, a tremor is a symptom of another neurological disorder, such as Parkinson disease or dystonia. Sometimes, ET is mistaken for these other diseases when they are not present. A health care provider s careful diagnosis is extremely important.  The cause of ET isn t known. However, one theory suggests that your cerebellum and other parts of your brain are not communicating correctly. The cerebellum is a part of the brain that controls muscle coordination.  What are the symptoms of ET?  If you have ET, you will experience shaking and trembling at different times and in different situations, but some characteristics are common to all. Here is what you might typically experience:    Tremors occur when you move and are less noticeable when you rest.    Certain medications, caffeine, or stress can make your tremors worse.    Tremors get worse as you age.    Tremors don t affect both sides of your body in the same way.  Here are different signs of essential tremor:    Tremors that are most obvious in your hands    Difficulty performing tasks with your hands, such as writing or using tools    Shaking or quivering sound in your voice    Uncontrollable head-nodding    In rare instances, tremors in your legs or feet  How is ET diagnosed?  Your rapid, uncontrollable trembling, as well as questions about your medical and family history, can help your health care provider determine if you have familial ET. He or she will probably need to rule out other conditions that could cause shaking or trembling. For example, tremors could be symptoms of diseases, such as hyperthyroidism. Your health care provider might test you for those, as well.  In some cases, the tremors might be related to other factors. To find out for certain, your health care provider may have you try to:    Abstain from alcohol; if you re an alcoholic, trembling is a common symptom.    Cut out cigarette smoking.    Avoid  caffeine.    Avoid certain medications  How is ET treated?   Propanolol and primidone are 2 medications often prescribed to treat ET. Propanolol blocks the stimulating action of neurotransmitters to calm your trembling. Primidone is a common antiseizure medication that also controls the actions of neurotransmitters.  Gabapentin and topiramate are 2 other antiseizure medications that are sometimes prescribed. In some cases, tranquilizers like alprazolam or clonazepam might be suggested.  For ET in your hands, botulinum toxin (Botox) injections have shown some promise in easing the trembling. They work by weakening the surrounding muscles around your hands. For severe tremors, a stimulating device (deep brain stimulator) surgically implanted in your brain may help.  Can ET be prevented?   The specific cause of ET is not known, so scientists are not sure how the condition can be prevented.  Living with ET  ET is usually not dangerous, but it can certainly be frustrating if you have to deal with it. Certain factors can make tremors worse, so the following steps may help to decrease tremors:    Avoid alcohol, cigarettes, and caffeine    Avoid stressful situations as much as possible    Use relaxation techniques, such as yoga, deep-breathing exercises, or biofeedback    Check with your health care provider to see if any medications you re taking could be making your tremors worse.  Talk with your health care provider about other options, such as surgery, if ET starts to affect your quality of life.  When should I call my health care provider?  If you have been diagnosed with ET, talk with your health care provider about when you might need to call. He or she will likely advise you to call if your tremors become worse, or if you develop new neurologic symptoms, such as numbness or weakness.  Key points    ET is a neurological disorder that causes your hands, head, trunk, voice, or legs to shake rhythmically. The cause is  not known, but it is often passed down from a parent to a child.    ET is sometimes confused with other types of tremor, so getting the right diagnosis is important.    Tremors tend to be worse during movement than when at rest. The tremors are usually not dangerous, but they can get worse over time.    Avoiding things that might make tremors worse, such as stress, caffeine, and certain medications, may be helpful. Medicines can also help control or limit tremors in some people. Severe tremors can sometimes be treated with surgery.  Next steps  Tips to help you get the most from a visit to your health care provider:    Before your visit, write down questions you want answered.    Bring someone with you to help you ask questions and remember what your provider tells you.    At the visit, write down the names of new medicines, treatments, or tests, and any new instructions your provider gives you.    If you have a follow-up appointment, write down the date, time, and purpose for that visit.    Know how you can contact your provider if you have questions.

## 2017-12-21 NOTE — MR AVS SNAPSHOT
After Visit Summary   12/21/2017    Will Dodson    MRN: [id number]           Patient Information     Date Of Birth          1985        Visit Information        Provider Department      12/21/2017 11:00 AM Norbert Wooten MD [facility]        Today's Diagnoses     Generalized anxiety disorder    -  1    Posttraumatic stress disorder        Essential tremor          Care Instructions    Plan to start buspar.  Increase the dose every 3 days as you tolerate. If you get too many side effects like grogginess with increasing then go back down to the dose you had been doing before the increase.    The Buspar takes weeks to fully work.    Next clinic visit in 1 month to recheck medication either with Dr. Wooten or your psychiatrist.  Call sooner if any side effects or issues.      Thank you for choosing [facility].  You may be receiving a survey in the mail from [facility] your [facility] today.  Please take a few minutes to complete and return the survey to let us know how we are doing.      If you have questions or concerns, please contact us via [email or web address] or you can contact your care team at [phone].    Our Clinic hours are:  Monday 6:40 am  to 7:00 pm  Tuesday -Friday 6:40 am to 5:00 pm    The [place] outpatient lab hours are:  Monday - Friday 6:10 am to 4:45 pm  Saturdays 7:00 am to 11:00 am  Appointments are required, call [phone]    If you have clinical questions after hours or would like to schedule an appointment,  call the clinic at [phone].  Essential Tremor (ET)  What is essential tremor?  Essential tremor (ET) is a neurological disorder that causes your hands, head, trunk, voice, and/or legs to shake rhythmically. It is often confused with Parkinson disease.  ET is the most common trembling disorder that people experience. Everyone has some ET, but the movements usually cannot be seen or felt. When tremors are noticeable, the condition is classified  as ET.  ET is most common among people older than age 65, but it can affect people at any age.  What causes ET?  ET can occur in different people for different reasons:    Familial essential tremor. In most people, the condition seems to be passed down from a parent to a child. If your parent has ET, there is a 50% chance that you or your children will inherit the gene responsible for the condition.    Essential tremor related to another disorder. Sometimes, a tremor is a symptom of another neurological disorder, such as Parkinson disease or dystonia. Sometimes, ET is mistaken for these other diseases when they are not present. A health care provider s careful diagnosis is extremely important.  The cause of ET isn t known. However, one theory suggests that your cerebellum and other parts of your brain are not communicating correctly. The cerebellum is a part of the brain that controls muscle coordination.  What are the symptoms of ET?  If you have ET, you will experience shaking and trembling at different times and in different situations, but some characteristics are common to all. Here is what you might typically experience:    Tremors occur when you move and are less noticeable when you rest.    Certain medications, caffeine, or stress can make your tremors worse.    Tremors get worse as you age.    Tremors don t affect both sides of your body in the same way.  Here are different signs of essential tremor:    Tremors that are most obvious in your hands    Difficulty performing tasks with your hands, such as writing or using tools    Shaking or quivering sound in your voice    Uncontrollable head-nodding    In rare instances, tremors in your legs or feet  How is ET diagnosed?  Your rapid, uncontrollable trembling, as well as questions about your medical and family history, can help your health care provider determine if you have familial ET. He or she will probably need to rule out other conditions that could  cause shaking or trembling. For example, tremors could be symptoms of diseases, such as hyperthyroidism. Your health care provider might test you for those, as well.  In some cases, the tremors might be related to other factors. To find out for certain, your health care provider may have you try to:    Abstain from alcohol; if you re an alcoholic, trembling is a common symptom.    Cut out cigarette smoking.    Avoid caffeine.    Avoid certain medications  How is ET treated?   Propanolol and primidone are 2 medications often prescribed to treat ET. Propanolol blocks the stimulating action of neurotransmitters to calm your trembling. Primidone is a common antiseizure medication that also controls the actions of neurotransmitters.  Gabapentin and topiramate are 2 other antiseizure medications that are sometimes prescribed. In some cases, tranquilizers like alprazolam or clonazepam might be suggested.  For ET in your hands, botulinum toxin (Botox) injections have shown some promise in easing the trembling. They work by weakening the surrounding muscles around your hands. For severe tremors, a stimulating device (deep brain stimulator) surgically implanted in your brain may help.  Can ET be prevented?   The specific cause of ET is not known, so scientists are not sure how the condition can be prevented.  Living with ET  ET is usually not dangerous, but it can certainly be frustrating if you have to deal with it. Certain factors can make tremors worse, so the following steps may help to decrease tremors:    Avoid alcohol, cigarettes, and caffeine    Avoid stressful situations as much as possible    Use relaxation techniques, such as yoga, deep-breathing exercises, or biofeedback    Check with your health care provider to see if any medications you re taking could be making your tremors worse.  Talk with your health care provider about other options, such as surgery, if ET starts to affect your quality of life.  When  should I call my health care provider?  If you have been diagnosed with ET, talk with your health care provider about when you might need to call. He or she will likely advise you to call if your tremors become worse, or if you develop new neurologic symptoms, such as numbness or weakness.  Key points    ET is a neurological disorder that causes your hands, head, trunk, voice, or legs to shake rhythmically. The cause is not known, but it is often passed down from a parent to a child.    ET is sometimes confused with other types of tremor, so getting the right diagnosis is important.    Tremors tend to be worse during movement than when at rest. The tremors are usually not dangerous, but they can get worse over time.    Avoiding things that might make tremors worse, such as stress, caffeine, and certain medications, may be helpful. Medicines can also help control or limit tremors in some people. Severe tremors can sometimes be treated with surgery.  Next steps  Tips to help you get the most from a visit to your health care provider:    Before your visit, write down questions you want answered.    Bring someone with you to help you ask questions and remember what your provider tells you.    At the visit, write down the names of new medicines, treatments, or tests, and any new instructions your provider gives you.    If you have a follow-up appointment, write down the date, time, and purpose for that visit.    Know how you can contact your provider if you have questions.                Follow-ups after your visit        Who to contact     If you have questions or need follow up information about today's clinic visit or your schedule please contact [facility] directly at [phone].  Normal or non-critical lab and imaging results will be communicated to you by MyChart, letter or phone within 4 business days after the clinic has received the results. If you do not hear from us within 7 days, please  "contact the clinic through [employer] or phone. If you have a critical or abnormal lab result, we will notify you by phone as soon as possible.  Submit refill requests through Re[employer] or call your pharmacy and they will forward the refill request to us. Please allow 3 business days for your refill to be completed.          Additional Information About Your Visit        [employer] Information     Reble lets you send messages to your doctor, view your test results, renew your prescriptions, schedule appointments and more. To sign up, go to [email or web address] . Click on \"Log in\" on the left side of the screen, which will take you to the Welcome page. Then click on \"Sign up Now\" on the right side of the page.     You will be asked to enter the access code listed below, as well as some personal information. Please follow the directions to create your username and password.     Your access code is: [email or web address]  Expires: 3/21/2018 11:53 AM     Your access code will  in 90 days. If you need help or a new code, please call your [place] clinic or [phone].        Care EveryWhere ID     This is your Care EveryWhere ID. This could be used by other organizations to access your [place] medical records  [id number]        Your Vitals Were     Pulse Temperature Respirations Height Pulse Oximetry BMI (Body Mass Index)    67 98.6  F (37  C) (Tympanic) 18 5' (1.524 m) 98% 25.15 kg/m2       Blood Pressure from Last 3 Encounters:   17 99/63   10/20/17 126/82   10/10/17 95/66    Weight from Last 3 Encounters:   17 128 lb 12.8 oz (58.4 kg)   17 126 lb (57.2 kg)   10/20/17 125 lb (56.7 kg)              We Performed the Following     DEPRESSION ACTION PLAN (DAP)          Today's Medication Changes          These changes are accurate as of: 17 11:53 AM.  If you have any questions, ask your nurse or doctor.               Start taking these medicines.        Dose/Directions    busPIRone 5 MG tablet "   Commonly known as:  BUSPAR   Used for:  Generalized anxiety disorder   Started by:  Norbert Wooten MD        Start at 5 mg twice daily for 3 days, then 7.5 mg (1.5 tabs) twice daily for 3 days, then 10 mg (2 tabs) twice daily for 3 days, then 12.5 mg (2.5 tabs) twice daily for 3 days, then 15 mg (3 tabs) twice daily and stay at that dose   Quantity:  150 tablet   Refills:  0            Where to get your medicines      Some of these will need a paper prescription and others can be bought over the counter.  Ask your nurse if you have questions.     Bring a paper prescription for each of these medications     busPIRone 5 MG tablet                Primary Care Provider Office Phone # Fax #    [facility] [phone] [address] [address]        Equal Access to Services     JEFFERY SALINAS : Hadii jessica dorantes Sozita, waaxda luqadaha, qaybta kaalmada adenancyyada, ashley campos . So [facility] [phone].    ATENCIÓN: Si habla español, tiene a jarrett disposición servicios gratuitos de asistencia lingüística. Llame al [phone].    We comply with applicable federal civil rights laws and [place] laws. We do not discriminate on the basis of race, color, national origin, age, disability, sex, sexual orientation, or gender identity.            Thank you!     Thank you for choosing [facility]  for your care. Our goal is always to provide you with excellent care. Hearing back from our patients is one way we can continue to improve our services. Please take a few minutes to complete the written survey that you may receive in the mail after your visit with us. Thank you!             Your Updated Medication List - Protect others around you: Learn how to safely use, store and throw away your medicines at www.disposemymeds.org.          This list is accurate as of: 12/21/17 11:53 AM.  Always use your most recent med list.                    Brand Name Dispense Instructions for use Diagnosis    albuterol 108 (90 BASE) MCG/ACT Inhaler    PROAIR HFA    1 Inhaler    Inhale 2 puffs into the lungs every 4 hours as needed for shortness of breath / dyspnea        amphetamine-dextroamphetamine 20 MG per tablet    ADDERALL    60 tablet    Take 1 tablet (20 mg) by mouth 2 times daily    ADHD (attention deficit hyperactivity disorder), combined type       busPIRone 5 MG tablet    BUSPAR    150 tablet    Start at 5 mg twice daily for 3 days, then 7.5 mg (1.5 tabs) twice daily for 3 days, then 10 mg (2 tabs) twice daily for 3 days, then 12.5 mg (2.5 tabs) twice daily for 3 days, then 15 mg (3 tabs) twice daily and stay at that dose    Generalized anxiety disorder       cloNIDine 0.1 MG tablet    CATAPRES     Take 1 tablet (0.1 mg) by mouth At Bedtime    Posttraumatic stress disorder       FLUoxetine 40 MG capsule    PROzac    90 capsule    Take 1 capsule (40 mg) by mouth daily    Moderate episode of recurrent major depressive disorder (H)       levonorgestrel 20 MCG/24HR IUD    MIRENA     1 each (20 mcg) by Intrauterine route continuous    Encounter for IUD insertion

## 2017-12-21 NOTE — NURSING NOTE
Chief Complaint   Patient presents with     Shaking     hands shake frequently - unable to control; x 1 month     Anxiety       Initial BP 99/63  Pulse 67  Temp 98.6  F (37  C) (Tympanic)  Resp 18  Ht 5' (1.524 m)  Wt 128 lb 12.8 oz (58.4 kg)  SpO2 98%  BMI 25.15 kg/m2 Estimated body mass index is 25.15 kg/(m^2) as calculated from the following:    Height as of this encounter: 5' (1.524 m).    Weight as of this encounter: 128 lb 12.8 oz (58.4 kg).  Medication Reconciliation: complete

## 2017-12-21 NOTE — PROGRESS NOTES
SUBJECTIVE:   Will Dodson is a 32 year old female who presents to clinic today for the following health issues:      Anxiety Follow-Up    Status since last visit: Worsened     Has been seeing Jerrell De Leon and was told that next time will switch to another psychiatrist as he is leaving. Did prescribe ambien for two weeks. Has been prescribed clonidine from psychiatrist which has not helped.  Has tried prazosin with side effect of headaches.     Seeing therapy once per week.    Prozac is helping depression symptoms which is helpful.    For anxiety has done seroquel in the past which had not been helpful for sleep or anxiety.    Other associated symptoms:shaking has been going on her whole life but worse in the last 4 months especially with eating, writing, or holding phone.    Dad has shakes too.    Complicating factors:   Significant life event: Yes-  PTSD   Current substance abuse: None  Depression symptoms: Yes-  Well controlled.  ERYN-7 SCORE 8/26/2016 10/25/2016 5/18/2017   Total Score 19 20 17   Some encounter information is confidential and restricted. Go to Review Flowsheets activity to see all data.       GADMemo    Has been avoiding the Adderall lately with this higher anxiety.      Amount of exercise or physical activity: 6-7 days/week for an average of 30-45 minutes    Problems taking medications regularly: stopped taking adderall since started shaking - adds to the anxiety    Medication side effects: possibly shaking    Diet: regular (no restrictions)      Problem list and histories reviewed & adjusted, as indicated.  Additional history: as documented    BP Readings from Last 3 Encounters:   12/21/17 99/63   10/20/17 126/82   10/10/17 95/66    Wt Readings from Last 3 Encounters:   12/21/17 128 lb 12.8 oz (58.4 kg)   12/14/17 126 lb (57.2 kg)   10/20/17 125 lb (56.7 kg)                      Reviewed and updated as needed this visit by clinical staffTobacco  Allergies  Meds  Med Hx  Surg Hx  Fam Hx   Soc Hx      Reviewed and updated as needed this visit by Provider         ROS:  C: NEGATIVE for fever, chills, change in weight  CV: NEGATIVE for chest pain, palpitations or peripheral edema  ENDOCRINE: NEGATIVE for temperature intolerance, skin/hair changes    OBJECTIVE:     BP 99/63  Pulse 67  Temp 98.6  F (37  C) (Tympanic)  Resp 18  Ht 5' (1.524 m)  Wt 128 lb 12.8 oz (58.4 kg)  SpO2 98%  BMI 25.15 kg/m2  Body mass index is 25.15 kg/(m^2).  GENERAL: healthy, alert and no distress  NEURO: Normal strength and tone, sensory exam grossly normal and mentation intact. Mild hand tremor when holding them still, worse with writing.  PSYCH: mentation appears normal, affect normal/bright    Diagnostic Test Results:  none     ASSESSMENT/PLAN:       Will was seen today for shaking and anxiety.    Diagnoses and all orders for this visit:    Generalized anxiety disorder: worsening anxiety  -plan to add buspar to the prozac  --discussed risks, benefits, and side effects of this new medication. Patient understands and is in agreement.  -seeing psychiatrist in 1 month so can either follow up there or here.  -continue weekly therapy  -avoid the adderall when anxiety is high  -     DEPRESSION ACTION PLAN (DAP)  -     busPIRone (BUSPAR) 5 MG tablet; Start at 5 mg twice daily for 3 days, then 7.5 mg (1.5 tabs) twice daily for 3 days, then 10 mg (2 tabs) twice daily for 3 days, then 12.5 mg (2.5 tabs) twice daily for 3 days, then 15 mg (3 tabs) twice daily and stay at that dose    Posttraumatic stress disorder    Essential tremor: discussed diagnosis and patient is familiar as dad has this condition.        See Patient Instructions    Norbert Wooten MD  [facility]

## 2017-12-21 NOTE — LETTER
My Depression Action Plan  Name: Will Dodson   Date of Birth 1985  Date: 12/21/2017    My doctor: Beatrice [facility] [place]   My clinic: [facility]  [address] [address]  [phone]          GREEN    ZONE   Good Control    What it looks like:     Things are going generally well. You have normal up s and down s. You may even feel depressed from time to time, but bad moods usually last less than a day.   What you need to do:  1. Continue to care for yourself (see self care plan)  2. Check your depression survival kit and update it as needed  3. Follow your physician s recommendations including any medication.  4. Do not stop taking medication unless you consult with your physician first.           YELLOW         ZONE Getting Worse    What it looks like:     Depression is starting to interfere with your life.     It may be hard to get out of bed; you may be starting to isolate yourself from others.    Symptoms of depression are starting to last most all day and this has happened for several days.     You may have suicidal thoughts but they are not constant.   What you need to do:     1. Call your care team, your response to treatment will improve if you keep your care team informed of your progress. Yellow periods are signs an adjustment may need to be made.     2. Continue your self-care, even if you have to fake it!    3. Talk to someone in your support network    4. Open up your depression survival kit           RED    ZONE Medical Alert - Get Help    What it looks like:     Depression is seriously interfering with your life.     You may experience these or other symptoms: You can t get out of bed most days, can t work or engage in other necessary activities, you have trouble taking care of basic hygiene, or basic responsibilities, thoughts of suicide or death that will not go away, self-injurious behavior.     What you need to do:  1. Call your care team  and request a same-day appointment. If they are not available (weekends or after hours) call your local crisis line, emergency room or 911.      Electronically signed by: Dr. Norbert Wooten, December 21, 2017    Depression Self Care Plan / Survival Kit    Self-Care for Depression  Here s the deal. Your body and mind are really not as separate as most people think.  What you do and think affects how you feel and how you feel influences what you do and think. This means if you do things that people who feel good do, it will help you feel better.  Sometimes this is all it takes.  There is also a place for medication and therapy depending on how severe your depression is, so be sure to consult with your medical provider and/ or Behavioral Health Consultant if your symptoms are worsening or not improving.     In order to better manage my stress, I will:    Exercise  Get some form of exercise, every day. This will help reduce pain and release endorphins, the  feel good  chemicals in your brain. This is almost as good as taking antidepressants!  This is not the same as joining a gym and then never going! (they count on that by the way ) It can be as simple as just going for a walk or doing some gardening, anything that will get you moving.      Hygiene   Maintain good hygiene (Get out of bed in the morning, Make your bed, Brush your teeth, Take a shower, and Get dressed like you were going to work, even if you are unemployed).  If your clothes don't fit try to get ones that do.    Diet  I will strive to eat foods that are good for me, drink plenty of water, and avoid excessive sugar, caffeine, alcohol, and other mood-altering substances.  Some foods that are helpful in depression are: complex carbohydrates, B vitamins, flaxseed, fish or fish oil, fresh fruits and vegetables.    Psychotherapy  I agree to participate in Individual Therapy (if recommended).    Medication  If prescribed medications, I agree to take them.  Missing  doses can result in serious side effects.  I understand that drinking alcohol, or other illicit drug use, may cause potential side effects.  I will not stop my medication abruptly without first discussing it with my provider.    Staying Connected With Others  I will stay in touch with my friends, family members, and my primary care provider/team.    Use your imagination  Be creative.  We all have a creative side; it doesn t matter if it s oil painting, sand castles, or mud pies! This will also kick up the endorphins.    Witness Beauty  (AKA stop and smell the roses) Take a look outside, even in mid-winter. Notice colors, textures. Watch the squirrels and birds.     Service to others  Be of service to others.  There is always someone else in need.  By helping others we can  get out of ourselves  and remember the really important things.  This also provides opportunities for practicing all the other parts of the program.    Humor  Laugh and be silly!  Adjust your TV habits for less news and crime-drama and more comedy.    Control your stress  Try breathing deep, massage therapy, biofeedback, and meditation. Find time to relax each day.     My support system    Clinic Contact:  Phone number:    Contact 1:  Phone number:    Contact 2:  Phone number:    Nondenominational/:  Phone number:    Therapist:  Phone number:    Local crisis center:    Phone number:    Other community support:  Phone number:

## 2017-12-22 ASSESSMENT — ANXIETY QUESTIONNAIRES: GAD7 TOTAL SCORE: 19

## 2017-12-27 ENCOUNTER — HOSPITAL ENCOUNTER (OUTPATIENT)
Dept: BEHAVIORAL HEALTH | Facility: CLINIC | Age: 32
End: 2017-12-27
Attending: SOCIAL WORKER
Payer: COMMERCIAL

## 2017-12-27 PROBLEM — F19.20 CHEMICAL DEPENDENCY (H): Status: ACTIVE | Noted: 2017-12-27

## 2017-12-27 PROCEDURE — H2035 A/D TX PROGRAM, PER HOUR: HCPCS | Mod: HQ

## 2017-12-28 NOTE — PROGRESS NOTES
Initial Services Plan        Before your first treatment group, please do the following    Immediate health & safety concerns: Look for a support network (such as AA, NA, DBT group, a Holiness group, etc.)    Suggestions for client during the time between intake & completion of treatment plan:  Tour your treatment center (unit or outpatient clinic).  Introduce yourself to the treatment group.  Spend time getting to know your peers.  Review your patient or client handbook.    Client issues to be addressed in the first treatment sessions:  Identify motivations(s) for coming to treatment, i.e. legal, family, job, self  Other client would like to talk about her first day about being excired to learn and gain new tools to stay sober and live a better life      FLORA Huynh  12/27/2017  6:13 PM

## 2017-12-28 NOTE — PROGRESS NOTES
"ORIENTATION NOTE:       D: Will Dodson attended orientation on Wed, 12/27/2017, with this counselor. She was given the orientation packet which was reviewed in its entirety. This packet includes: Program philosophy, treatment goals, program schedules, educational components, family group sessions, how to use the treatment materials, program rules and policies, client rights/responsibilities, information on HIV, STDs, Hepatitis, TB, information on how substance use affects pregnancy, information on narcan, abuse prevention plan/reporting protocol, client grievance procedure, risks of treatment, confidentiality and exceptions, treatment agreement/consent, facility tour/safety information and information about co-occurring disorders. Client was also given information on insurance processing and the contact number for the business office should she have any questions about coverage and/or co-pays. She was introduced to the stages of change and identified herself as preparation stage. As \"I hir rock bottom, drugs/alcohol/ have taken my life.  Ready to get my life back\"  Client completed her goals for treatment to include in the treatment plan and learned about PAWS. She reported last use date was \"11/08/2017'.  She reviewed the risks of treatment and is aware that she may experience emotions and/or memories that may be uncomfortable. She agreed to the confidential nature of the treatment group and is open to the group process. We discussed why building a sober support network is important for sobriety.    I: Required paperwork was explained and signed, gave her tour of facility, showed her the group room and exits and location of restrooms, she collaborated on the ISP and the Vulnerable Adult assessment. We discussed personal goals for treatment and what to expect within a group therapy format. She completed her personal goals for treatment to review with her counselor at her treatment plan session.  Client was asked to " call the counselor on Tuesday for her start date.  Client acknowledged understanding of orientation material. She was alert and appeared is motivated for sobriety and ready to start treatment.       P: Client will review all the packet information at home in case she has additional questions and then start IOP CD treatment at [facility] location.         FLORA Huynh

## 2018-01-03 NOTE — TELEPHONE ENCOUNTER
Client left a message stating she was calling to schedule a start date after having attended orientation last week. I attempted to reach her however was only able to leave a message. I stated I would tentatively schedule her for January 15th at 9:00 am however I would need her to confirm this by the end of today. I explained that I will need to meet with her individually prior to her starting group which is why I'm asking her to come in at 9:00am and then group would start at 10:00am. I provided my direct contact information for a returned call by the end of today to confirm our appointment and group start date.

## 2018-01-04 NOTE — TELEPHONE ENCOUNTER
----- Message from JOHN Echols sent at 1/4/2018  9:41 AM CST -----  Please schedule her for 30 sessions of [id number] starting on 1/15/2018. Thank you.

## 2018-01-15 NOTE — TELEPHONE ENCOUNTER
I reached out to client as she did not show up for our scheduled appointment this morning at 9am. I reminded her that she confirmed this with me and directed her to call me by 3pm today or I would move forward with a discharge. I explained we have a wait list and so when individuals don't show up it impacts this list and others who are still waiting to get in. I confirmed three times in my message she missed our scheduled appointment and needs to call by 3pm today or I would discharge. I provided my direct contact information for a return call.
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
## 2018-02-07 ENCOUNTER — OFFICE VISIT (OUTPATIENT)
Dept: FAMILY MEDICINE | Facility: CLINIC | Age: 33
End: 2018-02-07
Payer: COMMERCIAL

## 2018-02-07 ENCOUNTER — RADIANT APPOINTMENT (OUTPATIENT)
Dept: GENERAL RADIOLOGY | Facility: CLINIC | Age: 33
End: 2018-02-07
Attending: NURSE PRACTITIONER
Payer: COMMERCIAL

## 2018-02-07 VITALS
HEART RATE: 92 BPM | BODY MASS INDEX: 24.94 KG/M2 | TEMPERATURE: 99 F | HEIGHT: 60 IN | DIASTOLIC BLOOD PRESSURE: 89 MMHG | SYSTOLIC BLOOD PRESSURE: 121 MMHG | WEIGHT: 127 LBS

## 2018-02-07 DIAGNOSIS — R10.84 ABDOMINAL PAIN, GENERALIZED: ICD-10-CM

## 2018-02-07 DIAGNOSIS — R10.84 ABDOMINAL PAIN, GENERALIZED: Primary | ICD-10-CM

## 2018-02-07 LAB
ALBUMIN SERPL-MCNC: 4.3 G/DL (ref 3.4–5)
ALBUMIN UR-MCNC: NEGATIVE MG/DL
ALP SERPL-CCNC: 69 U/L (ref 40–150)
ALT SERPL W P-5'-P-CCNC: 18 U/L (ref 0–50)
ANION GAP SERPL CALCULATED.3IONS-SCNC: 8 MMOL/L (ref 3–14)
APPEARANCE UR: CLEAR
AST SERPL W P-5'-P-CCNC: 18 U/L (ref 0–45)
BILIRUB SERPL-MCNC: 0.5 MG/DL (ref 0.2–1.3)
BILIRUB UR QL STRIP: NEGATIVE
BUN SERPL-MCNC: 9 MG/DL (ref 7–30)
CALCIUM SERPL-MCNC: 9 MG/DL (ref 8.5–10.1)
CHLORIDE SERPL-SCNC: 108 MMOL/L (ref 94–109)
CO2 SERPL-SCNC: 21 MMOL/L (ref 20–32)
COLOR UR AUTO: YELLOW
CREAT SERPL-MCNC: 0.57 MG/DL (ref 0.52–1.04)
ERYTHROCYTE [DISTWIDTH] IN BLOOD BY AUTOMATED COUNT: 13.6 % (ref 10–15)
GFR SERPL CREATININE-BSD FRML MDRD: >90 ML/MIN/1.7M2
GLUCOSE SERPL-MCNC: 82 MG/DL (ref 70–99)
GLUCOSE UR STRIP-MCNC: NEGATIVE MG/DL
HCT VFR BLD AUTO: 43.5 % (ref 35–47)
HGB BLD-MCNC: 15 G/DL (ref 11.7–15.7)
HGB UR QL STRIP: NEGATIVE
KETONES UR STRIP-MCNC: NEGATIVE MG/DL
LEUKOCYTE ESTERASE UR QL STRIP: NEGATIVE
MCH RBC QN AUTO: 31.3 PG (ref 26.5–33)
MCHC RBC AUTO-ENTMCNC: 34.5 G/DL (ref 31.5–36.5)
MCV RBC AUTO: 91 FL (ref 78–100)
NITRATE UR QL: NEGATIVE
PH UR STRIP: 5.5 PH (ref 5–7)
PLATELET # BLD AUTO: 268 10E9/L (ref 150–450)
POTASSIUM SERPL-SCNC: 3.3 MMOL/L (ref 3.4–5.3)
PROT SERPL-MCNC: 7.8 G/DL (ref 6.8–8.8)
RBC # BLD AUTO: 4.79 10E12/L (ref 3.8–5.2)
SODIUM SERPL-SCNC: 137 MMOL/L (ref 133–144)
SOURCE: NORMAL
SP GR UR STRIP: 1.02 (ref 1–1.03)
UROBILINOGEN UR STRIP-ACNC: 0.2 EU/DL (ref 0.2–1)
WBC # BLD AUTO: 9.4 10E9/L (ref 4–11)

## 2018-02-07 PROCEDURE — 80053 COMPREHEN METABOLIC PANEL: CPT | Performed by: NURSE PRACTITIONER

## 2018-02-07 PROCEDURE — 36415 COLL VENOUS BLD VENIPUNCTURE: CPT | Performed by: NURSE PRACTITIONER

## 2018-02-07 PROCEDURE — 81003 URINALYSIS AUTO W/O SCOPE: CPT | Performed by: NURSE PRACTITIONER

## 2018-02-07 PROCEDURE — 74019 RADEX ABDOMEN 2 VIEWS: CPT | Mod: FY

## 2018-02-07 PROCEDURE — 85027 COMPLETE CBC AUTOMATED: CPT | Performed by: NURSE PRACTITIONER

## 2018-02-07 PROCEDURE — 99213 OFFICE O/P EST LOW 20 MIN: CPT | Performed by: NURSE PRACTITIONER

## 2018-02-07 NOTE — LETTER
February 7, 2018      Will Dodson  [address] [place] [id number]        Dear ,    We are writing to inform you of your test results.    All labs today normal, including blood counts, liver function, kidney function and electrolytes.     Resulted Orders   *UA reflex to Microscopic and Culture ([facility] (except [place] and [place])   Result Value Ref Range    Color Urine Yellow     Appearance Urine Clear     Glucose Urine Negative NEG^Negative mg/dL    Bilirubin Urine Negative NEG^Negative    Ketones Urine Negative NEG^Negative mg/dL    Specific Gravity Urine 1.025 1.003 - 1.035    Blood Urine Negative NEG^Negative    pH Urine 5.5 5.0 - 7.0 pH    Protein Albumin Urine Negative NEG^Negative mg/dL    Urobilinogen Urine 0.2 0.2 - 1.0 EU/dL    Nitrite Urine Negative NEG^Negative    Leukocyte Esterase Urine Negative NEG^Negative    Source Midstream Urine    CBC with platelets   Result Value Ref Range    WBC 9.4 4.0 - 11.0 10e9/L    RBC Count 4.79 3.8 - 5.2 10e12/L    Hemoglobin 15.0 11.7 - 15.7 g/dL    Hematocrit 43.5 35.0 - 47.0 %    MCV 91 78 - 100 fl    MCH 31.3 26.5 - 33.0 pg    MCHC 34.5 31.5 - 36.5 g/dL    RDW 13.6 10.0 - 15.0 %    Platelet Count 268 150 - 450 10e9/L   Comprehensive metabolic panel   Result Value Ref Range    Sodium 137 133 - 144 mmol/L    Potassium 3.3 (L) 3.4 - 5.3 mmol/L    Chloride 108 94 - 109 mmol/L    Carbon Dioxide 21 20 - 32 mmol/L    Anion Gap 8 3 - 14 mmol/L    Glucose 82 70 - 99 mg/dL    Urea Nitrogen 9 7 - 30 mg/dL    Creatinine 0.57 0.52 - 1.04 mg/dL    GFR Estimate >90 >60 mL/min/1.7m2      Comment:      Non  GFR Calc    GFR Estimate If Black >90 >60 mL/min/1.7m2      Comment:       GFR Calc    Calcium 9.0 8.5 - 10.1 mg/dL    Bilirubin Total 0.5 0.2 - 1.3 mg/dL    Albumin 4.3 3.4 - 5.0 g/dL    Protein Total 7.8 6.8 - 8.8 g/dL    Alkaline Phosphatase 69 40 - 150 U/L    ALT 18 0 - 50 U/L    AST 18 0 - 45 U/L       If  you have any questions or concerns, please call the clinic at the number listed above.       Sincerely,        YURI Gage CNP/CB

## 2018-02-07 NOTE — NURSING NOTE
Chief Complaint   Patient presents with     Abdominal Pain     pain and diarrhea x 2 days       Initial /89 (BP Location: Left arm, Patient Position: Chair, Cuff Size: Adult Regular)  Pulse 92  Temp 99  F (37.2  C) (Tympanic)  Ht 5' (1.524 m)  Wt 127 lb (57.6 kg)  BMI 24.8 kg/m2 Estimated body mass index is 24.8 kg/(m^2) as calculated from the following:    Height as of this encounter: 5' (1.524 m).    Weight as of this encounter: 127 lb (57.6 kg).  Medication Reconciliation: complete

## 2018-02-07 NOTE — PROGRESS NOTES
"  SUBJECTIVE:   Will Dodson is a 32 year old female who presents to clinic today for the following health issues:      ABDOMINAL PAIN     Onset: 2 days    Description:   Character: Dull ache and Cramping  Location: epigastric region pelvic region  Radiation: Back    Intensity: moderate, severe    Progression of Symptoms:  constant    Accompanying Signs & Symptoms:  Fever/Chills?: no   Gas/Bloating: no   Nausea: YES  Vomitting: YES- yesterday  Diarrhea?: YES- several times a day  Constipation:no   Dysuria or Hematuria: YES- \"more pressure\"   History:   Trauma: no   Previous similar pain: YES- hx of UTI's   Previous tests done: none    Precipitating factors:   Does the pain change with:     Food: no      BM: no     Urination: no     Alleviating factors:  n/a    Therapies Tried and outcome: none    LMP:  Unknown, IUD           Problem list and histories reviewed & adjusted, as indicated.  Additional history: as documented    Reviewed and updated as needed this visit by clinical staff  Tobacco  Allergies       Reviewed and updated as needed this visit by Provider         ROS:  Constitutional, HEENT, cardiovascular, pulmonary, gi and gu systems are negative, except as otherwise noted.    OBJECTIVE:     /89 (BP Location: Left arm, Patient Position: Chair, Cuff Size: Adult Regular)  Pulse 92  Temp 99  F (37.2  C) (Tympanic)  Ht 5' (1.524 m)  Wt 127 lb (57.6 kg)  BMI 24.8 kg/m2  Body mass index is 24.8 kg/(m^2).  GENERAL: healthy, alert and no distress  HENT: ear canals and TM's normal, nose and mouth without ulcers or lesions  NECK: no adenopathy, no asymmetry, masses, or scars and thyroid normal to palpation  RESP: lungs clear to auscultation - no rales, rhonchi or wheezes  CV: regular rate and rhythm, normal S1 S2, no S3 or S4, no murmur, click or rub, no peripheral edema and peripheral pulses strong  ABDOMEN: soft, nontender, no hepatosplenomegaly, no masses and bowel sounds normal    Diagnostic Test " with platelets     Comprehensive metabolic panel     Abdominal discomfort with nausea and diarrhea.  Likely viral gastroenteritis.  Patient states that she will follow up with her PCP in 2 days.    The risks, benefits and treatment options of prescribed medications or other treatments have been discussed with the patient. The patient verbalized their understanding and should call or follow up if no improvement or if they develop further problems.    YURI Gage [facility]

## 2018-02-07 NOTE — PATIENT INSTRUCTIONS
Thank you for choosing [facility].  You may be receiving a survey in the mail from Ian Purvis regarding your visit today.  Please take a few minutes to complete and return the survey to let us know how we are doing.      If you have questions or concerns, please contact us via [employer] or you can contact your care team at [phone].    Our Clinic hours are:  Monday 6:40 am  to 7:00 pm  Tuesday -Friday 6:40 am to 5:00 pm    The [place] outpatient lab hours are:  Monday - Friday 6:10 am to 4:45 pm  Saturdays 7:00 am to 11:00 am  Appointments are required, call [phone]    If you have clinical questions after hours or would like to schedule an appointment,  call the clinic at [phone].

## 2018-02-07 NOTE — MR AVS SNAPSHOT
After Visit Summary   2/7/2018    Will Dodson    MRN: [id number]           Patient Information     Date Of Birth          1985        Visit Information        Provider Department      2/7/2018 9:40 AM Reyna Rojas APRN [facility]        Today's Diagnoses     Abdominal pain, generalized    -  1      Care Instructions          Thank you for choosing [facility].  You may be receiving a survey in the mail from [email or web address] regarding your visit today.  Please take a few minutes to complete and return the survey to let us know how we are doing.      If you have questions or concerns, please contact us via Localize Direct or you can contact your care team at [phone].    Our Clinic hours are:  Monday 6:40 am  to 7:00 pm  Tuesday -Friday 6:40 am to 5:00 pm    The [place] outpatient lab hours are:  Monday - Friday 6:10 am to 4:45 pm  Saturdays 7:00 am to 11:00 am  Appointments are required, call [phone]    If you have clinical questions after hours or would like to schedule an appointment,  call the clinic at [phone].            Follow-ups after your visit        Who to contact     If you have questions or need follow up information about today's clinic visit or your schedule please contact [facility] directly at [phone].  Normal or non-critical lab and imaging results will be communicated to you by Aurora Spinehart, letter or phone within 4 business days after the clinic has received the results. If you do not hear from us within 7 days, please contact the clinic through [email or web address] or phone. If you have a critical or abnormal lab result, we will notify you by phone as soon as possible.  Submit refill requests through Localize Direct or call your pharmacy and they will forward the refill request to us. Please allow 3 business days for your refill to be completed.          Additional Information About Your Visit        Aurora Spinehart Information     Localize Direct lets you  "send messages to your doctor, view your test results, renew your prescriptions, schedule appointments and more. To sign up, go to [email or web address] . Click on \"Log in\" on the left side of the screen, which will take you to the Welcome page. Then click on \"Sign up Now\" on the right side of the page.     You will be asked to enter the access code listed below, as well as some personal information. Please follow the directions to create your username and password.     Your access code is: [email or web address]  Expires: 3/21/2018 11:53 AM     Your access code will  in 90 days. If you need help or a new code, please call your [place] clinic or [phone].        Care EveryWhere ID     This is your [facility] EveryWhere ID. This could be used by other organizations to access your [place] medical records  [id number]        Your Vitals Were     Pulse Temperature Height BMI (Body Mass Index)          92 99  F (37.2  C) (Tympanic) 5' (1.524 m) 24.8 kg/m2         Blood Pressure from Last 3 Encounters:   18 121/89   17 99/63   10/20/17 126/82    Weight from Last 3 Encounters:   18 127 lb (57.6 kg)   17 128 lb 12.8 oz (58.4 kg)   10/20/17 125 lb (56.7 kg)              We Performed the Following     *UA reflex to Microscopic and Culture ([place] and [facility] (except [place] and [place])     CBC with platelets     Comprehensive metabolic panel        Primary Care Provider Office Phone # Fax #    [facility] [phone] [address] [address]        Equal Access to Services     JEFFERY SALINAS : Vidhi Andino, nathaniel bazzi, kevin hoffman, ashley quigley. So [facility] [phone].    ATENCIÓN: Si habla español, tiene a jarrett disposición servicios gratuitos de asistencia lingüística. Llame al [phone].    We comply with applicable federal civil rights laws and [place] laws. We do not " discriminate on the basis of race, color, national origin, age, disability, sex, sexual orientation, or gender identity.            Thank you!     Thank you for choosing [facility]  for your care. Our goal is always to provide you with excellent care. Hearing back from our patients is one way we can continue to improve our services. Please take a few minutes to complete the written survey that you may receive in the mail after your visit with us. Thank you!             Your Updated Medication List - Protect others around you: Learn how to safely use, store and throw away your medicines at www.disposemymeds.org.          This list is accurate as of 2/7/18  1:30 PM.  Always use your most recent med list.                   Brand Name Dispense Instructions for use Diagnosis    albuterol 108 (90 BASE) MCG/ACT Inhaler    PROAIR HFA    1 Inhaler    Inhale 2 puffs into the lungs every 4 hours as needed for shortness of breath / dyspnea        busPIRone 5 MG tablet    BUSPAR    150 tablet    Start at 5 mg twice daily for 3 days, then 7.5 mg (1.5 tabs) twice daily for 3 days, then 10 mg (2 tabs) twice daily for 3 days, then 12.5 mg (2.5 tabs) twice daily for 3 days, then 15 mg (3 tabs) twice daily and stay at that dose    Generalized anxiety disorder       cloNIDine 0.1 MG tablet    CATAPRES     Take 1 tablet (0.1 mg) by mouth At Bedtime    Posttraumatic stress disorder       FLUoxetine 40 MG capsule    PROzac    90 capsule    Take 1 capsule (40 mg) by mouth daily    Moderate episode of recurrent major depressive disorder (H)       levonorgestrel 20 MCG/24HR IUD    MIRENA     1 each (20 mcg) by Intrauterine route continuous    Encounter for IUD insertion

## 2018-03-01 ENCOUNTER — OFFICE VISIT (OUTPATIENT)
Dept: FAMILY MEDICINE | Facility: CLINIC | Age: 33
End: 2018-03-01
Payer: COMMERCIAL

## 2018-03-01 VITALS
DIASTOLIC BLOOD PRESSURE: 76 MMHG | TEMPERATURE: 99 F | SYSTOLIC BLOOD PRESSURE: 116 MMHG | WEIGHT: 128.2 LBS | HEART RATE: 78 BPM | BODY MASS INDEX: 25.17 KG/M2 | RESPIRATION RATE: 26 BRPM | OXYGEN SATURATION: 100 % | HEIGHT: 60 IN

## 2018-03-01 DIAGNOSIS — F41.1 GENERALIZED ANXIETY DISORDER: ICD-10-CM

## 2018-03-01 DIAGNOSIS — F90.2 ADHD (ATTENTION DEFICIT HYPERACTIVITY DISORDER), COMBINED TYPE: Primary | ICD-10-CM

## 2018-03-01 DIAGNOSIS — F33.1 MODERATE EPISODE OF RECURRENT MAJOR DEPRESSIVE DISORDER (H): ICD-10-CM

## 2018-03-01 DIAGNOSIS — F43.10 POSTTRAUMATIC STRESS DISORDER: ICD-10-CM

## 2018-03-01 PROCEDURE — 99213 OFFICE O/P EST LOW 20 MIN: CPT | Performed by: FAMILY MEDICINE

## 2018-03-01 RX ORDER — FLUOXETINE 40 MG/1
40 CAPSULE ORAL DAILY
Qty: 90 CAPSULE | Refills: 3 | Status: SHIPPED | OUTPATIENT
Start: 2018-03-01 | End: 2018-08-14 | Stop reason: ALTCHOICE

## 2018-03-01 RX ORDER — DEXTROAMPHETAMINE SACCHARATE, AMPHETAMINE ASPARTATE, DEXTROAMPHETAMINE SULFATE AND AMPHETAMINE SULFATE 5; 5; 5; 5 MG/1; MG/1; MG/1; MG/1
20 TABLET ORAL 2 TIMES DAILY
Qty: 60 TABLET | Refills: 0 | Status: SHIPPED | OUTPATIENT
Start: 2018-03-12 | End: 2018-04-11

## 2018-03-01 RX ORDER — QUETIAPINE FUMARATE 50 MG/1
25-50 TABLET, FILM COATED ORAL
Qty: 30 TABLET | Refills: 1 | Status: ON HOLD | COMMUNITY
Start: 2018-03-01 | End: 2018-08-27

## 2018-03-01 RX ORDER — DEXTROAMPHETAMINE SACCHARATE, AMPHETAMINE ASPARTATE, DEXTROAMPHETAMINE SULFATE AND AMPHETAMINE SULFATE 5; 5; 5; 5 MG/1; MG/1; MG/1; MG/1
20 TABLET ORAL 2 TIMES DAILY
Qty: 60 TABLET | Refills: 0 | Status: SHIPPED | OUTPATIENT
Start: 2018-05-12 | End: 2018-06-11

## 2018-03-01 RX ORDER — DEXTROAMPHETAMINE SACCHARATE, AMPHETAMINE ASPARTATE, DEXTROAMPHETAMINE SULFATE AND AMPHETAMINE SULFATE 5; 5; 5; 5 MG/1; MG/1; MG/1; MG/1
20 TABLET ORAL 2 TIMES DAILY
Qty: 60 TABLET | Refills: 0 | Status: SHIPPED | OUTPATIENT
Start: 2018-04-12 | End: 2018-05-12

## 2018-03-01 ASSESSMENT — ANXIETY QUESTIONNAIRES
1. FEELING NERVOUS, ANXIOUS, OR ON EDGE: MORE THAN HALF THE DAYS
3. WORRYING TOO MUCH ABOUT DIFFERENT THINGS: NEARLY EVERY DAY
2. NOT BEING ABLE TO STOP OR CONTROL WORRYING: NEARLY EVERY DAY
IF YOU CHECKED OFF ANY PROBLEMS ON THIS QUESTIONNAIRE, HOW DIFFICULT HAVE THESE PROBLEMS MADE IT FOR YOU TO DO YOUR WORK, TAKE CARE OF THINGS AT HOME, OR GET ALONG WITH OTHER PEOPLE: SOMEWHAT DIFFICULT
5. BEING SO RESTLESS THAT IT IS HARD TO SIT STILL: NEARLY EVERY DAY
7. FEELING AFRAID AS IF SOMETHING AWFUL MIGHT HAPPEN: MORE THAN HALF THE DAYS
GAD7 TOTAL SCORE: 17
6. BECOMING EASILY ANNOYED OR IRRITABLE: SEVERAL DAYS

## 2018-03-01 ASSESSMENT — PATIENT HEALTH QUESTIONNAIRE - PHQ9: 5. POOR APPETITE OR OVEREATING: NEARLY EVERY DAY

## 2018-03-01 NOTE — PATIENT INSTRUCTIONS
You can cut the seroquel in half to 25mg at bedtime so you are not too groggy.    Bring the adderall Rx to the pharmacy, first fill is 3/12      Thank you for choosing [facility].  You may be receiving a survey in the mail from Ian Purvis regarding your visit today.  Please take a few minutes to complete and return the survey to let us know how we are doing.      If you have questions or concerns, please contact us via BEETmobile or you can contact your care team at [phone].    Our Clinic hours are:  Monday 6:40 am  to 7:00 pm  Tuesday -Friday 6:40 am to 5:00 pm    The [place] outpatient lab hours are:  Monday - Friday 6:10 am to 4:45 pm  Saturdays 7:00 am to 11:00 am  Appointments are required, call [phone]    If you have clinical questions after hours or would like to schedule an appointment,  call the clinic at [phone].

## 2018-03-01 NOTE — PROGRESS NOTES
SUBJECTIVE:   Will Dodson is a 32 year old female who presents to clinic today for the following health issues:      Medication Followup of adderall    Taking Medication as prescribed: yes    Side Effects:  None    Medication Helping Symptoms:  yes       Medication Followup of prozac    Taking Medication as prescribed: yes    Side Effects:  None    Medication Helping Symptoms:  Yes     Depression and Anxiety Follow-Up    Status since last visit: Improved for depression; anxiety is unchanged    Other associated symptoms:None    Complicating factors:     Significant life event: No     Current substance abuse: None      Had been seeing Jerrell De Leon and was told that next time will switch to another psychiatrist as he is leaving. Did prescribe ambien for two weeks. Has been prescribed clonidine from psychiatrist which has been helping.  Has tried prazosin with side effect of headaches.     Takes seroquel 50mg at bedtime but so groggy the next day.    Seeing therapy once per week.    Prozac is helping depression symptoms    For anxiety has done seroquel in the past which had not been helpful for sleep or anxiety.  PHQ9 19, no suicidal ideation.  ERYN 7 17    PHQ-9 10/25/2016 5/18/2017 12/14/2017   Total Score - 21 17   Q9: Suicide Ideation Not at all Not at all Not at all     ERYN-7 SCORE 10/25/2016 5/18/2017 12/21/2017   Total Score 20 17 19   Some encounter information is confidential and restricted. Go to Review Flowsheets activity to see all data.       PHQ-9  English  PHQ-9   Any Language  ERYN-7  Suicide Assessment Five-step Evaluation and Treatment (SAFE-T)    Amount of exercise or physical activity: 2-3 days/week for an average of 45-60 minutes    Problems taking medications regularly: No    Medication side effects: none    Diet: regular (no restrictions)         Problem list and histories reviewed & adjusted, as indicated.  Additional history: as documented    BP Readings from Last 3 Encounters:   03/01/18  116/76   02/07/18 121/89   12/21/17 99/63    Wt Readings from Last 3 Encounters:   03/01/18 128 lb 3.2 oz (58.2 kg)   02/07/18 127 lb (57.6 kg)   12/21/17 128 lb 12.8 oz (58.4 kg)                    Reviewed and updated as needed this visit by clinical staff  Meds       Reviewed and updated as needed this visit by Provider         ROS:  Constitutional, HEENT, cardiovascular, pulmonary, gi and gu systems are negative, except as otherwise noted.    OBJECTIVE:     /76  Pulse 78  Temp 99  F (37.2  C) (Tympanic)  Resp 26  Ht 5' (1.524 m)  Wt 128 lb 3.2 oz (58.2 kg)  SpO2 100%  BMI 25.04 kg/m2  Body mass index is 25.04 kg/(m^2).  GENERAL: healthy, alert and no distress  PSYCH: mentation appears normal, affect normal/bright. Fidgety.    Diagnostic Test Results:  none     ASSESSMENT/PLAN:       Will was seen today for refill request and health maintenance.    Diagnoses and all orders for this visit:    ADHD (attention deficit hyperactivity disorder), combined type: stable on medication, refill for 3 months  -     amphetamine-dextroamphetamine (ADDERALL) 20 MG per tablet; Take 1 tablet (20 mg) by mouth 2 times daily  -     amphetamine-dextroamphetamine (ADDERALL) 20 MG per tablet; Take 1 tablet (20 mg) by mouth 2 times daily  -     amphetamine-dextroamphetamine (ADDERALL) 20 MG per tablet; Take 1 tablet (20 mg) by mouth 2 times daily    Moderate episode of recurrent major depressive disorder (H): improved, stable  -refill  -has appt in June with new psychiatrist  -     FLUoxetine (PROZAC) 40 MG capsule; Take 1 capsule (40 mg) by mouth daily    Posttraumatic stress disorder    Generalized anxiety disorder: unchanged still now well controlled  -continues therapy weekly  -     FLUoxetine (PROZAC) 40 MG capsule; Take 1 capsule (40 mg) by mouth daily        Patient Instructions   You can cut the seroquel in half to 25mg at bedtime so you are not too groggy.    Bring the adderall Rx to the pharmacy, first fill is  3/12        Norbert Wooten MD  [facility]

## 2018-03-01 NOTE — NURSING NOTE
Chief Complaint   Patient presents with     Refill Request       Initial /76  Pulse 78  Temp 99  F (37.2  C) (Tympanic)  Resp 26  Ht 5' (1.524 m)  Wt 128 lb 3.2 oz (58.2 kg)  SpO2 100%  BMI 25.04 kg/m2 Estimated body mass index is 25.04 kg/(m^2) as calculated from the following:    Height as of this encounter: 5' (1.524 m).    Weight as of this encounter: 128 lb 3.2 oz (58.2 kg).  Medication Reconciliation: complete

## 2018-03-02 ASSESSMENT — ANXIETY QUESTIONNAIRES: GAD7 TOTAL SCORE: 17

## 2018-03-02 ASSESSMENT — PATIENT HEALTH QUESTIONNAIRE - PHQ9: SUM OF ALL RESPONSES TO PHQ QUESTIONS 1-9: 19

## 2018-03-18 ENCOUNTER — HOSPITAL ENCOUNTER (EMERGENCY)
Facility: CLINIC | Age: 33
Discharge: HOME | End: 2018-03-18
Attending: FAMILY MEDICINE | Admitting: FAMILY MEDICINE
Payer: COMMERCIAL

## 2018-03-18 ENCOUNTER — APPOINTMENT (OUTPATIENT)
Dept: GENERAL RADIOLOGY | Facility: CLINIC | Age: 33
End: 2018-03-18
Attending: FAMILY MEDICINE
Payer: COMMERCIAL

## 2018-03-18 VITALS
OXYGEN SATURATION: 97 % | HEIGHT: 60 IN | TEMPERATURE: 98.1 F | RESPIRATION RATE: 18 BRPM | BODY MASS INDEX: 23.56 KG/M2 | DIASTOLIC BLOOD PRESSURE: 79 MMHG | WEIGHT: 120 LBS | SYSTOLIC BLOOD PRESSURE: 107 MMHG

## 2018-03-18 DIAGNOSIS — R10.32 ABDOMINAL PAIN, LEFT LOWER QUADRANT: ICD-10-CM

## 2018-03-18 LAB
ALBUMIN SERPL-MCNC: 3.4 G/DL (ref 3.4–5)
ALBUMIN UR-MCNC: NEGATIVE MG/DL
ALP SERPL-CCNC: 66 U/L (ref 40–150)
ALT SERPL W P-5'-P-CCNC: 22 U/L (ref 0–50)
ANION GAP SERPL CALCULATED.3IONS-SCNC: 6 MMOL/L (ref 3–14)
APPEARANCE UR: ABNORMAL
AST SERPL W P-5'-P-CCNC: 18 U/L (ref 0–45)
BACTERIA #/AREA URNS HPF: ABNORMAL /HPF
BASOPHILS # BLD AUTO: 0 10E9/L (ref 0–0.2)
BASOPHILS NFR BLD AUTO: 0.2 %
BILIRUB SERPL-MCNC: 0.2 MG/DL (ref 0.2–1.3)
BILIRUB UR QL STRIP: NEGATIVE
BUN SERPL-MCNC: 11 MG/DL (ref 7–30)
CALCIUM SERPL-MCNC: 8.2 MG/DL (ref 8.5–10.1)
CHLORIDE SERPL-SCNC: 110 MMOL/L (ref 94–109)
CO2 SERPL-SCNC: 25 MMOL/L (ref 20–32)
COLOR UR AUTO: YELLOW
CREAT SERPL-MCNC: 0.52 MG/DL (ref 0.52–1.04)
DIFFERENTIAL METHOD BLD: NORMAL
EOSINOPHIL # BLD AUTO: 0.1 10E9/L (ref 0–0.7)
EOSINOPHIL NFR BLD AUTO: 1.6 %
ERYTHROCYTE [DISTWIDTH] IN BLOOD BY AUTOMATED COUNT: 13.3 % (ref 10–15)
GFR SERPL CREATININE-BSD FRML MDRD: >90 ML/MIN/1.7M2
GLUCOSE SERPL-MCNC: 96 MG/DL (ref 70–99)
GLUCOSE UR STRIP-MCNC: NEGATIVE MG/DL
HCT VFR BLD AUTO: 40.4 % (ref 35–47)
HGB BLD-MCNC: 13.7 G/DL (ref 11.7–15.7)
HGB UR QL STRIP: NEGATIVE
IMM GRANULOCYTES # BLD: 0 10E9/L (ref 0–0.4)
IMM GRANULOCYTES NFR BLD: 0.2 %
KETONES UR STRIP-MCNC: NEGATIVE MG/DL
LEUKOCYTE ESTERASE UR QL STRIP: NEGATIVE
LIPASE SERPL-CCNC: 273 U/L (ref 73–393)
LYMPHOCYTES # BLD AUTO: 2.3 10E9/L (ref 0.8–5.3)
LYMPHOCYTES NFR BLD AUTO: 37 %
MCH RBC QN AUTO: 31.1 PG (ref 26.5–33)
MCHC RBC AUTO-ENTMCNC: 33.9 G/DL (ref 31.5–36.5)
MCV RBC AUTO: 92 FL (ref 78–100)
MONOCYTES # BLD AUTO: 0.6 10E9/L (ref 0–1.3)
MONOCYTES NFR BLD AUTO: 9.1 %
MUCOUS THREADS #/AREA URNS LPF: PRESENT /LPF
NEUTROPHILS # BLD AUTO: 3.2 10E9/L (ref 1.6–8.3)
NEUTROPHILS NFR BLD AUTO: 51.9 %
NITRATE UR QL: NEGATIVE
PH UR STRIP: 7 PH (ref 5–7)
PLATELET # BLD AUTO: 221 10E9/L (ref 150–450)
POTASSIUM SERPL-SCNC: 4.2 MMOL/L (ref 3.4–5.3)
PROT SERPL-MCNC: 6.9 G/DL (ref 6.8–8.8)
RBC # BLD AUTO: 4.41 10E12/L (ref 3.8–5.2)
RBC #/AREA URNS AUTO: 2 /HPF (ref 0–2)
SODIUM SERPL-SCNC: 141 MMOL/L (ref 133–144)
SOURCE: ABNORMAL
SP GR UR STRIP: 1.02 (ref 1–1.03)
SQUAMOUS #/AREA URNS AUTO: 14 /HPF (ref 0–1)
UROBILINOGEN UR STRIP-MCNC: 0 MG/DL (ref 0–2)
WBC # BLD AUTO: 6.2 10E9/L (ref 4–11)
WBC #/AREA URNS AUTO: 8 /HPF (ref 0–5)

## 2018-03-18 PROCEDURE — 74019 RADEX ABDOMEN 2 VIEWS: CPT

## 2018-03-18 PROCEDURE — 81001 URINALYSIS AUTO W/SCOPE: CPT | Performed by: FAMILY MEDICINE

## 2018-03-18 PROCEDURE — 99283 EMERGENCY DEPT VISIT LOW MDM: CPT | Mod: Z6 | Performed by: FAMILY MEDICINE

## 2018-03-18 PROCEDURE — 85025 COMPLETE CBC W/AUTO DIFF WBC: CPT | Performed by: FAMILY MEDICINE

## 2018-03-18 PROCEDURE — 87086 URINE CULTURE/COLONY COUNT: CPT | Performed by: FAMILY MEDICINE

## 2018-03-18 PROCEDURE — 99284 EMERGENCY DEPT VISIT MOD MDM: CPT | Mod: 25 | Performed by: FAMILY MEDICINE

## 2018-03-18 PROCEDURE — 96374 THER/PROPH/DIAG INJ IV PUSH: CPT | Performed by: FAMILY MEDICINE

## 2018-03-18 PROCEDURE — 25000128 H RX IP 250 OP 636: Performed by: FAMILY MEDICINE

## 2018-03-18 PROCEDURE — 80053 COMPREHEN METABOLIC PANEL: CPT | Performed by: FAMILY MEDICINE

## 2018-03-18 PROCEDURE — 83690 ASSAY OF LIPASE: CPT | Performed by: FAMILY MEDICINE

## 2018-03-18 RX ORDER — MAGNESIUM CARB/ALUMINUM HYDROX 105-160MG
296 TABLET,CHEWABLE ORAL ONCE
Status: DISCONTINUED | OUTPATIENT
Start: 2018-03-18 | End: 2018-03-18 | Stop reason: HOSPADM

## 2018-03-18 RX ORDER — KETOROLAC TROMETHAMINE 30 MG/ML
15 INJECTION, SOLUTION INTRAMUSCULAR; INTRAVENOUS ONCE
Status: COMPLETED | OUTPATIENT
Start: 2018-03-18 | End: 2018-03-18

## 2018-03-18 RX ADMIN — KETOROLAC TROMETHAMINE 15 MG: 30 INJECTION, SOLUTION INTRAMUSCULAR at 20:56

## 2018-03-18 ASSESSMENT — PAIN DESCRIPTION - DESCRIPTORS: DESCRIPTORS: STABBING

## 2018-03-18 NOTE — ED AVS SNAPSHOT
[facility] Emergency Department    [address] [address]    Phone:  [phone]    Fax:  [phone]                                       Will Dodson   MRN: [id number]    Department:  [facility] Emergency Department   Date of Visit:  3/18/2018           Patient Information     Date Of Birth          1985        Your diagnoses for this visit were:     Abdominal pain, left lower quadrant        You were seen by Nasim Barlow MD.        Discharge Instructions       Drinks a bottle of magnesium citrate tonight or tomorrow morning and cleanse her bowel.  If you continue to have pain after bowel cleansing, follow-up with your primary physician to discuss further workup including possible colonoscopy.  Return to the emergency department if you develop fevers, vomiting, or other new concerning symptoms.    24 Hour Appointment Hotline       To make an appointment at any [place] clinic, call [phone] ([phone]). If you don't have a family doctor or clinic, we will help you find one. [place] clinics are conveniently located to serve the needs of you and your family.             Review of your medicines      Our records show that you are taking the medicines listed below. If these are incorrect, please call your family doctor or clinic.        Dose / Directions Last dose taken    albuterol 108 (90 BASE) MCG/ACT Inhaler   Commonly known as:  PROAIR HFA   Dose:  2 puff   Quantity:  1 Inhaler        Inhale 2 puffs into the lungs every 4 hours as needed for shortness of breath / dyspnea   Refills:  0        * amphetamine-dextroamphetamine 20 MG per tablet   Commonly known as:  ADDERALL   Dose:  20 mg   Quantity:  60 tablet        Take 1 tablet (20 mg) by mouth 2 times daily   Refills:  0        * amphetamine-dextroamphetamine 20 MG per tablet   Commonly known as:  ADDERALL   Dose:  20 mg   Quantity:  60 tablet   Start taking on:  4/12/2018        Take 1 tablet (20 mg) by mouth  2 times daily   Refills:  0        * amphetamine-dextroamphetamine 20 MG per tablet   Commonly known as:  ADDERALL   Dose:  20 mg   Quantity:  60 tablet   Start taking on:  5/12/2018        Take 1 tablet (20 mg) by mouth 2 times daily   Refills:  0        cloNIDine 0.1 MG tablet   Commonly known as:  CATAPRES   Dose:  0.1 mg        Take 1 tablet (0.1 mg) by mouth At Bedtime   Refills:  0        FLUoxetine 40 MG capsule   Commonly known as:  PROzac   Dose:  40 mg   Quantity:  90 capsule        Take 1 capsule (40 mg) by mouth daily   Refills:  3        levonorgestrel 20 MCG/24HR IUD   Commonly known as:  MIRENA   Dose:  1 each        1 each (20 mcg) by Intrauterine route continuous   Refills:  0        SEROQUEL 50 MG tablet   Dose:  25-50 mg   Quantity:  30 tablet   Generic drug:  QUEtiapine        Take 0.5-1 tablets (25-50 mg) by mouth nightly as needed   Refills:  1        * Notice:  This list has 3 medication(s) that are the same as other medications prescribed for you. Read the directions carefully, and ask your doctor or other care provider to review them with you.            Procedures and tests performed during your visit     CBC with platelets, differential    Comprehensive metabolic panel    Lipase    UA reflex to Microscopic    Urine Culture    XR Abdomen 2 Views      Orders Needing Specimen Collection     None      Pending Results     Date and Time Order Name Status Description    3/18/2018 2108 Urine Culture In process             Pending Culture Results     Date and Time Order Name Status Description    3/18/2018 2108 Urine Culture In process             Pending Results Instructions     If you had any lab results that were not finalized at the time of your Discharge, you can call the ED Lab Result RN at [phone]. You will be contacted by this team for any positive Lab results or changes in treatment. The nurses are available 7 days a week from 10A to 6:30P.  You can leave a message 24 hours per day  and they will return your call.        Test Results From Your Hospital Stay        3/18/2018  8:36 PM      Component Results     Component Value Ref Range & Units Status    WBC 6.2 4.0 - 11.0 10e9/L Final    RBC Count 4.41 3.8 - 5.2 10e12/L Final    Hemoglobin 13.7 11.7 - 15.7 g/dL Final    Hematocrit 40.4 35.0 - 47.0 % Final    MCV 92 78 - 100 fl Final    MCH 31.1 26.5 - 33.0 pg Final    MCHC 33.9 31.5 - 36.5 g/dL Final    RDW 13.3 10.0 - 15.0 % Final    Platelet Count 221 150 - 450 10e9/L Final    Diff Method Automated Method  Final    % Neutrophils 51.9 % Final    % Lymphocytes 37.0 % Final    % Monocytes 9.1 % Final    % Eosinophils 1.6 % Final    % Basophils 0.2 % Final    % Immature Granulocytes 0.2 % Final    Absolute Neutrophil 3.2 1.6 - 8.3 10e9/L Final    Absolute Lymphocytes 2.3 0.8 - 5.3 10e9/L Final    Absolute Monocytes 0.6 0.0 - 1.3 10e9/L Final    Absolute Eosinophils 0.1 0.0 - 0.7 10e9/L Final    Absolute Basophils 0.0 0.0 - 0.2 10e9/L Final    Abs Immature Granulocytes 0.0 0 - 0.4 10e9/L Final         3/18/2018  8:52 PM      Component Results     Component Value Ref Range & Units Status    Sodium 141 133 - 144 mmol/L Final    Potassium 4.2 3.4 - 5.3 mmol/L Final    Chloride 110 (H) 94 - 109 mmol/L Final    Carbon Dioxide 25 20 - 32 mmol/L Final    Anion Gap 6 3 - 14 mmol/L Final    Glucose 96 70 - 99 mg/dL Final    Urea Nitrogen 11 7 - 30 mg/dL Final    Creatinine 0.52 0.52 - 1.04 mg/dL Final    GFR Estimate >90 >60 mL/min/1.7m2 Final    Non  GFR Calc    GFR Estimate If Black >90 >60 mL/min/1.7m2 Final    African American GFR Calc    Calcium 8.2 (L) 8.5 - 10.1 mg/dL Final    Bilirubin Total 0.2 0.2 - 1.3 mg/dL Final    Albumin 3.4 3.4 - 5.0 g/dL Final    Protein Total 6.9 6.8 - 8.8 g/dL Final    Alkaline Phosphatase 66 40 - 150 U/L Final    ALT 22 0 - 50 U/L Final    AST 18 0 - 45 U/L Final         3/18/2018  8:50 PM      Component Results     Component Value Ref Range & Units  Status    Color Urine Yellow  Final    Appearance Urine Slightly Cloudy  Final    Glucose Urine Negative NEG^Negative mg/dL Final    Bilirubin Urine Negative NEG^Negative Final    Ketones Urine Negative NEG^Negative mg/dL Final    Specific Gravity Urine 1.017 1.003 - 1.035 Final    Blood Urine Negative NEG^Negative Final    pH Urine 7.0 5.0 - 7.0 pH Final    Protein Albumin Urine Negative NEG^Negative mg/dL Final    Urobilinogen mg/dL 0.0 0.0 - 2.0 mg/dL Final    Nitrite Urine Negative NEG^Negative Final    Leukocyte Esterase Urine Negative NEG^Negative Final    Source Unspecified Urine  Final    RBC Urine 2 0 - 2 /HPF Final    WBC Urine 8 (H) 0 - 5 /HPF Final    Bacteria Urine Moderate (A) NEG^Negative /HPF Final    Squamous Epithelial /HPF Urine 14 (H) 0 - 1 /HPF Final    Mucous Urine Present (A) NEG^Negative /LPF Final         3/18/2018  9:17 PM      Component Results     Component Value Ref Range & Units Status    Lipase 273 73 - 393 U/L Final         3/18/2018  9:28 PM      Narrative     ABDOMEN TWO-THREE VIEW  3/18/2018 9:12 PM     HISTORY: Abdominal pain.    COMPARISON: February 7, 2018.    FINDINGS: Moderate  stool. No free air. There are no air filled  distended loops of small bowel. The colon is not distended. The lung  bases are unremarkable.        Impression     IMPRESSION: Nonobstructed bowel gas pattern.     VALARIE FRANCES MD         3/18/2018  9:12 PM                Thank you for choosing [facility]       Thank you for choosing [facility] for your care. Our goal is always to provide you with excellent care. Hearing back from our patients is one way we can continue to improve our services. Please take a few minutes to complete the written survey that you may receive in the mail after you visit with us. Thank you!        LIVELENZhart Information     Confabb lets you send messages to your doctor, view your test results, renew your prescriptions, schedule appointments and more. To sign up, go to  [email or web address] . Click on \"Log in\" on the left side of the screen, which will take you to the Welcome page. Then click on \"Sign up Now\" on the right side of the page.     You will be asked to enter the access code listed below, as well as some personal information. Please follow the directions to create your username and password.     Your access code is: [email or web address]  Expires: 3/21/2018 12:53 PM     Your access code will  in 90 days. If you need help or a new code, please call your [place] clinic or [phone].        Care EveryWhere ID     This is your Care EveryWhere ID. This could be used by other organizations to access your [place] medical records  [id number]        Equal Access to Services     JEFFERY SALINAS : Vidhi Andino, nathaniel bazzi, kevin hoffman, ashley quigley. So [facility] [phone].    ATENCIÓN: Si habla español, tiene a jarrett disposición servicios gratuitos de asistencia lingüística. Clementina al [phone].    We comply with applicable federal civil rights laws and [place] laws. We do not discriminate on the basis of race, color, national origin, age, disability, sex, sexual orientation, or gender identity.            After Visit Summary       This is your record. Keep this with you and show to your community pharmacist(s) and doctor(s) at your next visit.                  "

## 2018-03-18 NOTE — ED AVS SNAPSHOT
[facility] Emergency Department    [address] [address]    Phone:  [phone]    Fax:  [phone]                                       Will Dodson   MRN: [id number]    Department:  [facility] Emergency Department   Date of Visit:  3/18/2018           After Visit Summary Signature Page     I have received my discharge instructions, and my questions have been answered. I have discussed any challenges I see with this plan with the nurse or doctor.    ..........................................................................................................................................  Patient/Patient Representative Signature      ..........................................................................................................................................  Patient Representative Print Name and Relationship to Patient    ..................................................               ................................................  Date                                            Time    ..........................................................................................................................................  Reviewed by Signature/Title    ...................................................              ..............................................  Date                                                            Time

## 2018-03-19 NOTE — DISCHARGE INSTRUCTIONS
Drinks a bottle of magnesium citrate tonight or tomorrow morning and cleanse her bowel.  If you continue to have pain after bowel cleansing, follow-up with your primary physician to discuss further workup including possible colonoscopy.  Return to the emergency department if you develop fevers, vomiting, or other new concerning symptoms.

## 2018-03-19 NOTE — ED PROVIDER NOTES
History     Chief Complaint   Patient presents with     Abdominal Pain     started last night and increasing pain today with nausea     HPI  Will Dodson is a 32 year old female who has a history of anxiety, ADHD, depression, posttraumatic stress disorder, and chemical dependency who presents to the ED for evaluation of abdominal pain.  Her pain is located in the lower left quadrant.  She reports it started this morning and has been constant since.  She does feel nauseous and has diarrhea that began 2 weeks ago.  She is having around 5 stools per day.  Patient denies fever, vomiting, shortness of breath, urinary symptoms, or colonoscopy testing.  Patient denies being diagnosed with irritable bowel syndrome.  She has had a cholecystectomy and 2 C-sections.  Her current daily medications are Prozac and Seroquel.      Problem List:    Patient Active Problem List    Diagnosis Date Noted     Chemical dependency (H) 12/27/2017     Priority: Medium     Posttraumatic stress disorder 12/21/2017     Priority: Medium     Moderate episode of recurrent major depressive disorder (H) 05/18/2017     Priority: Medium     RhD negative 02/03/2017     Priority: Medium     With passive D antibody       ADHD (attention deficit hyperactivity disorder), combined type 01/19/2017     Priority: Medium     Patient is followed by MERVAT LUONG for ongoing prescription of stimulants.  All refills should be approved by this provider, or covering partner.    Medication(s): Adderall XR 30mg.   Maximum quantity per month: #30  Clinic visit frequency required: Q 3 months     Controlled substance agreement on file: Yes       Date(s): needs to be done  Neuropsych evaluation for ADD completed:  Yes, completed 2/22/17 with Jose Matta, on file and diagnosis confirmed    Last [facility] website verification:  done on 3/9/17   [email or web address]           Panic attack 10/25/2016     Priority: Medium     Generalized anxiety disorder 08/16/2016      Priority: Medium     H/O:  2016     Priority: Medium     C/sec x 2          Past Medical History:    Past Medical History:   Diagnosis Date     Chickenpox      Depression      Kidney stone        Past Surgical History:    Past Surgical History:   Procedure Laterality Date     C/SECTION, LOW TRANSVERSE  ,     , Low Transverse     COSMETIC SURGERY       CYSTOSCOPY,REMV CALCULUS,SIMPLE       LAPAROSCOPIC CHOLECYSTECTOMY N/A 2017    Procedure: LAPAROSCOPIC CHOLECYSTECTOMY;  Laparoscopic Cholecystectomy;  Surgeon: Carson Rojas MD;  Location: [facility] OR     ORTHOPEDIC SURGERY         Family History:    Family History   Problem Relation Age of Onset     Breast Cancer Mother      Depression Mother      Substance Abuse Mother      Hypertension Father      Substance Abuse Father      Colon Cancer Maternal Grandfather      Colon Cancer Paternal Grandfather      Other - See Comments Sister      epilepsy     Substance Abuse Sister      A&D       Social History:  Marital Status:  Single [1]  Social History   Substance Use Topics     Smoking status: Former Smoker     Packs/day: 0.25     Start date: 2015     Smokeless tobacco: Never Used     Alcohol use 0.0 oz/week     0 Standard drinks or equivalent per week      Comment: occas- quit with pregnancy        Medications:      FLUoxetine (PROZAC) 40 MG capsule   QUEtiapine (SEROQUEL) 50 MG tablet   amphetamine-dextroamphetamine (ADDERALL) 20 MG per tablet   [START ON 2018] amphetamine-dextroamphetamine (ADDERALL) 20 MG per tablet   [START ON 2018] amphetamine-dextroamphetamine (ADDERALL) 20 MG per tablet   cloNIDine (CATAPRES) 0.1 MG tablet   levonorgestrel (MIRENA) 20 MCG/24HR IUD   albuterol (ALBUTEROL) 108 (90 BASE) MCG/ACT inhaler         Review of Systems  All other systems are reviewed and are negative    Physical Exam   BP: 114/78  Heart Rate: 86  Temp: 98.1  F (36.7  C)  Resp: 18  Height: 152.4 cm (5')  Weight:  54.4 kg (120 lb)  SpO2: 98 %      Physical Exam  Nursing note and vitals were reviewed.  Constitutional: Awake and alert, adequately nourished and developed appearing 32-year-old in moderate discomfort, who does not appear acutely ill, and who answers questions appropriately and cooperates with examination.  HEENT: EOMI.   Neck: Freely mobile.  Cardiovascular: Cardiac examination reveals normal heart rate and regular rhythm without murmur.  Pulmonary/Chest: Breathing is unlabored.  Breath sounds are clear and equal bilaterally.  There no retractions, tachypnea, rales, wheezes, or rhonchi.  Abdomen: Soft, nontender, no HSM or masses rebound or guarding.  Musculoskeletal: Extremities are warm and well-perfused and without edema  Neurological: Alert, oriented, thought content logical, coherent   Skin: Warm, dry, no rashes.  Psychiatric: Affect broad and appropriate.        ED Course     ED Course     Procedures               Critical Care time:  none               Results for orders placed or performed during the hospital encounter of 03/18/18 (from the past 24 hour(s))   UA reflex to Microscopic   Result Value Ref Range    Color Urine Yellow     Appearance Urine Slightly Cloudy     Glucose Urine Negative NEG^Negative mg/dL    Bilirubin Urine Negative NEG^Negative    Ketones Urine Negative NEG^Negative mg/dL    Specific Gravity Urine 1.017 1.003 - 1.035    Blood Urine Negative NEG^Negative    pH Urine 7.0 5.0 - 7.0 pH    Protein Albumin Urine Negative NEG^Negative mg/dL    Urobilinogen mg/dL 0.0 0.0 - 2.0 mg/dL    Nitrite Urine Negative NEG^Negative    Leukocyte Esterase Urine Negative NEG^Negative    Source Unspecified Urine     RBC Urine 2 0 - 2 /HPF    WBC Urine 8 (H) 0 - 5 /HPF    Bacteria Urine Moderate (A) NEG^Negative /HPF    Squamous Epithelial /HPF Urine 14 (H) 0 - 1 /HPF    Mucous Urine Present (A) NEG^Negative /LPF   CBC with platelets, differential   Result Value Ref Range    WBC 6.2 4.0 - 11.0 10e9/L     RBC Count 4.41 3.8 - 5.2 10e12/L    Hemoglobin 13.7 11.7 - 15.7 g/dL    Hematocrit 40.4 35.0 - 47.0 %    MCV 92 78 - 100 fl    MCH 31.1 26.5 - 33.0 pg    MCHC 33.9 31.5 - 36.5 g/dL    RDW 13.3 10.0 - 15.0 %    Platelet Count 221 150 - 450 10e9/L    Diff Method Automated Method     % Neutrophils 51.9 %    % Lymphocytes 37.0 %    % Monocytes 9.1 %    % Eosinophils 1.6 %    % Basophils 0.2 %    % Immature Granulocytes 0.2 %    Absolute Neutrophil 3.2 1.6 - 8.3 10e9/L    Absolute Lymphocytes 2.3 0.8 - 5.3 10e9/L    Absolute Monocytes 0.6 0.0 - 1.3 10e9/L    Absolute Eosinophils 0.1 0.0 - 0.7 10e9/L    Absolute Basophils 0.0 0.0 - 0.2 10e9/L    Abs Immature Granulocytes 0.0 0 - 0.4 10e9/L   Comprehensive metabolic panel   Result Value Ref Range    Sodium 141 133 - 144 mmol/L    Potassium 4.2 3.4 - 5.3 mmol/L    Chloride 110 (H) 94 - 109 mmol/L    Carbon Dioxide 25 20 - 32 mmol/L    Anion Gap 6 3 - 14 mmol/L    Glucose 96 70 - 99 mg/dL    Urea Nitrogen 11 7 - 30 mg/dL    Creatinine 0.52 0.52 - 1.04 mg/dL    GFR Estimate >90 >60 mL/min/1.7m2    GFR Estimate If Black >90 >60 mL/min/1.7m2    Calcium 8.2 (L) 8.5 - 10.1 mg/dL    Bilirubin Total 0.2 0.2 - 1.3 mg/dL    Albumin 3.4 3.4 - 5.0 g/dL    Protein Total 6.9 6.8 - 8.8 g/dL    Alkaline Phosphatase 66 40 - 150 U/L    ALT 22 0 - 50 U/L    AST 18 0 - 45 U/L   Lipase   Result Value Ref Range    Lipase 273 73 - 393 U/L   XR Abdomen 2 Views    Narrative    ABDOMEN TWO-THREE VIEW  3/18/2018 9:12 PM     HISTORY: Abdominal pain.    COMPARISON: February 7, 2018.    FINDINGS: Moderate  stool. No free air. There are no air filled  distended loops of small bowel. The colon is not distended. The lung  bases are unremarkable.      Impression    IMPRESSION: Nonobstructed bowel gas pattern.     VALARIE FRANCES MD       Medications   magnesium citrate solution 296 mL (not administered)   ketorolac (TORADOL) injection 15 mg (15 mg Intravenous Given 3/18/18 2056)     8:38 PM Patient Assessed.   Course of care outlined.  Assessments & Plan (with Medical Decision Making)     32-year-old female presents with intermittent diarrhea and 1 day of abdominal pain that began this morning.  Physical examination is benign with no significant abdominal tenderness.  Laboratory evaluation is completely normal with a normal CBC, blood chemistries, urine with the exception of some bacteria in the urine and a few white cells.  I do not have suspicion that urinary tract infection is the cause of her symptoms.  We will culture the urine and treat if positive.  An x-ray of her abdomen shows considerable stool throughout the colon.  I suspect this is the cause of her pain.  She will try bowel cleansing with magnesium citrate.  If symptoms fail to resolve she should follow-up in primary care and consider colonoscopy given history of diarrhea.  She should return if she develops fever, vomiting, increased pain, or other new concerning symptoms.    I have reviewed the nursing notes.    I have reviewed the findings, diagnosis, plan and need for follow up with the patient.       Discharge Medication List as of 3/18/2018  9:37 PM          Final diagnoses:   Abdominal pain, left lower quadrant     This document serves as a record of the services and decisions personally performed and made by Nasim Barlow MD. It was created on HIS/HER behalf by Nelli Burch, a trained medical scribe. The creation of this document is based the provider's statements to the medical scribe.  Nelli Burch 8:38 PM 3/18/2018    Provider:   The information in this document, created by the medical scribe for me, accurately reflects the services I personally performed and the decisions made by me. I have reviewed and approved this document for accuracy prior to leaving the patient care area.  Nasim Barlow MD 8:38 PM 3/18/2018    3/18/2018   [facility] EMERGENCY DEPARTMENT     Nasim Barlow MD  03/18/18 8248

## 2018-03-19 NOTE — ED NOTES
PT ambulated to room 10, provided urine sample. Placed on gurney and on the monitor. PT states she is having LLQ ABD pain that began today. PT states she has also been nauseated. PT is noted to be A&OX4, appears to be in mild discomfort. All needs are being assessed and will be met and all comfort measures ar being addressed. Awaiting MD hall and orders at his time.

## 2018-03-20 ENCOUNTER — HOSPITAL ENCOUNTER (OUTPATIENT)
Dept: CT IMAGING | Facility: CLINIC | Age: 33
Discharge: HOME | End: 2018-03-20
Attending: NURSE PRACTITIONER | Admitting: NURSE PRACTITIONER
Payer: COMMERCIAL

## 2018-03-20 ENCOUNTER — OFFICE VISIT (OUTPATIENT)
Dept: FAMILY MEDICINE | Facility: CLINIC | Age: 33
End: 2018-03-20
Payer: COMMERCIAL

## 2018-03-20 VITALS
WEIGHT: 130 LBS | DIASTOLIC BLOOD PRESSURE: 81 MMHG | SYSTOLIC BLOOD PRESSURE: 118 MMHG | HEART RATE: 80 BPM | HEIGHT: 60 IN | BODY MASS INDEX: 25.52 KG/M2 | TEMPERATURE: 99.2 F

## 2018-03-20 DIAGNOSIS — R10.84 ABDOMINAL PAIN, GENERALIZED: ICD-10-CM

## 2018-03-20 DIAGNOSIS — Z84.1 FAMILY HISTORY OF KIDNEY STONES: Primary | ICD-10-CM

## 2018-03-20 LAB
ALBUMIN UR-MCNC: NEGATIVE MG/DL
APPEARANCE UR: CLEAR
BACTERIA SPEC CULT: NO GROWTH
BETA HCG QUAL IFA URINE: NEGATIVE
BILIRUB UR QL STRIP: NEGATIVE
COLOR UR AUTO: YELLOW
GLUCOSE UR STRIP-MCNC: NEGATIVE MG/DL
HGB UR QL STRIP: NEGATIVE
KETONES UR STRIP-MCNC: 15 MG/DL
LEUKOCYTE ESTERASE UR QL STRIP: NEGATIVE
Lab: NORMAL
NITRATE UR QL: NEGATIVE
PH UR STRIP: 5.5 PH (ref 5–7)
SOURCE: ABNORMAL
SP GR UR STRIP: >1.03 (ref 1–1.03)
SPECIMEN SOURCE: NORMAL
UROBILINOGEN UR STRIP-ACNC: 0.2 EU/DL (ref 0.2–1)

## 2018-03-20 PROCEDURE — 84703 CHORIONIC GONADOTROPIN ASSAY: CPT | Performed by: NURSE PRACTITIONER

## 2018-03-20 PROCEDURE — 74177 CT ABD & PELVIS W/CONTRAST: CPT

## 2018-03-20 PROCEDURE — 81003 URINALYSIS AUTO W/O SCOPE: CPT | Performed by: NURSE PRACTITIONER

## 2018-03-20 PROCEDURE — 25000125 ZZHC RX 250: Performed by: NURSE PRACTITIONER

## 2018-03-20 PROCEDURE — 99214 OFFICE O/P EST MOD 30 MIN: CPT | Performed by: NURSE PRACTITIONER

## 2018-03-20 PROCEDURE — 25000128 H RX IP 250 OP 636: Performed by: NURSE PRACTITIONER

## 2018-03-20 RX ORDER — IOPAMIDOL 755 MG/ML
64 INJECTION, SOLUTION INTRAVASCULAR ONCE
Status: COMPLETED | OUTPATIENT
Start: 2018-03-20 | End: 2018-03-20

## 2018-03-20 RX ADMIN — IOPAMIDOL 64 ML: 755 INJECTION, SOLUTION INTRAVENOUS at 16:13

## 2018-03-20 RX ADMIN — SODIUM CHLORIDE 56 ML: 9 INJECTION, SOLUTION INTRAVENOUS at 16:12

## 2018-03-20 NOTE — MR AVS SNAPSHOT
After Visit Summary   3/20/2018    Will Dodson    MRN: [id number]           Patient Information     Date Of Birth          1985        Visit Information        Provider Department      3/20/2018 2:40 PM Bri Kingston NP [facility]        Today's Diagnoses     Family history of kidney stones    -  1    Abdominal pain, generalized          Care Instructions    If CT is negative/normal would recommend treating for constipation with below recommendations.  Start by adding Miralax daily (see below)  Follow up with PCP, Dr. Wooten in the next 1-2 weeks if pain not improving or sooner if symptoms worsen.      For constipation:   1. Consume at least 8 glasses of water per day   2. Exercise for at least 30 minutes every day. Regular exercise helps to keep your digestive system active and and healthy and may help with constipation.   3. Increase dietary fiber. Goal of 25 grams per day for women, 35 grams per day for men. If unable to consume 25-35 grams through diet alone consider OTC supplements such as Benefiber, FiberCon, Metamucil, or Citrucel.   4. Recommend taking Miralax (17 grams = 1 scoop). Take once daily, can mix with anything. If you experience increasing bowel movements and diarrhea, decrease to every other day or every 3rd day. Miralax is an osmotic laxative that increases the amount of water secreted by the intestines resulting in softer and easier to pass stools.   5. Also recommend stimulant laxative such as Senna, Ex Lax or Dulcolax. Stimulant laxatives speed up the colonic motility of your gut helping to induce a bowel movement. Take as needed at bedtime.   6. If you are still having difficulties with constipation may also add a stool softener to your bowel regimen such a Docusate.     SONJA Sullivan          *Abdominal Pain, Unknown Cause (Female)    The exact cause of your abdominal (stomach) pain is not certain. This does not mean that this is something to  worry about, or the right tests were not done. Everyone likes to know the exact cause of the problem, but sometimes with abdominal pain, there is no clear-cut cause, and this could be a good thing. The good news is that your symptoms can be treated, and you will feel better.   Your condition does not seem serious now; however, sometimes the signs of a serious problem may take more time to appear. For this reason, it is important for you to watch for any new symptoms, problems, or worsening of your condition.  Over the next few days, the abdominal pain may come and go, or be continuous. Other common symptoms can include nausea and vomiting. Sometimes it can be difficult to tell if you feel nauseous, you may just feel bad and not associate that feeling with nausea. Constipation, diarrhea, and a fever may go along with the pain.  The pain may continue even if treated correctly over the following days. Depending on how things go, sometimes the cause can become clear and may require further or different treatment. Additional evaluations, medications, or tests may be needed.  Home care  Your health care provider may prescribe medications for pain, symptoms, or an infection.  Follow the health care provider's instructions for taking these medications.  General care    Rest until your next exam. No strenuous activities.    Try to find positions that ease discomfort. A small pillow placed on the abdomen may help relieve pain.    Something warm on your abdomen (such as a heating pad) may help, but be careful not to burn yourself.  Diet    Do not force yourself to eat, especially if having cramps, vomiting, or diarrhea.    Water is important so you do not get dehydrated. Soup may also be good. Sports drinks may also help, especially if they are not too acidic. Make sure you don't drink sugary drinks as this can make things worse. Take liquids in small amounts. Do not guzzle them.    Caffeine sometimes makes the pain and  cramping worse.    Avoid dairy products if you have vomiting or diarrhea.    Don't eat large amounts at a time. Wait a few minutes between bites.    Eat a diet low in fiber (called a low-residue diet). Foods allowed include refined breads, white rice, fruit and vegetable juices without pulp, tender meats. These foods will pass more easily through the intestine.    Avoid fried or fatty foods, dairy, alcohol and spicy foods until your symptoms go away.  Follow-up care  Follow up with your health care provider as instructed, or if your pain does not begin to improve in the next 24 hours.  When to seek medical care  Seek prompt medical care if any of the following occur:    Pain gets worse or moves to the right lower abdomen    New or worsening vomiting or diarrhea    Swelling of the abdomen    Unable to pass stool for more than three days    New fever over 101  F (38.3 C), or rising fever    Blood in vomit or bowel movements (dark red or black color)    Jaundice (yellow color of eyes and skin)    Weakness, dizziness    Chest, arm, back, neck or jaw pain    Unexpected vaginal bleeding or missed period  Call 911  Call emergency services if any of the following occur:    Trouble breathing    Confusion    Fainting or loss of consciousness    Rapid heart rate    Seizure    [id number] [facility], [address] [place] [address]. All rights reserved. This information is not intended as a substitute for professional medical care. Always follow your healthcare professional's instructions.                Follow-ups after your visit        Future tests that were ordered for you today     Open Future Orders        Priority Expected Expires Ordered    CT Abdomen Pelvis w Contrast STAT  3/20/2019 3/20/2018            Who to contact     If you have questions or need follow up information about today's clinic visit or your schedule please contact [facility] directly at [phone].  Normal or non-critical  "lab and imaging results will be communicated to you by MyChart, letter or phone within 4 business days after the clinic has received the results. If you do not hear from us within 7 days, please contact the clinic through [employer] or phone. If you have a critical or abnormal lab result, we will notify you by phone as soon as possible.  Submit refill requests through [employer] or call your pharmacy and they will forward the refill request to us. Please allow 3 business days for your refill to be completed.          Additional Information About Your Visit        [employer] Information     [employer] lets you send messages to your doctor, view your test results, renew your prescriptions, schedule appointments and more. To sign up, go to [email or web address] . Click on \"Log in\" on the left side of the screen, which will take you to the Welcome page. Then click on \"Sign up Now\" on the right side of the page.     You will be asked to enter the access code listed below, as well as some personal information. Please follow the directions to create your username and password.     Your access code is: [email or web address]  Expires: 3/21/2018 12:53 PM     Your access code will  in 90 days. If you need help or a new code, please call your [facility] clinic or [phone].        Care EveryWhere ID     This is your Care EveryWhere ID. This could be used by other organizations to access your [facility] medical records  [id number]        Your Vitals Were     Pulse Temperature Height Breastfeeding? BMI (Body Mass Index)       80 99.2  F (37.3  C) (Tympanic) 5' (1.524 m) No 25.39 kg/m2        Blood Pressure from Last 3 Encounters:   18 118/81   18 107/79   18 116/76    Weight from Last 3 Encounters:   18 130 lb (59 kg)   18 120 lb (54.4 kg)   18 128 lb 3.2 oz (58.2 kg)              We Performed the Following     *UA reflex to Microscopic and Culture ([place] and [facility] (except [place] and " [place])     Beta HCG Qual, Urine - [facility] and [place] ([id number])        Primary Care Provider Office Phone # Fax #    Norbert Wooten [phone] [address] [facility] [address]        Equal Access to Services     JEFFERY SALINAS : Hadii aad ku hadasho Soomaali, waaxda luqadaha, qaybta kaalmada adeegyada, waxay idiin hayaan adenancy khmamadousirisha laandres quigley. So [facility] [phone].    ATENCIÓN: Si habla español, tiene a jarrett disposición servicios gratuitos de asistencia lingüística. Clementina al [phone].    We comply with applicable federal civil rights laws and [place] laws. We do not discriminate on the basis of race, color, national origin, age, disability, sex, sexual orientation, or gender identity.            Thank you!     Thank you for choosing [facility]  for your care. Our goal is always to provide you with excellent care. Hearing back from our patients is one way we can continue to improve our services. Please take a few minutes to complete the written survey that you may receive in the mail after your visit with us. Thank you!             Your Updated Medication List - Protect others around you: Learn how to safely use, store and throw away your medicines at www.disposemymeds.org.          This list is accurate as of 3/20/18  2:40 PM.  Always use your most recent med list.                   Brand Name Dispense Instructions for use Diagnosis    albuterol 108 (90 BASE) MCG/ACT Inhaler    PROAIR HFA    1 Inhaler    Inhale 2 puffs into the lungs every 4 hours as needed for shortness of breath / dyspnea        * amphetamine-dextroamphetamine 20 MG per tablet    ADDERALL    60 tablet    Take 1 tablet (20 mg) by mouth 2 times daily    ADHD (attention deficit hyperactivity disorder), combined type       * amphetamine-dextroamphetamine 20 MG per tablet   Start taking on:  4/12/2018    ADDERALL    60 tablet    Take 1 tablet (20 mg) by mouth 2 times daily    ADHD (attention deficit  hyperactivity disorder), combined type       * amphetamine-dextroamphetamine 20 MG per tablet   Start taking on:  5/12/2018    ADDERALL    60 tablet    Take 1 tablet (20 mg) by mouth 2 times daily    ADHD (attention deficit hyperactivity disorder), combined type       cloNIDine 0.1 MG tablet    CATAPRES     Take 1 tablet (0.1 mg) by mouth At Bedtime    Posttraumatic stress disorder       FLUoxetine 40 MG capsule    PROzac    90 capsule    Take 1 capsule (40 mg) by mouth daily    Moderate episode of recurrent major depressive disorder (H), Generalized anxiety disorder       levonorgestrel 20 MCG/24HR IUD    MIRENA     1 each (20 mcg) by Intrauterine route continuous    Encounter for IUD insertion       SEROQUEL 50 MG tablet   Generic drug:  QUEtiapine     30 tablet    Take 0.5-1 tablets (25-50 mg) by mouth nightly as needed    Moderate episode of recurrent major depressive disorder (H), Generalized anxiety disorder       * Notice:  This list has 3 medication(s) that are the same as other medications prescribed for you. Read the directions carefully, and ask your doctor or other care provider to review them with you.

## 2018-03-20 NOTE — PATIENT INSTRUCTIONS
If CT is negative/normal would recommend treating for constipation with below recommendations.  Start by adding Miralax daily (see below)  Follow up with PCP, Dr. Wooten in the next 1-2 weeks if pain not improving or sooner if symptoms worsen.      For constipation:   1. Consume at least 8 glasses of water per day   2. Exercise for at least 30 minutes every day. Regular exercise helps to keep your digestive system active and and healthy and may help with constipation.   3. Increase dietary fiber. Goal of 25 grams per day for women, 35 grams per day for men. If unable to consume 25-35 grams through diet alone consider OTC supplements such as Benefiber, FiberCon, Metamucil, or Citrucel.   4. Recommend taking Miralax (17 grams = 1 scoop). Take once daily, can mix with anything. If you experience increasing bowel movements and diarrhea, decrease to every other day or every 3rd day. Miralax is an osmotic laxative that increases the amount of water secreted by the intestines resulting in softer and easier to pass stools.   5. Also recommend stimulant laxative such as Senna, Ex Lax or Dulcolax. Stimulant laxatives speed up the colonic motility of your gut helping to induce a bowel movement. Take as needed at bedtime.   6. If you are still having difficulties with constipation may also add a stool softener to your bowel regimen such a Docusate.     Bri Kingston, SONJA          *Abdominal Pain, Unknown Cause (Female)    The exact cause of your abdominal (stomach) pain is not certain. This does not mean that this is something to worry about, or the right tests were not done. Everyone likes to know the exact cause of the problem, but sometimes with abdominal pain, there is no clear-cut cause, and this could be a good thing. The good news is that your symptoms can be treated, and you will feel better.   Your condition does not seem serious now; however, sometimes the signs of a serious problem may take more time to appear. For  this reason, it is important for you to watch for any new symptoms, problems, or worsening of your condition.  Over the next few days, the abdominal pain may come and go, or be continuous. Other common symptoms can include nausea and vomiting. Sometimes it can be difficult to tell if you feel nauseous, you may just feel bad and not associate that feeling with nausea. Constipation, diarrhea, and a fever may go along with the pain.  The pain may continue even if treated correctly over the following days. Depending on how things go, sometimes the cause can become clear and may require further or different treatment. Additional evaluations, medications, or tests may be needed.  Home care  Your health care provider may prescribe medications for pain, symptoms, or an infection.  Follow the health care provider's instructions for taking these medications.  General care    Rest until your next exam. No strenuous activities.    Try to find positions that ease discomfort. A small pillow placed on the abdomen may help relieve pain.    Something warm on your abdomen (such as a heating pad) may help, but be careful not to burn yourself.  Diet    Do not force yourself to eat, especially if having cramps, vomiting, or diarrhea.    Water is important so you do not get dehydrated. Soup may also be good. Sports drinks may also help, especially if they are not too acidic. Make sure you don't drink sugary drinks as this can make things worse. Take liquids in small amounts. Do not guzzle them.    Caffeine sometimes makes the pain and cramping worse.    Avoid dairy products if you have vomiting or diarrhea.    Don't eat large amounts at a time. Wait a few minutes between bites.    Eat a diet low in fiber (called a low-residue diet). Foods allowed include refined breads, white rice, fruit and vegetable juices without pulp, tender meats. These foods will pass more easily through the intestine.    Avoid fried or fatty foods, dairy, alcohol  and spicy foods until your symptoms go away.  Follow-up care  Follow up with your health care provider as instructed, or if your pain does not begin to improve in the next 24 hours.  When to seek medical care  Seek prompt medical care if any of the following occur:    Pain gets worse or moves to the right lower abdomen    New or worsening vomiting or diarrhea    Swelling of the abdomen    Unable to pass stool for more than three days    New fever over 101  F (38.3 C), or rising fever    Blood in vomit or bowel movements (dark red or black color)    Jaundice (yellow color of eyes and skin)    Weakness, dizziness    Chest, arm, back, neck or jaw pain    Unexpected vaginal bleeding or missed period  Call 911  Call emergency services if any of the following occur:    Trouble breathing    Confusion    Fainting or loss of consciousness    Rapid heart rate    Seizure    [id number] [facility], [address] [place] [address], [place] [address]. All rights reserved. This information is not intended as a substitute for professional medical care. Always follow your healthcare professional's instructions.

## 2018-03-20 NOTE — NURSING NOTE
Initial /81 (BP Location: Right arm, Patient Position: Chair, Cuff Size: Adult Regular)  Pulse 80  Temp 99.2  F (37.3  C) (Tympanic)  Ht 5' (1.524 m)  Wt 130 lb (59 kg)  Breastfeeding? No  BMI 25.39 kg/m2 Estimated body mass index is 25.39 kg/(m^2) as calculated from the following:    Height as of this encounter: 5' (1.524 m).    Weight as of this encounter: 130 lb (59 kg). .    Nohemi Blood CMA ([facility])

## 2018-03-20 NOTE — PROGRESS NOTES
SUBJECTIVE:   Will Dodson is a 32 year old female who presents to clinic today for the following health issues:    ED/UC Followup:    Facility:  [place]   Date of visit: 3/18/18  Reason for visit: Abdominal Pain   Current Status: patient states her abdominal pain has not gone away. She is feeling nausea. Pain is mostly in lower abdomen. She has been taking magnesium citrate, ibuprofen and tylenol with no relief.      Drank Mag citrate at home the next day after ED.  Has had bowel movement since .  Looser stools - since mag citrate.  Xray in ED showed moderate stool in colon    Getting sharp pains in the stomach left and right lower quadrant since ED visit.      No urinary symptoms.  No fevers.    Reports nausea.  No vomiting.      History of gallbladder removal this past summer.  History of kidney stones - she reports pain might be similar but also feels different.    Problem list and histories reviewed & adjusted, as indicated.  Additional history: as documented    Patient Active Problem List   Diagnosis     H/O:      Generalized anxiety disorder     Panic attack     ADHD (attention deficit hyperactivity disorder), combined type     RhD negative     Moderate episode of recurrent major depressive disorder (H)     Posttraumatic stress disorder     Chemical dependency (H)     Past Surgical History:   Procedure Laterality Date     C/SECTION, LOW TRANSVERSE  ,     , Low Transverse     COSMETIC SURGERY       CYSTOSCOPY,REMV CALCULUS,SIMPLE       LAPAROSCOPIC CHOLECYSTECTOMY N/A 2017    Procedure: LAPAROSCOPIC CHOLECYSTECTOMY;  Laparoscopic Cholecystectomy;  Surgeon: Carson Rojas MD;  Location: [facility] OR     ORTHOPEDIC SURGERY         Social History   Substance Use Topics     Smoking status: Former Smoker     Packs/day: 0.25     Start date: 2015     Smokeless tobacco: Never Used     Alcohol use 0.0 oz/week     0 Standard drinks or equivalent per week      Comment:  occas- quit with pregnancy     Family History   Problem Relation Age of Onset     Breast Cancer Mother      Depression Mother      Substance Abuse Mother      Hypertension Father      Substance Abuse Father      Colon Cancer Maternal Grandfather      Colon Cancer Paternal Grandfather      Other - See Comments Sister      epilepsy     Substance Abuse Sister      A&D         Current Outpatient Prescriptions   Medication Sig Dispense Refill     FLUoxetine (PROZAC) 40 MG capsule Take 1 capsule (40 mg) by mouth daily 90 capsule 3     QUEtiapine (SEROQUEL) 50 MG tablet Take 0.5-1 tablets (25-50 mg) by mouth nightly as needed 30 tablet 1     amphetamine-dextroamphetamine (ADDERALL) 20 MG per tablet Take 1 tablet (20 mg) by mouth 2 times daily 60 tablet 0     [START ON 4/12/2018] amphetamine-dextroamphetamine (ADDERALL) 20 MG per tablet Take 1 tablet (20 mg) by mouth 2 times daily 60 tablet 0     [START ON 5/12/2018] amphetamine-dextroamphetamine (ADDERALL) 20 MG per tablet Take 1 tablet (20 mg) by mouth 2 times daily 60 tablet 0     cloNIDine (CATAPRES) 0.1 MG tablet Take 1 tablet (0.1 mg) by mouth At Bedtime       levonorgestrel (MIRENA) 20 MCG/24HR IUD 1 each (20 mcg) by Intrauterine route continuous       albuterol (ALBUTEROL) 108 (90 BASE) MCG/ACT inhaler Inhale 2 puffs into the lungs every 4 hours as needed for shortness of breath / dyspnea 1 Inhaler 0     Allergies   Allergen Reactions     Blood-Group Specific Substance      Patient has Probable passiive anti D Antibody. Blood Product orders may be delayed.  Draw one red top and two purple top tubes for ALL Type and Screen/ Type and Crossmatch orders.       Vicodin [Hydrocodone-Acetaminophen] Nausea and Vomiting     Recent Labs   Lab Test  03/18/18 2022 02/07/18   1014  08/11/17   2005   07/28/15   1748   ALT  22  18  48   < >   --    CR  0.52  0.57  0.71   < >   --    GFRESTIMATED  >90  >90  >90  Non African American GFR Calc     < >   --    GFRESTBLACK  >90  >90   >90   GFR Calc     < >   --    POTASSIUM  4.2  3.3*  4.0   < >   --    TSH   --    --    --    --   0.72    < > = values in this interval not displayed.      BP Readings from Last 3 Encounters:   03/20/18 118/81   03/18/18 107/79   03/01/18 116/76    Wt Readings from Last 3 Encounters:   03/20/18 130 lb (59 kg)   03/18/18 120 lb (54.4 kg)   03/01/18 128 lb 3.2 oz (58.2 kg)                  Labs reviewed in EPIC    Reviewed and updated as needed this visit by clinical staff       Reviewed and updated as needed this visit by Provider         ROS:  Constitutional, HEENT, cardiovascular, pulmonary, GI, , musculoskeletal, neuro, skin, endocrine and psych systems are negative, except as otherwise noted.    OBJECTIVE:     /81 (BP Location: Right arm, Patient Position: Chair, Cuff Size: Adult Regular)  Pulse 80  Temp 99.2  F (37.3  C) (Tympanic)  Ht 5' (1.524 m)  Wt 130 lb (59 kg)  Breastfeeding? No  BMI 25.39 kg/m2  Body mass index is 25.39 kg/(m^2).  GENERAL: alert and no distress  NECK: no adenopathy, no asymmetry, masses, or scars and thyroid normal to palpation  RESP: lungs clear to auscultation - no rales, rhonchi or wheezes  CV: regular rate and rhythm, normal S1 S2, no S3 or S4, no murmur, click or rub, no peripheral edema and peripheral pulses strong  ABDOMEN: tenderness right mid and lower quadrant as well as left lower quadrant on exam, no rebound pain or guarding noted, no organomegaly or masses, liver span normal to percussion and bowel sounds normal; no CVA tenderness.  MS: no gross musculoskeletal defects noted, no edema    Diagnostic Test Results:  Results for orders placed or performed in visit on 03/20/18   *UA reflex to Microscopic and Culture ([place] [facility] (except [place] and [place])   Result Value Ref Range    Color Urine Yellow     Appearance Urine Clear     Glucose Urine Negative NEG^Negative mg/dL    Bilirubin Urine Negative NEG^Negative    Ketones  Urine 15 (A) NEG^Negative mg/dL    Specific Gravity Urine >1.030 1.003 - 1.035    Blood Urine Negative NEG^Negative    pH Urine 5.5 5.0 - 7.0 pH    Protein Albumin Urine Negative NEG^Negative mg/dL    Urobilinogen Urine 0.2 0.2 - 1.0 EU/dL    Nitrite Urine Negative NEG^Negative    Leukocyte Esterase Urine Negative NEG^Negative    Source Midstream Urine    Beta HCG Qual, Urine - [facility] and [place] ([id number])   Result Value Ref Range    Beta HCG Qual IFA Urine Negative NEG^Negative        Lab Results   Component Value Date    WBC 6.2 03/18/2018     Lab Results   Component Value Date    RBC 4.41 03/18/2018     Lab Results   Component Value Date    HGB 13.7 03/18/2018     Lab Results   Component Value Date    HCT 40.4 03/18/2018     No components found for: MCT  Lab Results   Component Value Date    MCV 92 03/18/2018     Lab Results   Component Value Date    MCH 31.1 03/18/2018     Lab Results   Component Value Date    MCHC 33.9 03/18/2018     Lab Results   Component Value Date    RDW 13.3 03/18/2018     Lab Results   Component Value Date     03/18/2018     Recent Results (from the past 744 hour(s))   XR Abdomen 2 Views    Narrative    ABDOMEN TWO-THREE VIEW  3/18/2018 9:12 PM     HISTORY: Abdominal pain.    COMPARISON: February 7, 2018.    FINDINGS: Moderate  stool. No free air. There are no air filled  distended loops of small bowel. The colon is not distended. The lung  bases are unremarkable.      Impression    IMPRESSION: Nonobstructed bowel gas pattern.     VALARIE FRANCES MD       ASSESSMENT/PLAN:     1. Abdominal pain, generalized   Uncertain etiology - differential could include kidney stone (although less likely given presentation and negative UA for hematuria), colitis, diverticulitis, persistent constipation.  Less likely acute abdomen given patient afebrile and does not appear acutely toxic - also labs done in ED just 72 hours prior were negative.    Negative for pregnancy today.  Urine normal.    Will  await CT results.  See AVS.    - *UA reflex to Microscopic and Culture ([place] [facility] (except [place] and [place])  - Beta HCG Qual, Urine - [facility] and [place] ([id number])  - CT Abdomen Pelvis w Contrast; Future    2. Family history of kidney stones     - *UA reflex to Microscopic and Culture ([facility] (except [place] and [place])  - Beta HCG Qual, Urine - [facility] and [place] ([id number])      Patient Instructions   If CT is negative/normal would recommend treating for constipation with below recommendations.  Start by adding Miralax daily (see below)  Follow up with PCP, Dr. Wooten in the next 1-2 weeks if pain not improving or sooner if symptoms worsen.      For constipation:   1. Consume at least 8 glasses of water per day   2. Exercise for at least 30 minutes every day. Regular exercise helps to keep your digestive system active and and healthy and may help with constipation.   3. Increase dietary fiber. Goal of 25 grams per day for women, 35 grams per day for men. If unable to consume 25-35 grams through diet alone consider OTC supplements such as Benefiber, FiberCon, Metamucil, or Citrucel.   4. Recommend taking Miralax (17 grams = 1 scoop). Take once daily, can mix with anything. If you experience increasing bowel movements and diarrhea, decrease to every other day or every 3rd day. Miralax is an osmotic laxative that increases the amount of water secreted by the intestines resulting in softer and easier to pass stools.   5. Also recommend stimulant laxative such as Senna, Ex Lax or Dulcolax. Stimulant laxatives speed up the colonic motility of your gut helping to induce a bowel movement. Take as needed at bedtime.   6. If you are still having difficulties with constipation may also add a stool softener to your bowel regimen such a Docusate.     Bri Kingston, BRANDYNP          *Abdominal Pain, Unknown Cause (Female)    The exact cause of your abdominal (stomach) pain is not  certain. This does not mean that this is something to worry about, or the right tests were not done. Everyone likes to know the exact cause of the problem, but sometimes with abdominal pain, there is no clear-cut cause, and this could be a good thing. The good news is that your symptoms can be treated, and you will feel better.   Your condition does not seem serious now; however, sometimes the signs of a serious problem may take more time to appear. For this reason, it is important for you to watch for any new symptoms, problems, or worsening of your condition.  Over the next few days, the abdominal pain may come and go, or be continuous. Other common symptoms can include nausea and vomiting. Sometimes it can be difficult to tell if you feel nauseous, you may just feel bad and not associate that feeling with nausea. Constipation, diarrhea, and a fever may go along with the pain.  The pain may continue even if treated correctly over the following days. Depending on how things go, sometimes the cause can become clear and may require further or different treatment. Additional evaluations, medications, or tests may be needed.  Home care  Your health care provider may prescribe medications for pain, symptoms, or an infection.  Follow the health care provider's instructions for taking these medications.  General care    Rest until your next exam. No strenuous activities.    Try to find positions that ease discomfort. A small pillow placed on the abdomen may help relieve pain.    Something warm on your abdomen (such as a heating pad) may help, but be careful not to burn yourself.  Diet    Do not force yourself to eat, especially if having cramps, vomiting, or diarrhea.    Water is important so you do not get dehydrated. Soup may also be good. Sports drinks may also help, especially if they are not too acidic. Make sure you don't drink sugary drinks as this can make things worse. Take liquids in small amounts. Do not guzzle  them.    Caffeine sometimes makes the pain and cramping worse.    Avoid dairy products if you have vomiting or diarrhea.    Don't eat large amounts at a time. Wait a few minutes between bites.    Eat a diet low in fiber (called a low-residue diet). Foods allowed include refined breads, white rice, fruit and vegetable juices without pulp, tender meats. These foods will pass more easily through the intestine.    Avoid fried or fatty foods, dairy, alcohol and spicy foods until your symptoms go away.  Follow-up care  Follow up with your health care provider as instructed, or if your pain does not begin to improve in the next 24 hours.  When to seek medical care  Seek prompt medical care if any of the following occur:    Pain gets worse or moves to the right lower abdomen    New or worsening vomiting or diarrhea    Swelling of the abdomen    Unable to pass stool for more than three days    New fever over 101  F (38.3 C), or rising fever    Blood in vomit or bowel movements (dark red or black color)    Jaundice (yellow color of eyes and skin)    Weakness, dizziness    Chest, arm, back, neck or jaw pain    Unexpected vaginal bleeding or missed period  Call 911  Call emergency services if any of the following occur:    Trouble breathing    Confusion    Fainting or loss of consciousness    Rapid heart rate    Seizure    [id number] [facility], [address] [place] [address], [place] [address]. All rights reserved. This information is not intended as a substitute for professional medical care. Always follow your healthcare professional's instructions.            Bri Kingston NP  [facility]

## 2018-04-01 ENCOUNTER — HEALTH MAINTENANCE LETTER (OUTPATIENT)
Age: 33
End: 2018-04-01

## 2018-04-25 ENCOUNTER — OFFICE VISIT (OUTPATIENT)
Dept: FAMILY MEDICINE | Facility: CLINIC | Age: 33
End: 2018-04-25
Payer: COMMERCIAL

## 2018-04-25 VITALS
SYSTOLIC BLOOD PRESSURE: 108 MMHG | TEMPERATURE: 98.2 F | DIASTOLIC BLOOD PRESSURE: 74 MMHG | WEIGHT: 136.4 LBS | BODY MASS INDEX: 26.78 KG/M2 | HEART RATE: 72 BPM | HEIGHT: 60 IN

## 2018-04-25 DIAGNOSIS — N63.0 LUMP OR MASS IN BREAST: Primary | ICD-10-CM

## 2018-04-25 PROCEDURE — 99214 OFFICE O/P EST MOD 30 MIN: CPT | Performed by: NURSE PRACTITIONER

## 2018-04-25 NOTE — PROGRESS NOTES
SUBJECTIVE:   Will Dodson is a 32 year old female who presents to clinic today for the following health issues:    Lump in right breast that she found last night       Duration: one day     Description (location/character/radiation): Was doing a breast exam on herself and found a lump the size of a pea.  Breast hurts today    Intensity:  mild    Accompanying signs and symptoms: none     History (similar episodes/previous evaluation): None    Precipitating or alleviating factors: None    Therapies tried and outcome: None     Problem list and histories reviewed & adjusted, as indicated.  Additional history: as documented    Labs reviewed in EPIC    Reviewed and updated as needed this visit by clinical staff  Tobacco  Allergies  Med Hx  Surg Hx  Fam Hx  Soc Hx      Reviewed and updated as needed this visit by Provider         ROS:  Constitutional, HEENT, cardiovascular, pulmonary, gi and gu systems are negative, except as otherwise noted.    OBJECTIVE:     /74  Pulse 72  Temp 98.2  F (36.8  C) (Tympanic)  Ht 5' (1.524 m)  Wt 136 lb 6.4 oz (61.9 kg)  BMI 26.64 kg/m2  Body mass index is 26.64 kg/(m^2).  GENERAL: healthy, alert and no distress  BREAST:bilateral implants , mass right breast about half an inch and tenderness right breast medial side. No erythema and nipple discharge.   PSYCH: mentation appears normal, affect normal/bright    Diagnostic Test Results:  Breast US and diagnostic mammogram     ASSESSMENT/PLAN:     1. Lump or mass in breast  -recommended imaging for evaluation   - US Breast Right Complete 4 Quadrants; Future  - MA Screen with Implants Right w/[place]y; Future    See Patient Instructions    YURI Baker [facility]

## 2018-04-25 NOTE — MR AVS SNAPSHOT
"              After Visit Summary   4/25/2018    Will Dodson    MRN: [id number]           Patient Information     Date Of Birth          1985        Visit Information        Provider Department      4/25/2018 7:00 AM Isela Steele APRN CNP [facility]        Today's Diagnoses     Lump or mass in breast    -  1      Care Instructions    Diagnostic mammogram and US  Please schedule               Follow-ups after your visit        Future tests that were ordered for you today     Open Future Orders        Priority Expected Expires Ordered    MA Screen with Implants Right w/Daevy Routine  4/25/2019 4/25/2018    US Breast Right Complete 4 Quadrants Routine  4/25/2019 4/25/2018            Who to contact     If you have questions or need follow up information about today's clinic visit or your schedule please contact [facility] directly at [phone].  Normal or non-critical lab and imaging results will be communicated to you by InEdgehart, letter or phone within 4 business days after the clinic has received the results. If you do not hear from us within 7 days, please contact the clinic through InEdgehart or phone. If you have a critical or abnormal lab result, we will notify you by phone as soon as possible.  Submit refill requests through "[employer] or call your pharmacy and they will forward the refill request to us. Please allow 3 business days for your refill to be completed.          Additional Information About Your Visit        MyChart Information     "[employer] lets you send messages to your doctor, view your test results, renew your prescriptions, schedule appointments and more. To sign up, go to [email or web address] . Click on \"Log in\" on the left side of the screen, which will take you to the Welcome page. Then click on \"Sign up Now\" on the right side of the page.     You will be asked to enter the access code listed below, as well as some personal information. Please follow " the directions to create your username and password.     Your access code is: [email or web address]  Expires: 2018  7:19 AM     Your access code will  in 90 days. If you need help or a new code, please call your [place] clinic or [phone].        Care EveryWhere ID     This is your Care EveryWhere ID. This could be used by other organizations to access your [place] medical records  [id number]        Your Vitals Were     Pulse Temperature Height BMI (Body Mass Index)          72 98.2  F (36.8  C) (Tympanic) 5' (1.524 m) 26.64 kg/m2         Blood Pressure from Last 3 Encounters:   18 108/74   18 118/81   18 107/79    Weight from Last 3 Encounters:   18 136 lb 6.4 oz (61.9 kg)   18 130 lb (59 kg)   18 120 lb (54.4 kg)               Primary Care Provider Office Phone # Fax #    Norbert Wooten [phone] [address] [facility] [address]        Equal Access to Services     JEFFERY SALINAS AH: Hadii jessica whatley hadasho Sodharmeshali, waaxda luqadaha, qaybta kaalmada adeegyada, ashley quigley. So [facility] [phone].    ATENCIÓN: Si habla español, tiene a jarrett disposición servicios gratuitos de asistencia lingüística. [facility] [phone].    We comply with applicable federal civil rights laws and [place] laws. We do not discriminate on the basis of race, color, national origin, age, disability, sex, sexual orientation, or gender identity.            Thank you!     Thank you for choosing [facility]  for your care. Our goal is always to provide you with excellent care. Hearing back from our patients is one way we can continue to improve our services. Please take a few minutes to complete the written survey that you may receive in the mail after your visit with us. Thank you!             Your Updated Medication List - Protect others around you: Learn how to safely use, store and throw away your medicines at  www.disposemymeds.org.          This list is accurate as of 4/25/18  7:19 AM.  Always use your most recent med list.                   Brand Name Dispense Instructions for use Diagnosis    albuterol 108 (90 Base) MCG/ACT Inhaler    PROAIR HFA    1 Inhaler    Inhale 2 puffs into the lungs every 4 hours as needed for shortness of breath / dyspnea        * amphetamine-dextroamphetamine 20 MG per tablet    ADDERALL    60 tablet    Take 1 tablet (20 mg) by mouth 2 times daily    ADHD (attention deficit hyperactivity disorder), combined type       * amphetamine-dextroamphetamine 20 MG per tablet   Start taking on:  5/12/2018    ADDERALL    60 tablet    Take 1 tablet (20 mg) by mouth 2 times daily    ADHD (attention deficit hyperactivity disorder), combined type       cloNIDine 0.1 MG tablet    CATAPRES     Take 1 tablet (0.1 mg) by mouth At Bedtime    Posttraumatic stress disorder       FLUoxetine 40 MG capsule    PROzac    90 capsule    Take 1 capsule (40 mg) by mouth daily    Moderate episode of recurrent major depressive disorder (H), Generalized anxiety disorder       levonorgestrel 20 MCG/24HR IUD    MIRENA     1 each (20 mcg) by Intrauterine route continuous    Encounter for IUD insertion       SEROQUEL 50 MG tablet   Generic drug:  QUEtiapine     30 tablet    Take 0.5-1 tablets (25-50 mg) by mouth nightly as needed    Moderate episode of recurrent major depressive disorder (H), Generalized anxiety disorder       * Notice:  This list has 2 medication(s) that are the same as other medications prescribed for you. Read the directions carefully, and ask your doctor or other care provider to review them with you.

## 2018-04-25 NOTE — NURSING NOTE
Chief Complaint   Patient presents with     Mass     Painful lump in right breast thats she found yesterday      Health Maintenance     Due for pap        Initial /74  Pulse 72  Temp 98.2  F (36.8  C) (Tympanic)  Ht 5' (1.524 m)  Wt 136 lb 6.4 oz (61.9 kg)  BMI 26.64 kg/m2 Estimated body mass index is 26.64 kg/(m^2) as calculated from the following:    Height as of this encounter: 5' (1.524 m).    Weight as of this encounter: 136 lb 6.4 oz (61.9 kg).  Medication Reconciliation: complete

## 2018-04-26 ENCOUNTER — RADIANT APPOINTMENT (OUTPATIENT)
Dept: ULTRASOUND IMAGING | Facility: CLINIC | Age: 33
End: 2018-04-26
Attending: NURSE PRACTITIONER
Payer: COMMERCIAL

## 2018-04-26 ENCOUNTER — RADIANT APPOINTMENT (OUTPATIENT)
Dept: MAMMOGRAPHY | Facility: CLINIC | Age: 33
End: 2018-04-26
Attending: NURSE PRACTITIONER
Payer: COMMERCIAL

## 2018-04-26 DIAGNOSIS — N63.0 LUMP OR MASS IN BREAST: ICD-10-CM

## 2018-04-26 PROCEDURE — 76642 ULTRASOUND BREAST LIMITED: CPT | Mod: RT | Performed by: STUDENT IN AN ORGANIZED HEALTH CARE EDUCATION/TRAINING PROGRAM

## 2018-04-26 PROCEDURE — 77066 DX MAMMO INCL CAD BI: CPT | Performed by: STUDENT IN AN ORGANIZED HEALTH CARE EDUCATION/TRAINING PROGRAM

## 2018-05-14 ENCOUNTER — OFFICE VISIT (OUTPATIENT)
Dept: FAMILY MEDICINE | Facility: CLINIC | Age: 33
End: 2018-05-14
Payer: COMMERCIAL

## 2018-05-14 VITALS
WEIGHT: 131.2 LBS | HEART RATE: 95 BPM | SYSTOLIC BLOOD PRESSURE: 135 MMHG | BODY MASS INDEX: 25.76 KG/M2 | HEIGHT: 60 IN | DIASTOLIC BLOOD PRESSURE: 92 MMHG | TEMPERATURE: 98.6 F

## 2018-05-14 DIAGNOSIS — R07.0 THROAT PAIN: Primary | ICD-10-CM

## 2018-05-14 DIAGNOSIS — J06.9 VIRAL UPPER RESPIRATORY TRACT INFECTION: ICD-10-CM

## 2018-05-14 PROCEDURE — 99213 OFFICE O/P EST LOW 20 MIN: CPT | Performed by: NURSE PRACTITIONER

## 2018-05-14 RX ORDER — TRAZODONE HYDROCHLORIDE 50 MG/1
TABLET, FILM COATED ORAL
Refills: 1 | COMMUNITY
Start: 2018-01-19 | End: 2018-08-14 | Stop reason: ALTCHOICE

## 2018-05-14 NOTE — PROGRESS NOTES
"  SUBJECTIVE:   Will Dodson is a 32 year old female who presents to clinic today for the following health issues:  Chief Complaint   Patient presents with     Throat Problem     Right Tonsil Pain- \"hole in the tonsil \"          ENT Symptoms             Symptoms: cc Present Absent Comment   Fever/Chills   X    Fatigue  X     Muscle Aches  X  Neck    Eye Irritation  X     Sneezing   X    Nasal John/Drg  X     Sinus Pressure/Pain  X     Loss of smell   X    Dental pain   X    Sore Throat X X  On the Right Side, Tonsil is swollen \" and has a hole in it\" , worried about acid reflux as well .   Swollen Glands  X     Ear Pain/Fullness  X  Bilateral ear Pain, Left Ear Worse   Cough  X  Non productive    Wheeze   X    Chest Pain  X     Shortness of breath  X X    Rash   X    Other         Symptom duration:  x 3 days    Symptom severity:  Severe   Treatments tried:  Sinus tabs, Allergy meds, Ibu    Contacts:  None      Patient states that she has history of having problems with her tonsils twice yearly and was told she should have them out.  She denies fevers and sick contacts.  Symptoms started 3 days ago and are worse with increased congestion.  Sudafed and Ibuprofen are not helping.  She also complains of a headache and left ear ache.    Problem list and histories reviewed & adjusted, as indicated.  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     Past Surgical History:   Procedure Laterality Date     C/SECTION, LOW TRANSVERSE  ,     , Low Transverse     COSMETIC SURGERY       CYSTOSCOPY,REMV CALCULUS,SIMPLE  2010     LAPAROSCOPIC " CHOLECYSTECTOMY N/A 8/31/2017    Procedure: LAPAROSCOPIC CHOLECYSTECTOMY;  Laparoscopic Cholecystectomy;  Surgeon: Carson Rojas MD;  Location: [facility] OR     ORTHOPEDIC SURGERY         Social History   Substance Use Topics     Smoking status: Former Smoker     Packs/day: 0.25     Start date: 6/9/2015     Smokeless tobacco: Never Used     Alcohol use 0.0 oz/week     0 Standard drinks or equivalent per week      Comment: occas- quit with pregnancy     Family History   Problem Relation Age of Onset     Breast Cancer Mother      Depression Mother      Substance Abuse Mother      Hypertension Father      Substance Abuse Father      Colon Cancer Maternal Grandfather      Colon Cancer Paternal Grandfather      Other - See Comments Sister      epilepsy     Substance Abuse Sister      A&D         Current Outpatient Prescriptions   Medication Sig Dispense Refill     albuterol (ALBUTEROL) 108 (90 BASE) MCG/ACT inhaler Inhale 2 puffs into the lungs every 4 hours as needed for shortness of breath / dyspnea 1 Inhaler 0     amphetamine-dextroamphetamine (ADDERALL) 20 MG per tablet Take 1 tablet (20 mg) by mouth 2 times daily 60 tablet 0     cloNIDine (CATAPRES) 0.1 MG tablet Take 1 tablet (0.1 mg) by mouth At Bedtime       FLUoxetine (PROZAC) 40 MG capsule Take 1 capsule (40 mg) by mouth daily 90 capsule 3     levonorgestrel (MIRENA) 20 MCG/24HR IUD 1 each (20 mcg) by Intrauterine route continuous       QUEtiapine (SEROQUEL) 50 MG tablet Take 0.5-1 tablets (25-50 mg) by mouth nightly as needed 30 tablet 1     traZODone (DESYREL) 50 MG tablet TAKE 1-2 TABLETS BY MOUTH DAILY AT BEDTIME AS NEEDED  1     Allergies   Allergen Reactions     Blood-Group Specific Substance      Patient has Probable passiive anti D Antibody. Blood Product orders may be delayed.  Draw one red top and two purple top tubes for ALL Type and Screen/ Type and Crossmatch orders.       Vicodin [Hydrocodone-Acetaminophen] Nausea and Vomiting       Reviewed  "and updated as needed this visit by clinical staff  Tobacco  Allergies  Meds  Problems  Med Hx  Surg Hx  Fam Hx  Soc Hx        Reviewed and updated as needed this visit by Provider  Allergies  Meds  Problems         ROS:  CONSTITUTIONAL: NEGATIVE for fever, chills, change in weight  ENT/MOUTH: POSITIVE for unilateral right throat pain, congestion, headache and ear pressure more in left than right  RESP: NEGATIVE for significant cough or SOB  CV: NEGATIVE for chest pain, palpitations or peripheral edema  PSYCHIATRIC: anxiety  ROS otherwise negative    OBJECTIVE:     BP (!) 135/92 (BP Location: Left arm, Patient Position: Chair, Cuff Size: Adult Regular)  Pulse 95  Temp 98.6  F (37  C) (Tympanic)  Ht 4' 11.75\" (1.518 m)  Wt 131 lb 3.2 oz (59.5 kg)  BMI 25.84 kg/m2  Body mass index is 25.84 kg/(m^2).  GENERAL: healthy, alert and no distress  HENT: ear canals and TM's normal, nose and mouth without ulcers or lesions  NECK: no adenopathy, no asymmetry, masses, or scars and thyroid normal to palpation  RESP: lungs clear to auscultation - no rales, rhonchi or wheezes  CV: regular rate and rhythm, normal S1 S2, no S3 or S4, no murmur, click or rub, no peripheral edema and peripheral pulses strong  ABDOMEN: soft, nontender, no hepatosplenomegaly, no masses and bowel sounds normal  MS: no gross musculoskeletal defects noted, no edema  PSYCH: inattentive, affect flat and anxious    Diagnostic Test Results:  none     ASSESSMENT/PLAN:     1. Throat pain  Patient is requesting ENT referral for chronic throat issues.  Discussed that her throat pain is most likely due to nasal drainage but would like to be seen.  Referral placed.  - OTOLARYNGOLOGY REFERRAL    2. Viral upper respiratory tract infection  See AVS for self care instructions that were given.      Patient Instructions     1.  Take Zyrtec daily.    2.  Use Flonase 1-2 times per day for symptoms.    3.  Humified air.    4.  Mucinex if Sudafed and Zyrtec not " helping.    5.  ENT consult placed.    Thank you for choosing [facility].  You may be receiving a survey in the mail from [employer] regarding your visit today.  Please take a few minutes to complete and return the survey to let us know how we are doing.      If you have questions or concerns, please contact us via [employer] or you can contact your care team at [phone].    Our Clinic hours are:  Monday 6:40 am  to 7:00 pm  Tuesday -Friday 6:40 am to 5:00 pm    The [place] outpatient lab hours are:  Monday - Friday 6:10 am to 4:45 pm  Saturdays 7:00 am to 11:00 am  Appointments are required, call [phone]    If you have clinical questions after hours or would like to schedule an appointment,  call the clinic at [phone].    Chen Chaidez NP  [facility]

## 2018-05-14 NOTE — PATIENT INSTRUCTIONS
1.  Take Zyrtec daily.    2.  Use Flonase 1-2 times per day for symptoms.    3.  Humified air.    4.  Mucinex if Sudafed and Zyrtec not helping.    5.  ENT consult placed.    Thank you for choosing [facility].  You may be receiving a survey in the mail from [facility] regarding your visit today.  Please take a few minutes to complete and return the survey to let us know how we are doing.      If you have questions or concerns, please contact us via [email or web address] or you can contact your care team at [phone].    Our Clinic hours are:  Monday 6:40 am  to 7:00 pm  Tuesday -Friday 6:40 am to 5:00 pm    The [place] outpatient lab hours are:  Monday - Friday 6:10 am to 4:45 pm  Saturdays 7:00 am to 11:00 am  Appointments are required, call [phone]    If you have clinical questions after hours or would like to schedule an appointment,  call the clinic at [phone].

## 2018-05-14 NOTE — MR AVS SNAPSHOT
After Visit Summary   5/14/2018    Will Dodson    MRN: [id number]           Patient Information     Date Of Birth          1985        Visit Information        Provider Department      5/14/2018 1:40 PM Chen Chaidez NP [facility]        Today's Diagnoses     Throat pain    -  1    Viral upper respiratory tract infection          Care Instructions      1.  Take Zyrtec daily.    2.  Use Flonase 1-2 times per day for symptoms.    3.  Humified air.    4.  Mucinex if Sudafed and Zyrtec not helping.    5.  ENT consult placed.    Thank you for choosing [facility].  You may be receiving a survey in the mail from [email or web address] regarding your visit today.  Please take a few minutes to complete and return the survey to let us know how we are doing.      If you have questions or concerns, please contact us via Solasta or you can contact your care team at [phone].    Our Clinic hours are:  Monday 6:40 am  to 7:00 pm  Tuesday -Friday 6:40 am to 5:00 pm    The [place] outpatient lab hours are:  Monday - Friday 6:10 am to 4:45 pm  Saturdays 7:00 am to 11:00 am  Appointments are required, call [phone]    If you have clinical questions after hours or would like to schedule an appointment,  call the clinic at [phone].            Follow-ups after your visit        Additional Services     OTOLARYNGOLOGY REFERRAL       Your provider has referred you to: [facility]: [facility] [phone]   [email or web address]    Please be aware that coverage of these services is subject to the terms and limitations of your health insurance plan.  Call member services at your health plan with any benefit or coverage questions.      Please bring the following with you to your appointment:    (1) Any X-Rays, CTs or MRIs which have been performed.  Contact the facility where they were done to arrange for  prior to your scheduled appointment.   (2)  "List of current medications  (3) This referral request   (4) Any documents/labs given to you for this referral                  Follow-up notes from your care team     Return if symptoms worsen or fail to improve.      Who to contact     If you have questions or need follow up information about today's clinic visit or your schedule please contact [facility] directly at [phone].  Normal or non-critical lab and imaging results will be communicated to you by MyChart, letter or phone within 4 business days after the clinic has received the results. If you do not hear from us within 7 days, please contact the clinic through MyChart or phone. If you have a critical or abnormal lab result, we will notify you by phone as soon as possible.  Submit refill requests through Ol[employer] or call your pharmacy and they will forward the refill request to us. Please allow 3 business days for your refill to be completed.          Additional Information About Your Visit        [employer] Information     [employer] lets you send messages to your doctor, view your test results, renew your prescriptions, schedule appointments and more. To sign up, go to [email or web address] . Click on \"Log in\" on the left side of the screen, which will take you to the Welcome page. Then click on \"Sign up Now\" on the right side of the page.     You will be asked to enter the access code listed below, as well as some personal information. Please follow the directions to create your username and password.     Your access code is: [email or web address]  Expires: 2018  7:19 AM     Your access code will  in 90 days. If you need help or a new code, please call your [facility] or [phone].        Care EveryWhere ID     This is your Care EveryWhere ID. This could be used by other organizations to access your [place] medical records  [id number]        Your Vitals Were     Pulse Temperature Height BMI (Body Mass Index)          95 98.6  F " "(37  C) (Tympanic) 4' 11.75\" (1.518 m) 25.84 kg/m2         Blood Pressure from Last 3 Encounters:   05/14/18 (!) 135/92   04/25/18 108/74   03/20/18 118/81    Weight from Last 3 Encounters:   05/14/18 131 lb 3.2 oz (59.5 kg)   04/25/18 136 lb 6.4 oz (61.9 kg)   03/20/18 130 lb (59 kg)              We Performed the Following     OTOLARYNGOLOGY REFERRAL        Primary Care Provider Office Phone # Fax #    Norbert Wooten [phone] [address] [facility] [address]        Equal Access to Services     JEFFERY SALINAS : Hadii aad ku hadasho Sodharmeshali, waaxda luqadaha, qaybta kaalmada adeegyada, ashley campos . So [facility] [phone].    ATENCIÓN: Si habla español, tiene a jarrett disposición servicios gratuitos de asistencia lingüística. [facility] [phone].    We comply with applicable federal civil rights laws and [place] laws. We do not discriminate on the basis of race, color, national origin, age, disability, sex, sexual orientation, or gender identity.            Thank you!     Thank you for choosing [facility]  for your care. Our goal is always to provide you with excellent care. Hearing back from our patients is one way we can continue to improve our services. Please take a few minutes to complete the written survey that you may receive in the mail after your visit with us. Thank you!             Your Updated Medication List - Protect others around you: Learn how to safely use, store and throw away your medicines at www.disposemymeds.org.          This list is accurate as of 5/14/18  2:24 PM.  Always use your most recent med list.                   Brand Name Dispense Instructions for use Diagnosis    albuterol 108 (90 Base) MCG/ACT Inhaler    PROAIR HFA    1 Inhaler    Inhale 2 puffs into the lungs every 4 hours as needed for shortness of breath / dyspnea        amphetamine-dextroamphetamine 20 MG per tablet    ADDERALL    60 tablet    Take 1 " tablet (20 mg) by mouth 2 times daily    ADHD (attention deficit hyperactivity disorder), combined type       cloNIDine 0.1 MG tablet    CATAPRES     Take 1 tablet (0.1 mg) by mouth At Bedtime    Posttraumatic stress disorder       FLUoxetine 40 MG capsule    PROzac    90 capsule    Take 1 capsule (40 mg) by mouth daily    Moderate episode of recurrent major depressive disorder (H), Generalized anxiety disorder       levonorgestrel 20 MCG/24HR IUD    MIRENA     1 each (20 mcg) by Intrauterine route continuous    Encounter for IUD insertion       SEROQUEL 50 MG tablet   Generic drug:  QUEtiapine     30 tablet    Take 0.5-1 tablets (25-50 mg) by mouth nightly as needed    Moderate episode of recurrent major depressive disorder (H), Generalized anxiety disorder

## 2018-08-09 ENCOUNTER — TELEPHONE (OUTPATIENT)
Dept: FAMILY MEDICINE | Facility: CLINIC | Age: 33
End: 2018-08-09

## 2018-08-09 NOTE — TELEPHONE ENCOUNTER
PHQ9 Due between: 7/1/18-11/1/18    Please contact patient to complete follow up PHQ9 before their DUE DATE.     Index date 3/1/18, phq9 score 19.  Remind she is also due for pap.  If she has had this done at another facility please ask when, where and results for abstracting.    This is important feedback for your care team to assess your symptoms and treatment plan.    You completed this same questionnaire   PHQ-9 SCORE 3/1/2018   Total Score -   Total Score 19   Some encounter information is confidential and restricted. Go to Review Flowsheets activity to see all data.       [place] STAFF: If upon calling patient and PHQ9 score is higher that 5 route to the provider. You may also seek an RN for review.

## 2018-08-14 ENCOUNTER — OFFICE VISIT (OUTPATIENT)
Dept: FAMILY MEDICINE | Facility: CLINIC | Age: 33
End: 2018-08-14
Payer: COMMERCIAL

## 2018-08-14 VITALS
HEIGHT: 60 IN | OXYGEN SATURATION: 98 % | TEMPERATURE: 98.4 F | BODY MASS INDEX: 27.68 KG/M2 | DIASTOLIC BLOOD PRESSURE: 70 MMHG | WEIGHT: 141 LBS | HEART RATE: 89 BPM | SYSTOLIC BLOOD PRESSURE: 120 MMHG

## 2018-08-14 DIAGNOSIS — F41.8 DEPRESSION WITH ANXIETY: Primary | ICD-10-CM

## 2018-08-14 DIAGNOSIS — F90.2 ADHD (ATTENTION DEFICIT HYPERACTIVITY DISORDER), COMBINED TYPE: ICD-10-CM

## 2018-08-14 PROCEDURE — 99214 OFFICE O/P EST MOD 30 MIN: CPT | Performed by: PHYSICIAN ASSISTANT

## 2018-08-14 RX ORDER — DEXTROAMPHETAMINE SACCHARATE, AMPHETAMINE ASPARTATE MONOHYDRATE, DEXTROAMPHETAMINE SULFATE AND AMPHETAMINE SULFATE 1.25; 1.25; 1.25; 1.25 MG/1; MG/1; MG/1; MG/1
5 CAPSULE, EXTENDED RELEASE ORAL DAILY
Qty: 30 CAPSULE | Refills: 0 | Status: ON HOLD | OUTPATIENT
Start: 2018-08-14 | End: 2018-08-27

## 2018-08-14 RX ORDER — BUPROPION HYDROCHLORIDE 150 MG/1
TABLET, EXTENDED RELEASE ORAL
Qty: 60 TABLET | Refills: 2 | Status: ON HOLD | OUTPATIENT
Start: 2018-08-14 | End: 2018-08-27

## 2018-08-14 RX ORDER — DEXTROAMPHETAMINE SACCHARATE, AMPHETAMINE ASPARTATE MONOHYDRATE, DEXTROAMPHETAMINE SULFATE AND AMPHETAMINE SULFATE 2.5; 2.5; 2.5; 2.5 MG/1; MG/1; MG/1; MG/1
20 CAPSULE, EXTENDED RELEASE ORAL DAILY
Qty: 60 CAPSULE | Refills: 0 | Status: CANCELLED | OUTPATIENT
Start: 2018-08-14

## 2018-08-14 ASSESSMENT — ANXIETY QUESTIONNAIRES
GAD7 TOTAL SCORE: 7
2. NOT BEING ABLE TO STOP OR CONTROL WORRYING: NOT AT ALL
7. FEELING AFRAID AS IF SOMETHING AWFUL MIGHT HAPPEN: NOT AT ALL
5. BEING SO RESTLESS THAT IT IS HARD TO SIT STILL: NEARLY EVERY DAY
3. WORRYING TOO MUCH ABOUT DIFFERENT THINGS: NOT AT ALL
1. FEELING NERVOUS, ANXIOUS, OR ON EDGE: MORE THAN HALF THE DAYS
6. BECOMING EASILY ANNOYED OR IRRITABLE: NOT AT ALL

## 2018-08-14 ASSESSMENT — PATIENT HEALTH QUESTIONNAIRE - PHQ9: 5. POOR APPETITE OR OVEREATING: MORE THAN HALF THE DAYS

## 2018-08-14 NOTE — MR AVS SNAPSHOT
"              After Visit Summary   2018    Will Dodson    MRN: [id number]           Patient Information     Date Of Birth          1985        Visit Information        Provider Department      2018 9:40 AM Yordan Bullock PA-C [facility]        Today's Diagnoses     Depression with anxiety    -  1    ADHD (attention deficit hyperactivity disorder), combined type           Follow-ups after your visit        Who to contact     Normal or non-critical lab and imaging results will be communicated to you by Bespokehart, letter or phone within 4 business days after the clinic has received the results. If you do not hear from us within 7 days, please contact the clinic through Bespokehart or phone. If you have a critical or abnormal lab result, we will notify you by phone as soon as possible.  Submit refill requests through PLAXD or call your pharmacy and they will forward the refill request to us. Please allow 3 business days for your refill to be completed.          If you need to speak with a  for additional information , please call: [phone]             Additional Information About Your Visit        PLAXD Information     PLAXD lets you send messages to your doctor, view your test results, renew your prescriptions, schedule appointments and more. To sign up, go to [email or web address] . Click on \"Log in\" on the left side of the screen, which will take you to the Welcome page. Then click on \"Sign up Now\" on the right side of the page.     You will be asked to enter the access code listed below, as well as some personal information. Please follow the directions to create your username and password.     Your access code is: [email or web address]  Expires: 2018 12:54 PM     Your access code will  in 90 days. If you need help or a new code, please call your [facility]3] or [phone].        Care EveryWhere ID     This is your Care EveryWhere ID. This could be " "used by other organizations to access your [facility] medical records  [id number]        Your Vitals Were     Pulse Temperature Height Pulse Oximetry BMI (Body Mass Index)       89 98.4  F (36.9  C) (Tympanic) 4' 11.75\" (1.518 m) 98% 27.77 kg/m2        Blood Pressure from Last 3 Encounters:   08/14/18 120/70   05/14/18 (!) 135/92   04/25/18 108/74    Weight from Last 3 Encounters:   08/14/18 141 lb (64 kg)   05/14/18 131 lb 3.2 oz (59.5 kg)   04/25/18 136 lb 6.4 oz (61.9 kg)              Today, you had the following     No orders found for display         Today's Medication Changes          These changes are accurate as of 8/14/18 12:54 PM.  If you have any questions, ask your nurse or doctor.               Start taking these medicines.        Dose/Directions    amphetamine-dextroamphetamine 5 MG per 24 hr capsule   Commonly known as:  ADDERALL XR   Used for:  ADHD (attention deficit hyperactivity disorder), combined type   Started by:  Yordan Bullock PA-C        Dose:  5 mg   Take 1 capsule (5 mg) by mouth daily   Quantity:  30 capsule   Refills:  0       buPROPion 150 MG 12 hr tablet   Commonly known as:  WELLBUTRIN SR   Used for:  Depression with anxiety   Started by:  Yordan Bullock PA-C        Take 1 tablet once daily and increase to 1 tablet twice daily after 4 to 7 days   Quantity:  60 tablet   Refills:  2         Stop taking these medicines if you haven't already. Please contact your care team if you have questions.     FLUoxetine 40 MG capsule   Commonly known as:  PROzac   Stopped by:  Yordan Bullokc PA-C           traZODone 50 MG tablet   Commonly known as:  DESYREL   Stopped by:  Yordan Bullock PA-C                Where to get your medicines      These medications were sent to [facility] [address] [place] [address] - [address]  [address] [address]     Phone:  [phone]     buPROPion 150 MG 12 hr tablet         Some of these will need a " paper prescription and others can be bought over the counter.  Ask your nurse if you have questions.     Bring a paper prescription for each of these medications     amphetamine-dextroamphetamine 5 MG per 24 hr capsule                Primary Care Provider Office Phone # Fax #    Norbert Wooten [phone] [address] [facility] [address]        Equal Access to Services     [facility] : Hadii aad ku hadasho Soomaali, waaxda luqadaha, qaybta kaalmada adeegyada, waxay idiin hayaan adeeg kharash la'aan ah. So [facility] [phone].    ATENCIÓN: Si habla español, tiene a jarrett disposición servicios gratuitos de asistencia lingüística. [facility] [phone].    We comply with applicable federal civil rights laws and [place] laws. We do not discriminate on the basis of race, color, national origin, age, disability, sex, sexual orientation, or gender identity.            Thank you!     Thank you for choosing [facility]  for your care. Our goal is always to provide you with excellent care. Hearing back from our patients is one way we can continue to improve our services. Please take a few minutes to complete the written survey that you may receive in the mail after your visit with us. Thank you!             Your Updated Medication List - Protect others around you: Learn how to safely use, store and throw away your medicines at www.disposemymeds.org.          This list is accurate as of 8/14/18 12:54 PM.  Always use your most recent med list.                   Brand Name Dispense Instructions for use Diagnosis    albuterol 108 (90 Base) MCG/ACT inhaler    PROAIR HFA    1 Inhaler    Inhale 2 puffs into the lungs every 4 hours as needed for shortness of breath / dyspnea        amphetamine-dextroamphetamine 5 MG per 24 hr capsule    ADDERALL XR    30 capsule    Take 1 capsule (5 mg) by mouth daily    ADHD (attention deficit hyperactivity disorder), combined type       buPROPion 150 MG 12 hr  tablet    WELLBUTRIN SR    60 tablet    Take 1 tablet once daily and increase to 1 tablet twice daily after 4 to 7 days    Depression with anxiety       cloNIDine 0.1 MG tablet    CATAPRES     Take 1 tablet (0.1 mg) by mouth At Bedtime    Posttraumatic stress disorder       levonorgestrel 20 MCG/24HR IUD    MIRENA     1 each (20 mcg) by Intrauterine route continuous    Encounter for IUD insertion       SEROQUEL 50 MG tablet   Generic drug:  QUEtiapine     30 tablet    Take 0.5-1 tablets (25-50 mg) by mouth nightly as needed    Moderate episode of recurrent major depressive disorder (H), Generalized anxiety disorder

## 2018-08-14 NOTE — PROGRESS NOTES
SUBJECTIVE:   Will Dodson is a 33 year old female who presents to clinic today for the following health issues:  Chief Complaint   Patient presents with     Depression     med review      A.FER.H.FER     Has been out of ADHD meds since June.  She does not take it in the summer.  She is starting school in September and would like to get back on Aderrall.        Depression and Anxiety Follow-Up    Status since last visit: No change    Other associated symptoms:None    Complicating factors: None    Significant life event: No     Current substance abuse: None    PHQ-9 10/25/2016 5/18/2017 3/1/2018   Total Score - 21 19   Q9: Suicide Ideation Not at all Not at all Not at all   Some encounter information is confidential and restricted. Go to Review Flowsheets activity to see all data.     ERYN-7 SCORE 5/18/2017 12/21/2017 3/1/2018   Total Score 17 19 17   Some encounter information is confidential and restricted. Go to Review Flowsheets activity to see all data.     Reviewed PHQ and ERYN with patient.  PHQ-9  English  PHQ-9   Any Language  ERYN-7  Suicide Assessment Five-step Evaluation and Treatment (SAFE-T)    Amount of exercise or physical activity: 4-5 days/week for an average of 45-60 minutes    Problems taking medications regularly: No    Medication side effects: none    Diet: regular (no restrictions)        Problem list and histories reviewed & adjusted, as indicated.  Additional history: Patient notes that her depression and anxiety aren't too well controlled on Prozac.  She has tried Cymbalta to no effect.  Reviewed Bupropion with her and she is amenable to changing from Prozac.  She is about to start school and will start Adderall next week.       Reviewed and updated as needed this visit by clinical staff  Allergies       ROS:  Constitutional, HEENT, cardiovascular, pulmonary, gi and gu systems are negative, except as otherwise noted.    OBJECTIVE:     /70  Pulse 89  Temp 98.4  F (36.9  C) (Tympanic)  Ht  "4' 11.75\" (1.518 m)  Wt 141 lb (64 kg)  SpO2 98%  BMI 27.77 kg/m2  Body mass index is 27.77 kg/(m^2).  GENERAL: healthy, alert and no distress  MS: no gross musculoskeletal defects noted, no edema  SKIN: no suspicious lesions or rashes  PSYCH: Psych: Alert and oriented times 3; coherent speech, normal   rate and volume, able to articulate logical thoughts, able   to abstract reason, no tangential thoughts, no hallucinations   or delusions. + fidgeting.   Her affect is congruent      Diagnostic Test Results:  none     ASSESSMENT/PLAN:   1. ADHD (attention deficit hyperactivity disorder), combined type  - amphetamine-dextroamphetamine (ADDERALL XR) 5 MG per 24 hr capsule; Take 1 capsule (5 mg) by mouth daily  Dispense: 30 capsule; Refill: 0    2. Depression with anxiety  - buPROPion (WELLBUTRIN SR) 150 MG 12 hr tablet; Take 1 tablet once daily and increase to 1 tablet twice daily after 4 to 7 days  Dispense: 60 tablet; Refill: 2    DC Prozac.  Follow up in 2 weeks.  Sooner if needed.  Use medication as directed.  Patient amenable to this follow up plan.     Yordan Bullock PA-C  [facility]"

## 2018-08-15 ASSESSMENT — PATIENT HEALTH QUESTIONNAIRE - PHQ9: SUM OF ALL RESPONSES TO PHQ QUESTIONS 1-9: 11

## 2018-08-15 ASSESSMENT — ANXIETY QUESTIONNAIRES: GAD7 TOTAL SCORE: 7

## 2018-08-15 NOTE — TELEPHONE ENCOUNTER
Patient had an office visit yesterday. PHQ9/GAD7 updated.  PHQ-9 SCORE 5/18/2017 3/1/2018 8/14/2018   Total Score - - -   Total Score 21 19 11   Some encounter information is confidential and restricted. Go to Review Flowsheets activity to see all data.     ERYN-7 SCORE 12/21/2017 3/1/2018 8/14/2018   Total Score 19 17 7   Some encounter information is confidential and restricted. Go to Review Flowsheets activity to see all data.   Vicki Roach CMA..........8/15/2018 2:28 PM

## 2018-08-23 ENCOUNTER — HOSPITAL ENCOUNTER (EMERGENCY)
Facility: CLINIC | Age: 33
Discharge: PSYCHIATRIC OR SUBSTANCE USE FACILITY | End: 2018-08-24
Attending: STUDENT IN AN ORGANIZED HEALTH CARE EDUCATION/TRAINING PROGRAM | Admitting: STUDENT IN AN ORGANIZED HEALTH CARE EDUCATION/TRAINING PROGRAM
Payer: COMMERCIAL

## 2018-08-23 DIAGNOSIS — R45.851 SUICIDAL IDEATION: ICD-10-CM

## 2018-08-23 DIAGNOSIS — F41.9 ANXIETY: ICD-10-CM

## 2018-08-23 LAB
ALBUMIN SERPL-MCNC: 3.9 G/DL (ref 3.4–5)
ALP SERPL-CCNC: 70 U/L (ref 40–150)
ALT SERPL W P-5'-P-CCNC: 27 U/L (ref 0–50)
ANION GAP SERPL CALCULATED.3IONS-SCNC: 9 MMOL/L (ref 3–14)
APAP SERPL-MCNC: <2 MG/L (ref 10–20)
AST SERPL W P-5'-P-CCNC: 31 U/L (ref 0–45)
BASOPHILS # BLD AUTO: 0 10E9/L (ref 0–0.2)
BASOPHILS NFR BLD AUTO: 0.2 %
BILIRUB SERPL-MCNC: 0.3 MG/DL (ref 0.2–1.3)
BUN SERPL-MCNC: 9 MG/DL (ref 7–30)
CALCIUM SERPL-MCNC: 8.4 MG/DL (ref 8.5–10.1)
CHLORIDE SERPL-SCNC: 102 MMOL/L (ref 94–109)
CO2 SERPL-SCNC: 26 MMOL/L (ref 20–32)
CREAT SERPL-MCNC: 0.74 MG/DL (ref 0.52–1.04)
DIFFERENTIAL METHOD BLD: NORMAL
EOSINOPHIL # BLD AUTO: 0.1 10E9/L (ref 0–0.7)
EOSINOPHIL NFR BLD AUTO: 1.5 %
ERYTHROCYTE [DISTWIDTH] IN BLOOD BY AUTOMATED COUNT: 12.4 % (ref 10–15)
ETHANOL SERPL-MCNC: <0.01 G/DL
GFR SERPL CREATININE-BSD FRML MDRD: >90 ML/MIN/1.7M2
GLUCOSE SERPL-MCNC: 87 MG/DL (ref 70–99)
HCG SERPL QL: NEGATIVE
HCT VFR BLD AUTO: 40.6 % (ref 35–47)
HGB BLD-MCNC: 13.5 G/DL (ref 11.7–15.7)
IMM GRANULOCYTES # BLD: 0 10E9/L (ref 0–0.4)
IMM GRANULOCYTES NFR BLD: 0.1 %
LYMPHOCYTES # BLD AUTO: 2.5 10E9/L (ref 0.8–5.3)
LYMPHOCYTES NFR BLD AUTO: 29.7 %
MCH RBC QN AUTO: 31.1 PG (ref 26.5–33)
MCHC RBC AUTO-ENTMCNC: 33.3 G/DL (ref 31.5–36.5)
MCV RBC AUTO: 94 FL (ref 78–100)
MONOCYTES # BLD AUTO: 0.5 10E9/L (ref 0–1.3)
MONOCYTES NFR BLD AUTO: 6.1 %
NEUTROPHILS # BLD AUTO: 5.2 10E9/L (ref 1.6–8.3)
NEUTROPHILS NFR BLD AUTO: 62.4 %
NRBC # BLD AUTO: 0 10*3/UL
NRBC BLD AUTO-RTO: 0 /100
PLATELET # BLD AUTO: 248 10E9/L (ref 150–450)
POTASSIUM SERPL-SCNC: 3.8 MMOL/L (ref 3.4–5.3)
PROT SERPL-MCNC: 7.5 G/DL (ref 6.8–8.8)
RBC # BLD AUTO: 4.34 10E12/L (ref 3.8–5.2)
SALICYLATES SERPL-MCNC: <2 MG/DL
SODIUM SERPL-SCNC: 137 MMOL/L (ref 133–144)
WBC # BLD AUTO: 8.3 10E9/L (ref 4–11)

## 2018-08-23 PROCEDURE — 80053 COMPREHEN METABOLIC PANEL: CPT | Performed by: STUDENT IN AN ORGANIZED HEALTH CARE EDUCATION/TRAINING PROGRAM

## 2018-08-23 PROCEDURE — 85025 COMPLETE CBC W/AUTO DIFF WBC: CPT | Performed by: STUDENT IN AN ORGANIZED HEALTH CARE EDUCATION/TRAINING PROGRAM

## 2018-08-23 PROCEDURE — 90791 PSYCH DIAGNOSTIC EVALUATION: CPT

## 2018-08-23 PROCEDURE — 80329 ANALGESICS NON-OPIOID 1 OR 2: CPT | Performed by: STUDENT IN AN ORGANIZED HEALTH CARE EDUCATION/TRAINING PROGRAM

## 2018-08-23 PROCEDURE — 84703 CHORIONIC GONADOTROPIN ASSAY: CPT | Performed by: STUDENT IN AN ORGANIZED HEALTH CARE EDUCATION/TRAINING PROGRAM

## 2018-08-23 PROCEDURE — 80320 DRUG SCREEN QUANTALCOHOLS: CPT | Performed by: STUDENT IN AN ORGANIZED HEALTH CARE EDUCATION/TRAINING PROGRAM

## 2018-08-23 PROCEDURE — 99285 EMERGENCY DEPT VISIT HI MDM: CPT | Mod: 25 | Performed by: STUDENT IN AN ORGANIZED HEALTH CARE EDUCATION/TRAINING PROGRAM

## 2018-08-23 PROCEDURE — 99285 EMERGENCY DEPT VISIT HI MDM: CPT | Mod: Z6 | Performed by: STUDENT IN AN ORGANIZED HEALTH CARE EDUCATION/TRAINING PROGRAM

## 2018-08-23 PROCEDURE — 25000132 ZZH RX MED GY IP 250 OP 250 PS 637: Performed by: STUDENT IN AN ORGANIZED HEALTH CARE EDUCATION/TRAINING PROGRAM

## 2018-08-23 RX ORDER — OLANZAPINE 5 MG/1
5 TABLET, ORALLY DISINTEGRATING ORAL ONCE
Status: COMPLETED | OUTPATIENT
Start: 2018-08-23 | End: 2018-08-23

## 2018-08-23 RX ORDER — HYDROXYZINE HYDROCHLORIDE 25 MG/1
50 TABLET, FILM COATED ORAL ONCE
Status: COMPLETED | OUTPATIENT
Start: 2018-08-23 | End: 2018-08-23

## 2018-08-23 RX ADMIN — HYDROXYZINE HYDROCHLORIDE 50 MG: 25 TABLET ORAL at 23:34

## 2018-08-23 RX ADMIN — OLANZAPINE 5 MG: 5 TABLET, ORALLY DISINTEGRATING ORAL at 22:37

## 2018-08-24 ENCOUNTER — HOSPITAL ENCOUNTER (INPATIENT)
Facility: CLINIC | Age: 33
LOS: 3 days | Discharge: HOME | DRG: 885 | End: 2018-08-27
Attending: PSYCHIATRY & NEUROLOGY | Admitting: PSYCHIATRY & NEUROLOGY
Payer: COMMERCIAL

## 2018-08-24 VITALS
HEART RATE: 81 BPM | WEIGHT: 139 LBS | DIASTOLIC BLOOD PRESSURE: 62 MMHG | OXYGEN SATURATION: 95 % | BODY MASS INDEX: 27.37 KG/M2 | TEMPERATURE: 98.2 F | RESPIRATION RATE: 16 BRPM | SYSTOLIC BLOOD PRESSURE: 92 MMHG

## 2018-08-24 PROBLEM — F41.8 DEPRESSION WITH ANXIETY: Status: ACTIVE | Noted: 2018-08-24

## 2018-08-24 PROCEDURE — 25000132 ZZH RX MED GY IP 250 OP 250 PS 637: Performed by: PSYCHIATRY & NEUROLOGY

## 2018-08-24 PROCEDURE — 99223 1ST HOSP IP/OBS HIGH 75: CPT | Mod: AI | Performed by: PSYCHIATRY & NEUROLOGY

## 2018-08-24 PROCEDURE — 12400007 ZZH R&B MH INTERMEDIATE UMMC

## 2018-08-24 RX ORDER — BISACODYL 10 MG
10 SUPPOSITORY, RECTAL RECTAL DAILY PRN
Status: DISCONTINUED | OUTPATIENT
Start: 2018-08-24 | End: 2018-08-27 | Stop reason: HOSPADM

## 2018-08-24 RX ORDER — OLANZAPINE 10 MG/2ML
10 INJECTION, POWDER, FOR SOLUTION INTRAMUSCULAR
Status: DISCONTINUED | OUTPATIENT
Start: 2018-08-24 | End: 2018-08-27 | Stop reason: HOSPADM

## 2018-08-24 RX ORDER — HYDROXYZINE HYDROCHLORIDE 25 MG/1
25 TABLET, FILM COATED ORAL EVERY 4 HOURS PRN
Status: DISCONTINUED | OUTPATIENT
Start: 2018-08-24 | End: 2018-08-27 | Stop reason: HOSPADM

## 2018-08-24 RX ORDER — BUPROPION HYDROCHLORIDE 100 MG/1
100 TABLET, EXTENDED RELEASE ORAL DAILY
Status: DISCONTINUED | OUTPATIENT
Start: 2018-08-24 | End: 2018-08-27 | Stop reason: HOSPADM

## 2018-08-24 RX ORDER — ALUMINA, MAGNESIA, AND SIMETHICONE 2400; 2400; 240 MG/30ML; MG/30ML; MG/30ML
30 SUSPENSION ORAL EVERY 4 HOURS PRN
Status: DISCONTINUED | OUTPATIENT
Start: 2018-08-24 | End: 2018-08-27 | Stop reason: HOSPADM

## 2018-08-24 RX ORDER — LAMOTRIGINE 25 MG/1
25 TABLET ORAL DAILY
Status: DISCONTINUED | OUTPATIENT
Start: 2018-08-24 | End: 2018-08-27 | Stop reason: HOSPADM

## 2018-08-24 RX ORDER — ACETAMINOPHEN 325 MG/1
650 TABLET ORAL EVERY 4 HOURS PRN
Status: DISCONTINUED | OUTPATIENT
Start: 2018-08-24 | End: 2018-08-27 | Stop reason: HOSPADM

## 2018-08-24 RX ORDER — OLANZAPINE 10 MG/1
10 TABLET ORAL
Status: DISCONTINUED | OUTPATIENT
Start: 2018-08-24 | End: 2018-08-27 | Stop reason: HOSPADM

## 2018-08-24 RX ADMIN — LAMOTRIGINE 25 MG: 25 TABLET ORAL at 17:19

## 2018-08-24 RX ADMIN — HYDROXYZINE HYDROCHLORIDE 25 MG: 25 TABLET ORAL at 21:36

## 2018-08-24 RX ADMIN — BUPROPION HYDROCHLORIDE 100 MG: 100 TABLET, FILM COATED, EXTENDED RELEASE ORAL at 17:19

## 2018-08-24 ASSESSMENT — ACTIVITIES OF DAILY LIVING (ADL)
DRESS: SCRUBS (BEHAVIORAL HEALTH)
ORAL_HYGIENE: INDEPENDENT
DRESS: INDEPENDENT
ORAL_HYGIENE: INDEPENDENT
LAUNDRY: WITH SUPERVISION
GROOMING: INDEPENDENT
HYGIENE/GROOMING: INDEPENDENT
LAUNDRY: UNABLE TO COMPLETE

## 2018-08-24 NOTE — ED NOTES
Patient placed in room  Patient searched and changed into Scrubs. Patient was very cooperative. Patient having anxiety attack  She is having thoughts of suicide   She states she is frightened by her own thoughts. She recently had a medication change that she thinks may be adding to the problem today.  Patient has 2 children  States she has not used alcohol or  any street drugs. Patient works at an Assisted living.   Her family has  Her children she  Took them their so she could come here

## 2018-08-24 NOTE — PROGRESS NOTES
08/24/18 1437   Behavioral Health   Hallucinations denies / not responding to hallucinations   Thinking intact   Orientation place: oriented;person: oriented;date: oriented;time: oriented   Memory baseline memory   Insight insight appropriate to situation   Judgement intact   Eye Contact at examiner   Affect blunted, flat   Mood mood is calm   Physical Appearance/Attire attire appropriate to age and situation   Hygiene well groomed   Suicidality (denies)   Self Injury (denies)   Elopement (none observed)   Activity withdrawn;isolative   Speech coherent;clear   Psychomotor / Gait balanced;steady   Psycho Education   Type of Intervention 1:1 intervention   Response participates, initiates socially appropriate   Hours 0.5   Treatment Detail (check in)   Activities of Daily Living   Hygiene/Grooming independent   Oral Hygiene independent   Dress scrubs (behavioral health)   Laundry unable to complete   Room Organization independent     Pt had a good shift and was very cooperative. She was visible in the milieu, but spent most of the shift isolated to herself. She denied any feelings of anxiety or depression, as well as thoughts of SI and SIB.

## 2018-08-24 NOTE — PLAN OF CARE
Problem: Patient Care Overview  Goal: Team Discussion  Team Plan:   BEHAVIORAL TEAM DISCUSSION    Participants: Dr. More, CTC SHALONDA Blancas  Progress: new admission  Continued Stay Criteria/Rationale: suicidal ideation  Medical/Physical: none  Precautions:   Behavioral Orders   Procedures     Code 1 - Restrict to Unit     Routine Programming     As clinically indicated     Self Injury Precaution     Status 15     Every 15 minutes.     Suicide precautions     Patients on Suicide Precautions should have a Combination Diet ordered that includes a Diet selection(s) AND a Behavioral Tray selection for Safe Tray - with utensils, or Safe Tray - NO utensils       Plan: provide safe environment, manage meds per psychiatry, offer groups for coping skills, CTC to contact family for additional information   Rationale for change in precautions or plan: no change
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
## 2018-08-24 NOTE — ED NOTES
Called to give report at 0003, nurse unable to take report at this time, she will call back in a couple hours when able.

## 2018-08-24 NOTE — ED NOTES
Patient requesting more medication for her anxiety   Patient remains  Very cooperative  Plan to admit patient to [place]

## 2018-08-24 NOTE — IP AVS SNAPSHOT
MRN:[id number]                      After Visit Summary   8/24/2018    Will Dodson    MRN: [id number]           Thank you!     Thank you for choosing [facility] for your care. Our goal is always to provide you with excellent care.        Patient Information     Date Of Birth          1985        Designated Caregiver       Most Recent Value    Caregiver    Will someone help with your care after discharge? no      About your hospital stay     You were admitted on:  August 24, 2018 You last received care in the:  [facility]    You were discharged on:  August 27, 2018       Who to Call     For medical emergencies, please call 911.  For non-urgent questions about your medical care, please call your primary care provider or clinic, [phone]          Attending Provider     Provider Specialty    Moises More MD Psychiatry       Primary Care Provider Office Phone # Fax #    Norbert Wooten [phone]      Further instructions from your care team        Behavioral Discharge Planning and Instructions      Summary:  You were admitted on 8/24/2018 due to panic attacks and suicidal ideation in the context of a recent medication change.  You were treated by Dr. Moises More MD and discharged on 8/27/18 from [facility] to Home      Principal Diagnosis: Major Depressive Disorder, Substance Use Disorder      Health Care Follow-up Appointments:   [facility] [phone]  FAX: [phone]  [facility]  Medication management: Angi Dunaway Tuesday September 18th @ 2:00  Therapy: Jaydon Eddy Thursday September 13th @ 11:00      Attend all scheduled appointments with your outpatient providers. Call at least 24 hours in advance if you need to reschedule an appointment to ensure continued access to your outpatient providers.   Major Treatments, Procedures and Findings:  You were provided with: a psychiatric assessment and medication  "evaluation and/or management    Symptoms to Report: feeling more aggressive, increased confusion, losing more sleep, mood getting worse or thoughts of suicide    Early warning signs can include: increased depression or anxiety sleep disturbances increased thoughts or behaviors of suicide or self-harm  increased unusual thinking, such as paranoia or hearing voices    Safety and Wellness:  Take all medicines as directed.  Make no changes unless your doctor suggests them.      Follow treatment recommendations.  Refrain from alcohol and non-prescribed drugs.  If there is a concern for safety, call 911.    Resources:   Crisis Intervention: 382.557.6601 or 388-702-8559 (TTY: 347.174.3464).  Call anytime for help.  National [employer] Mental Illness (www.mn.olya.org): 661.190.6058 or 957-243-9195.  [facility] Crisis Response [phone]    The treatment team has appreciated the opportunity to work with you.     If you have any questions or concerns our unit number is [phone].  You may be receiving a follow-up phone call within the next three days from a representative from behavioral health.    You have identified the best phone number to reach you as [phone]        Pending Results     No orders found from 8/22/2018 to 8/25/2018.            Admission Information     Date & Time Provider Department Dept. Phone    8/24/2018 Moises More MD [facility] [phone]      Your Vitals Were     Blood Pressure Pulse Temperature Respirations Height Weight    99/64 70 96.3  F (35.7  C) (Oral) 16 1.524 m (5') 64.4 kg (142 lb)    Pulse Oximetry BMI (Body Mass Index)                95% 27.73 kg/m2          [employer] Information     [employer] lets you send messages to your doctor, view your test results, renew your prescriptions, schedule appointments and more. To sign up, go to [email or web address] . Click on \"Log in\" on the left side of the screen, which will take you to the Welcome page. Then click on \"Sign " "up Now\" on the right side of the page.     You will be asked to enter the access code listed below, as well as some personal information. Please follow the directions to create your username and password.     Your access code is: [email or web address]  Expires: 2018 12:54 PM     Your access code will  in 90 days. If you need help or a new code, please call your [place] clinic or [phone].        Care EveryWhere ID     This is your Care EveryWhere ID. This could be used by other organizations to access your [place] medical records  [id number]        Equal Access to Services     [facility]: Hadii jessica Andino, nathaniel bazzi, kevin hoffman, ashley campos . So [facility] [phone].    ATENCIÓN: Si habla español, tiene a jarrett disposición servicios gratuitos de asistencia lingüística. Llame al [phone].    We comply with applicable federal civil rights laws and [place] laws. We do not discriminate on the basis of race, color, national origin, age, disability, sex, sexual orientation, or gender identity.               Review of your medicines      UNREVIEWED medicines. Ask your doctor about these medicines        Dose / Directions    albuterol 108 (90 Base) MCG/ACT inhaler   Commonly known as:  PROAIR HFA        Dose:  2 puff   Inhale 2 puffs into the lungs every 4 hours as needed for shortness of breath / dyspnea   Quantity:  1 Inhaler   Refills:  0       amphetamine-dextroamphetamine 5 MG per 24 hr capsule   Commonly known as:  ADDERALL XR   Used for:  ADHD (attention deficit hyperactivity disorder), combined type        Dose:  5 mg   Take 1 capsule (5 mg) by mouth daily   Quantity:  30 capsule   Refills:  0       buPROPion 150 MG 12 hr tablet   Commonly known as:  WELLBUTRIN SR   Used for:  Depression with anxiety        Take 1 tablet once daily and increase to 1 tablet twice daily after 4 to 7 days   Quantity:  60 tablet   Refills:  2       cloNIDine 0.1 " MG tablet   Commonly known as:  CATAPRES   Used for:  Posttraumatic stress disorder        Dose:  0.1 mg   Take 1 tablet (0.1 mg) by mouth At Bedtime   Refills:  0       levonorgestrel 20 MCG/24HR IUD   Commonly known as:  MIRENA   Used for:  Encounter for IUD insertion        Dose:  1 each   1 each (20 mcg) by Intrauterine route continuous   Refills:  0       SEROQUEL 50 MG tablet   Used for:  Moderate episode of recurrent major depressive disorder (H), Generalized anxiety disorder   Generic drug:  QUEtiapine        Dose:  25-50 mg   Take 0.5-1 tablets (25-50 mg) by mouth nightly as needed   Quantity:  30 tablet   Refills:  1                Protect others around you: Learn how to safely use, store and throw away your medicines at www.disposemymeds.org.             Medication List: This is a list of all your medications and when to take them. Check marks below indicate your daily home schedule. Keep this list as a reference.      Medications           Morning Afternoon Evening Bedtime As Needed    albuterol 108 (90 Base) MCG/ACT inhaler   Commonly known as:  PROAIR HFA   Inhale 2 puffs into the lungs every 4 hours as needed for shortness of breath / dyspnea                                amphetamine-dextroamphetamine 5 MG per 24 hr capsule   Commonly known as:  ADDERALL XR   Take 1 capsule (5 mg) by mouth daily                                buPROPion 150 MG 12 hr tablet   Commonly known as:  WELLBUTRIN SR   Take 1 tablet once daily and increase to 1 tablet twice daily after 4 to 7 days   Last time this was given:  100 mg on 8/27/2018  9:15 AM                                cloNIDine 0.1 MG tablet   Commonly known as:  CATAPRES   Take 1 tablet (0.1 mg) by mouth At Bedtime                                levonorgestrel 20 MCG/24HR IUD   Commonly known as:  MIRENA   1 each (20 mcg) by Intrauterine route continuous                                SEROQUEL 50 MG tablet   Take 0.5-1 tablets (25-50 mg) by mouth nightly as  needed   Generic drug:  QUEtiapine

## 2018-08-24 NOTE — IP AVS SNAPSHOT
[address] [address]    Phone:  [phone]                                       After Visit Summary   8/24/2018    Will Dodson    MRN: [id number]           After Visit Summary Signature Page     I have received my discharge instructions, and my questions have been answered. I have discussed any challenges I see with this plan with the nurse or doctor.    ..........................................................................................................................................  Patient/Patient Representative Signature      ..........................................................................................................................................  Patient Representative Print Name and Relationship to Patient    ..................................................               ................................................  Date                                            Time    ..........................................................................................................................................  Reviewed by Signature/Title    ...................................................              ..............................................  Date                                                            Time          22EPIC Rev 08/18

## 2018-08-24 NOTE — PROGRESS NOTES
Patient belongings     08/24/18 0453   Patient Belongings   Did you bring any home meds/supplements to the hospital?  No   Patient Belongings clothing;cell phone/electronics;keys;plastic bag;purse;shoes   Belongings Search Yes   Clothing Search Yes   Second Staff Trace WARD and Sara DÍAZ     One pink bag  Sets of key  Samsung phone with   One blue sports bra  One grey bar  One green T-shirt  One black yoga pants  One pair of pink Nike sneakers  Items sent to security with Envelope  #[id number]   Covered &Protected  Social Security Card  [facility] bank debit card----[id number]  [facility] bank Debit Card---[phone]  [place] [employer] Debit card----[phone]  Medications  Wellbutrin  [place]  A               Admission:  I am responsible for any personal items that are not sent to the safe or pharmacy.  [facility] is not responsible for loss, theft or damage of any property in my possession.    Signature:  _________________________________ Date: _______  Time: _____                                              Staff Signature:  ____________________________ Date: ________  Time: _____      2nd Staff person, if patient is unable/unwilling to sign:    Signature: ________________________________ Date: ________  Time: _____     Discharge:  [facility] has returned all of my personal belongings:    Signature: _________________________________ Date: ________  Time: _____                                          Staff Signature:  ____________________________ Date: ________  Time: _____

## 2018-08-24 NOTE — PROGRESS NOTES
33 year old female admitted to [facility] from the ER at [facility].  Arrived via EMS at 0425.  Was able to participate in search and VS, but, falling asleep and eyes closed during admission questions.      Admitted for depression with anxiety, PTSD, ADHD and SIB with no plan.  Contracts for safety at this time.  She states she has never been inpatient at [facility] in the past. Worsening depression and increased anxiety recently making it not possible for patient to remain working at this time.  Taking meds for this and apparently changed from Prozac to Wellbutrin recently.  PCP is the prescriber for her mental th meds (Dr. Will Wooten).      Lives in [place] with her 9 and 11 yr old children.  The children are being cared for by their grandmother while patient is in the hospital.    Calm (sleeping) and cooperative with search and VS.  Will need to complete admission paperwork when patient is awake and able.  No aggression noted.  Neatly dressed. Little eye contact. Some labs done at [facility].  Zyprexa and Vistaril given in [facility] ER before transport to [facility].      ** Need to complete admission process.  ** Need to review medications with patient and order meds.    Patient appears to be sleeping soundly and comfortably in 209-2 at this time.

## 2018-08-24 NOTE — ED PROVIDER NOTES
History     Chief Complaint   Patient presents with     Anxiety     suicidal thoughts     HPI  Will Dodson is a 33 year old female with past medical history which includes generalized anxiety, panic attacks, PTSD, and depression who presents for evaluation of increasing anxiety and thoughts of self-harm.  Patient explains that just over 1 week ago primary care provider had discontinued her Prozac and instead started Wellbutrin for her anxiety and ADHD.  She admits she was not feeling great at the time but over the past several days has become dramatically more anxious and depressed.  Over the past 24 hours she has been unable to function and unable to go to work due to her intractable anxiety.  She admits she is having horrible thoughts involving self-harm or suicide attempt but will not explicitly describe her thoughts.  She denies alcohol or drug use and has not attempted to hurt herself yet.  She has a 9-year-old and 11-year-old child at home but her mother is with him tonight.  She denies fever, chills, chest pain, recent injury or illness.  She states that if she returns home she does not feel like she can trust herself to stay safe.    Problem List:    Patient Active Problem List    Diagnosis Date Noted     Chemical dependency (H) 12/27/2017     Priority: Medium     Posttraumatic stress disorder 12/21/2017     Priority: Medium     Moderate episode of recurrent major depressive disorder (H) 05/18/2017     Priority: Medium     RhD negative 02/03/2017     Priority: Medium     With passive D antibody       ADHD (attention deficit hyperactivity disorder), combined type 01/19/2017     Priority: Medium     Patient is followed by MERVAT LUONG for ongoing prescription of stimulants.  All refills should be approved by this provider, or covering partner.    Medication(s): Adderall XR 30mg.   Maximum quantity per month: #30  Clinic visit frequency required: Q 3 months     Controlled substance agreement on file:  Yes       Date(s): needs to be done  Neuropsych evaluation for ADD completed:  Yes, completed 17 with Jose Matta, on file and diagnosis confirmed    Last [facility] website verification:  done on 3/9/17   [email or web address]           Panic attack 10/25/2016     Priority: Medium     Generalized anxiety disorder 2016     Priority: Medium     H/O:  2016     Priority: Medium     C/sec x 2       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 2007     Priority: Medium        Past Medical History:    Past Medical History:   Diagnosis Date     Chickenpox      Depression      Kidney stone        Past Surgical History:    Past Surgical History:   Procedure Laterality Date     C/SECTION, LOW TRANSVERSE  ,     , Low Transverse     COSMETIC SURGERY       CYSTOSCOPY,REMV CALCULUS,SIMPLE       LAPAROSCOPIC CHOLECYSTECTOMY N/A 2017    Procedure: LAPAROSCOPIC CHOLECYSTECTOMY;  Laparoscopic Cholecystectomy;  Surgeon: Carson Rojas MD;  Location: [facility] OR     ORTHOPEDIC SURGERY         Family History:    Family History   Problem Relation Age of Onset     Breast Cancer Mother      Depression Mother      Substance Abuse Mother      Hypertension Father      Substance Abuse Father      Colon Cancer Maternal Grandfather      Colon Cancer Paternal Grandfather      Other - See Comments Sister      epilepsy     Substance Abuse Sister      A&D       Social History:  Marital Status:  Single [1]  Social History   Substance Use Topics     Smoking status: Former Smoker     Packs/day: 0.25     Start date: 2015     Smokeless tobacco: Never Used     Alcohol use 0.0 oz/week     0 Standard drinks or equivalent per week      Comment: occas- quit with pregnancy        Medications:      albuterol (ALBUTEROL) 108 (90 BASE) MCG/ACT inhaler   amphetamine-dextroamphetamine (ADDERALL XR) 5 MG per 24 hr capsule   buPROPion (WELLBUTRIN SR) 150 MG 12 hr tablet   cloNIDine  (CATAPRES) 0.1 MG tablet   levonorgestrel (MIRENA) 20 MCG/24HR IUD   QUEtiapine (SEROQUEL) 50 MG tablet         Review of Systems  Constitutional:  Negative for fever or recent illness.  Cardiovascular:  Negative for chest pain or palpitations.  Respiratory:  Negative for shortness of breath.  Gastrointestinal:  Negative for abdominal pain, nausea or vomiting.  Musculoskeletal:  Negative for recent injury.    All others reviewed and are negative.      Physical Exam   BP: 131/88  Pulse: 114  Temp: 98.2  F (36.8  C)  Resp: 18  Weight: 63 kg (139 lb)  SpO2: 100 %      Physical Exam  Constitutional:  Well developed, well nourished.  Appears anxious and tearful sitting on the edge of the gurney.  HENT:  Normocephalic and atraumatic.  Symmetric in appearance.  Eyes:  Conjunctivae are normal.  Neck:  Neck supple.  Cardiovascular:  No cyanosis.  Borderline tachycardia with regular rhythm.  No audible murmurs noted.    Respiratory:  Effort normal without sign of respiratory distress.  CTAB without diminished regions.   Gastrointestinal:  Soft, nondistended abdomen.  Nontender and without guarding.  No rigidity or rebound tenderness.  Negative Lazcano's sign.   Genitourinary:  Noncontributory.   Musculoskeletal:  Moves extremities spontaneously.  Neurological:  Patient is alert.  Skin:  Skin is warm and dry.  Psych:  Patient does not show signs of confusion or intoxication.  Well-kept appearance.  Appears to have organized speech and thought process.  Significantly anxious and tearful but not agitated or threatening.  Admits to suicidal ideation without intent to harm others.  Denies visual or auditory hallucinations and is without evidence of delusions or psychosis.      ED Course     ED Course     Procedures               Critical Care time:  none               Results for orders placed or performed during the hospital encounter of 08/23/18 (from the past 24 hour(s))   CBC with platelets differential   Result Value Ref Range     WBC 8.3 4.0 - 11.0 10e9/L    RBC Count 4.34 3.8 - 5.2 10e12/L    Hemoglobin 13.5 11.7 - 15.7 g/dL    Hematocrit 40.6 35.0 - 47.0 %    MCV 94 78 - 100 fl    MCH 31.1 26.5 - 33.0 pg    MCHC 33.3 31.5 - 36.5 g/dL    RDW 12.4 10.0 - 15.0 %    Platelet Count 248 150 - 450 10e9/L    Diff Method Automated Method     % Neutrophils 62.4 %    % Lymphocytes 29.7 %    % Monocytes 6.1 %    % Eosinophils 1.5 %    % Basophils 0.2 %    % Immature Granulocytes 0.1 %    Nucleated RBCs 0 0 /100    Absolute Neutrophil 5.2 1.6 - 8.3 10e9/L    Absolute Lymphocytes 2.5 0.8 - 5.3 10e9/L    Absolute Monocytes 0.5 0.0 - 1.3 10e9/L    Absolute Eosinophils 0.1 0.0 - 0.7 10e9/L    Absolute Basophils 0.0 0.0 - 0.2 10e9/L    Abs Immature Granulocytes 0.0 0 - 0.4 10e9/L    Absolute Nucleated RBC 0.0    Comprehensive metabolic panel   Result Value Ref Range    Sodium 137 133 - 144 mmol/L    Potassium 3.8 3.4 - 5.3 mmol/L    Chloride 102 94 - 109 mmol/L    Carbon Dioxide 26 20 - 32 mmol/L    Anion Gap 9 3 - 14 mmol/L    Glucose 87 70 - 99 mg/dL    Urea Nitrogen 9 7 - 30 mg/dL    Creatinine 0.74 0.52 - 1.04 mg/dL    GFR Estimate >90 >60 mL/min/1.7m2    GFR Estimate If Black >90 >60 mL/min/1.7m2    Calcium 8.4 (L) 8.5 - 10.1 mg/dL    Bilirubin Total 0.3 0.2 - 1.3 mg/dL    Albumin 3.9 3.4 - 5.0 g/dL    Protein Total 7.5 6.8 - 8.8 g/dL    Alkaline Phosphatase 70 40 - 150 U/L    ALT 27 0 - 50 U/L    AST 31 0 - 45 U/L   Alcohol ethyl   Result Value Ref Range    Ethanol g/dL <0.01 <0.01 g/dL   Salicylate level   Result Value Ref Range    Salicylate Level <2 mg/dL   Acetaminophen level   Result Value Ref Range    Acetaminophen Level <2 mg/L       Medications   OLANZapine zydis (zyPREXA) ODT tab 5 mg (5 mg Oral Given 8/23/18 2237)       Assessments & Plan (with Medical Decision Making)   Will Dodson is a 33 year old female who presented to the department complaining of intractable anxiety and recently developing thoughts of harming herself.  She will not  describe ideas or plans to harm herself but she describes them as being very vivid.  She is also unable to go to work due to her persistent panic reactions.  She discontinued Prozac recently after having been on a stable dose for nearly 2 years.  It is probable that this is contributing to her symptoms.  She has organized speech and thought process, not suffering from hallucinations, and does not appear to be delusional.  She was independently assessed by mental health DEC  who strongly recommends transfer for formal psychiatric evaluation and management plan.  Patient is in agreement with this plan, due to the severity of her symptoms she will be placed on a hold requiring psychiatric evaluation before any safe discharge can be completed.  She has verbalized an understanding, all questions answered, and is in agreement with the plan at this time.      Disclaimer:  This note consists of symbols derived from keyboarding, dictation, and/or voice recognition software.  As a result, there may be errors in the script that have gone undetected.  Please consider this when interpreting information found in the chart.        I have reviewed the nursing notes.    I have reviewed the findings, diagnosis, plan and need for follow up with the patient.       New Prescriptions    No medications on file       Final diagnoses:   None       8/23/2018   [facility] EMERGENCY DEPARTMENT     Paul Driver DO  08/23/18 4507

## 2018-08-24 NOTE — PROGRESS NOTES
Initial Psychosocial Assessment    I have reviewed the chart, met with the patient, and developed Care Plan. Information for assessment was obtained from: Pt, medical record                                                             voluntary      Presenting Problem: Patient brought to the hospital for suicidal ideation and panic attacks. Pt had a recent medication change-prozac to Wellbutrin. Wellbutrin was recently increased.       History of Mental Health and Chemical Dependency:  This is pt's first mental health hospitalization    Denies any drug or alcohol use    Significant Life Events   (Illness, Abuse, Trauma, Death): history of PTSD 3.5 years ago    Family: raised in [place] with intact family, two sisters,  for three years, has two children - daughter 11 and son 9.    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

## 2018-08-25 PROCEDURE — 12400007 ZZH R&B MH INTERMEDIATE UMMC

## 2018-08-25 PROCEDURE — 25000132 ZZH RX MED GY IP 250 OP 250 PS 637: Performed by: PSYCHIATRY & NEUROLOGY

## 2018-08-25 RX ORDER — TRAZODONE HYDROCHLORIDE 50 MG/1
50 TABLET, FILM COATED ORAL
Status: DISCONTINUED | OUTPATIENT
Start: 2018-08-25 | End: 2018-08-27 | Stop reason: HOSPADM

## 2018-08-25 RX ADMIN — LAMOTRIGINE 25 MG: 25 TABLET ORAL at 08:30

## 2018-08-25 RX ADMIN — HYDROXYZINE HYDROCHLORIDE 25 MG: 25 TABLET ORAL at 20:01

## 2018-08-25 RX ADMIN — BUPROPION HYDROCHLORIDE 100 MG: 100 TABLET, FILM COATED, EXTENDED RELEASE ORAL at 08:30

## 2018-08-25 ASSESSMENT — ACTIVITIES OF DAILY LIVING (ADL)
DRESS: INDEPENDENT
DRESS: SCRUBS (BEHAVIORAL HEALTH);INDEPENDENT
LAUNDRY: WITH SUPERVISION
ORAL_HYGIENE: INDEPENDENT
GROOMING: INDEPENDENT
GROOMING: INDEPENDENT
ORAL_HYGIENE: INDEPENDENT

## 2018-08-25 NOTE — PROGRESS NOTES
08/24/18 2000   Behavioral Health   Hallucinations denies / not responding to hallucinations   Thinking intact   Orientation person: oriented;place: oriented   Memory baseline memory   Insight insight appropriate to situation   Judgement impaired   Eye Contact at examiner   Affect blunted, flat   Mood mood is calm   Physical Appearance/Attire neat   Hygiene well groomed   Suicidality other (see comments)  (denies )   1. Wish to be Dead No   2. Non-Specific Active Suicidal Thoughts  No   Self Injury other (see comment)  (denies )   Elopement (none reported or observed )   Activity (visible in the milieu )   Speech clear;coherent   Activities of Daily Living   Hygiene/Grooming independent   Oral Hygiene independent   Dress independent   Laundry with supervision   Room Organization independent        08/24/18 2000   Behavioral Health   Hallucinations denies / not responding to hallucinations   Thinking intact   Orientation person: oriented;place: oriented   Memory baseline memory   Insight insight appropriate to situation   Judgement impaired   Eye Contact at examiner   Affect blunted, flat   Mood mood is calm   Physical Appearance/Attire neat   Hygiene well groomed   Suicidality other (see comments)  (denies )   1. Wish to be Dead No   2. Non-Specific Active Suicidal Thoughts  No   Self Injury other (see comment)  (denies )   Elopement (none reported or observed )   Activity (visible in the milieu )   Speech clear;coherent   Activities of Daily Living   Hygiene/Grooming independent   Oral Hygiene independent   Dress independent   Laundry with supervision   Room Organization independent       Pt denied SI and SIB.  Pt seems calm, ate supper and was visible in the milieu.  Pt is independent with ADL'S.

## 2018-08-25 NOTE — PROGRESS NOTES
08/25/18 1453   Behavioral Health   Hallucinations denies / not responding to hallucinations   Thinking intact   Orientation person: oriented;place: oriented;date: oriented   Memory baseline memory   Insight admits / accepts   Judgement impaired   Eye Contact at examiner   Affect blunted, flat   Mood mood is calm   Physical Appearance/Attire attire appropriate to age and situation   Hygiene well groomed   Suicidality other (see comments)  (denies)   1. Wish to be Dead No   2. Non-Specific Active Suicidal Thoughts  No   Self Injury other (see comment)  (denies)   Elopement (nothign to report)   Activity isolative;withdrawn   Speech clear;coherent   Medication Sensitivity no observed side effects   Psychomotor / Gait balanced;steady   Psycho Education   Type of Intervention 1:1 intervention   Response participates, initiates socially appropriate   Hours 0.5   Treatment Detail (check in)   Activities of Daily Living   Hygiene/Grooming independent   Oral Hygiene independent   Dress independent   Room Organization independent   Behavioral Health Interventions   Depression provide emotional support;maintain safe secure environment   Patient was in her room most of the shift and she as sleeping.  The patient was out for about an hour and a half after lunch and was pacing in the hallways for exeresis.  The patient was not engaging socially with her peers.  The overall milieu on the unit was very high, with several patient being high agitated.  The appeared to make the patient scared to engage with her peers in the common area.  The patient talked about wanting to discharge and feeling like she would be safe not in the hospital.  We processed what she can to do using coping skills to remain patient and talk with the doctors on Monday.  The patient felt safe her and we talked about how she could accept being here and that she was safe.  This appeared to help the patient frame her situation in a more positive way.  The  patient denies SI and SiB.

## 2018-08-26 PROCEDURE — 25000132 ZZH RX MED GY IP 250 OP 250 PS 637: Performed by: PSYCHIATRY & NEUROLOGY

## 2018-08-26 PROCEDURE — 12400007 ZZH R&B MH INTERMEDIATE UMMC

## 2018-08-26 PROCEDURE — 97127 ZZHC OT THERAPEUTIC INTERVENTION W/FOCUS ON COGNITIVE FUNCTION,EA 15 MIN: CPT | Mod: GO

## 2018-08-26 RX ADMIN — NICOTINE POLACRILEX 4 MG: 2 GUM, CHEWING BUCCAL at 08:19

## 2018-08-26 RX ADMIN — LAMOTRIGINE 25 MG: 25 TABLET ORAL at 08:19

## 2018-08-26 RX ADMIN — BUPROPION HYDROCHLORIDE 100 MG: 100 TABLET, FILM COATED, EXTENDED RELEASE ORAL at 08:19

## 2018-08-26 RX ADMIN — HYDROXYZINE HYDROCHLORIDE 25 MG: 25 TABLET ORAL at 20:39

## 2018-08-26 ASSESSMENT — ACTIVITIES OF DAILY LIVING (ADL)
ORAL_HYGIENE: INDEPENDENT
HYGIENE/GROOMING: INDEPENDENT;SHOWER
DRESS: STREET CLOTHES;INDEPENDENT

## 2018-08-26 NOTE — PROGRESS NOTES
The pt was social and had a brighter affect than in previous shifts.  She colored, started a puzzle, and walked the hallway.  She denies any SI/SIB urges, denies hallucinations.  She is looking forward to discharging tomorrow.  She attended community meeting, and her answers were appropriate to the questions asked.       08/26/18 1446   Behavioral Health   Hallucinations denies / not responding to hallucinations   Thinking intact   Orientation place: oriented;person: oriented;date: oriented;time: oriented   Memory baseline memory   Insight admits / accepts   Judgement impaired   Eye Contact at examiner   Affect full range affect   Mood mood is calm   Physical Appearance/Attire attire appropriate to age and situation;neat   Hygiene well groomed   Suicidality other (see comments)  (pt denies)   1. Wish to be Dead No   2. Non-Specific Active Suicidal Thoughts  No   Self Injury other (see comment)  (pt denied)   Elopement (no statements or attempts)   Activity other (see comment)  (social, attended groups)   Speech clear;coherent   Medication Sensitivity no stated side effects;no observed side effects   Psychomotor / Gait paces;balanced;steady

## 2018-08-26 NOTE — PLAN OF CARE
Problem: Depressive Symptoms  Goal: Depressive Symptoms  Signs and symptoms of listed problems will be absent or manageable.   Outcome: Improving  48 Hour Assessment    Pt visible in milieu off and on throughout the evening. She is pleasant in her interactions with this writer and others, but is socially withdrawn. She displays a blunted affect, but brightens upon approach. Pt was napping at the beginning of the shift, spent time coloring in the lounge and eating dinner later in the evening. Pt requested, and given, hydroxyzine prn for anxiety. As of 2126, pt is sleeping in her room.      SI/SIB: Pt denies. No indicators present.    Auditory/Visual Hallucinations: Pt denies. Does not appear responding.     No additional concerns at time. Will continue to assess and offer support.

## 2018-08-26 NOTE — PLAN OF CARE
Problem: Occupational Therapy Goals (Adult)  Goal: Occupational Therapy Goals  Stand Alone Therapy Goal    Pt actively participated in occupational therapy clinic. Pt expressed motivation for art-based projects. Overall affect/behavior/cognition: eager, bright, energetic yet congruent, and highly engaged. Pt demonstrated good focus, planning, and problem solving. Performance skills: Able to independently initiate self-selected task, gather materials, sequence multi-step task, and adjust to workspace demands as needed. Social Interaction: Able to ask for assistance as needed and appeared comfortable interacting with peers and staff.

## 2018-08-27 VITALS
RESPIRATION RATE: 16 BRPM | WEIGHT: 142 LBS | BODY MASS INDEX: 27.88 KG/M2 | HEIGHT: 60 IN | SYSTOLIC BLOOD PRESSURE: 99 MMHG | OXYGEN SATURATION: 95 % | HEART RATE: 70 BPM | DIASTOLIC BLOOD PRESSURE: 64 MMHG | TEMPERATURE: 96.3 F

## 2018-08-27 PROCEDURE — 25000132 ZZH RX MED GY IP 250 OP 250 PS 637: Performed by: PSYCHIATRY & NEUROLOGY

## 2018-08-27 PROCEDURE — 97127 ZZHC OT THERAPEUTIC INTERVENTION W/FOCUS ON COGNITIVE FUNCTION,EA 15 MIN: CPT | Mod: GO

## 2018-08-27 PROCEDURE — 99207 ZZC CDG-CODE CATEGORY CHANGED: CPT | Performed by: PSYCHIATRY & NEUROLOGY

## 2018-08-27 PROCEDURE — 99207 ZZC NO CHARGE VISIT/PATIENT NOT SEEN: CPT | Performed by: PSYCHIATRY & NEUROLOGY

## 2018-08-27 RX ORDER — LAMOTRIGINE 25 MG/1
25 TABLET ORAL DAILY
Qty: 30 TABLET | Refills: 1 | Status: SHIPPED | OUTPATIENT
Start: 2018-08-28 | End: 2019-01-18

## 2018-08-27 RX ORDER — TRAZODONE HYDROCHLORIDE 50 MG/1
50 TABLET, FILM COATED ORAL
Qty: 90 TABLET | Refills: 0 | Status: SHIPPED | OUTPATIENT
Start: 2018-08-27 | End: 2019-06-04

## 2018-08-27 RX ORDER — BUPROPION HYDROCHLORIDE 100 MG/1
100 TABLET, EXTENDED RELEASE ORAL DAILY
Qty: 30 TABLET | Refills: 1 | Status: SHIPPED | OUTPATIENT
Start: 2018-08-28 | End: 2018-12-06

## 2018-08-27 RX ADMIN — LAMOTRIGINE 25 MG: 25 TABLET ORAL at 09:15

## 2018-08-27 RX ADMIN — BUPROPION HYDROCHLORIDE 100 MG: 100 TABLET, FILM COATED, EXTENDED RELEASE ORAL at 09:15

## 2018-08-27 ASSESSMENT — ACTIVITIES OF DAILY LIVING (ADL)
ORAL_HYGIENE: INDEPENDENT
GROOMING: INDEPENDENT
DRESS: STREET CLOTHES;INDEPENDENT

## 2018-08-27 NOTE — PROGRESS NOTES
Pt calm and pleasant this shift. Was in room beginning of shift but did come out to milieu later but otherwise mostly on the phone. Says she is really hoping to discharge tomorrow and that her mood and symptoms are better now. Denying SI/SIB. Hopes that she can get set up with some therapy when she leaves so she can get more help.       08/26/18 220   Behavioral Health   Hallucinations denies / not responding to hallucinations   Thinking intact   Orientation place: oriented;person: oriented;time: oriented;date: oriented   Memory baseline memory   Insight admits / accepts   Judgement impaired   Eye Contact at examiner   Affect blunted, flat   Mood mood is calm   Physical Appearance/Attire appears stated age;attire appropriate to age and situation;neat   Hygiene well groomed   Suicidality other (see comments)  (denies)   1. Wish to be Dead No   2. Non-Specific Active Suicidal Thoughts  No   Self Injury other (see comment)  (denies)   Elopement (none)   Activity isolative;withdrawn   Speech coherent;clear   Medication Sensitivity no observed side effects;no stated side effects   Psychomotor / Gait steady;balanced   Activities of Daily Living   Hygiene/Grooming independent;shower   Oral Hygiene independent   Dress street clothes;independent   Room Organization independent

## 2018-08-27 NOTE — PLAN OF CARE
Problem: Patient Care Overview  Goal: Discharge Needs Assessment  Outcome: Adequate for Discharge Date Met: 08/27/18  Discharged to home with a 30 day supply of medications.  She denied being suicidal.  She had a letter for work outlining the days she was hospitalized.  She was cooperative with all discharge procedures.  She left via a taxi cab.

## 2018-08-27 NOTE — DISCHARGE INSTRUCTIONS
Behavioral Discharge Planning and Instructions      Summary:  You were admitted on 8/24/2018 due to panic attacks and suicidal ideation in the context of a recent medication change.  You were treated by Dr. Moises More MD and discharged on 8/27/18 from [facility]      Principal Diagnosis: Major Depressive Disorder, Substance Use Disorder      Health Care Follow-up Appointments:   [facility] [phone]  FAX: [phone] [address] [facility]  Medication management: Angi Dunaway Tuesday September 18th @ 2:00  Therapy: Jaydon Eddy Thursday September 13th @ 11:00      Attend all scheduled appointments with your outpatient providers. Call at least 24 hours in advance if you need to reschedule an appointment to ensure continued access to your outpatient providers.   Major Treatments, Procedures and Findings:  You were provided with: a psychiatric assessment and medication evaluation and/or management    Symptoms to Report: feeling more aggressive, increased confusion, losing more sleep, mood getting worse or thoughts of suicide    Early warning signs can include: increased depression or anxiety sleep disturbances increased thoughts or behaviors of suicide or self-harm  increased unusual thinking, such as paranoia or hearing voices    Safety and Wellness:  Take all medicines as directed.  Make no changes unless your doctor suggests them.      Follow treatment recommendations.  Refrain from alcohol and non-prescribed drugs.  If there is a concern for safety, call 911.    Resources:   Crisis Intervention: 640.385.6706 or 410-616-8902 (TTY: 258.897.3060).  Call anytime for help.  National [place] on Mental Illness (www.mn.olya.org): 292.244.4218 or 652-604-4235.  [facility] Crisis Response 945 213-6192    The treatment team has appreciated the opportunity to work with you.     If you have any questions or concerns our unit number is [phone].  You may be receiving  a follow-up phone call within the next three days from a representative from behavioral health.    You have identified the best phone number to reach you as [phone]

## 2018-08-28 ENCOUNTER — PATIENT OUTREACH (OUTPATIENT)
Dept: CARE COORDINATION | Facility: CLINIC | Age: 33
End: 2018-08-28

## 2018-08-28 ASSESSMENT — ACTIVITIES OF DAILY LIVING (ADL): DEPENDENT_IADLS:: INDEPENDENT

## 2018-08-28 NOTE — LETTER
[facility] CARE COORDINATION  [address] [facility] [address]  August 28, 2018    Will Dodson  [address] [place] [address]      Dear Will,    I am a clinic care coordinator who works with Norbert Wooten MD at [phone]. I wanted to thank you for spending the time to talk with me.  I wanted to introduce myself and provide you with my contact information so that you can call me with questions or concerns about your health care. Below is a description of clinic care coordination and how I can further assist you.     The clinic care coordinator is a registered nurse and/or  who understand the health care system. The goal of clinic care coordination is to help you manage your health and improve access to the [facility] system in the most efficient manner. The registered nurse can assist you in meeting your health care goals by providing education, coordinating services, and strengthening the communication among your providers. The  can assist you with financial, behavioral, psychosocial, chemical dependency, counseling, and/or psychiatric resources.    Please feel free to contact me at [phone], with any questions or concerns. We at [facility] are focused on providing you with the highest-quality healthcare experience possible and that all starts with you.     Sincerely,     Domonique Yañez    Enclosed: I have enclosed a copy of a 24 Hour Access Plan. This has helpful phone numbers for you to call when needed. Please keep this in an easy to access place to use as needed.

## 2018-08-28 NOTE — PROGRESS NOTES
"Clinic Care Coordination Contact    Clinic Care Coordination Contact  OUTREACH    Referral Information:  Referral Source: IP Report [facility].    Primary Diagnosis:     Behavioral Health  Summary:  You were admitted on 8/24/2018 due to panic attacks and suicidal ideation in the context of a recent medication change.  You were treated by Dr. Moises More MD and discharged on 8/27/18 from [facility] Home     Principal Diagnosis: Major Depressive Disorder, Substance Use Disorder     Health Care Follow-up Appointments:   [facility] [phone]  FAX: [phone]  [facility]  Medication management: Angi Dunaway Tuesday September 18th @ 2:00  Therapy: Jaydon Eddy Thursday September 13th @ 11:00         Chief Complaint   Patient presents with     Clinic Care Coordination - [facility]        Universal Utilization: Several no shows for routine care at clinic.   Clinic Utilization  Difficulty keeping appointments:: No  Utilization    Last refreshed: 8/27/2018  5:34 PM:  No Show Count (past year) 7       Last refreshed: 8/27/2018  5:34 PM:  ED visits 4       Last refreshed: 8/27/2018  5:34 PM:  Hospital admissions 1          Current as of: 8/27/2018  5:34 PM             Clinical Concerns:  Current Medical Concerns:  No concerns identified.    Current Behavioral Concerns:   Doing well since she got home yesterday.  Has her medications and is taking them.  She got her kids with her again. Her mom took care of them while she was in the hospital.  She is going to try to \"roll with the punches,\" breath, and will reach out to her boyfriend or mom if she has anxiety.  They are both helpful to her.  She was on her way to work.  Glad to be home and feeling better.    Education Provided to patient: Reviewed care coordination role.     Pain  Pain (GOAL):: No  Health Maintenance Reviewed: Due/Overdue   Health Maintenance Due   Topic Date Due     PAP Q3 YR  01/29/2018 "       Clinical Pathway: None    Medication Management:  No concerns.  Is taking as prescribed.      Functional Status:  Dependent ADLs:: Ambulation-no assistive device  Dependent IADLs:: Independent  Bed or wheelchair confined:: No  Mobility Status: Independent  Fallen 2 or more times in the past year?: No  Any fall with injury in the past year?: No    Living Situation:  Current living arrangement:: I live in a private home with family, two kids in school.      Diet/Exercise/Sleep:  Diet:: Regular  Inadequate nutrition (GOAL):: No  Food Insecurity: No  Tube Feeding: No  Inadequate activity/exercise (GOAL):: No  Significant changes in sleep pattern (GOAL): No    Transportation:  Transportation concerns (GOAL):: No  Transportation means:: Regular car     Psychosocial:  Mental health DX:: Yes  Mental health DX how managed:: Medication  Mental health management concern (GOAL):: Yes  Informal Support system:: Family, Significant other, Parent     Financial/Insurance:   Financial/Insurance concerns (GOAL):: No  Has QIANA MA.  No financial concerns.      Resources and Interventions:  Current Resources:   Her daughter has special needs and has robust team of workers for her including , in home family therapy, a psychiatrist and therapist and school district resources.  Patient does not want other supports at this time.       Supplies used at home:: None  Equipment Currently Used at Home: none    Advance Care Plan/Directive  Advanced Care Plans/Directives on file:: No  Advanced Care Plan/Directive Status: Declined Further Information    Referrals Placed: None     Patient/Caregiver understanding: Patient will attend appointments set for her at [facility]. That is also where her daughter receives some services.  She would like CC contact in two weeks to assess her needs.  Will send letter with CC information.      Outreach Frequency: 2 weeks      Plan: No further outreach by coverage CC SW. Will route  note to usual CC for f/u in two weeks.

## 2018-08-28 NOTE — LETTER
Health Care Home - Access Care Plan    About Me  Patient Name:  Will Dodson    YOB: 1985  Age:                             33 year old   Beatrice MRN:            [id number] Telephone Information:     Home Phone [phone]   Mobile Not on file.       Address:    [address] [facility] [address] Email address:  No e-mail address on record      Emergency Contact(s)  Name Relationship Lgl Grd Work Phone Home Phone Mobile Phone   SOCORRO VILLA Mother    [phone]             Health Maintenance: Routine Health maintenance Reviewed: Due/Overdue   Health Maintenance Due   Topic Date Due     PAP Q3 YR  01/29/2018       My Access Plan  Medical Emergency 911   Questions or concerns during clinic hours Primary Clinic Line, I will call the clinic directly: [facility] - [phone]   24 Hour Appointment Line [phone] or  [phone]) (toll free)   24 Hour Nurse Line [phone] (toll free)   Questions or concerns outside clinic hours 24 Hour Appointment Line, I will call the after-hours on-call line:   [facility] [phone] or [phone] ([phone]) (toll-free)   Preferred Urgent Care [facility], [phone]   Preferred Hospital [place], [place]  [phone]   Preferred Pharmacy [facility] [address] [place], [place] - [address]      Behavioral Health Crisis Line The National Suicide Prevention Lifeline at 1-689.216.2303 or 031     My Care Team Members  Patient Care Team       Relationship Specialty Notifications Start End    Norbert Wooten MD PCP - General Family Practice  2/26/18     Phone: [phone] Fax: [phone] [address] [facility] [address]    Loulou Del Valle LSW Clinic Care Coordinator Primary Care - CC  8/28/18     Phone: [phone] Fax: [phone]               My Medical and Care Information  Problem List   Patient Active Problem List    Diagnosis     H/O:      Generalized anxiety disorder     Panic attack     ADHD (attention deficit hyperactivity disorder), combined type     RhD negative     Moderate episode of recurrent major depressive disorder (H)     Posttraumatic stress disorder     Chemical dependency (H)     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     Depression with anxiety      Current Medications and Allergies:  See printed Medication Report

## 2018-09-06 ENCOUNTER — OFFICE VISIT (OUTPATIENT)
Dept: FAMILY MEDICINE | Facility: CLINIC | Age: 33
End: 2018-09-06
Payer: COMMERCIAL

## 2018-09-06 VITALS
DIASTOLIC BLOOD PRESSURE: 78 MMHG | HEART RATE: 68 BPM | SYSTOLIC BLOOD PRESSURE: 110 MMHG | BODY MASS INDEX: 27.48 KG/M2 | HEIGHT: 60 IN | WEIGHT: 140 LBS | TEMPERATURE: 98.7 F

## 2018-09-06 DIAGNOSIS — Z11.51 SCREENING FOR HUMAN PAPILLOMAVIRUS: ICD-10-CM

## 2018-09-06 DIAGNOSIS — J01.00 ACUTE NON-RECURRENT MAXILLARY SINUSITIS: ICD-10-CM

## 2018-09-06 DIAGNOSIS — Z11.3 SCREEN FOR STD (SEXUALLY TRANSMITTED DISEASE): Primary | ICD-10-CM

## 2018-09-06 DIAGNOSIS — N89.8 VAGINAL DISCHARGE: ICD-10-CM

## 2018-09-06 DIAGNOSIS — Z12.4 SCREENING FOR CERVICAL CANCER: ICD-10-CM

## 2018-09-06 DIAGNOSIS — Z00.00 ROUTINE GENERAL MEDICAL EXAMINATION AT A HEALTH CARE FACILITY: ICD-10-CM

## 2018-09-06 LAB
ALBUMIN UR-MCNC: NEGATIVE MG/DL
APPEARANCE UR: CLEAR
BILIRUB UR QL STRIP: NEGATIVE
COLOR UR AUTO: YELLOW
GLUCOSE UR STRIP-MCNC: NEGATIVE MG/DL
HGB UR QL STRIP: NEGATIVE
KETONES UR STRIP-MCNC: NEGATIVE MG/DL
LEUKOCYTE ESTERASE UR QL STRIP: NEGATIVE
NITRATE UR QL: NEGATIVE
PH UR STRIP: 8.5 PH (ref 5–7)
SOURCE: ABNORMAL
SP GR UR STRIP: 1.01 (ref 1–1.03)
SPECIMEN SOURCE: ABNORMAL
UROBILINOGEN UR STRIP-ACNC: 0.2 EU/DL (ref 0.2–1)
WET PREP SPEC: ABNORMAL

## 2018-09-06 PROCEDURE — 87491 CHLMYD TRACH DNA AMP PROBE: CPT | Performed by: NURSE PRACTITIONER

## 2018-09-06 PROCEDURE — 81003 URINALYSIS AUTO W/O SCOPE: CPT | Performed by: NURSE PRACTITIONER

## 2018-09-06 PROCEDURE — 99395 PREV VISIT EST AGE 18-39: CPT | Performed by: NURSE PRACTITIONER

## 2018-09-06 PROCEDURE — 99213 OFFICE O/P EST LOW 20 MIN: CPT | Mod: 25 | Performed by: NURSE PRACTITIONER

## 2018-09-06 PROCEDURE — 87591 N.GONORRHOEAE DNA AMP PROB: CPT | Performed by: NURSE PRACTITIONER

## 2018-09-06 PROCEDURE — 87624 HPV HI-RISK TYP POOLED RSLT: CPT | Performed by: NURSE PRACTITIONER

## 2018-09-06 PROCEDURE — 87210 SMEAR WET MOUNT SALINE/INK: CPT | Performed by: NURSE PRACTITIONER

## 2018-09-06 PROCEDURE — G0145 SCR C/V CYTO,THINLAYER,RESCR: HCPCS | Performed by: NURSE PRACTITIONER

## 2018-09-06 RX ORDER — AMOXICILLIN 500 MG/1
500 CAPSULE ORAL 3 TIMES DAILY
Qty: 30 CAPSULE | Refills: 0 | Status: SHIPPED | OUTPATIENT
Start: 2018-09-06 | End: 2018-09-21

## 2018-09-06 ASSESSMENT — ENCOUNTER SYMPTOMS
VOMITING: 0
SHORTNESS OF BREATH: 0
SINUS PAIN: 1
NAUSEA: 0
NERVOUS/ANXIOUS: 0
ARTHRALGIAS: 0
COUGH: 0
CHEST TIGHTNESS: 0
POLYDIPSIA: 0
DYSPHORIC MOOD: 0
PALPITATIONS: 0
CONSTIPATION: 0
ROS SKIN COMMENTS: SELF BREAST EXAM WITHOUT AREA OF CONCERN.
DIARRHEA: 0
RHINORRHEA: 0
SLEEP DISTURBANCE: 0
POLYPHAGIA: 0
LIGHT-HEADEDNESS: 0
DIFFICULTY URINATING: 0
ABDOMINAL DISTENTION: 0
ABDOMINAL PAIN: 0
SORE THROAT: 0
DIZZINESS: 0
WHEEZING: 0
SINUS PRESSURE: 1
NUMBNESS: 0
ACTIVITY CHANGE: 0
FATIGUE: 0
HEADACHES: 0
FREQUENCY: 0

## 2018-09-06 ASSESSMENT — ANXIETY QUESTIONNAIRES
7. FEELING AFRAID AS IF SOMETHING AWFUL MIGHT HAPPEN: NOT AT ALL
5. BEING SO RESTLESS THAT IT IS HARD TO SIT STILL: SEVERAL DAYS
1. FEELING NERVOUS, ANXIOUS, OR ON EDGE: SEVERAL DAYS
6. BECOMING EASILY ANNOYED OR IRRITABLE: NOT AT ALL
3. WORRYING TOO MUCH ABOUT DIFFERENT THINGS: NOT AT ALL
2. NOT BEING ABLE TO STOP OR CONTROL WORRYING: NOT AT ALL
GAD7 TOTAL SCORE: 2

## 2018-09-06 ASSESSMENT — PATIENT HEALTH QUESTIONNAIRE - PHQ9: 5. POOR APPETITE OR OVEREATING: NOT AT ALL

## 2018-09-06 NOTE — PROGRESS NOTES
SUBJECTIVE:   CC: Will Dodson is an 33 year old woman who presents for preventive health visit.     Healthy Habits:    Do you get at least three servings of calcium containing foods daily (dairy, green leafy vegetables, etc.)? yes    Amount of exercise or daily activities, outside of work: 3 day(s) per week    Problems taking medications regularly No    Medication side effects: No    Have you had an eye exam in the past two years? yes    Do you see a dentist twice per year? yes    Do you have sleep apnea, excessive snoring or daytime drowsiness?no    - Vaginal itching, odor and some discharge intermittently x 1 week. No urinary sx. She is now noticing pain during intercourse. She is requesting STD testing.    - Needs note excusing her from work for the day and possibly tomorrow.    Today's PHQ-2 Score:   PHQ-2 ( 1999 Pfizer) 3/20/2018 10/25/2016   Q1: Little interest or pleasure in doing things 1 0   Q2: Feeling down, depressed or hopeless 1 1   PHQ-2 Score 2 1       Abuse: Current or Past(Physical, Sexual or Emotional)- No  Do you feel safe in your environment - Yes    Social History   Substance Use Topics     Smoking status: Former Smoker     Packs/day: 0.25     Start date: 6/9/2015     Smokeless tobacco: Never Used     Alcohol use 0.0 oz/week     0 Standard drinks or equivalent per week      Comment: occas- quit with pregnancy     If you drink alcohol do you typically have >3 drinks per day or >7 drinks per week? No                     Reviewed orders with patient.  Reviewed health maintenance and updated orders accordingly - Yes  Current Outpatient Prescriptions   Medication Sig Dispense Refill     amoxicillin (AMOXIL) 500 MG capsule Take 1 capsule (500 mg) by mouth 3 times daily 30 capsule 0     buPROPion (WELLBUTRIN SR) 100 MG 12 hr tablet Take 1 tablet (100 mg) by mouth daily 30 tablet 1     cloNIDine (CATAPRES) 0.1 MG tablet Take 1 tablet (0.1 mg) by mouth At Bedtime       lamoTRIgine (LAMICTAL) 25 MG  tablet Take 1 tablet (25 mg) by mouth daily 30 tablet 1     levonorgestrel (MIRENA) 20 MCG/24HR IUD 1 each (20 mcg) by Intrauterine route continuous       albuterol (ALBUTEROL) 108 (90 BASE) MCG/ACT inhaler Inhale 2 puffs into the lungs every 4 hours as needed for shortness of breath / dyspnea 1 Inhaler 0     traZODone (DESYREL) 50 MG tablet Take 1 tablet (50 mg) by mouth nightly as needed for sleep 90 tablet 0     Allergies   Allergen Reactions     Blood-Group Specific Substance      Patient has Probable passiive anti D Antibody. Blood Product orders may be delayed.  Draw one red top and two purple top tubes for ALL Type and Screen/ Type and Crossmatch orders.       Vicodin [Hydrocodone-Acetaminophen] Nausea and Vomiting       Mammogram not appropriate for this patient based on age.    Pertinent mammograms are reviewed under the imaging tab.  History of abnormal Pap smear:   PAP / HPV 1/29/2015   PAP NIL     Reviewed and updated as needed this visit by clinical staff  Tobacco  Allergies  Meds  Med Hx  Surg Hx  Fam Hx  Soc Hx        Reviewed and updated as needed this visit by Provider           Has not been feeling well since yesterday. Temp at home up to 101. No cough, wheezing, shortness of breath or sore throat at home. Took some ibuproofen and does feel some better now. Apptitie is ok. No other ill contacts that is aware of.  Is having a lot of sinus congestion and sinus pain and pressure.  Having pain in ears.    Vaginal discharge and itching for the last week. No new sexual partners. No pain with urination. No frquency. No trouble holding urei.     Last Pap: 2015 NIL, Never an abnormal.   Last mammogram: Never. Mom with breast cancer. Had at age 55.   Last BMD: Never   Last Colonoscopy: Never. PGF and MGF. Parents are good about getting their colonoscopies and they have been normal.   Last eye exam: Annually   Last dental exam: Every 6 mo  Last tetanus vaccine: 2016  Last influenza vaccine: Declines    Last shingles vaccine: Never   Last pneumonia vaccine: Never   Hep C screen (born [birth year]): N/A  HIV screen: 2015  AAA screen (age 65-78 with smoking hx): N/A  IVD (HTN, Hyperlipid, Smoking): N/A  Lung CA screening (55-80, 30 pk smoking hx): N/A    ROS:  Review of Systems   Constitutional: Negative for activity change and fatigue.   HENT: Positive for congestion, dental problem, ear pain, sinus pain and sinus pressure. Negative for rhinorrhea and sore throat.    Respiratory: Negative for cough, chest tightness, shortness of breath and wheezing.    Cardiovascular: Negative for chest pain, palpitations and leg swelling.   Gastrointestinal: Negative for abdominal distention, abdominal pain, constipation, diarrhea, nausea and vomiting.   Endocrine: Negative for cold intolerance, heat intolerance, polydipsia, polyphagia and polyuria.   Genitourinary: Positive for vaginal discharge (and itching). Negative for difficulty urinating, enuresis, frequency and urgency.   Musculoskeletal: Negative for arthralgias.   Skin: Negative for rash.        Self breast exam without area of concern.    Neurological: Negative for dizziness, light-headedness, numbness and headaches.   Psychiatric/Behavioral: Negative for dysphoric mood and sleep disturbance. The patient is not nervous/anxious.          OBJECTIVE:   /78  Pulse 68  Temp 98.7  F (37.1  C) (Tympanic)  Ht 5' (1.524 m)  Wt 140 lb (63.5 kg)  LMP  (LMP Unknown)  Breastfeeding? No  BMI 27.34 kg/m2  EXAM:  Physical Exam   Constitutional: She appears well-developed and well-nourished.   HENT:   Head: Normocephalic and atraumatic.   Right Ear: Tympanic membrane and external ear normal. No middle ear effusion.   Left Ear: Tympanic membrane and external ear normal.  No middle ear effusion.   Nose: No mucosal edema. Right sinus exhibits maxillary sinus tenderness. Left sinus exhibits maxillary sinus tenderness.   Mouth/Throat: Uvula is midline, oropharynx is clear and  moist and mucous membranes are normal.   Eyes: Pupils are equal, round, and reactive to light.   Neck: Normal range of motion. Carotid bruit is not present. No thyromegaly present.   Cardiovascular: Normal rate, regular rhythm and normal heart sounds.    Pulses:       Femoral pulses are 2+ on the right side, and 2+ on the left side.       Dorsalis pedis pulses are 2+ on the right side, and 2+ on the left side.        Posterior tibial pulses are 2+ on the right side, and 2+ on the left side.   Pulmonary/Chest: Effort normal and breath sounds normal.   Abdominal: Soft. Normal appearance and bowel sounds are normal. There is no tenderness.   Genitourinary: Uterus is not enlarged. Cervix exhibits no motion tenderness and no discharge. Right adnexum displays no mass and no tenderness. Left adnexum displays no mass and no tenderness. Vaginal discharge found.   Musculoskeletal: Normal range of motion.   Neurological: She is alert.   Skin: Skin is warm and dry. No rash noted.   Psychiatric: She has a normal mood and affect. Her behavior is normal.         ASSESSMENT/PLAN:   1. Routine general medical examination at a health care facility  Screening guidelines reviewed.   Plan mammogram at age 45 due to family history.  Educated regarding screening for colon cancer    2. Screening for cervical cancer    - Pap imaged thin layer screen with HPV - recommended age 30 - 65    3. Screening for human papillomavirus    - HPV High Risk Types DNA Cervical    4. Screen for STD (sexually transmitted disease)    - Chlamydia trachomatis PCR  - Neisseria gonorrhoeae PCR    5. Vaginal discharge    - Wet prep  - UA reflex to Microscopic and Culture    6. Acute non-recurrent maxillary sinusitis  Reviewed treatment plan and role of antibiotics  Instructed to call or return for :  --symptoms not improved in 3 days  --Worsening fever or headaches  --new, unexplained symptoms develop  - amoxicillin (AMOXIL) 500 MG capsule; Take 1 capsule (500 mg)  by mouth 3 times daily  Dispense: 30 capsule; Refill: 0    COUNSELING:   Reviewed preventive health counseling, as reflected in patient instructions       Regular exercise       Healthy diet/nutrition       Safe sex practices/STD prevention       Colon cancer screening    BP Readings from Last 1 Encounters:   09/06/18 110/78     Estimated body mass index is 27.34 kg/(m^2) as calculated from the following:    Height as of this encounter: 5' (1.524 m).    Weight as of this encounter: 140 lb (63.5 kg).           reports that she has quit smoking. She started smoking about 3 years ago. She smoked 0.25 packs per day. She has never used smokeless tobacco.      Counseling Resources:  ATP IV Guidelines  Pooled Cohorts Equation Calculator  Breast Cancer Risk Calculator  FRAX Risk Assessment  ICSI Preventive Guidelines  Dietary Guidelines for Americans, 2010  USDA's MyPlate  ASA Prophylaxis  Lung CA Screening    YURI Kaye [facility]

## 2018-09-06 NOTE — LETTER
[facility]  [address] [place] [id number]  Phone: [phone]    September 6, 2018        Will Dodson  [address] [place] [address]          To whom it may concern:    RE: Will Dodson    Patient was seen and treated today at our clinic. Please excuse her from work 9/6/18 and 9/7/18    Please contact me for questions or concerns.      Sincerely,        YURI Kaye CNP

## 2018-09-06 NOTE — LETTER
September 13, 2018    Will Dodson  [address] [place] [id number]    Dear Will,  We are happy to inform you that your PAP smear result from 9/6/18 is normal.  We are now able to do a follow up test on PAP smears. The DNA test is for HPV (Human Papilloma Virus). Cervical cancer is closely linked with certain types of HPV. Your results showed no evidence of high risk HPV.  Therefore we recommend you return in 5 years for your next pap smear and HPV test.  You will still need to return to the clinic every year for an annual exam and other preventive tests.  Please contact the clinic at [phone] with any questions.  Sincerely,    Vielka Cordon, YURI CNP/rlm

## 2018-09-06 NOTE — LETTER
September 11, 2018      Will Dodson  [address] [place] [address]              Will,     You are negative for sexually transmitted diseases. Please use condoms with every sexual encounter.     Resulted Orders   Chlamydia trachomatis PCR   Result Value Ref Range    Specimen Description Cervix     Chlamydia Trachomatis PCR Negative NEG^Negative      Comment:      Negative for C. trachomatis rRNA by transcription mediated amplification.  A negative result by transcription mediated amplification does not preclude   the presence of C. trachomatis infection because results are dependent on   proper and adequate collection, absence of inhibitors, and sufficient rRNA to   be detected.     Neisseria gonorrhoeae PCR   Result Value Ref Range    Specimen Descrip Cervix     N Gonorrhea PCR Negative NEG^Negative      Comment:      Negative for N. gonorrhoeae rRNA by transcription mediated amplification.  A negative result by transcription mediated amplification does not preclude   the presence of N. gonorrhoeae infection because results are dependent on   proper and adequate collection, absence of inhibitors, and sufficient rRNA to   be detected.     Wet prep   Result Value Ref Range    Specimen Description Vagina     Wet Prep Clue cells seen (A)     Wet Prep No yeast seen     Wet Prep No Trichomonas seen    UA reflex to Microscopic and Culture   Result Value Ref Range    Color Urine Yellow     Appearance Urine Clear     Glucose Urine Negative NEG^Negative mg/dL    Bilirubin Urine Negative NEG^Negative    Ketones Urine Negative NEG^Negative mg/dL    Specific Gravity Urine 1.015 1.003 - 1.035    Blood Urine Negative NEG^Negative    pH Urine 8.5 (H) 5.0 - 7.0 pH    Protein Albumin Urine Negative NEG^Negative mg/dL    Urobilinogen Urine 0.2 0.2 - 1.0 EU/dL    Nitrite Urine Negative NEG^Negative    Leukocyte Esterase Urine Negative NEG^Negative    Source Midstream Urine        If you have any questions or concerns, please  call the clinic at the number listed above.       Sincerely,        YURI Kaye CNP/ag

## 2018-09-06 NOTE — MR AVS SNAPSHOT
After Visit Summary   9/6/2018    Will Dodson    MRN: [id number]           Patient Information     Date Of Birth          1985        Visit Information        Provider Department      9/6/2018 1:20 PM Vielka Cordon APRN [facility]        Today's Diagnoses     Screen for STD (sexually transmitted disease)    -  1    Routine general medical examination at a health care facility        Screening for cervical cancer        Screening for human papillomavirus        Vaginal discharge        Acute non-recurrent maxillary sinusitis          Care Instructions      Preventive Health Recommendations  Female Ages 26 - 39  Yearly exam:   See your health care provider every year in order to    Review health changes.     Discuss preventive care.      Review your medicines if you your doctor has prescribed any.    Until age 30: Get a Pap test every three years (more often if you have had an abnormal result).    After age 30: Talk to your doctor about whether you should have a Pap test every 3 years or have a Pap test with HPV screening every 5 years.   You do not need a Pap test if your uterus was removed (hysterectomy) and you have not had cancer.  You should be tested each year for STDs (sexually transmitted diseases), if you're at risk.   Talk to your provider about how often to have your cholesterol checked.  If you are at risk for diabetes, you should have a diabetes test (fasting glucose).  Shots: Get a flu shot each year. Get a tetanus shot every 10 years.   Nutrition:     Eat at least 5 servings of fruits and vegetables each day.    Eat whole-grain bread, whole-wheat pasta and brown rice instead of white grains and rice.    Get adequate Calcium and Vitamin D.     Lifestyle    Exercise at least 150 minutes a week (30 minutes a day, 5 days of the week). This will help you control your weight and prevent disease.    Limit alcohol to one drink per day.    No smoking.     Wear  "sunscreen to prevent skin cancer.    See your dentist every six months for an exam and cleaning.            Follow-ups after your visit        Who to contact     Normal or non-critical lab and imaging results will be communicated to you by AlignMedhart, letter or phone within 4 business days after the clinic has received the results. If you do not hear from us within 7 days, please contact the clinic through AlignMedhart or phone. If you have a critical or abnormal lab result, we will notify you by phone as soon as possible.  Submit refill requests through Pro[employer] TV or call your pharmacy and they will forward the refill request to us. Please allow 3 business days for your refill to be completed.          If you need to speak with a  for additional information , please call: [phone]           Additional Information About Your Visit        Procam TV Information     Procam TV lets you send messages to your doctor, view your test results, renew your prescriptions, schedule appointments and more. To sign up, go to [email or web address] . Click on \"Log in\" on the left side of the screen, which will take you to the Welcome page. Then click on \"Sign up Now\" on the right side of the page.     You will be asked to enter the access code listed below, as well as some personal information. Please follow the directions to create your username and password.     Your access code is: [email or web address]  Expires: 2018 12:54 PM     Your access code will  in 90 days. If you need help or a new code, please call your [facility] clinic or [phone].        Care EveryWhere ID     This is your Care EveryWhere ID. This could be used by other organizations to access your [facility] medical records  [id number]        Your Vitals Were     Pulse Temperature Height Last Period Breastfeeding? BMI (Body Mass Index)    68 98.7  F (37.1  C) (Tympanic) 5' (1.524 m) (LMP Unknown) No 27.34 kg/m2       Blood Pressure from Last 3 " Encounters:   09/06/18 110/78   08/25/18 99/64   08/24/18 92/62    Weight from Last 3 Encounters:   09/06/18 140 lb (63.5 kg)   08/26/18 142 lb (64.4 kg)   08/23/18 139 lb (63 kg)              We Performed the Following     Chlamydia trachomatis PCR     HPV High Risk Types DNA Cervical     Neisseria gonorrhoeae PCR     Pap imaged thin layer screen with HPV - recommended age 30 - 65     UA reflex to Microscopic and Culture     Wet prep          Today's Medication Changes          These changes are accurate as of 9/6/18  2:08 PM.  If you have any questions, ask your nurse or doctor.               Start taking these medicines.        Dose/Directions    amoxicillin 500 MG capsule   Commonly known as:  AMOXIL   Used for:  Acute non-recurrent maxillary sinusitis   Started by:  Vielka Cordon APRN CNP        Dose:  500 mg   Take 1 capsule (500 mg) by mouth 3 times daily   Quantity:  30 capsule   Refills:  0            Where to get your medicines      These medications were sent to [facility] [address] [place] [address] [address]   [address] [facility] [address]     Phone:  [phone]     amoxicillin 500 MG capsule                Primary Care Provider Office Phone # Fax #    Norbert Wooten [phone] [address] [facility] [address]        Equal Access to Services     JEFFERY SALINAS : Hadii jessica whatley hadasho Soomaali, waaxda luqadaha, qaybta kaalmada adeegyada, ashley quigley. So [facility] [phone].    ATENCIÓN: Si habla español, tiene a jarrett disposición servicios gratuitos de asistencia lingüística. Clementina al [phone].    We comply with applicable federal civil rights laws and [place] laws. We do not discriminate on the basis of race, color, national origin, age, disability, sex, sexual orientation, or gender identity.            Thank you!     Thank you for choosing [facility]  for your care. Our goal is always to provide  you with excellent care. Hearing back from our patients is one way we can continue to improve our services. Please take a few minutes to complete the written survey that you may receive in the mail after your visit with us. Thank you!             Your Updated Medication List - Protect others around you: Learn how to safely use, store and throw away your medicines at www.disposemymeds.org.          This list is accurate as of 9/6/18  2:08 PM.  Always use your most recent med list.                   Brand Name Dispense Instructions for use Diagnosis    albuterol 108 (90 Base) MCG/ACT inhaler    PROAIR HFA    1 Inhaler    Inhale 2 puffs into the lungs every 4 hours as needed for shortness of breath / dyspnea        amoxicillin 500 MG capsule    AMOXIL    30 capsule    Take 1 capsule (500 mg) by mouth 3 times daily    Acute non-recurrent maxillary sinusitis       buPROPion 100 MG 12 hr tablet    WELLBUTRIN SR    30 tablet    Take 1 tablet (100 mg) by mouth daily        cloNIDine 0.1 MG tablet    CATAPRES     Take 1 tablet (0.1 mg) by mouth At Bedtime    Posttraumatic stress disorder       lamoTRIgine 25 MG tablet    LaMICtal    30 tablet    Take 1 tablet (25 mg) by mouth daily        levonorgestrel 20 MCG/24HR IUD    MIRENA     1 each (20 mcg) by Intrauterine route continuous    Encounter for IUD insertion       traZODone 50 MG tablet    DESYREL    90 tablet    Take 1 tablet (50 mg) by mouth nightly as needed for sleep

## 2018-09-06 NOTE — LETTER
September 11, 2018      Will Dodson  [address] [facility] [address]          Will,     One of the swabs that we did at your appointment yesterday is positive for bacteria. This is not a sexually transmitted disease.  I have sent a prescription to the pharmacy, [facility] in [place], for Flagyl 500 mg twice per day for the next 7 days.  You should take this with food.  Please do not drink any alcohol with this medication or 24 hours after your last dose as it can make you extremely ill.    Resulted Orders   Chlamydia trachomatis PCR   Result Value Ref Range    Specimen Description Cervix     Chlamydia Trachomatis PCR Negative NEG^Negative      Comment:      Negative for C. trachomatis rRNA by transcription mediated amplification.  A negative result by transcription mediated amplification does not preclude   the presence of C. trachomatis infection because results are dependent on   proper and adequate collection, absence of inhibitors, and sufficient rRNA to   be detected.     Neisseria gonorrhoeae PCR   Result Value Ref Range    Specimen Descrip Cervix     N Gonorrhea PCR Negative NEG^Negative      Comment:      Negative for N. gonorrhoeae rRNA by transcription mediated amplification.  A negative result by transcription mediated amplification does not preclude   the presence of N. gonorrhoeae infection because results are dependent on   proper and adequate collection, absence of inhibitors, and sufficient rRNA to   be detected.     Wet prep   Result Value Ref Range    Specimen Description Vagina     Wet Prep Clue cells seen (A)     Wet Prep No yeast seen     Wet Prep No Trichomonas seen    UA reflex to Microscopic and Culture   Result Value Ref Range    Color Urine Yellow     Appearance Urine Clear     Glucose Urine Negative NEG^Negative mg/dL    Bilirubin Urine Negative NEG^Negative    Ketones Urine Negative NEG^Negative mg/dL    Specific Gravity Urine 1.015 1.003 - 1.035    Blood Urine Negative  NEG^Negative    pH Urine 8.5 (H) 5.0 - 7.0 pH    Protein Albumin Urine Negative NEG^Negative mg/dL    Urobilinogen Urine 0.2 0.2 - 1.0 EU/dL    Nitrite Urine Negative NEG^Negative    Leukocyte Esterase Urine Negative NEG^Negative    Source Midstream Urine        If you have any questions or concerns, please call the clinic at the number listed above.       Sincerely,        YURI Kaye CNP/ag

## 2018-09-07 ENCOUNTER — TELEPHONE (OUTPATIENT)
Dept: FAMILY MEDICINE | Facility: CLINIC | Age: 33
End: 2018-09-07

## 2018-09-07 DIAGNOSIS — B96.89 BV (BACTERIAL VAGINOSIS): Primary | ICD-10-CM

## 2018-09-07 DIAGNOSIS — N76.0 BV (BACTERIAL VAGINOSIS): Primary | ICD-10-CM

## 2018-09-07 LAB
C TRACH DNA SPEC QL NAA+PROBE: NEGATIVE
N GONORRHOEA DNA SPEC QL NAA+PROBE: NEGATIVE
SPECIMEN SOURCE: NORMAL
SPECIMEN SOURCE: NORMAL

## 2018-09-07 RX ORDER — METRONIDAZOLE 500 MG/1
500 TABLET ORAL 2 TIMES DAILY
Qty: 14 TABLET | Refills: 0 | Status: SHIPPED | OUTPATIENT
Start: 2018-09-07 | End: 2018-09-21

## 2018-09-07 ASSESSMENT — ANXIETY QUESTIONNAIRES: GAD7 TOTAL SCORE: 2

## 2018-09-07 ASSESSMENT — PATIENT HEALTH QUESTIONNAIRE - PHQ9: SUM OF ALL RESPONSES TO PHQ QUESTIONS 1-9: 2

## 2018-09-07 NOTE — TELEPHONE ENCOUNTER
Please call Will with results today.     Will,  One of the swabs that we did at your appointment yesterday is positive for bacteria. This is not a sexually transmitted disease.  I have sent a prescription to the pharmacy, [facility] in [place], for Flagyl 500 mg twice per day for the next 7 days.  You should take this with food.  Please do not drink any alcohol with this medication or 24 hours after your last dose as it can make you extremely ill.    Vielka HOLLAND

## 2018-09-07 NOTE — PROGRESS NOTES
Will,     You are negative for sexually transmitted diseases. Please use condoms with every sexual encounter.    Vielka ESQUEDAC

## 2018-09-10 NOTE — TELEPHONE ENCOUNTER
ML again on pt ID VM to  medication and take , to call back to discuss  [facility]  RN/Richi Shrestha

## 2018-09-10 NOTE — TELEPHONE ENCOUNTER
Pt left msg she was returning our call,  I have left her a msg to try us again , when she calls back, please read her the note below left by Vielka.     Shanell Samuels, [place]     No

## 2018-09-11 LAB
COPATH REPORT: NORMAL
PAP: NORMAL

## 2018-09-12 LAB
FINAL DIAGNOSIS: NORMAL
HPV HR 12 DNA CVX QL NAA+PROBE: NEGATIVE
HPV16 DNA SPEC QL NAA+PROBE: NEGATIVE
HPV18 DNA SPEC QL NAA+PROBE: NEGATIVE
SPECIMEN DESCRIPTION: NORMAL
SPECIMEN SOURCE CVX/VAG CYTO: NORMAL

## 2018-09-21 ENCOUNTER — OFFICE VISIT (OUTPATIENT)
Dept: FAMILY MEDICINE | Facility: CLINIC | Age: 33
End: 2018-09-21
Payer: COMMERCIAL

## 2018-09-21 VITALS
BODY MASS INDEX: 26.7 KG/M2 | DIASTOLIC BLOOD PRESSURE: 70 MMHG | SYSTOLIC BLOOD PRESSURE: 120 MMHG | WEIGHT: 136 LBS | HEART RATE: 92 BPM | OXYGEN SATURATION: 98 % | TEMPERATURE: 98.8 F | HEIGHT: 60 IN

## 2018-09-21 DIAGNOSIS — Z23 NEED FOR PROPHYLACTIC VACCINATION AND INOCULATION AGAINST INFLUENZA: ICD-10-CM

## 2018-09-21 DIAGNOSIS — M54.42 ACUTE MIDLINE LOW BACK PAIN WITH LEFT-SIDED SCIATICA: Primary | ICD-10-CM

## 2018-09-21 PROCEDURE — 90686 IIV4 VACC NO PRSV 0.5 ML IM: CPT | Performed by: PHYSICIAN ASSISTANT

## 2018-09-21 PROCEDURE — 99213 OFFICE O/P EST LOW 20 MIN: CPT | Mod: 25 | Performed by: PHYSICIAN ASSISTANT

## 2018-09-21 PROCEDURE — 90471 IMMUNIZATION ADMIN: CPT | Performed by: PHYSICIAN ASSISTANT

## 2018-09-21 RX ORDER — KETOROLAC TROMETHAMINE 10 MG/1
10 TABLET, FILM COATED ORAL EVERY 6 HOURS PRN
Qty: 20 TABLET | Refills: 0 | Status: SHIPPED | OUTPATIENT
Start: 2018-09-21 | End: 2019-01-18

## 2018-09-21 NOTE — PATIENT INSTRUCTIONS
Common Spine and Disk Problems  The most common serious back problems happen when disks tear, bulge, or rupture. In such cases, an injured disk can no longer cushion the vertebrae and absorb shock. As a result, the rest of your spine may also weaken. This can lead to pain, stiffness, and other symptoms.     Torn annulus      Contained herniated disk      Extruded herniated disk     Torn annulus. A sudden movement may cause a tiny tear in an annulus. Nearby ligaments may stretch.    Contained herniated disk. As a disk wears out, the nucleus may bulge into the annulus and press on nerves.    Extruded herniated disk. When a disk ruptures, its nucleus can squeeze out and irritate a nerve.     Arthritis      Instability      Spondylolisthesis     Arthritis. As disks wear out over time, bone spurs form. These growths can irritate nerves and inflame facets.    Instability. As a disk stretches, the vertebrae slip back and forth. This can put pressure on the annulus.    Spondylolisthesis. Listhesis is a condition in which one vertebra has moved forward or backward, in relation to the one above or below it. This causes a crack (stress fracture) in the areas that link the vertebrae together. This may put pressure on the annulus, stretch the disk, and irritate nerves.  Date Last Reviewed: 10/18/2015    7372-6276 [employer]. [address] [place] [address], [place] [address]. All rights reserved. This information is not intended as a substitute for professional medical care. Always follow your healthcare professional's instructions.

## 2018-09-21 NOTE — MR AVS SNAPSHOT
After Visit Summary   9/21/2018    Will Dodson    MRN: [id number]           Patient Information     Date Of Birth          1985        Visit Information        Provider Department      9/21/2018 1:20 PM Yordan Bullock PA-C [facility]        Today's Diagnoses     Acute midline low back pain with left-sided sciatica    -  1    Need for prophylactic vaccination and inoculation against influenza          Care Instructions      Common Spine and Disk Problems  The most common serious back problems happen when disks tear, bulge, or rupture. In such cases, an injured disk can no longer cushion the vertebrae and absorb shock. As a result, the rest of your spine may also weaken. This can lead to pain, stiffness, and other symptoms.     Torn annulus      Contained herniated disk      Extruded herniated disk     Torn annulus. A sudden movement may cause a tiny tear in an annulus. Nearby ligaments may stretch.    Contained herniated disk. As a disk wears out, the nucleus may bulge into the annulus and press on nerves.    Extruded herniated disk. When a disk ruptures, its nucleus can squeeze out and irritate a nerve.     Arthritis      Instability      Spondylolisthesis     Arthritis. As disks wear out over time, bone spurs form. These growths can irritate nerves and inflame facets.    Instability. As a disk stretches, the vertebrae slip back and forth. This can put pressure on the annulus.    Spondylolisthesis. Listhesis is a condition in which one vertebra has moved forward or backward, in relation to the one above or below it. This causes a crack (stress fracture) in the areas that link the vertebrae together. This may put pressure on the annulus, stretch the disk, and irritate nerves.  Date Last Reviewed: 10/18/2015    7006-0159 [employer]. [address] [place] [address], [place] [address]. All rights reserved. This information is not intended as a substitute for professional  medical care. Always follow your healthcare professional's instructions.                Follow-ups after your visit        Additional Services     ORTHO  REFERRAL       [facility] is referring you to the Orthopedic  Services at [facility].       The  Representative will assist you in the coordination of your Orthopedic and Musculoskeletal Care as prescribed by your physician.    The  Representative will call you within 1 business day to help schedule your appointment, or you may contact the  Representative at:    All areas ~ [phone]     Type of Referral : Spine: Lumbar: Medical Spine/Non-Surgical Specialist        Timeframe requested: 1 - 2 days    Coverage of these services is subject to the terms and limitations of your health insurance plan.  Please call member services at your health plan with any benefit or coverage questions.      If X-rays, CT or MRI's have been performed, please contact the facility where they were done to arrange for , prior to your scheduled appointment.  Please bring this referral request to your appointment and present it to your specialist.                  Who to contact     Normal or non-critical lab and imaging results will be communicated to you by [employer], letter or phone within 4 business days after the clinic has received the results. If you do not hear from us within 7 days, please contact the clinic through [employer] or phone. If you have a critical or abnormal lab result, we will notify you by phone as soon as possible.  Submit refill requests through [employer] or call your pharmacy and they will forward the refill request to us. Please allow 3 business days for your refill to be completed.          If you need to speak with a  for additional information , please call: [phone]             Additional Information About Your Visit        [employer] Information      "[employer] lets you send messages to your doctor, view your test results, renew your prescriptions, schedule appointments and more. To sign up, go to [email or web address] . Click on \"Log in\" on the left side of the screen, which will take you to the Welcome page. Then click on \"Sign up Now\" on the right side of the page.     You will be asked to enter the access code listed below, as well as some personal information. Please follow the directions to create your username and password.     Your access code is: [email or web address]  Expires: 2018 12:54 PM     Your access code will  in 90 days. If you need help or a new code, please call your [facility] clinic or [phone].        Care EveryWhere ID     This is your Care EveryWhere ID. This could be used by other organizations to access your [facility] medical records  [id number]        Your Vitals Were     Pulse Temperature Height Last Period Pulse Oximetry BMI (Body Mass Index)    92 98.8  F (37.1  C) (Tympanic) 5' (1.524 m) (LMP Unknown) 98% 26.56 kg/m2       Blood Pressure from Last 3 Encounters:   18 120/70   18 110/78   18 99/64    Weight from Last 3 Encounters:   18 136 lb (61.7 kg)   18 140 lb (63.5 kg)   18 142 lb (64.4 kg)              We Performed the Following     FLU VACCINE, SPLIT VIRUS, IM (QUADRIVALENT) [26659]- >3 YRS     ORTHO [facility] REFERRAL     Vaccine Administration, Initial [96547]          Today's Medication Changes          These changes are accurate as of 18  1:51 PM.  If you have any questions, ask your nurse or doctor.               Start taking these medicines.        Dose/Directions    ketorolac 10 MG tablet   Commonly known as:  TORADOL   Used for:  Acute midline low back pain with left-sided sciatica   Started by:  Yordan Bullock PA-C        Dose:  10 mg   Take 1 tablet (10 mg) by mouth every 6 hours as needed for pain   Quantity:  20 tablet   Refills:  0            Where to get " your medicines      These medications were sent to [id number] - [place], [place] - [address]  [address], [facility] [address]     Phone:  [phone]     ketorolac 10 MG tablet                Primary Care Provider Office Phone # Fax #    Norbert Wooten [phone] [address] [facility] [address]        Equal Access to Services     JEFFERY SALINAS : Hadii aad ku hadasho Soomaali, waaxda luqadaha, qaybta kaalmada adeegyada, waxay idiin hayaan adeeg kharasirisha laandres quigley. So [facility] [phone].    ATENCIÓN: Si habla espgunnar, tiene a jarrett disposición servicios gratuitos de asistencia lingüística. [facility] [phone].    We comply with applicable federal civil rights laws and [place] laws. We do not discriminate on the basis of race, color, national origin, age, disability, sex, sexual orientation, or gender identity.            Thank you!     Thank you for choosing [facility]  for your care. Our goal is always to provide you with excellent care. Hearing back from our patients is one way we can continue to improve our services. Please take a few minutes to complete the written survey that you may receive in the mail after your visit with us. Thank you!             Your Updated Medication List - Protect others around you: Learn how to safely use, store and throw away your medicines at www.disposemymeds.org.          This list is accurate as of 9/21/18  1:51 PM.  Always use your most recent med list.                   Brand Name Dispense Instructions for use Diagnosis    albuterol 108 (90 Base) MCG/ACT inhaler    PROAIR HFA    1 Inhaler    Inhale 2 puffs into the lungs every 4 hours as needed for shortness of breath / dyspnea        buPROPion 100 MG 12 hr tablet    WELLBUTRIN SR    30 tablet    Take 1 tablet (100 mg) by mouth daily        ketorolac 10 MG tablet    TORADOL    20 tablet    Take 1 tablet (10 mg) by mouth every 6 hours as needed for pain    Acute  midline low back pain with left-sided sciatica       lamoTRIgine 25 MG tablet    LaMICtal    30 tablet    Take 1 tablet (25 mg) by mouth daily        levonorgestrel 20 MCG/24HR IUD    MIRENA     1 each (20 mcg) by Intrauterine route continuous    Encounter for IUD insertion       traZODone 50 MG tablet    DESYREL    90 tablet    Take 1 tablet (50 mg) by mouth nightly as needed for sleep
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
## 2018-10-05 ENCOUNTER — PATIENT OUTREACH (OUTPATIENT)
Dept: CARE COORDINATION | Facility: CLINIC | Age: 33
End: 2018-10-05

## 2018-10-05 NOTE — PROGRESS NOTES
Clinic Care Coordination Contact  Outreach    Attempted follow up call for regular CC/SW.  Pt said it was not a good time to talk and asked for a call back next week.     Will have Sw call pt in 3-5 business days.     ESTHELA Zhang, Clinic Care Coordinator 10/5/2018   3:08 PM  [phone]

## 2018-12-03 ENCOUNTER — APPOINTMENT (OUTPATIENT)
Dept: CT IMAGING | Facility: CLINIC | Age: 33
End: 2018-12-03
Attending: EMERGENCY MEDICINE
Payer: COMMERCIAL

## 2018-12-03 ENCOUNTER — HOSPITAL ENCOUNTER (EMERGENCY)
Facility: CLINIC | Age: 33
Discharge: HOME | End: 2018-12-03
Attending: EMERGENCY MEDICINE
Payer: COMMERCIAL

## 2018-12-03 ENCOUNTER — HOSPITAL ENCOUNTER (EMERGENCY)
Facility: CLINIC | Age: 33
Discharge: HOME | End: 2018-12-03
Attending: EMERGENCY MEDICINE | Admitting: EMERGENCY MEDICINE
Payer: COMMERCIAL

## 2018-12-03 VITALS
RESPIRATION RATE: 16 BRPM | TEMPERATURE: 98 F | OXYGEN SATURATION: 98 % | SYSTOLIC BLOOD PRESSURE: 104 MMHG | DIASTOLIC BLOOD PRESSURE: 70 MMHG

## 2018-12-03 VITALS
TEMPERATURE: 97.7 F | HEART RATE: 74 BPM | DIASTOLIC BLOOD PRESSURE: 98 MMHG | OXYGEN SATURATION: 99 % | RESPIRATION RATE: 16 BRPM | WEIGHT: 130 LBS | SYSTOLIC BLOOD PRESSURE: 124 MMHG | BODY MASS INDEX: 25.52 KG/M2 | HEIGHT: 60 IN

## 2018-12-03 DIAGNOSIS — S00.01XA ABRASION OF SCALP, INITIAL ENCOUNTER: ICD-10-CM
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
## 2018-12-03 DIAGNOSIS — S00.12XA PERIORBITAL ECCHYMOSIS OF LEFT EYE, INITIAL ENCOUNTER: ICD-10-CM

## 2018-12-03 DIAGNOSIS — S09.90XA INJURY OF HEAD, INITIAL ENCOUNTER: ICD-10-CM

## 2018-12-03 DIAGNOSIS — F10.920 ALCOHOLIC INTOXICATION WITHOUT COMPLICATION (H): ICD-10-CM

## 2018-12-03 DIAGNOSIS — S01.112A EYEBROW LACERATION, LEFT, INITIAL ENCOUNTER: ICD-10-CM

## 2018-12-03 DIAGNOSIS — T74.91XA DOMESTIC VIOLENCE OF ADULT, INITIAL ENCOUNTER: ICD-10-CM

## 2018-12-03 PROCEDURE — 70450 CT HEAD/BRAIN W/O DYE: CPT

## 2018-12-03 PROCEDURE — 99284 EMERGENCY DEPT VISIT MOD MDM: CPT | Mod: 25 | Performed by: EMERGENCY MEDICINE

## 2018-12-03 PROCEDURE — 99282 EMERGENCY DEPT VISIT SF MDM: CPT | Mod: 27 | Performed by: EMERGENCY MEDICINE

## 2018-12-03 PROCEDURE — 27210282 ZZH ADHESIVE DERMABOND SKIN: Performed by: EMERGENCY MEDICINE

## 2018-12-03 PROCEDURE — 12011 RPR F/E/E/N/L/M 2.5 CM/<: CPT | Mod: Z6 | Performed by: EMERGENCY MEDICINE

## 2018-12-03 PROCEDURE — 25000131 ZZH RX MED GY IP 250 OP 636 PS 637: Performed by: EMERGENCY MEDICINE

## 2018-12-03 PROCEDURE — 25000132 ZZH RX MED GY IP 250 OP 250 PS 637: Performed by: EMERGENCY MEDICINE

## 2018-12-03 PROCEDURE — 12011 RPR F/E/E/N/L/M 2.5 CM/<: CPT | Performed by: EMERGENCY MEDICINE

## 2018-12-03 PROCEDURE — 25000125 ZZHC RX 250

## 2018-12-03 RX ORDER — ONDANSETRON 4 MG/1
4 TABLET, ORALLY DISINTEGRATING ORAL ONCE
Status: COMPLETED | OUTPATIENT
Start: 2018-12-03 | End: 2018-12-03

## 2018-12-03 RX ORDER — OXYCODONE HYDROCHLORIDE 5 MG/1
5 TABLET ORAL EVERY 6 HOURS PRN
Qty: 8 TABLET | Refills: 0 | Status: SHIPPED | OUTPATIENT
Start: 2018-12-03 | End: 2018-12-05

## 2018-12-03 RX ORDER — ACETAMINOPHEN 500 MG
1000 TABLET ORAL ONCE
Status: COMPLETED | OUTPATIENT
Start: 2018-12-03 | End: 2018-12-03

## 2018-12-03 RX ORDER — IBUPROFEN 400 MG/1
800 TABLET, FILM COATED ORAL ONCE
Status: COMPLETED | OUTPATIENT
Start: 2018-12-03 | End: 2018-12-03

## 2018-12-03 RX ADMIN — Medication: at 01:14

## 2018-12-03 RX ADMIN — ACETAMINOPHEN 1000 MG: 500 TABLET ORAL at 01:14

## 2018-12-03 RX ADMIN — IBUPROFEN 800 MG: 400 TABLET ORAL at 02:12

## 2018-12-03 RX ADMIN — ONDANSETRON 4 MG: 4 TABLET, ORALLY DISINTEGRATING ORAL at 01:15

## 2018-12-03 NOTE — ED NOTES
The number to the [employer] was texted to the patient per her request rather than taking the phamplet.  MD/Primary RN notified
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
## 2018-12-03 NOTE — ED AVS SNAPSHOT
[facility] Emergency Department    [address] [address]    Phone:  [phone]    Fax:  [phone]                                       Will Dodson   MRN: [id number]    Department:  [facility] Emergency Department   Date of Visit:  12/3/2018           After Visit Summary Signature Page     I have received my discharge instructions, and my questions have been answered. I have discussed any challenges I see with this plan with the nurse or doctor.    ..........................................................................................................................................  Patient/Patient Representative Signature      ..........................................................................................................................................  Patient Representative Print Name and Relationship to Patient    ..................................................               ................................................  Date                                   Time    ..........................................................................................................................................  Reviewed by Signature/Title    ...................................................              ..............................................  Date                                               Time          22EPIC Rev 08/18
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
## 2018-12-03 NOTE — DISCHARGE INSTRUCTIONS
Return to the emergency department if you have worsening symptoms, repeated vomiting, or other concerns.  The glue around your left eyebrow will dissolve on its own.  Do not put any antibiotic ointment over this.  Return if it becomes more swollen, red, warm to the touch or if there are signs of infection.
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
## 2018-12-03 NOTE — ED AVS SNAPSHOT
[facility] Emergency Department    [address] [address]    Phone:  [phone]    Fax:  [phone]                                       Will Dodson   MRN: [id number]    Department:  [facility] Emergency Department   Date of Visit:  12/3/2018           After Visit Summary Signature Page     I have received my discharge instructions, and my questions have been answered. I have discussed any challenges I see with this plan with the nurse or doctor.    ..........................................................................................................................................  Patient/Patient Representative Signature      ..........................................................................................................................................  Patient Representative Print Name and Relationship to Patient    ..................................................               ................................................  Date                                   Time    ..........................................................................................................................................  Reviewed by Signature/Title    ...................................................              ..............................................  Date                                               Time          22EPIC Rev 08/18
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
## 2018-12-04 ENCOUNTER — PATIENT OUTREACH (OUTPATIENT)
Dept: CARE COORDINATION | Facility: CLINIC | Age: 33
End: 2018-12-04

## 2018-12-04 ASSESSMENT — ACTIVITIES OF DAILY LIVING (ADL): DEPENDENT_IADLS:: INDEPENDENT

## 2018-12-04 NOTE — ED NOTES
Pt presents to ED with c/o inadequate pain control.  Pt seen in ED after fall, see previous notes.  Pt states that pain as not be adequately controlled at home with ibuprofen and tylenol.  No other complaints.  No changes in vision.  Bruising present left periorbital area.  Dermabond intact.  Call light within reach.  Ice pack applied.

## 2018-12-04 NOTE — PROGRESS NOTES
Clinic Care Coordination Contact  [facility]    Referral Source: ED Follow-Up  Clinical Data: Care Coordinator Outreach  Outreach attempted x 1.  Left message on voicemail with call back information and requested return call.  Plan: Care Coordinator mailed out care coordination introduction letter on 12-4-18. Care Coordinator will try to reach patient again in 1-2 business days.    Loulou Epps  Social Work Care Coordinator  [facility]  [phone]
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
## 2018-12-04 NOTE — LETTER
Health Care Home - Access Care Plan    About Me:  Patient Name:  Will Dodson    YOB: 1985  Age:                           33 year old   Beatrice MRN:          [id number] Telephone Information:   Home Phone [phone]   Mobile [phone]       Address:    [address] [place] [address] Email address:  No e-mail address on record      Emergency Contact(s)  Name Relationship Lgl Grd Work Phone Home Phone Mobile Phone   SOCORRO VILLA Mother    [phone]             Health Maintenance: Routine Health maintenance Reviewed: Up to date    My Access Plan  Medical Emergency 911   Questions or concerns during clinic hours Primary Clinic Line, I will call the clinic directly: [facility] - [phone]   24 Hour Appointment Line [phone] or  [phone]) (toll free)   24 Hour Nurse Line [phone] (toll free)   Questions or concerns outside clinic hours 24 Hour Appointment Line, I will call the after-hours on-call line:   [facility] [phone] or [phone]) (toll-free)   Preferred Urgent Care [facility], [phone]   Preferred Hospital [place], [place]  [phone]   Preferred Pharmacy [facility] - [place], [place] - [address]      Behavioral Health Crisis Line The National Suicide Prevention Lifeline at 1-948.425.6183 or 911           My Care Team Members  Patient Care Team       Relationship Specialty Notifications Start End    Norbert Wooten MD PCP - General Family Practice  18     Phone: [phone] Fax: [phone]         [address] [facility] [address]    Loulou Del Valle LSW Clinic Care Coordinator Primary Care - CC Admissions 18     Phone: [phone] Fax: [phone]               My Medical and Care Information  Problem List   Patient Active Problem List   Diagnosis     H/O:      Generalized anxiety disorder      Panic attack     ADHD (attention deficit hyperactivity disorder), combined type     RhD negative     Moderate episode of recurrent major depressive disorder (H)     Posttraumatic stress disorder     Chemical dependency (H)     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     Depression with anxiety      Current Medications and Allergies:  See printed Medication Report

## 2018-12-04 NOTE — LETTER
[facility] CARE COORDINATION  [address] [place] [address]  [phone]      December 4, 2018    Will Dodson  [address] [place] [id number]      Dear Will,    I am a clinic care coordinator who works with Norbert Wooten MD at [facility].  I recently tried to call and was unable to reach you. I wanted to introduce myself and provide you with my contact information so that you can call me with questions or concerns about your health care. Below is a description of clinic care coordination and how I can further assist you.     The clinic care coordinator is a registered nurse and/or  who understand the health care system. The goal of clinic care coordination is to help you manage your health and improve access to the [facility] in the most efficient manner. The registered nurse can assist you in meeting your health care goals by providing education, coordinating services, and strengthening the communication among your providers. The  can assist you with financial, behavioral, psychosocial, chemical dependency, counseling, and/or psychiatric resources.    Please feel free to contact me at [phone], with any questions or concerns. We at [facility] are focused on providing you with the highest-quality healthcare experience possible and that all starts with you.     Sincerely,     Loulou Del Valle    Enclosed: I have enclosed a copy of a 24 Hour Access Plan. This has helpful phone numbers for you to call when needed. Please keep this in an easy to access place to use as needed.
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
## 2018-12-05 ENCOUNTER — APPOINTMENT (OUTPATIENT)
Dept: CT IMAGING | Facility: CLINIC | Age: 33
End: 2018-12-05
Attending: STUDENT IN AN ORGANIZED HEALTH CARE EDUCATION/TRAINING PROGRAM
Payer: COMMERCIAL

## 2018-12-05 ENCOUNTER — HOSPITAL ENCOUNTER (EMERGENCY)
Facility: CLINIC | Age: 33
Discharge: HOME | End: 2018-12-05
Attending: STUDENT IN AN ORGANIZED HEALTH CARE EDUCATION/TRAINING PROGRAM | Admitting: STUDENT IN AN ORGANIZED HEALTH CARE EDUCATION/TRAINING PROGRAM
Payer: COMMERCIAL

## 2018-12-05 VITALS
DIASTOLIC BLOOD PRESSURE: 81 MMHG | WEIGHT: 130 LBS | TEMPERATURE: 98.1 F | SYSTOLIC BLOOD PRESSURE: 116 MMHG | OXYGEN SATURATION: 98 % | HEART RATE: 92 BPM | RESPIRATION RATE: 14 BRPM | BODY MASS INDEX: 25.39 KG/M2

## 2018-12-05 DIAGNOSIS — G50.0 INFRAORBITAL NEURALGIA: ICD-10-CM

## 2018-12-05 DIAGNOSIS — S02.32XA CLOSED FRACTURE OF LEFT ORBITAL FLOOR, INITIAL ENCOUNTER (H): ICD-10-CM

## 2018-12-05 PROCEDURE — 99284 EMERGENCY DEPT VISIT MOD MDM: CPT | Mod: 25 | Performed by: STUDENT IN AN ORGANIZED HEALTH CARE EDUCATION/TRAINING PROGRAM

## 2018-12-05 PROCEDURE — 25000132 ZZH RX MED GY IP 250 OP 250 PS 637: Performed by: STUDENT IN AN ORGANIZED HEALTH CARE EDUCATION/TRAINING PROGRAM

## 2018-12-05 PROCEDURE — 99284 EMERGENCY DEPT VISIT MOD MDM: CPT | Mod: Z6 | Performed by: STUDENT IN AN ORGANIZED HEALTH CARE EDUCATION/TRAINING PROGRAM

## 2018-12-05 PROCEDURE — 70486 CT MAXILLOFACIAL W/O DYE: CPT

## 2018-12-05 RX ORDER — OXYCODONE HYDROCHLORIDE 5 MG/1
5-10 TABLET ORAL EVERY 6 HOURS PRN
Qty: 10 TABLET | Refills: 0 | Status: SHIPPED | OUTPATIENT
Start: 2018-12-05 | End: 2018-12-06

## 2018-12-05 RX ORDER — OLANZAPINE 5 MG/1
5 TABLET, ORALLY DISINTEGRATING ORAL ONCE
Status: COMPLETED | OUTPATIENT
Start: 2018-12-05 | End: 2018-12-05

## 2018-12-05 RX ORDER — OXYCODONE HYDROCHLORIDE 5 MG/1
5 TABLET ORAL ONCE
Status: COMPLETED | OUTPATIENT
Start: 2018-12-05 | End: 2018-12-05

## 2018-12-05 RX ADMIN — OXYCODONE HYDROCHLORIDE 5 MG: 5 TABLET ORAL at 13:52

## 2018-12-05 RX ADMIN — OLANZAPINE 5 MG: 5 TABLET, ORALLY DISINTEGRATING ORAL at 13:52
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
## 2018-12-05 NOTE — ED AVS SNAPSHOT
[facility] Emergency Department    [address] [address]    Phone:  [phone]    Fax:  [phone]                                       Will Dodson   MRN: [id number]    Department:  [facility] Emergency Department   Date of Visit:  12/5/2018           After Visit Summary Signature Page     I have received my discharge instructions, and my questions have been answered. I have discussed any challenges I see with this plan with the nurse or doctor.    ..........................................................................................................................................  Patient/Patient Representative Signature      ..........................................................................................................................................  Patient Representative Print Name and Relationship to Patient    ..................................................               ................................................  Date                                   Time    ..........................................................................................................................................  Reviewed by Signature/Title    ...................................................              ..............................................  Date                                               Time          22EPIC Rev 08/18
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
## 2018-12-06 ENCOUNTER — PATIENT OUTREACH (OUTPATIENT)
Dept: CARE COORDINATION | Facility: CLINIC | Age: 33
End: 2018-12-06

## 2018-12-06 ENCOUNTER — HOSPITAL ENCOUNTER (EMERGENCY)
Facility: CLINIC | Age: 33
Discharge: HOME | End: 2018-12-06
Attending: EMERGENCY MEDICINE | Admitting: EMERGENCY MEDICINE
Payer: COMMERCIAL

## 2018-12-06 ENCOUNTER — TELEPHONE (OUTPATIENT)
Dept: OTOLARYNGOLOGY | Facility: CLINIC | Age: 33
End: 2018-12-06

## 2018-12-06 VITALS
HEART RATE: 82 BPM | WEIGHT: 136.24 LBS | SYSTOLIC BLOOD PRESSURE: 128 MMHG | TEMPERATURE: 98 F | DIASTOLIC BLOOD PRESSURE: 77 MMHG | OXYGEN SATURATION: 98 % | BODY MASS INDEX: 26.61 KG/M2 | RESPIRATION RATE: 16 BRPM

## 2018-12-06 DIAGNOSIS — S02.85XA CLOSED FRACTURE OF LEFT ORBIT, INITIAL ENCOUNTER (H): ICD-10-CM

## 2018-12-06 PROCEDURE — 99281 EMR DPT VST MAYX REQ PHY/QHP: CPT | Performed by: EMERGENCY MEDICINE

## 2018-12-06 PROCEDURE — 99284 EMERGENCY DEPT VISIT MOD MDM: CPT | Mod: Z6 | Performed by: EMERGENCY MEDICINE

## 2018-12-06 RX ORDER — OXYCODONE HYDROCHLORIDE 5 MG/1
5 TABLET ORAL EVERY 6 HOURS PRN
Qty: 8 TABLET | Refills: 0 | Status: SHIPPED | OUTPATIENT
Start: 2018-12-06 | End: 2019-02-26

## 2018-12-06 ASSESSMENT — ENCOUNTER SYMPTOMS
EYE PAIN: 1
TROUBLE SWALLOWING: 0

## 2018-12-06 ASSESSMENT — ACTIVITIES OF DAILY LIVING (ADL): DEPENDENT_IADLS:: INDEPENDENT
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
## 2018-12-06 NOTE — TELEPHONE ENCOUNTER
Spoke with patient and discussed Dr. Gonzalez's message below in detail.  Patient will try calling the facilities mentioned to get an apt soon.    Amol Waldron RN....12/6/2018 10:50 AM
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
## 2018-12-06 NOTE — ED AVS SNAPSHOT
[facility], [place], Emergency Department    [address] [place] [address]    Phone:  [phone]                                       Will Dodson   MRN: [id number]    Department:  [facility], Emergency Department   Date of Visit:  12/6/2018           After Visit Summary Signature Page     I have received my discharge instructions, and my questions have been answered. I have discussed any challenges I see with this plan with the nurse or doctor.    ..........................................................................................................................................  Patient/Patient Representative Signature      ..........................................................................................................................................  Patient Representative Print Name and Relationship to Patient    ..................................................               ................................................  Date                                   Time    ..........................................................................................................................................  Reviewed by Signature/Title    ...................................................              ..............................................  Date                                               Time          22EPIC Rev 08/18

## 2018-12-06 NOTE — DISCHARGE INSTRUCTIONS
Please make an appointment to follow up with [facility] (phone: [phone]) in a week.    Ask for Cordelia Badillo Hamlar or Eva.    Return if worse swelling into face or new vision changes.
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
## 2018-12-06 NOTE — TELEPHONE ENCOUNTER
I looked at the CT scan on receiving this message.  This is a signficant orbital floor fracture, with possible involvement of the periorbital muscles.  This needs to be sent to the three places that do facial fractures: [facility], [facility] or [facility].  None of the [place] [facility]'s do facial fracture or orbital reconstruction.  She needs to go be seen today.

## 2018-12-06 NOTE — TELEPHONE ENCOUNTER
Spoke with patient regarding symptoms. Informed her that we have no specialists in clinic today that are able to see her for this concern, so we would send her to the ED.  The patient was already en route to the ED. Explained to the patient that we would see her, but it would likely result in her going to the ED anyway.  She got was getting a call, and needed to disconnect from our conversation and she said she would call back if she changed her plan.
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
## 2019-01-16 ENCOUNTER — PATIENT OUTREACH (OUTPATIENT)
Dept: CARE COORDINATION | Facility: CLINIC | Age: 34
End: 2019-01-16

## 2019-01-16 NOTE — PROGRESS NOTES
Clinic Care Coordination Contact    Clinic Care Coordination Contact  OUTREACH    Referral Information:  [facility] follow up.    SW placed call to pt today.  She shared she was at work and only had a little time to speak.  SW calling to follow up on pt's health, pt's safety and overall status.  Pt shared she is doing well, has no safety issues, but did not attend her follow up ENT appointment and her left eye continues to bother her.  Pt requested that this writer call her back with the information for her to call and schedule a new appointment.  Pt has no other needs, requests at this time and does not feel that she needs care coordination services.    SW reviewed pt's EMR and called pt's voicemail & left information for Dr. Gonzalez & for [facility] ENT information.    Chief Complaint   Patient presents with     Clinic Care Coordination - Follow-up     social work         Universal Utilization:    Utilization    Last refreshed: 1/12/2019  9:59 AM:  Hospital Admissions 0           Last refreshed: 1/12/2019  9:59 AM:  ED Visits 6           Last refreshed: 1/12/2019  9:59 AM:  No Show Count (past year) 9              Current as of: 1/12/2019  9:59 AM            Loulou Epps  Social Work Care Coordinator  [facility]  [phone]

## 2019-01-18 ENCOUNTER — TELEPHONE (OUTPATIENT)
Dept: OTOLARYNGOLOGY | Facility: CLINIC | Age: 34
End: 2019-01-18

## 2019-01-18 ENCOUNTER — HOSPITAL ENCOUNTER (EMERGENCY)
Facility: CLINIC | Age: 34
Discharge: HOME | End: 2019-01-18
Attending: FAMILY MEDICINE | Admitting: FAMILY MEDICINE
Payer: COMMERCIAL

## 2019-01-18 VITALS
WEIGHT: 130 LBS | DIASTOLIC BLOOD PRESSURE: 86 MMHG | OXYGEN SATURATION: 97 % | TEMPERATURE: 98 F | RESPIRATION RATE: 16 BRPM | BODY MASS INDEX: 25.39 KG/M2 | HEART RATE: 106 BPM | SYSTOLIC BLOOD PRESSURE: 142 MMHG

## 2019-01-18 DIAGNOSIS — J01.00 ACUTE MAXILLARY SINUSITIS, RECURRENCE NOT SPECIFIED: ICD-10-CM

## 2019-01-18 DIAGNOSIS — S06.0X9A CONCUSSION WITH LOSS OF CONSCIOUSNESS, INITIAL ENCOUNTER: ICD-10-CM

## 2019-01-18 PROCEDURE — 99282 EMERGENCY DEPT VISIT SF MDM: CPT | Performed by: FAMILY MEDICINE

## 2019-01-18 PROCEDURE — 99284 EMERGENCY DEPT VISIT MOD MDM: CPT | Mod: Z6 | Performed by: FAMILY MEDICINE

## 2019-01-18 RX ORDER — AMOXICILLIN 500 MG/1
500 CAPSULE ORAL 3 TIMES DAILY
Qty: 21 CAPSULE | Refills: 0 | Status: SHIPPED | OUTPATIENT
Start: 2019-01-18 | End: 2019-02-26

## 2019-01-18 NOTE — LETTER
January 18, 2019      To Whom It May Concern:      Will Dodson was seen in our Emergency Department today, 01/18/19.      Sincerely,        Antwan Lua MD
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
## 2019-01-18 NOTE — ED AVS SNAPSHOT
[facility] Emergency Department  [address] [address]  Phone:  [phone]  Fax:  [phone]                                    Will Dodson   MRN: [id number]    Department:  [facility] Emergency Department   Date of Visit:  1/18/2019           After Visit Summary Signature Page    I have received my discharge instructions, and my questions have been answered. I have discussed any challenges I see with this plan with the nurse or doctor.    ..........................................................................................................................................  Patient/Patient Representative Signature      ..........................................................................................................................................  Patient Representative Print Name and Relationship to Patient    ..................................................               ................................................  Date                                   Time    ..........................................................................................................................................  Reviewed by Signature/Title    ...................................................              ..............................................  Date                                               Time          22EPIC Rev 08/18

## 2019-01-18 NOTE — DISCHARGE INSTRUCTIONS
Return to the Emergency Room if the following occurs:     Severely worsened pain, fever >101, vomiting/dehydration, or for any concern at anytime.    Or, follow-up with the following provider as we discussed:     Follow-up in the concussion clinic which is affiliated with the sports medicine clinic.  See contact information provided for scheduling.    Medications discussed:    Ibuprofen 600 mg every six hours for pain (7 days duration).  Tylenol 1000 mg every six hours for pain (7 days duration).  Therefore, you can alternate these every three hours and do it safely.  Amoxicillin.    If you received pain-relieving or sedating medication during your time in the ER, avoid alcohol, driving automobiles, or working with machinery.  Also, a responsible adult must stay with you.        Call the Nurse Advice Line at [phone] or [phone] for any concern at anytime.

## 2019-01-21 ENCOUNTER — PATIENT OUTREACH (OUTPATIENT)
Dept: CARE COORDINATION | Facility: CLINIC | Age: 34
End: 2019-01-21

## 2019-01-21 ASSESSMENT — ACTIVITIES OF DAILY LIVING (ADL)
DEPENDENT_IADLS:: INDEPENDENT
DEPENDENT_IADLS:: INDEPENDENT
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
## 2019-01-22 ENCOUNTER — OFFICE VISIT (OUTPATIENT)
Dept: BEHAVIORAL HEALTH | Facility: CLINIC | Age: 34
End: 2019-01-22
Payer: COMMERCIAL

## 2019-01-22 DIAGNOSIS — F43.10 PTSD (POST-TRAUMATIC STRESS DISORDER): ICD-10-CM

## 2019-01-22 DIAGNOSIS — F19.21 SUBSTANCE DEPENDENCE, IN REMISSION (H): ICD-10-CM

## 2019-01-22 DIAGNOSIS — F33.1 MODERATE EPISODE OF RECURRENT MAJOR DEPRESSIVE DISORDER (H): Primary | ICD-10-CM

## 2019-01-22 DIAGNOSIS — F41.1 GENERALIZED ANXIETY DISORDER: ICD-10-CM

## 2019-01-22 PROCEDURE — 90791 PSYCH DIAGNOSTIC EVALUATION: CPT | Performed by: SOCIAL WORKER

## 2019-01-22 ASSESSMENT — ANXIETY QUESTIONNAIRES
7. FEELING AFRAID AS IF SOMETHING AWFUL MIGHT HAPPEN: MORE THAN HALF THE DAYS
IF YOU CHECKED OFF ANY PROBLEMS ON THIS QUESTIONNAIRE, HOW DIFFICULT HAVE THESE PROBLEMS MADE IT FOR YOU TO DO YOUR WORK, TAKE CARE OF THINGS AT HOME, OR GET ALONG WITH OTHER PEOPLE: VERY DIFFICULT
3. WORRYING TOO MUCH ABOUT DIFFERENT THINGS: NEARLY EVERY DAY
2. NOT BEING ABLE TO STOP OR CONTROL WORRYING: SEVERAL DAYS
1. FEELING NERVOUS, ANXIOUS, OR ON EDGE: SEVERAL DAYS
4. TROUBLE RELAXING: NEARLY EVERY DAY
GAD7 TOTAL SCORE: 16
6. BECOMING EASILY ANNOYED OR IRRITABLE: NEARLY EVERY DAY
5. BEING SO RESTLESS THAT IT IS HARD TO SIT STILL: NEARLY EVERY DAY

## 2019-01-22 ASSESSMENT — PATIENT HEALTH QUESTIONNAIRE - PHQ9: SUM OF ALL RESPONSES TO PHQ QUESTIONS 1-9: 19

## 2019-01-23 ASSESSMENT — ANXIETY QUESTIONNAIRES: GAD7 TOTAL SCORE: 16

## 2019-01-29 ENCOUNTER — OFFICE VISIT (OUTPATIENT)
Dept: BEHAVIORAL HEALTH | Facility: CLINIC | Age: 34
End: 2019-01-29
Payer: COMMERCIAL

## 2019-01-29 DIAGNOSIS — F43.10 PTSD (POST-TRAUMATIC STRESS DISORDER): ICD-10-CM

## 2019-01-29 DIAGNOSIS — F41.1 GENERALIZED ANXIETY DISORDER: ICD-10-CM

## 2019-01-29 DIAGNOSIS — F33.1 MODERATE EPISODE OF RECURRENT MAJOR DEPRESSIVE DISORDER (H): Primary | ICD-10-CM

## 2019-01-29 PROCEDURE — 90834 PSYTX W PT 45 MINUTES: CPT | Performed by: SOCIAL WORKER
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
## 2019-01-29 NOTE — LETTER
[facility]  [address] [facility] [address] [address]  Phone: [phone]  Fax: [phone]        January 29, 2019        Will Ridleyyer  [address] [place] [id number]          To whom it may concern:    RE: Will West    This letter is written per MsKevin West's request.  She will seeing me on a regular basis.     Please contact me for questions or concerns.      Sincerely,        FRANK SharpSW
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
## 2019-02-04 ENCOUNTER — OFFICE VISIT (OUTPATIENT)
Dept: BEHAVIORAL HEALTH | Facility: CLINIC | Age: 34
End: 2019-02-04
Payer: COMMERCIAL

## 2019-02-04 DIAGNOSIS — F33.1 MODERATE EPISODE OF RECURRENT MAJOR DEPRESSIVE DISORDER (H): Primary | ICD-10-CM

## 2019-02-04 DIAGNOSIS — F19.20 CHEMICAL DEPENDENCY (H): ICD-10-CM

## 2019-02-04 DIAGNOSIS — F41.1 GENERALIZED ANXIETY DISORDER: ICD-10-CM

## 2019-02-04 DIAGNOSIS — F43.10 PTSD (POST-TRAUMATIC STRESS DISORDER): ICD-10-CM

## 2019-02-04 PROCEDURE — 90847 FAMILY PSYTX W/PT 50 MIN: CPT | Performed by: SOCIAL WORKER

## 2019-02-04 ASSESSMENT — ANXIETY QUESTIONNAIRES
GAD7 TOTAL SCORE: 13
IF YOU CHECKED OFF ANY PROBLEMS ON THIS QUESTIONNAIRE, HOW DIFFICULT HAVE THESE PROBLEMS MADE IT FOR YOU TO DO YOUR WORK, TAKE CARE OF THINGS AT HOME, OR GET ALONG WITH OTHER PEOPLE: SOMEWHAT DIFFICULT
1. FEELING NERVOUS, ANXIOUS, OR ON EDGE: NEARLY EVERY DAY
2. NOT BEING ABLE TO STOP OR CONTROL WORRYING: MORE THAN HALF THE DAYS
7. FEELING AFRAID AS IF SOMETHING AWFUL MIGHT HAPPEN: SEVERAL DAYS
4. TROUBLE RELAXING: MORE THAN HALF THE DAYS
3. WORRYING TOO MUCH ABOUT DIFFERENT THINGS: MORE THAN HALF THE DAYS
5. BEING SO RESTLESS THAT IT IS HARD TO SIT STILL: SEVERAL DAYS
6. BECOMING EASILY ANNOYED OR IRRITABLE: MORE THAN HALF THE DAYS

## 2019-02-04 ASSESSMENT — PATIENT HEALTH QUESTIONNAIRE - PHQ9: SUM OF ALL RESPONSES TO PHQ QUESTIONS 1-9: 16
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
## 2019-02-05 ASSESSMENT — ANXIETY QUESTIONNAIRES: GAD7 TOTAL SCORE: 13

## 2019-02-18 ENCOUNTER — HOSPITAL ENCOUNTER (EMERGENCY)
Facility: CLINIC | Age: 34
Discharge: HOME | End: 2019-02-18
Attending: FAMILY MEDICINE | Admitting: FAMILY MEDICINE
Payer: COMMERCIAL

## 2019-02-18 VITALS
BODY MASS INDEX: 23.44 KG/M2 | RESPIRATION RATE: 14 BRPM | HEART RATE: 104 BPM | OXYGEN SATURATION: 99 % | DIASTOLIC BLOOD PRESSURE: 89 MMHG | SYSTOLIC BLOOD PRESSURE: 124 MMHG | WEIGHT: 120 LBS | TEMPERATURE: 98 F

## 2019-02-18 DIAGNOSIS — R22.0 SWELLING OF LEFT SIDE OF FACE: ICD-10-CM

## 2019-02-18 PROCEDURE — 99284 EMERGENCY DEPT VISIT MOD MDM: CPT | Mod: Z6 | Performed by: FAMILY MEDICINE

## 2019-02-18 PROCEDURE — 99283 EMERGENCY DEPT VISIT LOW MDM: CPT | Performed by: FAMILY MEDICINE

## 2019-02-18 PROCEDURE — 25000125 ZZHC RX 250: Performed by: FAMILY MEDICINE

## 2019-02-18 RX ORDER — OXYMETAZOLINE HYDROCHLORIDE 0.05 G/100ML
2 SPRAY NASAL 2 TIMES DAILY
Status: DISCONTINUED | OUTPATIENT
Start: 2019-02-18 | End: 2019-02-19 | Stop reason: HOSPADM

## 2019-02-18 RX ADMIN — Medication 2 SPRAY: at 22:11

## 2019-02-18 NOTE — ED AVS SNAPSHOT
[facility] Emergency Department  [address] [address]  Phone:  [phone]  Fax:  [phone]                                    Will Dodson   MRN: [id number]    Department:  [facility] Emergency Department   Date of Visit:  2/18/2019           After Visit Summary Signature Page    I have received my discharge instructions, and my questions have been answered. I have discussed any challenges I see with this plan with the nurse or doctor.    ..........................................................................................................................................  Patient/Patient Representative Signature      ..........................................................................................................................................  Patient Representative Print Name and Relationship to Patient    ..................................................               ................................................  Date                                   Time    ..........................................................................................................................................  Reviewed by Signature/Title    ...................................................              ..............................................  Date                                               Time          22EPIC Rev 08/18

## 2019-02-19 NOTE — DISCHARGE INSTRUCTIONS
ICD-10-CM    1. Swelling of left side of face R22.0     No serious findings today.  symptoms are likely related to upper respiratory symptoms and would recommend nasal saline.  consider afrin for 3 days only. recheck in ENT clinic given longstanding symptoms   2. Orbital fracture, sequela (H) S02.80XS    Treating Upper Respiratory Infections and Sinus Infections    START WITH OVER-THE-COUNTER TOPICAL MEDICATIONS  Saline Nasal Spray (Deep Sea or Ocean)   Effective decongestant without any systemic side effects   Use this as frequently as every 10-15 minutes   Start use in the morning with 3 minutes of constant infusion    Oxymetazoline (Afrin)   Used twice daily for moderate to severe relief of sinus headache   Do not exceed 3 days of use       Prolonged use results in a chronic runny nose    CONSIDER ADDING OVER-THE-COUNTER ORAL MEDICATIONS  Guaifenesin (Robitussin, over-the-counter)   Guaifenesin is an expectorant that helps promote sinus drainage   Guaifenesin may cause a dry mouth sensation    Stay hydrated and consider sucking candies   Guaifenesin will increase coughing temporarily    Increases sinus drainage and is also a cough expectorant   Adult dosing is 400 mg of the short-acting Guaifenesin every 4 hours   Mucinex (12 hour preparation of high dose Guaifenesin alone)    Ibuprofen (Advil, Motrin)   Helps relieve the sinus headache, lower fever, relieve muscle aches   Adults may use 600 mg orally every 6 hours as needed with food   Children should not exceed 10 mg per kilogram every 6 hours    ANTIBIOTICS   Upper respiratory infections nearly always start as viral infections   Bacteria do not super-infect sinuses until after 10-14 days   Antibiotics are only indicated if symptoms last more than 10-14 days    THE SEASONAL ALLERGY TO SINUS INFECTION CONNECTION  Frequent sinus infections are often due to underlying seasonal allergies  Allergy control may prevent sinus infections      Consider starting a nasal  steroid inhaler (prescription only)  Antihistamines may dry-up sinus drainage and worsen sinusitis       Avoid antihistamines unless sinusitus is unlikely
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
## 2019-02-20 ENCOUNTER — PATIENT OUTREACH (OUTPATIENT)
Dept: CARE COORDINATION | Facility: CLINIC | Age: 34
End: 2019-02-20

## 2019-02-20 ASSESSMENT — ACTIVITIES OF DAILY LIVING (ADL): DEPENDENT_IADLS:: INDEPENDENT

## 2019-02-20 NOTE — LETTER
[facility] CARE COORDINATION  [address] [place] [address]  [phone]      February 20, 2019    Will Dodson  [address] [place] [id number]      Dear Will,    I am a clinic care coordinator who works with Norbert Wooten MD at the [facility]. I recently tried to call and was unable to reach you, so wanted to provide you with my contact information so that you can call me with questions or concerns about your health care. Below is a description of clinic care coordination and how I can further assist you.     The clinic care coordinator is a registered nurse and/or  who understand the health care system. The goal of clinic care coordination is to help you manage your health and improve access to the [facility] in the most efficient manner. The registered nurse can assist you in meeting your health care goals by providing education, coordinating services, and strengthening the communication among your providers. The  can assist you with financial, behavioral, psychosocial, chemical dependency, counseling, and/or psychiatric resources.    Please feel free to contact me at [phone], with any questions or concerns. We at [facility] are focused on providing you with the highest-quality healthcare experience possible and that all starts with you.     Sincerely,     Loulou Del Valle    Enclosed: I have enclosed a copy of a 24 Hour Access Plan. This has helpful phone numbers for you to call when needed. Please keep this in an easy to access place to use as needed.

## 2019-02-20 NOTE — PROGRESS NOTES
Clinic Care Coordination Contact  [facility]/Voicemail    Referral Source: ED Follow-Up    Clinical Data: Care Coordinator Outreach    Outreach attempted x 1.  Left message on voicemail with call back information and requested return call.  Plan: Care Coordinator mailed out care coordination introduction letter on 2-20-19. Care Coordinator will try to reach patient again in 1-2 business days.    Pt has also missed her appointments with [facility], Karen, and with [facility] CD intake assessment.    SW routing note to Karen & PCP for review.    Loulou Epps  Social Work Care Coordinator  [facility]  [phone]

## 2019-02-20 NOTE — LETTER
Health Care Home - Access Care Plan      About Me:  Patient Name:  Will Dodson    YOB: 1985  Age:                           33 year old   [place] MRN:          [id number] Telephone Information:   Home Phone [phone]   Mobile [phone]       Address:  [address] [facility] [address] Email address:  [email or web address]      Emergency Contact(s)  Name Relationship Lgl Grd Work Phone Home Phone Mobile Phone   SOCORRO VILLA Mother    [phone]               Health Maintenance: Routine Health maintenance Reviewed: Due/Overdue    My Access Plan  Medical Emergency 911   Questions or concerns during clinic hours Primary Clinic Line, I will call the clinic directly: [facility] - [phone]   24 Hour Appointment Line [phone] or  [phone]) (toll free)   24 Hour Nurse Line [phone] (toll free)   Questions or concerns outside clinic hours 24 Hour Appointment Line, I will call the after-hours on-call line:   [facility] [phone] or [phone]) (toll-free)   Preferred Urgent Care [facility], [phone]   Preferred Hospital [place], [place]  [phone]   Preferred Pharmacy [id number] - [place], [place] - [address]     Behavioral Health Crisis Line The National Suicide Prevention Lifeline at 1-276.262.6375 or 911                 My Care Team Members  Patient Care Team       Relationship Specialty Notifications Start End    Norbert Wooten MD PCP - Assigned PCP   16     Phone: [phone] Fax: [phone] [address] [facility] [address]    Loulou Del Valle LSW Clinic Care Coordinator Primary Care - CC Admissions 18     Phone: [phone] Fax: [phone]               My Medical and Care Information  Problem List   Patient Active Problem List   Diagnosis     H/O:      Generalized anxiety disorder     Panic  attack     ADHD (attention deficit hyperactivity disorder), combined type     RhD negative     Moderate episode of recurrent major depressive disorder (H)     Posttraumatic stress disorder     Chemical dependency (H)     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     Depression with anxiety      Current Medications and Allergies:  See printed Medication Report

## 2019-02-21 ENCOUNTER — OFFICE VISIT (OUTPATIENT)
Dept: BEHAVIORAL HEALTH | Facility: CLINIC | Age: 34
End: 2019-02-21
Payer: COMMERCIAL

## 2019-02-21 DIAGNOSIS — F33.1 MODERATE EPISODE OF RECURRENT MAJOR DEPRESSIVE DISORDER (H): Primary | ICD-10-CM

## 2019-02-21 DIAGNOSIS — F19.20 CHEMICAL DEPENDENCY (H): ICD-10-CM

## 2019-02-21 DIAGNOSIS — F43.10 PTSD (POST-TRAUMATIC STRESS DISORDER): ICD-10-CM

## 2019-02-21 DIAGNOSIS — F41.1 GENERALIZED ANXIETY DISORDER: ICD-10-CM

## 2019-02-21 PROCEDURE — 90834 PSYTX W PT 45 MINUTES: CPT | Performed by: SOCIAL WORKER

## 2019-02-21 ASSESSMENT — ANXIETY QUESTIONNAIRES
GAD7 TOTAL SCORE: 9
IF YOU CHECKED OFF ANY PROBLEMS ON THIS QUESTIONNAIRE, HOW DIFFICULT HAVE THESE PROBLEMS MADE IT FOR YOU TO DO YOUR WORK, TAKE CARE OF THINGS AT HOME, OR GET ALONG WITH OTHER PEOPLE: SOMEWHAT DIFFICULT
3. WORRYING TOO MUCH ABOUT DIFFERENT THINGS: MORE THAN HALF THE DAYS
6. BECOMING EASILY ANNOYED OR IRRITABLE: MORE THAN HALF THE DAYS
7. FEELING AFRAID AS IF SOMETHING AWFUL MIGHT HAPPEN: SEVERAL DAYS
1. FEELING NERVOUS, ANXIOUS, OR ON EDGE: SEVERAL DAYS
2. NOT BEING ABLE TO STOP OR CONTROL WORRYING: MORE THAN HALF THE DAYS
5. BEING SO RESTLESS THAT IT IS HARD TO SIT STILL: NOT AT ALL

## 2019-02-21 ASSESSMENT — PATIENT HEALTH QUESTIONNAIRE - PHQ9
5. POOR APPETITE OR OVEREATING: SEVERAL DAYS
SUM OF ALL RESPONSES TO PHQ QUESTIONS 1-9: 14

## 2019-02-21 ASSESSMENT — ACTIVITIES OF DAILY LIVING (ADL): DEPENDENT_IADLS:: INDEPENDENT
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
## 2019-02-22 ASSESSMENT — ANXIETY QUESTIONNAIRES: GAD7 TOTAL SCORE: 9

## 2019-02-26 ENCOUNTER — OFFICE VISIT (OUTPATIENT)
Dept: FAMILY MEDICINE | Facility: CLINIC | Age: 34
End: 2019-02-26
Payer: COMMERCIAL

## 2019-02-26 ENCOUNTER — HOSPITAL ENCOUNTER (OUTPATIENT)
Dept: CT IMAGING | Facility: CLINIC | Age: 34
Discharge: HOME | End: 2019-02-26
Attending: INTERNAL MEDICINE | Admitting: INTERNAL MEDICINE
Payer: COMMERCIAL

## 2019-02-26 VITALS
DIASTOLIC BLOOD PRESSURE: 68 MMHG | HEIGHT: 60 IN | HEART RATE: 104 BPM | BODY MASS INDEX: 24.15 KG/M2 | WEIGHT: 123 LBS | TEMPERATURE: 98.8 F | SYSTOLIC BLOOD PRESSURE: 112 MMHG

## 2019-02-26 DIAGNOSIS — J01.00 SUBACUTE MAXILLARY SINUSITIS: ICD-10-CM

## 2019-02-26 DIAGNOSIS — S02.32XG: Primary | ICD-10-CM

## 2019-02-26 PROCEDURE — 70486 CT MAXILLOFACIAL W/O DYE: CPT

## 2019-02-26 PROCEDURE — 99214 OFFICE O/P EST MOD 30 MIN: CPT | Performed by: INTERNAL MEDICINE

## 2019-02-26 RX ORDER — DOXYCYCLINE 100 MG/1
100 CAPSULE ORAL 2 TIMES DAILY
Qty: 14 CAPSULE | Refills: 0 | Status: SHIPPED | OUTPATIENT
Start: 2019-02-26 | End: 2019-03-08

## 2019-02-26 ASSESSMENT — MIFFLIN-ST. JEOR: SCORE: 1194.42
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
## 2019-02-26 NOTE — PROGRESS NOTES
"  SUBJECTIVE:   Will Dodson is a 33 year old female who presents to clinic today for the following health issues:    Chief Complaint   Patient presents with     Throat Problem     lumps on both side of neck, throat feels tight, ear are painful. ENT appointment set up for 3/8/19.        Will was seen recently for orbital fracture on  and started on Augmentin on  for possible sinusitis- she says this didn't really help.  She has ENT follow-up scheduled on 3/8.     She noticed neck pain about 2 weeks ago, noticed the lumps in the neck shortly after that.  Neck feels swollen.  No swallowing pain.  Solids feel like they sometimes get stuck.  Left greater than right ear pain.  Left cheek she can \"feel squishy stuff\".   Ongoing sinus pressure.  She is having left sided blurry/foggy vision.  She had headaches.  No fevers or chills.  She has had cough and wheezing as well.        Problem list and histories reviewed & adjusted, as indicated.  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     Depression with anxiety     Past Surgical History:   Procedure Laterality Date     C/SECTION, LOW TRANSVERSE  ,     , Low Transverse     COSMETIC SURGERY       CYSTOSCOPY,REMV CALCULUS,SIMPLE       LAPAROSCOPIC CHOLECYSTECTOMY N/A 2017    Procedure: LAPAROSCOPIC CHOLECYSTECTOMY;  Laparoscopic Cholecystectomy;  Surgeon: Carson Rojas MD;  Location: [facility] OR     ORTHOPEDIC SURGERY         Social History     Tobacco Use     Smoking status: Former Smoker     Packs/day: 0.25     Start date: 2015     Smokeless tobacco: Never Used "   Substance Use Topics     Alcohol use: Yes     Alcohol/week: 0.0 oz     Comment: occas- quit with pregnancy     Family History   Problem Relation Age of Onset     Breast Cancer Mother      Depression Mother      Substance Abuse Mother      Hypertension Father      Substance Abuse Father      Colon Cancer Maternal Grandfather      Colon Cancer Paternal Grandfather      Other - See Comments Sister         epilepsy     Substance Abuse Sister         A&D         Current Outpatient Medications   Medication Sig Dispense Refill     albuterol (ALBUTEROL) 108 (90 BASE) MCG/ACT inhaler Inhale 2 puffs into the lungs every 4 hours as needed for shortness of breath / dyspnea 1 Inhaler 0     doxycycline hyclate (VIBRAMYCIN) 100 MG capsule Take 1 capsule (100 mg) by mouth 2 times daily for 7 days 14 capsule 0     levonorgestrel (MIRENA) 20 MCG/24HR IUD 1 each (20 mcg) by Intrauterine route continuous       traZODone (DESYREL) 50 MG tablet Take 1 tablet (50 mg) by mouth nightly as needed for sleep 90 tablet 0     Allergies   Allergen Reactions     Blood-Group Specific Substance      Patient has Probable passiive anti D Antibody. Blood Product orders may be delayed.  Draw one red top and two purple top tubes for ALL Type and Screen/ Type and Crossmatch orders.       Vicodin [Hydrocodone-Acetaminophen] Nausea and Vomiting       Reviewed and updated as needed this visit by clinical staff  Tobacco  Allergies  Meds  Med Hx  Surg Hx  Fam Hx  Soc Hx      Reviewed and updated as needed this visit by Provider         ROS:  Constitutional, HEENT, pulmonary systems are negative, except as otherwise noted.    OBJECTIVE:     /68   Pulse 104   Temp 98.8  F (37.1  C) (Tympanic)   Ht 1.524 m (5')   Wt 55.8 kg (123 lb)   BMI 24.02 kg/m    Body mass index is 24.02 kg/m .    GENERAL: alert and no distress  EYES: Eyes grossly normal to inspection, PERRL and conjunctivae and sclerae normal  HENT: ear canals and TMs normal, left  maxillary sinuses is tender to percussion, nose and mouth without ulcers or lesions, oropharynx red but no tonsillar exudates  NECK: no adenopathy, no asymmetry, masses, or scars  RESP: lungs clear to auscultation - no rales, rhonchi or wheezes  CV: regular rate and rhythm, normal S1 S2, no S3 or S4, no murmur, click or rub         ASSESSMENT/PLAN:         1. Closed fracture of left orbital floor with delayed healing, subsequent encounter  2. Subacute maxillary sinusitis    Will presents with concerned of neck masses, which I do not actually appreciate on exam today.  Perhaps having reaction lymph nodes that are fluctuating in size.  She had a left orbital floor fracture in December and states she is not sure if she should wait for the ENT appointment to have this reimaged due to her ongoing symptoms.  Agreed it is reasonable to reimage, and prescribed doxycycline for possible ongoing sinusitis anticipating it would be a few days before she got in for CT.  However, she did actually have the CT this afternoon and it indicates healing of the fracture, no acute findings.  Advise following up with ENT as scheduled.      - doxycycline hyclate (VIBRAMYCIN) 100 MG capsule; Take 1 capsule (100 mg) by mouth 2 times daily for 7 days  Dispense: 14 capsule; Refill: 0  - CT Facial Bones without Contrast; Future    Aleksandar Perales MD  [facility]

## 2019-03-04 ENCOUNTER — APPOINTMENT (OUTPATIENT)
Dept: GENERAL RADIOLOGY | Facility: CLINIC | Age: 34
End: 2019-03-04
Attending: NURSE PRACTITIONER
Payer: COMMERCIAL

## 2019-03-04 ENCOUNTER — TELEPHONE (OUTPATIENT)
Dept: OTOLARYNGOLOGY | Facility: CLINIC | Age: 34
End: 2019-03-04

## 2019-03-04 ENCOUNTER — HOSPITAL ENCOUNTER (EMERGENCY)
Facility: CLINIC | Age: 34
Discharge: HOME | End: 2019-03-04
Attending: NURSE PRACTITIONER | Admitting: NURSE PRACTITIONER
Payer: COMMERCIAL

## 2019-03-04 VITALS
SYSTOLIC BLOOD PRESSURE: 122 MMHG | WEIGHT: 120 LBS | BODY MASS INDEX: 23.44 KG/M2 | OXYGEN SATURATION: 98 % | RESPIRATION RATE: 16 BRPM | TEMPERATURE: 97 F | DIASTOLIC BLOOD PRESSURE: 87 MMHG

## 2019-03-04 DIAGNOSIS — R06.02 SHORTNESS OF BREATH: ICD-10-CM

## 2019-03-04 DIAGNOSIS — R09.89 TENDER LYMPH NODE: ICD-10-CM

## 2019-03-04 DIAGNOSIS — R09.A2 GLOBUS SENSATION: ICD-10-CM

## 2019-03-04 LAB
ALBUMIN SERPL-MCNC: 3.7 G/DL (ref 3.4–5)
ALP SERPL-CCNC: 77 U/L (ref 40–150)
ALT SERPL W P-5'-P-CCNC: 26 U/L (ref 0–50)
ANION GAP SERPL CALCULATED.3IONS-SCNC: 5 MMOL/L (ref 3–14)
AST SERPL W P-5'-P-CCNC: 18 U/L (ref 0–45)
BASOPHILS # BLD AUTO: 0 10E9/L (ref 0–0.2)
BASOPHILS NFR BLD AUTO: 0.2 %
BILIRUB SERPL-MCNC: 0.4 MG/DL (ref 0.2–1.3)
BUN SERPL-MCNC: 13 MG/DL (ref 7–30)
CALCIUM SERPL-MCNC: 8.4 MG/DL (ref 8.5–10.1)
CHLORIDE SERPL-SCNC: 108 MMOL/L (ref 94–109)
CO2 SERPL-SCNC: 26 MMOL/L (ref 20–32)
CREAT SERPL-MCNC: 0.62 MG/DL (ref 0.52–1.04)
D DIMER PPP FEU-MCNC: <0.3 UG/ML FEU (ref 0–0.5)
DIFFERENTIAL METHOD BLD: NORMAL
EOSINOPHIL # BLD AUTO: 0 10E9/L (ref 0–0.7)
EOSINOPHIL NFR BLD AUTO: 0.5 %
ERYTHROCYTE [DISTWIDTH] IN BLOOD BY AUTOMATED COUNT: 13.1 % (ref 10–15)
FLUAV AG UPPER RESP QL IA.RAPID: NEGATIVE
FLUBV AG UPPER RESP QL IA.RAPID: NEGATIVE
GFR SERPL CREATININE-BSD FRML MDRD: >90 ML/MIN/{1.73_M2}
GLUCOSE SERPL-MCNC: 124 MG/DL (ref 70–99)
HCT VFR BLD AUTO: 40.6 % (ref 35–47)
HETEROPH AB SER QL: NEGATIVE
HGB BLD-MCNC: 13.7 G/DL (ref 11.7–15.7)
INTERNAL QC OK POCT: YES
INTERNAL QC OK POCT: YES
LYMPHOCYTES # BLD AUTO: 2.4 10E9/L (ref 0.8–5.3)
LYMPHOCYTES NFR BLD AUTO: 40.6 %
MCH RBC QN AUTO: 30.8 PG (ref 26.5–33)
MCHC RBC AUTO-ENTMCNC: 33.7 G/DL (ref 31.5–36.5)
MCV RBC AUTO: 91 FL (ref 78–100)
MONOCYTES # BLD AUTO: 0.4 10E9/L (ref 0–1.3)
MONOCYTES NFR BLD AUTO: 6.5 %
NEUTROPHILS # BLD AUTO: 3.1 10E9/L (ref 1.6–8.3)
NEUTROPHILS NFR BLD AUTO: 52.2 %
PLATELET # BLD AUTO: 274 10E9/L (ref 150–450)
POTASSIUM SERPL-SCNC: 4 MMOL/L (ref 3.4–5.3)
PROT SERPL-MCNC: 6.9 G/DL (ref 6.8–8.8)
RBC # BLD AUTO: 4.45 10E12/L (ref 3.8–5.2)
S PYO AG THROAT QL IA.RAPID: NEGATIVE
SODIUM SERPL-SCNC: 139 MMOL/L (ref 133–144)
TSH SERPL DL<=0.005 MIU/L-ACNC: 0.49 MU/L (ref 0.4–4)
WBC # BLD AUTO: 6 10E9/L (ref 4–11)

## 2019-03-04 PROCEDURE — 71046 X-RAY EXAM CHEST 2 VIEWS: CPT

## 2019-03-04 PROCEDURE — 87880 STREP A ASSAY W/OPTIC: CPT | Performed by: NURSE PRACTITIONER

## 2019-03-04 PROCEDURE — 99214 OFFICE O/P EST MOD 30 MIN: CPT | Mod: Z6 | Performed by: NURSE PRACTITIONER

## 2019-03-04 PROCEDURE — 80053 COMPREHEN METABOLIC PANEL: CPT | Performed by: NURSE PRACTITIONER

## 2019-03-04 PROCEDURE — 86308 HETEROPHILE ANTIBODY SCREEN: CPT | Performed by: NURSE PRACTITIONER

## 2019-03-04 PROCEDURE — 36415 COLL VENOUS BLD VENIPUNCTURE: CPT | Performed by: NURSE PRACTITIONER

## 2019-03-04 PROCEDURE — 87081 CULTURE SCREEN ONLY: CPT | Performed by: NURSE PRACTITIONER

## 2019-03-04 PROCEDURE — 85025 COMPLETE CBC W/AUTO DIFF WBC: CPT | Performed by: NURSE PRACTITIONER

## 2019-03-04 PROCEDURE — G0463 HOSPITAL OUTPT CLINIC VISIT: HCPCS | Mod: 25 | Performed by: NURSE PRACTITIONER

## 2019-03-04 PROCEDURE — 84443 ASSAY THYROID STIM HORMONE: CPT | Performed by: NURSE PRACTITIONER

## 2019-03-04 PROCEDURE — 85379 FIBRIN DEGRADATION QUANT: CPT | Performed by: NURSE PRACTITIONER

## 2019-03-04 PROCEDURE — 87804 INFLUENZA ASSAY W/OPTIC: CPT | Performed by: NURSE PRACTITIONER

## 2019-03-04 ASSESSMENT — ENCOUNTER SYMPTOMS
HEADACHES: 0
VOMITING: 0
SORE THROAT: 0
NEUROLOGICAL NEGATIVE: 1
CHILLS: 0
ABDOMINAL PAIN: 0
WOUND: 0
FEVER: 0
NAUSEA: 0
SHORTNESS OF BREATH: 1
MYALGIAS: 1
TROUBLE SWALLOWING: 1
COUGH: 1
FATIGUE: 1
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
## 2019-03-04 NOTE — DISCHARGE INSTRUCTIONS
Labs look good.  Chest xray looks good.  Recheck with Dr. Wooten tomorrow at 1:40pm to re-evaluate symptoms and next steps.

## 2019-03-04 NOTE — ED AVS SNAPSHOT
[facility]  [address] [address]  Phone:  [phone]  Fax:  [phone]                                    Will Dodson   MRN: [id number]    Department:  [facility]   Date of Visit:  3/4/2019           After Visit Summary Signature Page    I have received my discharge instructions, and my questions have been answered. I have discussed any challenges I see with this plan with the nurse or doctor.    ..........................................................................................................................................  Patient/Patient Representative Signature      ..........................................................................................................................................  Patient Representative Print Name and Relationship to Patient    ..................................................               ................................................  Date                                   Time    ..........................................................................................................................................  Reviewed by Signature/Title    ...................................................              ..............................................  Date                                               Time          22EPIC Rev 08/18
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
## 2019-03-06 ENCOUNTER — OFFICE VISIT (OUTPATIENT)
Dept: FAMILY MEDICINE | Facility: CLINIC | Age: 34
End: 2019-03-06
Payer: COMMERCIAL

## 2019-03-06 VITALS
BODY MASS INDEX: 24.54 KG/M2 | DIASTOLIC BLOOD PRESSURE: 74 MMHG | HEART RATE: 102 BPM | WEIGHT: 125 LBS | SYSTOLIC BLOOD PRESSURE: 108 MMHG | RESPIRATION RATE: 14 BRPM | TEMPERATURE: 96.5 F | OXYGEN SATURATION: 99 % | HEIGHT: 60 IN

## 2019-03-06 DIAGNOSIS — R22.1 NECK SWELLING: Primary | ICD-10-CM

## 2019-03-06 LAB
BACTERIA SPEC CULT: NORMAL
Lab: NORMAL
SPECIMEN SOURCE: NORMAL

## 2019-03-06 PROCEDURE — 99207 ZZC NO BILLABLE SERVICE THIS VISIT: CPT | Performed by: FAMILY MEDICINE

## 2019-03-06 ASSESSMENT — MIFFLIN-ST. JEOR: SCORE: 1203.5

## 2019-03-06 NOTE — PROGRESS NOTES
SUBJECTIVE:   Will Dodson is a 33 year old female who presents to clinic today for the following health issues:      ED/UC Followup:    [place]  [facility]  Date of visit: 3-4-19  Reason for visit:tenderness  lymph nodes SOB  Current Status: patient is still the same          Concern - lymph nodes tenderness   Onset: 3 weks ago     Description:   Patient started with tenderness in lymph nodes 3 weeks ago was seen in UC and was put on antibiotic, patient feels it did not help     Intensity: moderate    Progression of Symptoms:  worsening    Accompanying Signs & Symptoms:  Enlarged lymph nodes     Previous history of similar problem:   None     Precipitating factors:   Worsened by: none     Alleviating factors:  Improved by: none     Therapies Tried and outcome: patient was seen in UC and had labs and was put on an antibiotic      33 yr old female here for UC follow up . She was seen for tenderness in her neck and swelling. Work up was negative. She was under the impression that she was going to get a CT scan ordered. I explained that I did not think this was the right approach as there is some documentation that she has had 3 CTs in the last few months and because of this I told her I will not be able to order the CT. Immediately after I said this she pulled out a recorder and was going to start recording the visit. I told her I did not appreciate her recording me and I was very upset at which time I told her it will be better if she sees her primary care doctor. At that point patient decided to leave .     Problem list and histories reviewed & adjusted, as indicated.  Additional history: as documented    Patient Active Problem List   Diagnosis     H/O:      Generalized anxiety disorder     Panic attack     ADHD (attention deficit hyperactivity disorder), combined type     RhD negative     Moderate episode of recurrent major depressive disorder (H)     Posttraumatic stress disorder     Chemical dependency (H)      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     Depression with anxiety     Past Surgical History:   Procedure Laterality Date     C/SECTION, LOW TRANSVERSE  ,     , Low Transverse     COSMETIC SURGERY       CYSTOSCOPY,REMV CALCULUS,SIMPLE       LAPAROSCOPIC CHOLECYSTECTOMY N/A 2017    Procedure: LAPAROSCOPIC CHOLECYSTECTOMY;  Laparoscopic Cholecystectomy;  Surgeon: Carson Rojas MD;  Location: [facility] OR     ORTHOPEDIC SURGERY         Social History     Tobacco Use     Smoking status: Former Smoker     Packs/day: 0.25     Start date: 2015     Smokeless tobacco: Never Used   Substance Use Topics     Alcohol use: Yes     Alcohol/week: 0.0 oz     Comment: occas- quit with pregnancy     Family History   Problem Relation Age of Onset     Breast Cancer Mother      Depression Mother      Substance Abuse Mother      Hypertension Father      Substance Abuse Father      Colon Cancer Maternal Grandfather      Colon Cancer Paternal Grandfather      Other - See Comments Sister         epilepsy     Substance Abuse Sister         A&D         Current Outpatient Medications   Medication Sig Dispense Refill     levonorgestrel (MIRENA) 20 MCG/24HR IUD 1 each (20 mcg) by Intrauterine route continuous       albuterol (ALBUTEROL) 108 (90 BASE) MCG/ACT inhaler Inhale 2 puffs into the lungs every 4 hours as needed for shortness of breath / dyspnea (Patient not taking: Reported on 3/6/2019) 1 Inhaler 0     traZODone (DESYREL) 50 MG tablet Take 1 tablet (50 mg) by mouth nightly as needed for sleep (Patient not taking: Reported on 3/6/2019) 90 tablet 0     Allergies   Allergen Reactions     Blood-Group Specific Substance      Patient has Probable passiive anti D Antibody. Blood Product orders may be delayed.  Draw one red top and two purple top tubes for ALL Type and Screen/ Type and Crossmatch orders.       Vicodin [Hydrocodone-Acetaminophen] Nausea and Vomiting     BP Readings from Last 3  Encounters:   03/06/19 108/74   03/04/19 122/87   02/26/19 112/68    Wt Readings from Last 3 Encounters:   03/06/19 56.7 kg (125 lb)   03/04/19 54.4 kg (120 lb)   02/26/19 55.8 kg (123 lb)                  Labs reviewed in EPIC    Reviewed and updated as needed this visit by clinical staff  Tobacco  Allergies  Meds  Med Hx  Surg Hx  Fam Hx  Soc Hx      Reviewed and updated as needed this visit by Provider         ROS:  Constitutional, HEENT, cardiovascular, pulmonary, gi and gu systems are negative, except as otherwise noted.    OBJECTIVE:     /74   Pulse 102   Temp 96.5  F (35.8  C) (Tympanic)   Resp 14   Ht 1.524 m (5')   Wt 56.7 kg (125 lb)   SpO2 99%   BMI 24.41 kg/m    Body mass index is 24.41 kg/m .  Did not examine patient         ASSESSMENT/PLAN:         1. Neck swelling  Patient did not wait to be examined. Visit was abruptly stopped when patient did not get her wishes       FUTURE APPOINTMENTS:       - Follow-up visit as needed    Hai Rangel MD  [facility] [place]

## 2019-03-06 NOTE — RESULT ENCOUNTER NOTE
Final Beta strep group A r/o culture is NEGATIVE for Group A streptococcus.    No treatment or change in treatment per [facility] Strep protocol.

## 2019-03-08 ENCOUNTER — OFFICE VISIT (OUTPATIENT)
Dept: OTOLARYNGOLOGY | Facility: CLINIC | Age: 34
End: 2019-03-08
Payer: COMMERCIAL

## 2019-03-08 VITALS
DIASTOLIC BLOOD PRESSURE: 82 MMHG | TEMPERATURE: 98 F | RESPIRATION RATE: 18 BRPM | SYSTOLIC BLOOD PRESSURE: 121 MMHG | HEART RATE: 113 BPM

## 2019-03-08 DIAGNOSIS — M54.2 CERVICALGIA: ICD-10-CM

## 2019-03-08 DIAGNOSIS — G50.1 ATYPICAL FACIAL PAIN: Primary | ICD-10-CM

## 2019-03-08 PROCEDURE — 99204 OFFICE O/P NEW MOD 45 MIN: CPT | Performed by: OTOLARYNGOLOGY

## 2019-03-08 NOTE — PATIENT INSTRUCTIONS
Per physician instructions.    If you have questions or concerns on any instructions given to you by your provider today or if you need to schedule an appointment, you can reach us at [phone].    Thank you!
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
## 2019-03-08 NOTE — NURSING NOTE
Chief Complaint   Patient presents with     Consult For     orbital fracture, pt c/o of swollen neck        Initial /82 (BP Location: Left arm, Patient Position: Chair, Cuff Size: Adult Regular)   Pulse 113   Temp 98  F (36.7  C) (Tympanic)   Resp 18  Estimated body mass index is 24.41 kg/m  as calculated from the following:    Height as of 3/6/19: 1.524 m (5').    Weight as of 3/6/19: 56.7 kg (125 lb).  BP completed using cuff size: regular   Medications and allergies reviewed.      Roxane ODOM, CMA
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
## 2019-03-11 ENCOUNTER — HOSPITAL ENCOUNTER (OUTPATIENT)
Dept: CT IMAGING | Facility: CLINIC | Age: 34
Discharge: HOME | End: 2019-03-11
Attending: OTOLARYNGOLOGY | Admitting: OTOLARYNGOLOGY
Payer: COMMERCIAL

## 2019-03-11 DIAGNOSIS — M54.2 CERVICALGIA: ICD-10-CM

## 2019-03-11 DIAGNOSIS — G50.1 ATYPICAL FACIAL PAIN: ICD-10-CM

## 2019-03-11 PROCEDURE — 25000128 H RX IP 250 OP 636: Performed by: RADIOLOGY

## 2019-03-11 PROCEDURE — 70491 CT SOFT TISSUE NECK W/DYE: CPT

## 2019-03-11 PROCEDURE — 25000125 ZZHC RX 250: Performed by: RADIOLOGY

## 2019-03-11 RX ORDER — IOPAMIDOL 755 MG/ML
70 INJECTION, SOLUTION INTRAVASCULAR ONCE
Status: COMPLETED | OUTPATIENT
Start: 2019-03-11 | End: 2019-03-11

## 2019-03-11 RX ADMIN — IOPAMIDOL 70 ML: 755 INJECTION, SOLUTION INTRAVENOUS at 16:18

## 2019-03-11 RX ADMIN — SODIUM CHLORIDE 80 ML: 9 INJECTION, SOLUTION INTRAVENOUS at 16:18

## 2019-03-12 DIAGNOSIS — M54.2 CERVICALGIA: ICD-10-CM

## 2019-03-12 DIAGNOSIS — G50.1 ATYPICAL FACIAL PAIN: Primary | ICD-10-CM

## 2019-03-12 NOTE — PROGRESS NOTES
Called and left a detailed voicemail. Ct neck soft tissue reviewed, and totally normal.  Between, this, and the recent CT maxillofacial, I can find no pathology.  Therefore I am very confident that her pain and sense of fullness is due to muscle tension and temporomandibular disease.    I have entered a referral for [facility], and have encouraged her to try some therapy sessions.  Call with questions.

## 2019-03-18 ENCOUNTER — HOSPITAL ENCOUNTER (OUTPATIENT)
Dept: BEHAVIORAL HEALTH | Facility: CLINIC | Age: 34
Discharge: HOME | End: 2019-03-18
Attending: SOCIAL WORKER | Admitting: SOCIAL WORKER
Payer: COMMERCIAL

## 2019-03-18 VITALS — WEIGHT: 125 LBS | BODY MASS INDEX: 24.54 KG/M2 | HEIGHT: 60 IN

## 2019-03-18 PROCEDURE — H0001 ALCOHOL AND/OR DRUG ASSESS: HCPCS

## 2019-03-18 ASSESSMENT — ANXIETY QUESTIONNAIRES
6. BECOMING EASILY ANNOYED OR IRRITABLE: MORE THAN HALF THE DAYS
IF YOU CHECKED OFF ANY PROBLEMS ON THIS QUESTIONNAIRE, HOW DIFFICULT HAVE THESE PROBLEMS MADE IT FOR YOU TO DO YOUR WORK, TAKE CARE OF THINGS AT HOME, OR GET ALONG WITH OTHER PEOPLE: SOMEWHAT DIFFICULT
5. BEING SO RESTLESS THAT IT IS HARD TO SIT STILL: NOT AT ALL
3. WORRYING TOO MUCH ABOUT DIFFERENT THINGS: NOT AT ALL
2. NOT BEING ABLE TO STOP OR CONTROL WORRYING: SEVERAL DAYS
GAD7 TOTAL SCORE: 6
1. FEELING NERVOUS, ANXIOUS, OR ON EDGE: SEVERAL DAYS
7. FEELING AFRAID AS IF SOMETHING AWFUL MIGHT HAPPEN: SEVERAL DAYS

## 2019-03-18 ASSESSMENT — MIFFLIN-ST. JEOR: SCORE: 1203.5

## 2019-03-18 ASSESSMENT — PAIN SCALES - GENERAL: PAINLEVEL: NO PAIN (0)

## 2019-03-18 ASSESSMENT — PATIENT HEALTH QUESTIONNAIRE - PHQ9
SUM OF ALL RESPONSES TO PHQ QUESTIONS 1-9: 13
5. POOR APPETITE OR OVEREATING: SEVERAL DAYS
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
## 2019-03-18 NOTE — PROGRESS NOTES
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 again. Relapsed.    2. Tell me how things are going. Ask enough questions to determine whether the person has use related problems or assets that can be built upon in the following areas: Family/friends/relationships; Legal; Financial; Emotional; Educational; Recreational/ leisure; Vocational/employment; Living arrangements (DSM)      Per client:family/friend relationships-good, legal-on probation with [facility], for disorderly conduct, unsupervised, no past legals besides that. 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 Hobbies-outdoor stuff, be outdoors. Living arrangement-shared program sober house, permanent housing for single parents, financially-just making it, bills.    3. What activities have you engaged in when using alcohol/other drugs that could be hazardous to you or others (i.e. driving a car/motorcycle/boat, operating machinery, unsafe sex, sharing needles for drugs or tattoos, etc     The client  reported having a history of having unsafe sex.    4. How much time do you spend getting, using or getting over using alcohol or drugs? (DSM)     Client reported:during relapse maybe a couple hours, some of the day.     5. Reasons for drinking/drug use (Use the space below to record answers. It may not be necessary to ask each item.)  Like the feeling Yes   Trying to forget problems Yes   To cope with stress Yes   To relieve physical pain Yes   To cope with anxiety Yes   To cope with depression Yes   To relax or unwind No   Makes it easier to talk with people No   Partner encourages use No   Most friends drink or use No   To cope with family problems Yes   Afraid of withdrawal symptoms/to feel better No   Other (specify)  No     A. What concerns other people about your alcohol or drug use/Has anyone told you that you use too much? What did they say? (DSM)     Per client:nobody but housing.     B. What did you think about that/ do you think you have a problem with alcohol or drug use?     Per client:get back on the right path.     6. What changes are you willing to make? What substance are you willing to stop using? How are you going to do that? Have you tried that before? What interfered with your success with that goal?      Per client: outpatient programming, start going to sober meetings again, used to do a lot of sober activities outside of housing, start doing that stuff again, looking into medication again. Continue with therapy.     7. What would be helpful to you in making this change?     Client reported outpatient treatment.     Dimension IV Ratings   Readiness for Change - The placing authority must use the criteria in Dimension IV to determine a client s readiness for change.   RISK DESCRIPTIONS - Severity ratin Client is motivated with active reinforcement, to explore treatment and strategies for change, but ambivalent about illness or need for change.    REASONS SEVERITY WAS ASSIGNED - (What  information did the person provide that supports your assessment of his or her readiness to change? How aware is the person of problems caused by continued use? How willing is she or he to make changes? What does the person feel would be helpful? What has the person been able to do without help?)      Anabel reported she relapsed and would like to get on the path of sobriety again. Anabel reported she believes outpatient treatment, sober support group meetings/activities, and therapy as what would be helpful with this. Anabel reported only her housing has expressed concern about her relapse. Anabel appears to have both external and internal motivation for change.          DIMENSION V - Relapse, Continued Use, and Continued Problem Potential   1A. In what ways have you tried to control, cut-down or quit your use? If you have had periods of sobriety, how did you accomplish that? What was helpful? What happened to prevent you from continuing your sobriety? (DSM)     Per client:Prior to relapse was sober a year, engage in a lot of sober activities and meetings, treatment as well. Triggers-stressor with children.     1B. What were the circumstances of your most recent relapse with mood altering chemicals?    Per client: stressor with children, and got overwhelmed.    2. Have you experienced cravings? If yes, ask follow up questions to determine if the person recognizes triggers and if the person has had any success in dealing with them.     Denied    3. Have you been treated for alcohol/other drug abuse/dependence? Yes.  3B. Number of times(lifetime) (over what period) 3.  3C. Number of times completed treatment (lifetime) 2.  3D. During the past three years have you participated in outpatient and/or residential?  Yes.  3E. When and where? [facility], [facility], [facility] about a year ago.   3F. What was helpful? What was not? Helpful-learning about self, going into why I use,  finding, triggers. Not helpful-nothing.     4. Support group participation: Have you/do you attend support group meetings to reduce/stop your alcohol/drug use? How recently? What was your experience? Are you willing to restart? If the person has not participated, is he or she willing?     Per client:past none currently, three months ago last meeting.     5. What would assist you in staying sober/straight?     Clt reported outpatient treatment.     Dimension V Ratings   Relapse/Continued Use/Continued problem potential - The placing authority must use the criteria in Dimension V to determine a client s relapse, continued use, and continued problem potential.   RISK DESCRIPTIONS - Severity rating: 3 Client has poor recognition and understanding of relapse and recidivism issues and displays moderately high vulnerability for further substance use or mental health problems. Client has few coping skills and rarely applies coping skills.    REASONS SEVERITY WAS ASSIGNED - (What information did the person provide that indicates his or her understanding of relapse issues? What about the person s experience indicates how prone he or she is to relapse? What coping skills does the person have that decrease relapse potential?)      Clt reported a year of sobriety prior to her relapse. Clt reported on and off use since relapsing. Clt reported what was helpful to abstain was treatment, sober support group meetings and activities. Clt reported triggers but denied cravings. Clt reported three past treatment in which she completed two of them. Clt reported her last sober support group meeting was three months ago. Clt would appear to benefit from review of relapse skills and gaining structured sober support and routine. Clt appears to be at moderately high risk for continued use due to lack of sober support, routine, and most recent relapse cycle.         DIMENSION VI - Recovery Environment   1. Are you employed/attending school?  Tell me about that.     Clt reported she is not currently working or attending school. 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 Describe a typical day; evening for you. Work, school, social, leisure, volunteer, spiritual practices. Include time spent obtaining, using, recovering from drugs or alcohol. (DSM)     Per client:get up, get kids to school, run errands, clean.     Please describe what leisure activities have been associated with your substance abuse:     Client denied.     2B. How often do you spend more time than you planned using or use more than you planned? (DSM)     Client reported every time she uses.     3. How important is using to your social connections? Do many of your family or friends use?     Per client: denied not at all    4A. Are you currently in a significant relationship?     Yes.  4B. How long? Within the last month            Please describe your significant other's use of mood altering chemicals? denied    4C. Sexual Orientation:     Heterosexual    5A. Who do you live with?      Client reported sober housing with children-shared program sober house, permanent housing for single parents.    5B. Tell me about their alcohol/drug use and mental health issues.     None    5C. Are you concerned for your safety there? No    5D. Are you concerned about the safety of anyone else who lives with you? No    6A. Do you have children who live with you?     Yes.  (Ask follow-up questions to determine the person's relationship and responsibility, both legal and care giving; and what arrangements are made for supervision for the children when the person is not available.) Rosalind 12, Aldo 10, [facility] , have not met with them yet.    6B. Do you have children who do not live with you?     No    7A. Who supports you in making changes in your alcohol or drug use? What are they willing to do to support you? Who is upset or angry about you making  "changes in your alcohol or drug use? How big a problem is this for you?      \"my best friend\"    7B. This table is provided to record information about the person s relationships and available support It is not necessary to ask each item; only to get a comprehensive picture of their support system.  How often can you count on the following people when you need someone?   Partner / Spouse Always supportive   Parent(s)/Aunt(s)/Uncle(s)/Grandparents The patient doesn't have any current contact with parents or other family members.   Sibling(s)/Cousin(s) Never supportive   Child(kwabena) Always supportive   Other relative(s) No contact   Friend(s)/neighbor(s) Always supportive   Child(kwabena) s father(s)/mother(s) The patient doesn't have any current contact with children(s) mother or father.   Support group member(s) The patient denied having any current involvement with 12-step or other support group meetings.   Community of cali members The patient denied having any current involvement with community cali members.   /counselor/therapist/healer Always supportive   Other (specify) No     8A. What is your current living situation?     Client reported sober housing for single parents and children.     8B. What is your long term plan for where you will be living?     Clt reported permanent living.     8C. Tell me about your living environment/neighborhood? Ask enough follow up questions to determine safety, criminal activity, availability of alcohol and drugs, supportive or antagonistic to the person making changes.      safe    9. Criminal justice history: Gather current/recent history and any significant history related to substance use--Arrests? Convictions? Circumstances? Alcohol or drug involvement? Sentences? Still on probation or parole? Expectations of the court? Current court order? Any sex offenses - lifetime? What level? (DSM)    Clt reported previous disorderly conduct through [facility] and is " on unsupervised probation with them. Clt denied any other past legals.     10. What obstacles exist to participating in treatment? (Time off work, childcare, funding, transportation, pending half-way time, living situation)     None    Dimension VI Ratings   Recovery environment - The placing authority must use the criteria in Dimension VI to determine a client s recovery environment.   RISK DESCRIPTIONS - Severity rating: 3 Client is not engaged in structured, meaningful activity and the client's peers, family, significant other, and living environment are unsupportive, or there is significant criminal justice system involvement.    REASONS SEVERITY WAS ASSIGNED - (What support does the person have for making changes? What structure/stability does the person have in his or her daily life that will increase the likelihood that changes can be sustained? What problems exist in the person s environment that will jeopardize getting/staying clean and sober?)     Anabel reported she is not currently working or attending school. Anabel reported she lives in sober housing for single parents with her two children. Anabel reported her housing expressed concern regarding her relapse. Anabel reported using is not important to her social connections. Anshut reported she is in a romantic relationship and denied he uses substances. Anshut reported her best friend as her supportive network. Anabel reported current probation for previous disorderly conduct charge. Anabel reported recent CPS involvement. Anabel reported she has not received any information from the [facility] regarding their involvement details yet. Anabel appears to lack structured sober support and routine.          Client Choice/Exceptions   Would you like services specific to language, age, gender, culture, Alevism preference, race, ethnicity, sexual orientation or disability?  No    What particular treatment choices and options would you like to have? IOP    Do you have a preference for a  particular treatment program? None    Criteria for Diagnosis     Criteria for Diagnosis  DSM-5 Criteria for Substance Use Disorder  Instructions: Determine whether the client currently meets the criteria for Substance Use Disorder using the diagnostic criteria in the DSM-V pp.[phone]. Current means during the most recent 12 months outside a facility that controls access to substances    Category of Substance Severity (ICD-10 Code / DSM 5 Code)     Alcohol Use Disorder The patient does not meet the criteria for an Alcohol use disorder.   Cannabis Use Disorder The patient does not meet the criteria for a Cannabis use disorder.   Hallucinogen Use Disorder The patient does not meet the criteria for a Hallucinogen use disorder.   Inhalant Use Disorder The patient does not meet the criteria for an Inhalant use disorder.   Opioid Use Disorder The patient does not meet the criteria for an Opioid use disorder.   Sedative, Hypnotic, or Anxiolytic Use Disorder The patient does not meet the criteria for a Sedative/Hypnotic use disorder.   Stimulant Related Disorder Severe   (F15.20) (304.40) Amphetamine type substance   Tobacco Use Disorder Mild    (Z72.0) (305.1)   Other (or unknown) Substance Use Disorder The patient does not meet the criteria for a Other (or unknown) Substance use disorder.       Collateral Contact Summary   Number of contacts made: 1    Contact with referring person:  Yes-EMR    If court related records were reviewed, summarize here: No court records had been reviewed at the time of this documentation.    Information from collateral contacts supported/largely agreed with information from the client and associated risk ratings.      Rule 25 Assessment Summary and Plan   's Recommendation    1. Abstain from using all non-prescribed mood altering chemicals and substances. Take all medication as prescribed and directed by licensed physicians.  2. Complete an IOP treatment program such as [facility]. Follow all recommendations and referrals made by treatment providers.  3. Follow all recommendations and referrals made by [facility] and Child Protection Services (CPS) through [facility].  4. Start attending sober support group meetings weekly.   5. Continue care with all other licensed professional medical providers such as pcp and therapist and continue to follow all recommendations and referral made by those providers.    Collateral Contacts     Name:    Electronic Medical Records (EMR)   Relationship:    Medical Records   Phone Number:    None Releases:    Yes     Clt medical record was reviewed and utilized for collateral purposes of this assessment and largely agreed with the information from the client.      Collateral Contacts     Name:    None   Relationship:    None   Phone Number:    None   Releases:    None     None    ollateral Contacts      A problematic pattern of alcohol/drug use leading to clinically significant impairment or distress, as manifested by at least two of the following, occurring within a 12-month period:    1.) Alcohol/drug is often taken in larger amounts or over a longer period than was intended.  2.) There is a persistent desire or unsuccessful efforts to cut down or control alcohol/drug use  3.) A great deal of time is spent in activities necessary to obtain alcohol, use alcohol, or recover from its effects.  6.) Continued alcohol use despite having persistent or recurrent social or interpersonal problems caused or exacerbated by the effects of alcohol/drug.  8.) Recurrent alcohol/drug use in situations in which it is physically hazardous.  9.) Alcohol/drug use is continued despite knowledge of having a persistent or recurrent physical or psychological problem that is likely to have been caused or exacerbated by alcohol.  10.) Tolerance, as defined by either of the following: A need for markedly increased amounts of alcohol/drug to  achieve intoxication or desired effect. and A markedly diminished effect with continued use of the same amount of alcohol/drug.      Specify if: In early remission:  After full criteria for alcohol/drug use disorder were previously met, none of the criteria for alcohol/drug use disorder have been met for at least 3 months but for less than 12 months (with the exception that Criterion A4,  Craving or a strong desire or urge to use alcohol/drug  may be met).     In sustained remission:   After full criteria for alcohol use disorder were previously met, none of the criteria for alcohol/drug use disorder have been met at any time during a period of 12 months or longer (with the exception that Criterion A4,  Craving or strong desire or urge to use alcohol/drug  may be met).   Specify if:   This additional specifier is used if the individual is in an environment where access to alcohol is restricted.    Mild: Presence of 2-3 symptoms  Moderate: Presence of 4-5 symptoms  Severe: Presence of 6 or more symptoms

## 2019-03-19 ASSESSMENT — ANXIETY QUESTIONNAIRES: GAD7 TOTAL SCORE: 6

## 2019-03-21 ENCOUNTER — OFFICE VISIT (OUTPATIENT)
Dept: BEHAVIORAL HEALTH | Facility: CLINIC | Age: 34
End: 2019-03-21
Payer: COMMERCIAL

## 2019-03-21 DIAGNOSIS — F33.1 MODERATE EPISODE OF RECURRENT MAJOR DEPRESSIVE DISORDER (H): Primary | ICD-10-CM

## 2019-03-21 DIAGNOSIS — F19.20 CHEMICAL DEPENDENCY (H): ICD-10-CM

## 2019-03-21 DIAGNOSIS — F43.10 PTSD (POST-TRAUMATIC STRESS DISORDER): ICD-10-CM

## 2019-03-21 DIAGNOSIS — F41.1 GENERALIZED ANXIETY DISORDER: ICD-10-CM

## 2019-03-21 PROCEDURE — 90832 PSYTX W PT 30 MINUTES: CPT | Performed by: SOCIAL WORKER

## 2019-03-21 ASSESSMENT — ANXIETY QUESTIONNAIRES
3. WORRYING TOO MUCH ABOUT DIFFERENT THINGS: SEVERAL DAYS
6. BECOMING EASILY ANNOYED OR IRRITABLE: MORE THAN HALF THE DAYS
IF YOU CHECKED OFF ANY PROBLEMS ON THIS QUESTIONNAIRE, HOW DIFFICULT HAVE THESE PROBLEMS MADE IT FOR YOU TO DO YOUR WORK, TAKE CARE OF THINGS AT HOME, OR GET ALONG WITH OTHER PEOPLE: SOMEWHAT DIFFICULT
7. FEELING AFRAID AS IF SOMETHING AWFUL MIGHT HAPPEN: SEVERAL DAYS
1. FEELING NERVOUS, ANXIOUS, OR ON EDGE: MORE THAN HALF THE DAYS
GAD7 TOTAL SCORE: 11
5. BEING SO RESTLESS THAT IT IS HARD TO SIT STILL: MORE THAN HALF THE DAYS
2. NOT BEING ABLE TO STOP OR CONTROL WORRYING: SEVERAL DAYS

## 2019-03-21 ASSESSMENT — PATIENT HEALTH QUESTIONNAIRE - PHQ9
5. POOR APPETITE OR OVEREATING: MORE THAN HALF THE DAYS
SUM OF ALL RESPONSES TO PHQ QUESTIONS 1-9: 13

## 2019-03-22 ASSESSMENT — ANXIETY QUESTIONNAIRES: GAD7 TOTAL SCORE: 11
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
## 2019-03-28 ENCOUNTER — NURSE TRIAGE (OUTPATIENT)
Dept: NURSING | Facility: CLINIC | Age: 34
End: 2019-03-28

## 2019-03-29 NOTE — TELEPHONE ENCOUNTER
Calling to find out results. Will received a my chart message and a voice mail saying she had new results. No new results found.    Cecilia Ferrer RN/ [facility]        Reason for Disposition    [1] Caller requesting nonurgent health information AND [2] PCP's office is the best resource    Protocols used: INFORMATION ONLY CALL - NO TRIAGE-PEDIATRIC-

## 2019-04-01 ENCOUNTER — HOSPITAL ENCOUNTER (OUTPATIENT)
Dept: BEHAVIORAL HEALTH | Facility: CLINIC | Age: 34
End: 2019-04-01
Attending: SOCIAL WORKER
Payer: COMMERCIAL

## 2019-04-01 ENCOUNTER — THERAPY VISIT (OUTPATIENT)
Dept: PHYSICAL THERAPY | Facility: CLINIC | Age: 34
End: 2019-04-01
Payer: COMMERCIAL

## 2019-04-01 ENCOUNTER — BEH TREATMENT PLAN (OUTPATIENT)
Dept: BEHAVIORAL HEALTH | Facility: CLINIC | Age: 34
End: 2019-04-01
Attending: FAMILY MEDICINE

## 2019-04-01 DIAGNOSIS — M54.2 NECK PAIN: Primary | ICD-10-CM

## 2019-04-01 DIAGNOSIS — M26.609 TMJ (TEMPOROMANDIBULAR JOINT SYNDROME): ICD-10-CM

## 2019-04-01 PROCEDURE — 97035 APP MDLTY 1+ULTRASOUND EA 15: CPT | Mod: GP | Performed by: PHYSICAL THERAPIST

## 2019-04-01 PROCEDURE — G8982 BODY POS GOAL STATUS: HCPCS | Mod: GP | Performed by: PHYSICAL THERAPIST

## 2019-04-01 PROCEDURE — H2035 A/D TX PROGRAM, PER HOUR: HCPCS

## 2019-04-01 PROCEDURE — 97163 PT EVAL HIGH COMPLEX 45 MIN: CPT | Mod: GP | Performed by: PHYSICAL THERAPIST

## 2019-04-01 PROCEDURE — G8981 BODY POS CURRENT STATUS: HCPCS | Mod: GP | Performed by: PHYSICAL THERAPIST

## 2019-04-01 PROCEDURE — 97140 MANUAL THERAPY 1/> REGIONS: CPT | Mod: GP | Performed by: PHYSICAL THERAPIST

## 2019-04-01 NOTE — PROGRESS NOTES
Initial Services Plan        Before your first treatment group, please do the following    Immediate health & safety concerns: None    Suggestions for client during the time between intake & completion of treatment plan:  Complete the problem list for your treatment plan.  Review your patient or client handbook.    Client issues to be addressed in the first treatment sessions:  Identify motivations(s) for coming to treatment, i.e. legal, family, job, self      Vanessa Koehler MA, [facility], [facility]  4/1/2019  1:08 PM

## 2019-04-01 NOTE — PROGRESS NOTES
Patient:  Will Dodson    Date: April 1, 2019    Comprehensive Assessment UPDATE        Comprehensive Summary Update and Review  Counselor met with patient on 4/1/2019 and reviewed the Comprehensive Assessment.    There were no changes/updates identified by patient or in chart entries.    Pope-Suicide Severity Rating Scale Reassessment   Have you ever wished you were dead or that you could go to sleep and not wake up?  Past Month:  No   Have you actually had any thoughts of killing yourself?  Past Month:  No   Have you been thinking about how you might do this?     Past Month:  NA Lifetime:  NA   Have you had these thoughts and had some intention of acting on them?     Past Month:  NA Lifetime:  NA   Have you started to work out the details of how to kill yourself?   Past Month:  NA Lifetime:  NA   Do you intend to carry out this plan?   NA   When you have the thoughts how long do they last?  N/A   Are there things - anyone or anything (i.e. family, [employer], pain of death) that stopped you from wanting to die or acting on thoughts of suicide?  Does not apply     2008  [employer]  Used with permission by Lisa Fraire, PhD.       Guide to C-SSRS Risk Ratings   NO IDEATION:  with no active thoughts IDEATION: with a wish to die. IDEATION: with active thoughts. Risk Ratings   If Yes No No 0 - Very Low Risk   If NA Yes No 1 - Low Risk   If NA Yes Yes 2 - Low/moderate risk   IDEATION: associated thoughts of methods without intent or plan INTENT: Intent to follow through on suicide PLAN: Plan to follow through on suicide Risk Ratings cont...   If Yes No No 3 - Moderate Risk   If Yes Yes No 4 - High Risk   If Yes Yes Yes 5 - High Risk   The patient's ADDITIONAL RISK FACTORS and lack of PROTECTIVE FACTORS may increase their overall suicide risk ratings.     Additional Risk Factors:    No additional risk factors   Protective Factors:    Having people in his/her life that would  "prevent the patient from considering a suicide attempt (i.e. young children, spouse, parents, etc.)     A positive relationship with his/her clinical medical and/or mental health providers     Having easy access to supportive family members     Having a good community support network     Risk Status   0. - Very Low Risk:  Evaluation Counselors:  Document in Epic / SBAR to counselor \"Very Low Risk\".      Treatment Counselors:  Reassess upon admission as applicable, assess weekly in progress notes under Dimension 3 and summarize in Discharge / Treatment summary under Dimension 3.   Additional information to support suicide risk rating: There was no additional information to provide at this time.     "
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
## 2019-04-01 NOTE — PROGRESS NOTES
Patient Safety Plan Template    Name:   Will Dodson YOB: 1985 Age:  33 year old MR Number:  [id number]   Step 1: Warning signs (Thoughts, images, mood, situation, behavior) that a crisis may be developin. Irritability     2. Chaos or unstructured times     3. Isolation     Step 2: Internal coping strategies - Things I can do to take my mind off of my problems without contacting another person (relaxation technique, physical activity):     1. Listen to music     2. Deep breathing     3. Go for a walk     Step 3: People and social settings that provide distraction:     1. Name: Emelia   Phone: [phone]   2. Name: Fidencio   Phone: [phone]   3. Place: [facility]   4. Place: Nature     The one thing that is most important to me and worth living for is: My Children.     Step 4: People whom I can ask for help:     1. Name: Emelia   Phone: [phone]     2. Name: Fidencio   Phone: [phone]     3. Name: Daphnie   Phone: [phone]     Step 5: Professionals or agencies I can contact during a crisis:     1. Clinician Name: Jodie Davis   Phone: [phone]   Clinician Pager or Emergency Contact #: [phone]     2. Clinician Name: KAREN   Phone: KAREN     Clinician Pager or Emergency Contact #: NA     3. Local Urgent Care Services:[facility]    Urgent Care Services Address: [address] [place] [address]    Urgent Care Services Phone: [phone]     4. Suicide [facility] Phone: [phone]     Step 6: Making the environment safe:     1. Remove all substance and paraphernalia  from my living environment     2. Now allowing people who use over.      Safety Plan Template 2008 Yarelis Sher and Richard Walter is reprinted with the express permission of the authors.  No portion of the Safety Plan Template may be reproduced without the express, written permission.  You can contact the authors at [email or web address] or [email or web address].
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
## 2019-04-01 NOTE — PROGRESS NOTES
[facility]  Adult Chemical Dependency Program  Treatment Plan Requirements    These services are provided by the facility for each patient/client according to the individual's treatment plan:    Individual and group counseling    Education    Transition services    Services to address any co-occurring mental illness    Service coordination    Initial Treatment Plan Goals:  1. Complete all the requirements of Program Orientation.  2. Maintain medication compliance throughout the program.  3. Complete requirements for workshop/skills groups based on identified issues on your problem list.  4. Complete the support group attendance feedback sheet weekly.  5. Gain family involvement in treatment process to address family issues from the problem list.  6. Attend and participate in all required groups per individual treatment plan.  7. Focus attention to individualized issues from the treatment plan.  8. Complete all requirements for UA's, alcohol screening tests and other testing.  9. Schedule a physical examination if recommended.    In addition to the above, complete all individual goals as specifically outlines on your treatment plan.    Criteria for discharge:  Patients/clients are discharged from the program following completion of the entire program including Phase I and II or acceptance of other post-treatment referrals such as correction house, or aftercare at other facilities.  Patients/clients may also be discharged for inappropriate behavior or chemical use.      Favorable Discharge - Patients/clients have completed agreed upon treatment goals, understand their diagnosis and appear motivated about the follow-up care.    Guarded Discharge - Patients/clients have demonstrated some understanding of their diagnosis and recovery process, and have completed some of their treatment goals.  This prognosis also includes patients/clients who have completed some treatment goals but have not made  commitment to community support or follow through with referrals.    Unfavorable Discharge - Patients/clients have not completed agreed upon treatment goals due to their own choice, have limited understanding of their diagnosis, and have shown minimal or inconsistent behavior conducive to recovery.  Those patients/clients discharged due to behavioral problems will also be unfavorable discharges.  Adult CD Treatment Plan                                Will Dodson  MR # [id number]   1985  33 year old female    Acute Intoxication/Withdrawal Potential     DIMENSION 1  RISK FACTOR: 0     SUBSTANCE USE DISORDERS:  Amphetamines and Tobacco            Date Assigned Source Area of Treatment Focus / Goal / Treatment Strategies    Target  Date Initials Outcome Date Completed   2019 Self -  Current  Area of Treatment Focus:  Client's last use date was reported as 3/1/2019    Goal:  Develop effective strategies to maintain abstinence.    Treatment Strategies:  (Note: Must indicate the amount and frequency for each strategy)    Client will report to staff and group any alcohol or drug use daily when applicable though out treatment.  (daily throughout treatment)    Client will submit to random UA and breathalyzers when requested throughout treatment. (as requested throughout treatment)                         2019                         [facility]                         Not Completed        Not Completed                    Biomedical Conditions and Complaints     DIMENSION 2  RISK FACTOR: 1             Date Assigned Source Area of Treatment Focus / Goal / Treatment Strategies Target  Date Initials Outcome Date Completed   2019 Self -  Current  Area of Treatment Focus:  Client states she has TMJ    Goal:  Maintain good physical health.    Treatment Strategies:   (Note: Must indicate the amount and frequency for each strategy)    Client will continue with physical therapy as  "recommended for her TMJ. (ongoing throughout treatment)                     8/12/2019                     [facility]                     Not Completed                    4/1/2019 Self -  Current  Area of Treatment Focus:  Client states she has been referred to a concussion specialist.    Goal:  Client will follow through with a concussion specialist.     Treatment Strategies:   (Note: Must indicate the amount and frequency for each strategy)    Client will schedule an appointment with a concussion specialist as recommended. (1x)    Client will follow specialists recommendations. (ongoing as recommended)                         8/12/2019 8/12/2019                           [facility]                         Not Completed      Not Completed                      Emotional/Behavioral/Cognitive Conditions and Complications     DIMENSION 3  RISK FACTOR: 2             Date Assigned Source Area of Treatment Focus / Goal / Treatment Strategies Target  Date Initials Outcome Date Completed     4/1/2019 Self -  Current  Area of Treatment Focus:   Client presents with ERYN    Goal:  Client will verbalize less ERYN symptoms.    Treatment Strategies:  (Note: Must indicate the amount and frequency for each strategy)    Client will complete (1x), \"Creating your Coping Plan\" and share in group.    Client will participate in individual therapy as recommended through out treatment.                     4/16/2019 8/12/2019                     [facility]                     Not Completed    Not Completed                    4/1/2019 Self -  Current  Area of Treatment Focus:   Client presents with MDD recurrent moderate    Goal:  Client will demonstrate less MDD symptoms.    Treatment Strategies:  (Note: Must indicate the amount and frequency for each strategy)    Client will participate in individual therapy as recommended through out treatment.                         8/12/2019                         ADA                         Not " Completed                    4/1/2019 Self -  Current  Area of Treatment Focus:   Client presents with PTSD diagnosis    Goal:  Client will work on resolving PTSD symptoms.    Treatment Strategies:  (Note: Must indicate the amount and frequency for each strategy)    Client will participate in individual therapy as recommended through out treatment.                       8/12/2019                       ADA                       Not Completed                    Readiness to Change     DIMENSION 4  RISK FACTOR: 2             Date Assigned Source Area of Treatment Focus / Goal / Treatment Strategies Target  Date Initials Outcome Date Completed   4/1/2019 Self -  Current  Area of Treatment Focus:  Client is in need of the support of an outpatient treatment program.    Goal:  Client will verbalize the benefits of this outpatient treatment program.    Treatment Strategies:  (Note: Must indicate the amount and frequency for each strategy)    Client will attend three three hour sessions of group a week and individual sessions as needed throughout the course of Phase I outpatient treatment.    Client will attend three two hour sessions of group a week and individual sessions as needed throughout the course of Phase II outpatient treatment.    Client will attend one two hour session of group a week and individual sessions as needed throughout the course of Phase III outpatient treatment.                         5/8/2019 6/19/2019 8/12/2019                         ADA [facility]ALL[facility]                         Not Completed        Not Completed        Not Completed                    Date Assigned Source Area of Treatment Focus / Goal / Treatment Strategies Target  Date Initials Outcome Date Completed   4/1/2019 Self -  Current  Area of Treatment Focus:  Client states they would like to work on learning more about what skills are needed to maintain sobriety.    Goal:  Client will demonstrate healthy  "recovery skills daily.     Treatment Strategies:  (Note: Must indicate the amount and frequency for each strategy)    Client will complete (1x)  the \"consequences worksheet\" and share in group how my addiction has impacted my life.    Client will complete (1x) worksheet on \"looking outside of yourself for the solution\" and share in group my need for a strong support system in recovery.    Client will complete (1x)  the worksheet on \"what it means to be making a decision\" and share in group what changes I need to make to support ongoing recovery.                           4/29/2019          6/3/2019          7/22/2019                           ADA [facility]ALLERO                           Not Completed        Not Completed        Not Completed                    Relapse/Continues Use/Continues Problem Potential     DIMENSION 5  RISK FACTOR: 3                 Date Assigned Source Area of Treatment Focus / Goal / Treatment Strategies Target  Date Initials Outcome Date Completed   4/1/2019 Self -  Current  Area of Treatment Focus:  Client states she would like to work on \"dealing with triggers\".    Goal:  Client will demonstrate three new skills to help deal with triggers.     Treatment Strategies:  (Note: Must indicate the amount and frequency for each strategy)    Client will complete the \"relapse symptoms assignment\" one time and share in group what she learned and what she could relate to.     Client will complete RP 1 and RP 2 worksheet one time and share insights in group.    Client will complete Cravings and Triggers worksheet one time and share in group.     Client will complete Coping with Triggers one time and share in group.                            4/24/2019 4/30/2019 5/7/2019 5/14/2019                         ADA CABALLERO                         Not Completed        Not Completed    Not Completed    Not Completed                    Recovery Environment " "    DIMENSION 6  RISK FACTOR: 3                 Date Assigned Source Area of Treatment Focus / Goal / Treatment Strategies Target  Date Initials Outcome Date Completed   4/1/2019 Self -  Current  Area of Treatment Focus:  Client is living in a sober house and lacks sober network.     Goal:  Client will expand her sober network.     Treatment Strategies:  (Note: Must indicate the amount and frequency for each strategy)    Client will attend one support meeting this week and report back my experience to the group. (1x)    Client will attend a support meeting this week and obtain the names and numbers of three sober people. (1x)    Client will utilize one of the names and numbers she obtained at last weeks meeting and schedule a sober outing with the person. (1x)                         4/30/2019 5/6/2019 5/13/2019                       ADA CABALLERO                       Not Completed      Not Completed      Not Completed                    Date Assigned Source Area of Treatment Focus / Goal / Treatment Strategies Target  Date Initials Outcome Date Completed   4/1/2019 Self -  Current  Area of Treatment Focus:  Client has legal consequences due to chemical use.    Goal:  Client will resolve all legal issues.    Treatment Strategies:  (Note: Must indicate the amount and frequency for each strategy)    Client will follow all legal recommendations and requirements. (Ongoing throughout treatment)                       8/12/2019                       ADA                       Not Completed                      Individual abuse prevention plan (required for lodging plus) : specific actions, referral:   No additional protection measures required other than the Program Abuse Prevention Plan - No    All interventions that are designated as \"current\" will need to be completed in order to transition out of treatment with a favorable prognosis. The treatment plan is a flexible document and a work in " progress. Interventions and goals may be added at any time to customize the treatment plan to each individuals needs. Client may work with therapist/counselor to change interventions as long as they pertain to the goals stipulated in the plan and/or are clinically driven.      1. I have participated in creating my treatment plan with my therapist on 4/1/2019.     I agree with the plan as it is written in the electronic health record.      2. I have completed and reviewed my Safety Plan with my counselor and signed this on 4/1/2019. I have been given the hard copy of this plan.      3. Last Use Date: 3/1/2019      Will Dodson    ________________________ __________  Client Name    Signature    Date      Vanessa Koehler MA, [facility], [facility] ________________________ __________  Name of Therapist   Signature    Date

## 2019-04-02 ENCOUNTER — OFFICE VISIT (OUTPATIENT)
Dept: BEHAVIORAL HEALTH | Facility: CLINIC | Age: 34
End: 2019-04-02
Payer: COMMERCIAL

## 2019-04-02 ENCOUNTER — HOSPITAL ENCOUNTER (OUTPATIENT)
Dept: BEHAVIORAL HEALTH | Facility: CLINIC | Age: 34
End: 2019-04-02
Attending: SOCIAL WORKER
Payer: COMMERCIAL

## 2019-04-02 DIAGNOSIS — F33.1 MODERATE EPISODE OF RECURRENT MAJOR DEPRESSIVE DISORDER (H): Primary | ICD-10-CM

## 2019-04-02 DIAGNOSIS — F41.1 GENERALIZED ANXIETY DISORDER: ICD-10-CM

## 2019-04-02 DIAGNOSIS — F43.10 PTSD (POST-TRAUMATIC STRESS DISORDER): ICD-10-CM

## 2019-04-02 DIAGNOSIS — F19.20 CHEMICAL DEPENDENCY (H): ICD-10-CM

## 2019-04-02 PROCEDURE — 90832 PSYTX W PT 30 MINUTES: CPT | Performed by: SOCIAL WORKER

## 2019-04-02 PROCEDURE — H2035 A/D TX PROGRAM, PER HOUR: HCPCS

## 2019-04-02 ASSESSMENT — ANXIETY QUESTIONNAIRES
IF YOU CHECKED OFF ANY PROBLEMS ON THIS QUESTIONNAIRE, HOW DIFFICULT HAVE THESE PROBLEMS MADE IT FOR YOU TO DO YOUR WORK, TAKE CARE OF THINGS AT HOME, OR GET ALONG WITH OTHER PEOPLE: SOMEWHAT DIFFICULT
6. BECOMING EASILY ANNOYED OR IRRITABLE: NEARLY EVERY DAY
1. FEELING NERVOUS, ANXIOUS, OR ON EDGE: MORE THAN HALF THE DAYS
4. TROUBLE RELAXING: SEVERAL DAYS
5. BEING SO RESTLESS THAT IT IS HARD TO SIT STILL: SEVERAL DAYS
GAD7 TOTAL SCORE: 14
3. WORRYING TOO MUCH ABOUT DIFFERENT THINGS: SEVERAL DAYS
7. FEELING AFRAID AS IF SOMETHING AWFUL MIGHT HAPPEN: NEARLY EVERY DAY
2. NOT BEING ABLE TO STOP OR CONTROL WORRYING: NEARLY EVERY DAY

## 2019-04-02 ASSESSMENT — PATIENT HEALTH QUESTIONNAIRE - PHQ9: SUM OF ALL RESPONSES TO PHQ QUESTIONS 1-9: 15

## 2019-04-02 NOTE — PROGRESS NOTES
INDIVIDUAL THERAPY NOTE    Data: Client and I reviewed her desire to have others involved in her treatment and signed appropriate releases of information.    Intervention: Client stated that she does not need to have her sober house involved in this treatment. She stated that she is comfortable with [facility]  being involved and signed the ALEX. She stated that she is on unsupervised probation and did not need to sign an ALEX for this. She is going to work on getting her individual therapists information to sign and ALEX for this.     We talked about how she has a good relationship with her therapist and I talked about how that makes all the difference.       Assessment: Client seems open to being here and willing to look at change.       Plan: Client will start group tomorrow as she had already had an individual appointment scheduled for 9:30 with her therapist. She will get the contact information for the therapist and bring this back to sign a ALEX.

## 2019-04-03 ENCOUNTER — HOSPITAL ENCOUNTER (OUTPATIENT)
Dept: BEHAVIORAL HEALTH | Facility: CLINIC | Age: 34
End: 2019-04-03
Attending: SOCIAL WORKER
Payer: COMMERCIAL

## 2019-04-03 PROCEDURE — H2035 A/D TX PROGRAM, PER HOUR: HCPCS | Mod: HQ

## 2019-04-03 ASSESSMENT — ANXIETY QUESTIONNAIRES: GAD7 TOTAL SCORE: 14

## 2019-04-03 NOTE — PROGRESS NOTES
CD ADULT Progress Note     Treatment Plan Review completed on:  4/3/2019    Attendance Dates: 4/1/2019, 4/2/2019, and 4/3/2019    Total # of Group Sessions:  Phase 1: 1      Phase 2: N/A      Phase 3: N/A      Individual Sessions: 2        Length of stay/projected discharge date: 8/12/2019 Monday Tuesday Wednesday Thursday Friday Saturday Sunday   Group Therapy 0 hours 0 hours 2 hours       Specialty Groups*          1:1 1.5 hours 30 Minutes        Family Program          [place]            Phase II            Absent            *Specialty Groups include Mental Health Care, Assertiveness and Communication, Sobriety Maintenance Skills, Spiritual Care, Stress Management, Relapse Prevention, Family Systems.                    Learning Style:  Visual  Hands on  Verbal  Demonstration    Staff member contributing:  Vanessa Koehler MA, LADC, LPCC    Received supervision:  No    Client:  contributed to goals and plan    Did Client receive a copy of treatment plan/revised plan:  Yes    Changes to Treatment Plan:  Yes    Client agrees with plan/revised plan:  Yes    Any changes in Vulnerable Adult Status:  No    Care Coordination Activities:  None    Medical, Mental Health and other appointments the client attended: Client reported a therapy appointment on 4/2/2019 with her individual therapist.    Medication issues: Client denied.    Physical and mental health problems: See dimensions 2 and 3 below for further details.     Review and evaluation of the individual abuse prevention plan: The programs individual abuse prevention plan is sufficient for this client.     Substance Use Disorders:  Alcohol Use Disorder Moderate (F10.20) and Stimulant Related Disorder Severe (F15.20)  Amphetamine type substance      ASAM Risk Ratings and Data       DIMENSION 1: Acute Intoxication/Withdrawal  The client's ability to cope with withdrawal symptoms and current state of intoxication       Acute Intoxication/Withdrawal - Current Risk  Factor:  0    Reporting sober date of 3/1/2019    Goals:    Develop effective strategies to maintain abstinence.      Data:  Client denies any intoxication or withdrawal present.   Intervention: No intervention needed.  Assessment: Client reports some insight in to her need for abstinence and motivation to maintain abstinence.   Plan: Client will use healthy coping skills to help maintain abstinence.    DIMENSION 2:  Biomedical Conditions and Complaints  The degree to which any physical disorder would interfere with treatment for substance abuse and the client's ability to tolerate any related discomfort     Biomedical Conditions and Complaints - Current Risk Factor:  1    Goals:   Maintain good physical health.  Client will follow through with a concussion specialist.     Data: Client denies any medical issues that need direct care or intervention at this time. She states she has been diagnosed with TMJ and has started physical therapy for this. Client states she has also been recommended to a concussion specialist.   Intervention: No intervention needed by myself.   Assessment: Client presents with insight in to ways to maintain physical health.  Plan: Client will ensure they are eating, sleeping and engaging in overall good self care. She will continue with physical therapy as recommended by her physical therapist.     DIMENSION 3:  Emotional/Behavioral/Cognitive Conditions and Complications  The degree to which any condition or complications are likely to interfere with treatment for substance abuse or with function in significant life areas and the likelihood of risk of harm to self or others.     Emotional/Behavioral - Current Risk Factor:  2    DSM-5 Diagnoses:   ERYN, MDD recurrent moderate and PTSD    Goals:   Client will verbalize less ERYN symptoms.  Client will demonstrate less MDD symptoms.  Client will work on resolving PTSD symptoms.      Data: Client presents with a diagnosis of ERYN, MDD recurrent  "moderate and PTSD. She currently is working with an individual therapist and reports a positive relationship and experience with this therapist. She denies any medications currently but is considering depressive medications. Client's protective factors include; family, children, and therapist. Client's potential risk factors include; stress, lack of structure and irritability. Client denies any suicidal thought, plan or intent past or present. Client also denies any thoughts or behaviors of self harm past or present.   Intervention: On Wednesday client talked about how she is excited and grateful to be starting treatment.   Assessment: Client seems timid during group and was able to share a bit about what brought her to treatment and her motivation.   Plan: Client will work on opening up and building trust with in the group as she is new this week.     DIMENSION 4:  Readiness to Change  Consider the amount of support and encouragement necessary to keep the client involved in treatment.     Readiness to Change - Current Risk Factor:  2    Goals:  Client will verbalize the benefits of outpatient treatment.  Client will demonstrate healthy recovery skills daily.     Data: Client rates their motivation at a 8-9 on a scale of 1 - 10 with 10 being the highest. Client reports their main motivation for recovery is \"my children\". Client talked, in their first group, about what led them to treatment and how they feel about being here. They also took some time to talk about what their goals are while they are here.   Intervention: Client was confronted for being an hour late to group on Wednesday and understands this behavior is not okay. She states she over slept and did not hear her alarm. I reminded her that she has been late for both individual appointments and now her first group. She states it will not happen again and she doesn't want to start off with this negative impression with the group.   Assessment: Client was " "visibly upset about being late and seemed genuine in her apology. She was attentive during group and participated when prompted.   Plan: Client will work on her timeliness and make it to all future scheduled groups and individuals on time.      DIMENSION 5:  Relapse/Continued Use/Continued Problem Potential  Consider the degree to which the client recognizes relapse issues and has the skills to prevent relapse of either substance use or mental health problems.     Relapse/Continued Use/Continued Problem Potential - Current Risk Factor:  3    Goals:    Client will demonstrate three new skills to help deal with triggers.     Data: Client reports their cravings at a 1-3 on a scale of 1 - 10 with 10 being the highest. Client reports \"listening to music, walking and deep breathing\" helps minimize their cravings. Client reports their biggest trigger this week was \"stress\". Client states they expanded their sober support network this week by \"starting group\".   Intervention: On Wednesday we talked about spirituality versus Jain. We talked about the importance of reaching out to others and not engaging in self will. We talked about the dangers of relying on only ourselves to solve our problems. We talked about the differences between action and responsibility. Client was open to hearing about the information and shared minimally. However this may be due to her coming in late and the discussion already having been started and it being her first group. The group was assigned to create a picture that represents what they want and/or need in a higher power or spiritual being to share in group on Monday.   Assessment: Client was attentive in group and participated when prompted.   Plan: Client will complete her want ad for a higher power to present in group on Monday.     DIMENSION 6:  Recovery Environment  Consider the degree to which key areas of the client's life are supportive of or antagonistic to treatment " participation and recovery.     Recovery Environment - Current Risk Factor:  3    Support group attended this week:  No    Did family agree to attend family week:  No    If yes:  none schedule this week     Please see telephone encounters for any collateral contact this week.  Goals:    Client will expand her sober network.   Client will resolve all legal issues.    Data: Client reports attending no meetings this week. Client does not have a sponsor. Client is currently employed sporadic part time. Client reports the following legal issues; disorderly conduct. She states she is on unsupervised probation currently. Client is currently living in an environment conducive to recovery. She reports living in a permanent sober house with there two children.   Intervention: We talked on Wednesday about the importance of who we surround ourselves with and how we need others to help us in our recovery.   Assessment: Client was agreeable to this discussion.  Plan: Client will attend a support meeting.      Vanessa Koehler MA, LADC, LPCC

## 2019-04-08 ENCOUNTER — TELEPHONE (OUTPATIENT)
Dept: BEHAVIORAL HEALTH | Facility: CLINIC | Age: 34
End: 2019-04-08

## 2019-04-08 NOTE — TELEPHONE ENCOUNTER
I received a message from client's CPS worker, Nora Little, requesting a call back to confirm client's start date. I have a signed ALEX. I returned her call this morning confirming client's start date and her difficulty showing up on time for all of her scheduled appointments so far; one of which she was 50 minutes late for. I provided my contact information and suggested available times if she wanted to schedule time to speak live if she had more information she needed.

## 2019-04-08 NOTE — TELEPHONE ENCOUNTER
I spoke with client about her desire to move to residential/inpatient. She states she refused a UA. She states she overslept and did not go in as requested by CPS. She states that she thinks a higher level of care might be a better fit. She states CPS has taken her children and they are currently being placed with her mom. We talked about higher level of care and if she found anything from her insurance about option. She states that she was not able to reach insurance. I talked with her about [facility]. She is open to this.     I spoke with Ed about this case and he asked that client call him to review the case. He states he will put he pito the list. I provided MR number and some demographic information. We agreed client would use him as a  this week as I'm out of the office. He states he will keep me posted through email. We agreed client would continue to attend group until a placement can be confirmed.     I called client back and directed her to speak with Ed to see if we can get her in to [facility]. I reviewed that she needs to continue to attend group until a plan for higher level of care is confirmed and she is transitioned. She knows to call Ed, attend group and direct any questions to Ed for the rest of the week in my absence.

## 2019-04-08 NOTE — TELEPHONE ENCOUNTER
LP SCREEN TELEPHONE NOTE   Will Dodson paperwork was reviewed by FLORA Easley and the patient was deemed ELIGIBLE for the LP program.     Medical: The patient is medically stable and did not appear to need a medical screeening with the LP RN at this time.     Insurance: [facility] MA/PMAP, so [facility]KE will need to be informed by the ADMISSION counselor when the patient has been admitted to the treatment program.  After being informed of the admission date for treatment [facility]TAKE will need to fill out the [facility] referral forms with the patient's start date and then fax the [facility] referral forms to [facility] to activate the authorization for treatment.     This will be a: VA, ISP & LP UPDATE evaluation.     IV: This patient is not an IV drug user.     Business office: The patient has [employer] MA/PMAP and would NOT need to consult with anyone in the business office about the out of pocket costs of the LP program.     List: This patient CAN be placed on the [facility] Waiting List at this time.       Group: Women's Group or Mental Health Enhanced Mixed Group     Additional Info as needed: This patient had been in the Mixed Co-occurring Day Outpatient program at [facility] in [place], but she is wanting to enter the LP program at this time.     The best current contact telephone number for the patient is: [phone]       Thanks,  FLORA Easley   4/8/2019

## 2019-04-08 NOTE — TELEPHONE ENCOUNTER
"Client emailed me \"Chi Mendez. I will not be coming to group today I have a flat tire. I am thinking about in patient treatment & that it would be more beneficial for me. Please email me back so we can discuss this further. Thank you. Will Dodson    I responded, \"I am just walking in to an individual appointment. I have back to back appointments most of the day. I will try calling when I get a chance so we can talk about this further. I am out tomorrow and Wednesday for sure. The first step would be to call your insurance and ask which programs are in-network and would be covered by your insurance. Then you can start researching programs. I believe Katherine jacquelin in [place] takes [facility]. Start there. Let me know your progress in doing these two things.\"    She responded with \"Thank you\"  "

## 2019-04-08 NOTE — TELEPHONE ENCOUNTER
This counselor talked with this patient on 4/8/2019 at 4:00 pm.  The patient stated she was wanted to upgrade her treatment from the Mixed Co-occurring Day Outpatient program at [facility] in [place] to the [facility].  The patient stated she had been struggling with cravings to smoke methamphetamine and drink alcohol so she felt she needed a higher level of care.  The patient was informed that she would be placed on the [facility] waiting list.  The patient was informed to contact the LP Admission Coordinators at [phone] to inquire about the current projected wait for a female bed in the LP program.  The patient was informed to provide her full name and her best current contact telephone number to the LP Admission Coordinators to maintain her spot on the LP waiting list.

## 2019-04-09 NOTE — TELEPHONE ENCOUNTER
[facility] bed available. Writer offered to schedule her admission for Thursday, 4/11/19, but patient is unable to admit at this time. She stated that she has court on Wednesday, 4/10/19 and does not know what will happen after that. Writer let her know that the open spot in LP will be given away, but she will be kept on the wait list for the next open bed. Patient understood.    This is a 78 year old female s/p fall causing fx of her left hip.  Now s/p Left hip IM nail.  Pt has high thrombosis risk and requires anticoagulation prophylaxis.    Plan:  ::heparin 5,000 units sq q12h will increase to q8h once pt/ HH is stable  ::daily cbc/bmp  ::le venodynes  ::increase mobility as tolerated.      Will continue to follow.

## 2019-04-10 ENCOUNTER — THERAPY VISIT (OUTPATIENT)
Dept: PHYSICAL THERAPY | Facility: CLINIC | Age: 34
End: 2019-04-10
Payer: COMMERCIAL

## 2019-04-10 DIAGNOSIS — M54.2 CERVICALGIA: ICD-10-CM

## 2019-04-10 DIAGNOSIS — G50.1 ATYPICAL FACIAL PAIN: ICD-10-CM

## 2019-04-10 PROCEDURE — 97140 MANUAL THERAPY 1/> REGIONS: CPT | Mod: GP | Performed by: PHYSICAL THERAPIST

## 2019-04-10 PROCEDURE — 97035 APP MDLTY 1+ULTRASOUND EA 15: CPT | Mod: GP | Performed by: PHYSICAL THERAPIST
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
## 2019-04-11 NOTE — TELEPHONE ENCOUNTER
I left desiree a message updating her on client regarding her difficulty with showing up on time and her no show on Tuesday for group. I also talked with her about client's request to go to a residential level of care and then refusing the bed that was offered to her due to court yesterday. I stated that there was a bed available today for client however due to her refusal of this it was given away an she will remain on the wait list. I stated a Samira Lu from [facility] called stating she was working with Desiree however I only have a ALEX for Desiree. I provided my contact information for a return call. I stated I'm worried about her motivation as she has not really engaged in this program and then refuses what she asked for with a higher level of care.

## 2019-04-11 NOTE — TELEPHONE ENCOUNTER
I reached out to client. Her phone went right to voice mail possibly indicating it is turned off our out of range of service. I left a message requesting a call back with an update on her plan as she is not attending group as requested and refusing [facility] bed. I provided my contact information and stated I really needed to talk to her.

## 2019-04-12 NOTE — TELEPHONE ENCOUNTER
[facility] bed available. Phone went straight to voicemail. Writer left her a voicemail requesting her to return phone call to schedule a day for admission and that the open spot in [place] cannot be put on hold for her so there is a possibility it may get fill.

## 2019-04-15 ENCOUNTER — PATIENT OUTREACH (OUTPATIENT)
Dept: CARE COORDINATION | Facility: CLINIC | Age: 34
End: 2019-04-15

## 2019-04-15 ASSESSMENT — ACTIVITIES OF DAILY LIVING (ADL): DEPENDENT_IADLS:: INDEPENDENT

## 2019-04-15 NOTE — TELEPHONE ENCOUNTER
I left client a message stating due to lack of communication and attendance she is being discharged from the program. I explained that I see evidence that [facility] has reached out with bed availability and she is not returning their call either. I confirmed at this point she would be discharged and if she wants to return to treatment she would need to get an updated assessment. I encouraged her to reach out if there was anything we could do to help her.

## 2019-04-15 NOTE — TELEPHONE ENCOUNTER
----- Message from JOHN Echols sent at 4/15/2019  7:57 AM CDT -----  Please remove all future appointments for this client. Keep todays appointment 4/15 as I need this for her discharge work and remove all forward starting 4/16. Thank you.

## 2019-04-15 NOTE — PROGRESS NOTES
Clinic Care Coordination Contact  [facility]/Voicemail    Referral Source: PCP  Clinical Data: Care Coordinator Outreach  Outreach attempted x 1.  Left message on voicemail with call back information and requested return call.  Plan: Care Coordinator mailed out care coordination introduction letter on 2-20-19. Care Coordinator will try to reach patient again in 5-10 business days.    Per EMR, pt is discharged as of today from [facility] due to lack of engagement or follow up with services.      Loulou Epps  Social Work Care Coordinator  [facility]  [phone]

## 2019-04-22 ENCOUNTER — OFFICE VISIT (OUTPATIENT)
Dept: FAMILY MEDICINE | Facility: CLINIC | Age: 34
End: 2019-04-22
Payer: COMMERCIAL

## 2019-04-22 VITALS
BODY MASS INDEX: 25.56 KG/M2 | TEMPERATURE: 98 F | DIASTOLIC BLOOD PRESSURE: 89 MMHG | HEIGHT: 60 IN | RESPIRATION RATE: 18 BRPM | SYSTOLIC BLOOD PRESSURE: 129 MMHG | WEIGHT: 130.2 LBS | OXYGEN SATURATION: 99 % | HEART RATE: 97 BPM

## 2019-04-22 DIAGNOSIS — F33.1 MODERATE EPISODE OF RECURRENT MAJOR DEPRESSIVE DISORDER (H): ICD-10-CM

## 2019-04-22 DIAGNOSIS — F90.2 ADHD (ATTENTION DEFICIT HYPERACTIVITY DISORDER), COMBINED TYPE: ICD-10-CM

## 2019-04-22 DIAGNOSIS — F19.20 CHEMICAL DEPENDENCY (H): ICD-10-CM

## 2019-04-22 PROCEDURE — 99214 OFFICE O/P EST MOD 30 MIN: CPT | Performed by: PHYSICIAN ASSISTANT

## 2019-04-22 RX ORDER — FLUOXETINE 10 MG/1
10 CAPSULE ORAL DAILY
Qty: 30 CAPSULE | Refills: 1 | Status: SHIPPED | OUTPATIENT
Start: 2019-04-22 | End: 2019-05-24

## 2019-04-22 RX ORDER — DEXTROAMPHETAMINE SACCHARATE, AMPHETAMINE ASPARTATE MONOHYDRATE, DEXTROAMPHETAMINE SULFATE AND AMPHETAMINE SULFATE 2.5; 2.5; 2.5; 2.5 MG/1; MG/1; MG/1; MG/1
10 CAPSULE, EXTENDED RELEASE ORAL DAILY
Qty: 30 CAPSULE | Refills: 0 | Status: SHIPPED | OUTPATIENT
Start: 2019-04-22 | End: 2019-06-04

## 2019-04-22 ASSESSMENT — PAIN SCALES - GENERAL: PAINLEVEL: NO PAIN (0)

## 2019-04-22 ASSESSMENT — MIFFLIN-ST. JEOR: SCORE: 1227.08

## 2019-04-22 NOTE — PROGRESS NOTES
SUBJECTIVE:   Will Dodson is a 33 year old female who presents to clinic today for the following   health issues:    Depression and Anxiety Follow-Up    Status since last visit: No change    Other associated symptoms:Depression and anxiety     Complicating factors:     Significant life event: No     Current substance abuse: None    PHQ 2/21/2019 3/21/2019 4/2/2019   PHQ-9 Total Score 14 13 15   Q9: Thoughts of better off dead/self-harm past 2 weeks Not at all Not at all Not at all   Some encounter information is confidential and restricted. Go to Review Flowsheets activity to see all data.     ERYN-7 SCORE 2/21/2019 3/21/2019 4/2/2019   Total Score 9 11 14   Some encounter information is confidential and restricted. Go to Review Flowsheets activity to see all data.     In the past two weeks have you had thoughts of suicide or self-harm?  No.    Do you have concerns about your personal safety or the safety of others?   No  PHQ-9  English  PHQ-9   Any Language  ERYN-7  Suicide Assessment Five-step Evaluation and Treatment (SAFE-T)    Amount of exercise or physical activity: 2-3 days/week for an average of 15-30 minutes    Problems taking medications regularly: No    Medication side effects: none    Diet: regular (no restrictions)      Medication Followup of ADHD    Taking Medication as prescribed: not applicable    Side Effects:  None    Medication Helping Symptoms:  not applicable     ADHD      Duration: all her life. Has been off Adderall for over 6 months     Description (location/character/radiation): problems focusing    Intensity:  severe    Accompanying signs and symptoms: none    History (similar episodes/previous evaluation): None    Precipitating or alleviating factors: None    Therapies tried and outcome: Adderall was helping    Patient is going back to school and needs to restart ASdderall        Additional history: as documented    Reviewed  and updated as needed this visit by clinical staff  Tobacco   Allergies  Meds  Problems  Med Hx  Surg Hx  Fam Hx  Soc Hx          Reviewed and updated as needed this visit by Provider  Tobacco  Allergies  Meds  Problems  Med Hx  Surg Hx  Fam Hx         Patient Active Problem List   Diagnosis     H/O:      Generalized anxiety disorder     Panic attack     ADHD (attention deficit hyperactivity disorder), combined type     RhD negative     Moderate episode of recurrent major depressive disorder (H)     Posttraumatic stress disorder     Chemical dependency (H)     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     Depression with anxiety     Neck pain     TMJ (temporomandibular joint syndrome)     Past Surgical History:   Procedure Laterality Date     C/SECTION, LOW TRANSVERSE  ,     , Low Transverse     COSMETIC SURGERY       CYSTOSCOPY,REMV CALCULUS,SIMPLE       LAPAROSCOPIC CHOLECYSTECTOMY N/A 2017    Procedure: LAPAROSCOPIC CHOLECYSTECTOMY;  Laparoscopic Cholecystectomy;  Surgeon: Carson Rojas MD;  Location: [facility] OR     ORTHOPEDIC SURGERY         Social History     Tobacco Use     Smoking status: Current Every Day Smoker     Packs/day: 0.25     Types: Cigarettes     Start date: 2015     Smokeless tobacco: Never Used   Substance Use Topics     Alcohol use: Yes     Alcohol/week: 0.0 oz     Comment: occas- quit with pregnancy     Family History   Problem Relation Age of Onset     Breast Cancer Mother      Depression Mother      Substance Abuse Mother      Anxiety Disorder Mother      Hypertension Father      Substance Abuse Father      Colon Cancer Maternal Grandfather      Colon Cancer Paternal Grandfather      Other - See Comments Sister         epilepsy     Substance Abuse Sister         A&D         Current Outpatient Medications   Medication Sig Dispense Refill     amphetamine-dextroamphetamine (ADDERALL XR) 10 MG 24 hr capsule Take 1 capsule (10 mg) by mouth daily For one week then can if needed increase to  2 capsules (20mg). 30 capsule 0     FLUoxetine (PROZAC) 10 MG capsule Take 1 capsule (10 mg) by mouth daily 30 capsule 1     albuterol (ALBUTEROL) 108 (90 BASE) MCG/ACT inhaler Inhale 2 puffs into the lungs every 4 hours as needed for shortness of breath / dyspnea (Patient not taking: Reported on 3/8/2019) 1 Inhaler 0     levonorgestrel (MIRENA) 20 MCG/24HR IUD 1 each (20 mcg) by Intrauterine route continuous (Patient not taking: Reported on 3/18/2019)       traZODone (DESYREL) 50 MG tablet Take 1 tablet (50 mg) by mouth nightly as needed for sleep (Patient not taking: Reported on 3/18/2019) 90 tablet 0     Allergies   Allergen Reactions     Blood-Group Specific Substance      Patient has Probable passiive anti D Antibody. Blood Product orders may be delayed.  Draw one red top and two purple top tubes for ALL Type and Screen/ Type and Crossmatch orders.       Vicodin [Hydrocodone-Acetaminophen] Nausea and Vomiting       ROS:  Constitutional, HEENT, cardiovascular, pulmonary, gi and gu systems are negative, except as otherwise noted.    OBJECTIVE:     /89 (BP Location: Right arm, Patient Position: Sitting, Cuff Size: Adult Large)   Pulse 97   Temp 98  F (36.7  C) (Oral)   Resp 18   Ht 1.524 m (5')   Wt 59.1 kg (130 lb 3.2 oz)   LMP  (LMP Unknown)   SpO2 99%   Breastfeeding? No   BMI 25.43 kg/m    Body mass index is 25.43 kg/m .  GENERAL: healthy, alert and no distress  EYES: Eyes grossly normal to inspection,  conjunctivae and sclerae normal  HENT: normal cephalic/atraumatic, face is symmetrical,   RESP: no difficulty breathing, no use of accessory muscles observed.   CV: no peripheral edema and color normal, no parasternal heaves visualized.   MS: no gross musculoskeletal defects noted, no edema  SKIN: no suspicious lesions or rashes  NEURO: Normal strength and tone, mentation intact and speech normal  PSYCH: mentation appears normal, affect normal/bright      Diagnostic Test Results:  none      ASSESSMENT/PLAN:       ICD-10-CM    1. Moderate episode of recurrent major depressive disorder (H) F33.1 FLUoxetine (PROZAC) 10 MG capsule   2. ADHD (attention deficit hyperactivity disorder), combined type F90.2 amphetamine-dextroamphetamine (ADDERALL XR) 10 MG 24 hr capsule   3. Chemical dependency (H) F19.20      Restart Prozac 10 mg daily   Follow up in 4-6 weeks     Adderall 10 mg daily   May call in for refill monthly   Follow up every 6 months unless develop SE    Sober for 3 months . Doing AA and psych therapy.     Fatimah Hall PA-C  [facility]

## 2019-04-22 NOTE — PATIENT INSTRUCTIONS
Restart Prozac 10 mg daily   Follow up in 4-6 weeks     Adderall 10 mg daily   May call in for refill monthly   Follow up every 6 months unless develop SE

## 2019-04-25 ENCOUNTER — TELEPHONE (OUTPATIENT)
Dept: FAMILY MEDICINE | Facility: CLINIC | Age: 34
End: 2019-04-25

## 2019-04-25 NOTE — TELEPHONE ENCOUNTER
Samira Lu [facility]. Is calling and  Wants to know if Karen CALLAHAN is meeting with Patient and what kind of goals they are working and the dates of contact. She can be reached at   [phone]

## 2019-04-25 NOTE — TELEPHONE ENCOUNTER
Returned Samira VALENZUELA with [facility]. Discussed attendance and goals along with patient's plans to obtain sobriety. Will continue to assess as needed  MARIA R Rhodes (Mindy),[facility]  .

## 2019-04-29 ENCOUNTER — PATIENT OUTREACH (OUTPATIENT)
Dept: CARE COORDINATION | Facility: CLINIC | Age: 34
End: 2019-04-29

## 2019-04-29 ASSESSMENT — ACTIVITIES OF DAILY LIVING (ADL): DEPENDENT_IADLS:: INDEPENDENT

## 2019-04-29 NOTE — PROGRESS NOTES
Clinic Care Coordination Contact  [facility]/Voicemail    Referral Source: PCP    Clinical Data: Care Coordinator Outreach    Outreach attempted x 2.  Left message on voicemail with call back information and requested return call.    Plan: Care Coordinator mailed out care coordination introduction letter on 2-20-19. Care Coordinator will do no further outreaches at this time.    Loulou Chatterjee [facility]  Social Work Care Coordinator  [facility]  [phone]

## 2019-05-16 ENCOUNTER — HOSPITAL ENCOUNTER (OUTPATIENT)
Dept: BEHAVIORAL HEALTH | Facility: CLINIC | Age: 34
Discharge: HOME | End: 2019-05-16
Attending: SOCIAL WORKER | Admitting: SOCIAL WORKER
Payer: COMMERCIAL

## 2019-05-16 VITALS — WEIGHT: 130 LBS | HEIGHT: 64 IN | BODY MASS INDEX: 22.2 KG/M2

## 2019-05-16 PROCEDURE — H0001 ALCOHOL AND/OR DRUG ASSESS: HCPCS

## 2019-05-16 ASSESSMENT — ANXIETY QUESTIONNAIRES
GAD7 TOTAL SCORE: 6
7. FEELING AFRAID AS IF SOMETHING AWFUL MIGHT HAPPEN: NOT AT ALL
IF YOU CHECKED OFF ANY PROBLEMS ON THIS QUESTIONNAIRE, HOW DIFFICULT HAVE THESE PROBLEMS MADE IT FOR YOU TO DO YOUR WORK, TAKE CARE OF THINGS AT HOME, OR GET ALONG WITH OTHER PEOPLE: SOMEWHAT DIFFICULT
5. BEING SO RESTLESS THAT IT IS HARD TO SIT STILL: SEVERAL DAYS
1. FEELING NERVOUS, ANXIOUS, OR ON EDGE: SEVERAL DAYS
3. WORRYING TOO MUCH ABOUT DIFFERENT THINGS: SEVERAL DAYS
2. NOT BEING ABLE TO STOP OR CONTROL WORRYING: NOT AT ALL
6. BECOMING EASILY ANNOYED OR IRRITABLE: MORE THAN HALF THE DAYS

## 2019-05-16 ASSESSMENT — MIFFLIN-ST. JEOR: SCORE: 1289.68

## 2019-05-16 ASSESSMENT — PATIENT HEALTH QUESTIONNAIRE - PHQ9
5. POOR APPETITE OR OVEREATING: SEVERAL DAYS
SUM OF ALL RESPONSES TO PHQ QUESTIONS 1-9: 17

## 2019-05-16 ASSESSMENT — PAIN SCALES - GENERAL: PAINLEVEL: NO PAIN (0)
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
## 2019-05-16 NOTE — PROGRESS NOTES
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 or attributes must a provider have?    Clt reported mental health diagnosis. Clt reported she takes her MH medications as prescribed and directed by her primary care clinic. Clt reported she plans to go back to individual therapy at [facility] after residential treatment. Clt/EMR collateral reported past trauma/abuse issues but client denied current. Clt denied current SI. Clt reported past suicide attempt as a 17 yr old and SI as an adolescent. Anabel would appear to benefit from continued care with mental health services and follow recommendations and referrals made by her provider(s).          DIMENSION IV - Readiness for Change   1. You ve told me what brought you here today. (first section) What do you think the problem really is?     Client reported to get into a residential treatment program.     2. Tell me how things are going. Ask enough questions to determine whether the person has use related problems or assets that can be built  upon in the following areas: Family/friends/relationships; Legal; Financial; Emotional; Educational; Recreational/ leisure; Vocational/employment; Living arrangements (DSM)      per client:family/friend relationships-do not have any really, boyfriend. See children twice per week. Legal-[facility] Probation, probation team, disorderly conduct. Random UAs with probation. CPS, [facility], Samira Lu, once a month speak or meet with her. Living arrangements-same place have been, I am deciding to move, have not given notice or anything yet though. Employment-not working or attending school currently. Hobbies-outdoor stuff, be outdoors, some money problems.    3. What activities have you engaged in when using alcohol/other drugs that could be hazardous to you or others (i.e. driving a car/motorcycle/boat, operating machinery, unsafe sex, sharing needles for drugs or tattoos, etc     The client reported having a history of having unsafe sex.    4. How much time do you spend getting, using or getting over using alcohol or drugs? (DSM)     Client reported: maybe a couple hours, some of the day.     5. Reasons for drinking/drug use (Use the space below to record answers. It may not be necessary to ask each item.)  Like the feeling Yes   Trying to forget problems Yes   To cope with stress Yes   To relieve physical pain Yes   To cope with anxiety Yes   To cope with depression Yes   To relax or unwind No   Makes it easier to talk with people No   Partner encourages use No   Most friends drink or use No   To cope with family problems Yes   Afraid of withdrawal symptoms/to feel better No   Other (specify)  No     A. What concerns other people about your alcohol or drug use/Has anyone told you that you use too much? What did they say? (DSM)     Per client: Housing and the [facility] are concerned.     B. What did you think about that/ do you think you have a problem with alcohol or drug use?     Client  reported:it is a problem,get back on the right path.    6. What changes are you willing to make? What substance are you willing to stop using? How are you going to do that? Have you tried that before? What interfered with your success with that goal?      Per client:Inpatient/residential treatment, back on prescribed medications. Quit using    7. What would be helpful to you in making this change?     Per client:Treatment.     Dimension IV Ratings   Readiness for Change - The placing authority must use the criteria in Dimension IV to determine a client s readiness for change.   RISK DESCRIPTIONS - Severity rating: 3 Client displays inconsistent compliance, minimal awareness of either the client's addiction or mental disorder, and is minimally cooperative.    REASONS SEVERITY WAS ASSIGNED - (What information did the person provide that supports your assessment of his or her readiness to change? How aware is the person of problems caused by continued use? How willing is she or he to make changes? What does the person feel would be helpful? What has the person been able to do without help?)      Anabel reported she knows her use is a problem and she would like to get on the right path and yet her previous behavior and follow through on recommendations has been inconsistent with her verbal report. Anabel appears to lack insight into the negative consequences of her continued use evidence by not completing IOP treatment and external motivation for change such as legals, CPS involvement, and risking losing her housing if continuing to use.          DIMENSION V - Relapse, Continued Use, and Continued Problem Potential   1A. In what ways have you tried to control, cut-down or quit your use? If you have had periods of sobriety, how did you accomplish that? What was helpful? What happened to prevent you from continuing your sobriety? (DSM)     Per client: Family and parents are triggers as well to use. Helps abstain, exercising  "helps when not using     Per EMR CD assessment on 3/18/19:  \"Per client:Prior to relapse was sober a year, engage in a lot of sober activities and meetings, treatment as well. Triggers-stressor with children\".     1B. What were the circumstances of your most recent relapse with mood altering chemicals?    Per client: alcohol- yesterday-fight with mother and father, meth-same thing fight with mother and father.     EMR CD assessment on 3/18/19:  \"Per client: stressor with children, and got overwhelmed\".    2. Have you experienced cravings? If yes, ask follow up questions to determine if the person recognizes triggers and if the person has had any success in dealing with them.     The client reported having cravings to use mood altering chemicals on an almost daily basis.    3. Have you been treated for alcohol/other drug abuse/dependence? Yes.  3B. Number of times(lifetime) (over what period) 4.  3C. Number of times completed treatment (lifetime) 2.  3D. During the past three years have you participated in outpatient and/or residential?  Yes.  3E. When and where? [place], inpatient, [facility] [place] was about a year ago.   3F. What was helpful? What was not? Helpful-learning about self, going into why I use, finding, triggers. Not helpful-nothing.     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 Client was referred to a higher level of care and did not follow through with this either.   LAST USE DATE:  3/1/2019 however there are concerns client possibly used more recently.  PROGNOSIS: Unfavorable prognosis - Client has not completed agreed upon treatment goals due to their own choice, has limited understanding of their diagnosis, and has shown minimal or inconsistent behavior conducive to recovery. This status could be upgraded if client completes a higher level of care and follows through with all recommendations.    LIVING ARRANGEMENTS AT DISCHARGE:   Client is living with her children in permanent sober living.   CONTINUING CARE RECOMMENDATIONS/REFERRALS:  Client is recommended to attend support meetings weekly, obtain and maintain open communication and regular contact with a sponsor, continue to develop and expand their support system, and remain abstinent from all mood altering chemicals. Client is recommended to participate in individual therapy, psychiatry and maintain healthy physical health as needed. She is recommended to a higher level of care due to unsuccessful attempts at IOP.       4. Support group participation: Have you/do you attend support group meetings to reduce/stop your alcohol/drug use? How recently? What was your experience? Are you willing to restart? If the person has not participated, is he or she willing?     Client denied current sober support group meeting attendance.    Per EMR CD assessment on 3/18/19:  \"Per client:past none currently, three months ago last meeting\".    5. What would assist you in staying sober/straight?     Client reported residential treatment.     Dimension V Ratings   Relapse/Continued Use/Continued problem potential - The placing authority must use the criteria in Dimension V to determine a client s relapse, continued use, and continued problem " potential.   RISK DESCRIPTIONS - Severity ratin No awareness of the negative impact of mental health problems or substance abuse. No coping skills to arrest mental health or addiction illnesses, or prevent relapse.    REASONS SEVERITY WAS ASSIGNED - (What information did the person provide that indicates his or her understanding of relapse issues? What about the person s experience indicates how prone he or she is to relapse? What coping skills does the person have that decrease relapse potential?)      Clt reported periods of sobriety. Clt reported on and off use since relapsing and did not complete IOP treatment in 2019 due to lack of communication and attendance. Clt reported continued use since discharging from IOP treatment.  Clt reported triggers and cravings. Anabel reported four past treatment in which she completed two of them. Anshut denied current sober support group meeting attendance but reported past attendance. Anabel appears to be at high risk for continued use and would appear to benefit from a higher level of care such as a residential treatment program evidenced by client report of being unable to remain sober in an IOP treatment program.          DIMENSION VI - Recovery Environment   1. Are you employed/attending school? Tell me about that.     Anabel reported she is not currently working or attending school. 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    2A. Describe a typical day; evening for you. Work, school, social, leisure, volunteer, spiritual practices. Include time spent obtaining, using, recovering from drugs or alcohol. (DSM)     Per client: get up, work on the computer for a while, clean, watch tv.     Please describe what leisure activities have been associated with your substance abuse:     Client denied    2B. How often do you spend more time than you planned using or use more than you planned? (DSM)     Client reported every time she uses.     3. How  "important is using to your social connections? Do many of your family or friends use?     Per client: denied not at all    4A. Are you currently in a significant relationship?     Yes.  4B. How long? Since March of 2019           Please describe your significant other's use of mood altering chemicals? Clt denied    4C. Sexual Orientation:     Heterosexual    5A. Who do you live with?      Client reported living in sober housing still but no longer with her children at this time.    Per EMR CD assessment on 3/18/19:  \"Client reported sober housing with children-shared program sober house, permanent housing for single parents\".      5B. Tell me about their alcohol/drug use and mental health issues.     Clt reported sober housing.    5C. Are you concerned for your safety there? No    5D. Are you concerned about the safety of anyone else who lives with you? No    6A. Do you have children who live with you?     No    6B. Do you have children who do not live with you?     Yes.  (Ask follow-up questions to determine the person's relationship and responsibility, both legal and care giving; and what arrangements are made for supervision for the children when the person is not available.) Rosalind 14, [facility] ,see children once per week, staying with their grandparents, meet with CPS worker once a month.        7A. Who supports you in making changes in your alcohol or drug use? What are they willing to do to support you? Who is upset or angry about you making changes in your alcohol or drug use? How big a problem is this for you?      Per client: \"my boyfriend\"    Per EMR CD assessment on 3/18/19:  \"my best friend\"    7B. This table is provided to record information about the person s relationships and available support It is not necessary to ask each item; only to get a comprehensive picture of their support system.  How often can you count on the following people when you need someone? " "  Partner / Spouse Always supportive   Parent(s)/Aunt(s)/Uncle(s)/Grandparents Never supportive   Sibling(s)/Cousin(s) Never supportive   Child(kwabena) Always supportive   Other relative(s) The patient doesn't have any current contact with other relatives.   Friend(s)/neighbor(s) Always supportive   Child(kwabena) s father(s)/mother(s) The patient doesn't have any current contact with children(s) mother or father.   Support group member(s) The patient denied having any current involvement with 12-step or other support group meetings.   Community of cali members The patient denied having any current involvement with community cali members.   /counselor/therapist/healer Always supportive   Other (specify) No     8A. What is your current living situation?     Client reported sober housing    8B. What is your long term plan for where you will be living?     Per client:just beginning to look into moving.     8C. Tell me about your living environment/neighborhood? Ask enough follow up questions to determine safety, criminal activity, availability of alcohol and drugs, supportive or antagonistic to the person making changes.      Client reported safe    9. Criminal justice history: Gather current/recent history and any significant history related to substance use--Arrests? Convictions? Circumstances? Alcohol or drug involvement? Sentences? Still on probation or parole? Expectations of the court? Current court order? Any sex offenses - lifetime? What level? (DSM)    Per client:[facility] Probation, probation team, disorderly conduct. Random UAs with probation. CPS, [facility], Samira Lu, once a month speak or meet with her    Per EMR CD assessment on 3/18/19:  \"Clt reported previous disorderly conduct through [facility] and is on unsupervised probation with them. Clt denied any other past legals\".       10. What obstacles exist to participating in treatment? (Time off work, childcare, " funding, transportation, pending residential time, living situation)     None    Dimension VI Ratings   Recovery environment - The placing authority must use the criteria in Dimension VI to determine a client s recovery environment.   RISK DESCRIPTIONS - Severity ratin Client has (A) Chronically antagonistic significant other, living environment, family, peer group or long-term criminal justice involvement that is harmful to recovery or treatment progress, or (B) Client has an actively antagonistic significant other, family, work, or living environment with immediate threat to the client's safety and well-being.    REASONS SEVERITY WAS ASSIGNED - (What support does the person have for making changes? What structure/stability does the person have in his or her daily life that will increase the likelihood that changes can be sustained? What problems exist in the person s environment that will jeopardize getting/staying clean and sober?)     Anabel reported she is not currently working or attending school. Anabel reported she lives in sober housing for single parents with her two children. Anabel reported her housing expressed concern regarding her relapse. Anabel reported using is not important to her social connections. Anabel reported she is in a romantic relationship and denied he uses substances. Anabel reported her best friend as her supportive network. Anabel reported current probation for previous disorderly conduct charge. Anabel reported recent CPS involvement. Anabel reported she has not received any information from the [facility] regarding their involvement details yet. Anabel appears to lack structured sober support and routine.          Client Choice/Exceptions   Would you like services specific to language, age, gender, culture, Rastafari preference, race, ethnicity, sexual orientation or disability?  No    What particular treatment choices and options would you like to have? Inpatient/residential treatment    Do you have a preference for  a particular treatment program? [facility]    Criteria for Diagnosis     Criteria for Diagnosis  DSM-5 Criteria for Substance Use Disorder  Instructions: Determine whether the client currently meets the criteria for Substance Use Disorder using the diagnostic criteria in the DSM-V pp.481-589. Current means during the most recent 12 months outside a facility that controls access to substances    Category of Substance Severity (ICD-10 Code / DSM 5 Code)     Alcohol Use Disorder Mild  (F10.10) (305.00)   Cannabis Use Disorder The patient does not meet the criteria for a Cannabis use disorder.   Hallucinogen Use Disorder The patient does not meet the criteria for a Hallucinogen use disorder.   Inhalant Use Disorder The patient does not meet the criteria for an Inhalant use disorder.   Opioid Use Disorder The patient does not meet the criteria for an Opioid use disorder.   Sedative, Hypnotic, or Anxiolytic Use Disorder The patient does not meet the criteria for a Sedative/Hypnotic use disorder.   Stimulant Related Disorder Severe   (F15.20) (304.40) Amphetamine type substance   Tobacco Use Disorder Mild    (Z72.0) (305.1)   Other (or unknown) Substance Use Disorder The patient does not meet the criteria for a Other (or unknown) Substance use disorder.       Collateral Contact Summary   Number of contacts made: 1    Contact with referring person:  No    If court related records were reviewed, summarize here: No court records had been reviewed at the time of this documentation.    Information from collateral contacts supported/largely agreed with information from the client and associated risk ratings.      Rule 25 Assessment Summary and Plan   's Recommendation    1. Abstain from using all non-prescribed mood altering chemicals and substances. Take all medication as prescribed and directed by licensed physicians.  2. Complete a residential treatment program such as [facility] or  "[place]. Follow all recommendations and referrals made by treatment providers.  3. Follow all recommendations and referrals made by [facility] Probation and Child Protection Services (CPS) through [facility].  4. Start attending sober support group meetings weekly until admitted into a residential treatment program.   5. Continue care with all other licensed professional medical provider(s) such as pcp and continue to follow all recommendations and referral made by those provider(s).      Collateral Contacts     Name:    Electronic Medical Records (EMR)   Relationship:    Medical Records   Phone Number:    None Releases:    Yes     Clt medical record was reviewed and utilized for collateral purposes of this assessment and largely agreed with the information from the client.    Per EMR CD assessment on 3/18/19 recommendations and diagnosis:  \"1. Abstain from using all non-prescribed mood altering chemicals and substances. Take all medication as prescribed and directed by licensed physicians.  2. Complete an IOP treatment program such as [facility]. Follow all recommendations and referrals made by treatment providers.  3. Follow all recommendations and referrals made by [facility] Probation and Child Protection Services (CPS) through [facility].  4. Start attending sober support group meetings weekly.   5. Continue care with all other licensed professional medical provider(s) such as pcp and therapist and continue to follow all recommendations and referral made by those providers.    Category of Substance Severity (ICD-10 Code / DSM 5 Code)      Alcohol Use Disorder The patient does not meet the criteria for an Alcohol use disorder.   Cannabis Use Disorder The patient does not meet the criteria for a Cannabis use disorder.   Hallucinogen Use Disorder The patient does not meet the criteria for a Hallucinogen use disorder.   Inhalant Use Disorder The patient does not meet " "the criteria for an Inhalant use disorder.   Opioid Use Disorder The patient does not meet the criteria for an Opioid use disorder.   Sedative, Hypnotic, or Anxiolytic Use Disorder The patient does not meet the criteria for a Sedative/Hypnotic use disorder.   Stimulant Related Disorder Severe   (F15.20) (304.40) Amphetamine type substance   Tobacco Use Disorder Mild    (Z72.0) (305.1)   Other (or unknown) Substance Use Disorder The patient does not meet the criteria for a Other (or unknown) Substance use disorder.   \".    Per EMR telephone encounter on 4/26/19:  \"Received a ALEX and copy of order placing children in protective care from [facility], Samira Lu, [phone].  Pt in no active programming at this time.  Looks like pending L+ per previous telephone note\".        Collateral Contacts     Name:    Samira Lu   Relationship:    [facility] CPS    Phone Number:    [phone]   Releases:    Yes     Left  for collateral contact on 5/20/19 and collateral contact VM stated she would be out of the office until 5/22/19 and will return messages at that time. Unable to obtain updated collateral information by collateral contact at the signing of this document. Collateral received from [facility] in 04/2019 that was uploaded into client medical record (EMR) chart and was reviewed at the time of this assessment.    ollateral Contacts      A problematic pattern of alcohol/drug use leading to clinically significant impairment or distress, as manifested by at least two of the following, occurring within a 12-month period:    1.) Alcohol/drug is often taken in larger amounts or over a longer period than was intended.  2.) There is a persistent desire or unsuccessful efforts to cut down or control alcohol/drug use  3.) A great deal of time is spent in activities necessary to obtain alcohol, use alcohol, or recover from its effects.  6.) Continued alcohol use despite having " persistent or recurrent social or interpersonal problems caused or exacerbated by the effects of alcohol/drug.  8.) Recurrent alcohol/drug use in situations in which it is physically hazardous.  9.) Alcohol/drug use is continued despite knowledge of having a persistent or recurrent physical or psychological problem that is likely to have been caused or exacerbated by alcohol.  10.) Tolerance, as defined by either of the following: A need for markedly increased amounts of alcohol/drug to achieve intoxication or desired effect. and A markedly diminished effect with continued use of the same amount of alcohol/drug.      Specify if: In early remission:  After full criteria for alcohol/drug use disorder were previously met, none of the criteria for alcohol/drug use disorder have been met for at least 3 months but for less than 12 months (with the exception that Criterion A4,  Craving or a strong desire or urge to use alcohol/drug  may be met).     In sustained remission:   After full criteria for alcohol use disorder were previously met, none of the criteria for alcohol/drug use disorder have been met at any time during a period of 12 months or longer (with the exception that Criterion A4,  Craving or strong desire or urge to use alcohol/drug  may be met).   Specify if:   This additional specifier is used if the individual is in an environment where access to alcohol is restricted.    Mild: Presence of 2-3 symptoms  Moderate: Presence of 4-5 symptoms  Severe: Presence of 6 or more symptoms

## 2019-05-17 ASSESSMENT — ANXIETY QUESTIONNAIRES: GAD7 TOTAL SCORE: 6

## 2019-05-24 ENCOUNTER — OFFICE VISIT (OUTPATIENT)
Dept: FAMILY MEDICINE | Facility: CLINIC | Age: 34
End: 2019-05-24
Payer: COMMERCIAL

## 2019-05-24 VITALS
BODY MASS INDEX: 21.73 KG/M2 | WEIGHT: 127.3 LBS | HEIGHT: 64 IN | RESPIRATION RATE: 16 BRPM | TEMPERATURE: 98 F | OXYGEN SATURATION: 99 % | DIASTOLIC BLOOD PRESSURE: 88 MMHG | HEART RATE: 103 BPM | SYSTOLIC BLOOD PRESSURE: 131 MMHG

## 2019-05-24 DIAGNOSIS — F41.1 GENERALIZED ANXIETY DISORDER: ICD-10-CM

## 2019-05-24 DIAGNOSIS — F33.1 MODERATE EPISODE OF RECURRENT MAJOR DEPRESSIVE DISORDER (H): Primary | ICD-10-CM

## 2019-05-24 DIAGNOSIS — F10.20 UNCOMPLICATED ALCOHOL DEPENDENCE (H): ICD-10-CM

## 2019-05-24 PROBLEM — F19.20 CHEMICAL DEPENDENCY (H): Status: RESOLVED | Noted: 2017-12-27 | Resolved: 2019-05-24

## 2019-05-24 PROCEDURE — 99214 OFFICE O/P EST MOD 30 MIN: CPT | Performed by: PHYSICIAN ASSISTANT

## 2019-05-24 ASSESSMENT — MIFFLIN-ST. JEOR: SCORE: 1277.43

## 2019-05-24 ASSESSMENT — PAIN SCALES - GENERAL: PAINLEVEL: NO PAIN (0)

## 2019-05-24 NOTE — LETTER
[address] [address]  Phone: [phone]    May 24, 2019        Will Dodson  [facility] [address] [place] [id number]          To whom it may concern:    RE: Will Dodson    Patient was seen and treated today at our clinic. Patient is safe to get off medication- Adderall, in order to be admitted to alcohol dependence treatment program.     Please contact me for questions or concerns.      Sincerely,        Earnestine Hall PAC

## 2019-05-24 NOTE — PROGRESS NOTES
Subjective     Will Dodson is a 33 year old female who presents to clinic today for the following health issues:    HPI   Patient is here today requesting a note for treatment [facility] that she can be off Adderall and enter inpatient alcohol dependence program.   After last office visit for depression and ADHD, patient relapsed with her alcohol dependence due to her ex got out of [facility] and showed up at her home. Patient reports that she only took a couple capsules since last fill on 4/22/19 because she was getting ready to go back to school. Patient still has a full bottle that she will surrender when she enters the program on Monday.     Please fax to -   Fax: [phone]   Attn: Rekha gordon    Depression and Anxiety Follow-Up    How are you doing with your depression since your last visit? Worsened since her ex got out of [employer]    How are you doing with your anxiety since your last visit?  Worsened since personal issues    Are you having other symptoms that might be associated with depression or anxiety? Yes:  relapsed alcoholism    Have you had a significant life event? OTHER: see above     Do you have any concerns with your use of alcohol or other drugs? Yes:  alcohol    Social History     Tobacco Use     Smoking status: Current Every Day Smoker     Packs/day: 0.25     Types: Cigarettes     Start date: 6/9/2015     Smokeless tobacco: Never Used   Substance Use Topics     Alcohol use: Yes     Alcohol/week: 0.0 oz     Comment: daily 3 drinks     Drug use: Yes     Types: Methamphetamines     Comment: 5/10/19 once per week, couple hits.      PHQ 2/21/2019 3/21/2019 4/2/2019   PHQ-9 Total Score 14 13 15   Q9: Thoughts of better off dead/self-harm past 2 weeks Not at all Not at all Not at all   Some encounter information is confidential and restricted. Go to Review Flowsheets activity to see all data.     ERYN-7 SCORE 2/21/2019 3/21/2019 4/2/2019   Total Score 9 11 14   Some encounter information is confidential  and restricted. Go to Review Flowsheets activity to see all data.     No flowsheet data found.  No flowsheet data found.  In the past two weeks have you had thoughts of suicide or self-harm?  No.    Do you have concerns about your personal safety or the safety of others?   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     Depression with anxiety     Neck pain     TMJ (temporomandibular joint syndrome)     Uncomplicated alcohol dependence (H)     Past Surgical History:   Procedure Laterality Date     C/SECTION, LOW TRANSVERSE  ,     , Low Transverse     COSMETIC SURGERY       CYSTOSCOPY,REMV CALCULUS,SIMPLE       LAPAROSCOPIC CHOLECYSTECTOMY N/A 2017    Procedure: LAPAROSCOPIC CHOLECYSTECTOMY;  Laparoscopic Cholecystectomy;  Surgeon: Carson Rojas MD;  Location: [facility]     ORTHOPEDIC SURGERY         Social History     Tobacco Use     Smoking status: Current Every Day Smoker     Packs/day: 0.25     Types: Cigarettes     Start date: 2015     Smokeless tobacco: Never Used   Substance Use Topics     Alcohol use: Yes     Alcohol/week: 0.0 oz     Comment: daily 3 drinks     Family History   Problem Relation Age of Onset     Breast Cancer Mother      Depression Mother      Substance Abuse Mother      Anxiety Disorder Mother      Hypertension Father      Substance Abuse Father      Colon Cancer Maternal Grandfather      Colon Cancer Paternal Grandfather      Other - See Comments Sister         epilepsy     Substance Abuse Sister         A&D         Current Outpatient Medications   Medication Sig Dispense Refill      "FLUoxetine (PROZAC) 20 MG capsule Take 1 capsule (20 mg) by mouth daily 30 capsule 1     albuterol (ALBUTEROL) 108 (90 BASE) MCG/ACT inhaler Inhale 2 puffs into the lungs every 4 hours as needed for shortness of breath / dyspnea (Patient not taking: Reported on 3/8/2019) 1 Inhaler 0     levonorgestrel (MIRENA) 20 MCG/24HR IUD 1 each (20 mcg) by Intrauterine route continuous (Patient not taking: Reported on 3/18/2019)       traZODone (DESYREL) 50 MG tablet Take 1 tablet (50 mg) by mouth nightly as needed for sleep (Patient not taking: Reported on 3/18/2019) 90 tablet 0     Allergies   Allergen Reactions     Blood-Group Specific Substance      Patient has Probable passiive anti D Antibody. Blood Product orders may be delayed.  Draw one red top and two purple top tubes for ALL Type and Screen/ Type and Crossmatch orders.       Vicodin [Hydrocodone-Acetaminophen] Nausea and Vomiting     Reviewed and updated as needed this visit by Provider  Tobacco  Allergies  Meds  Problems  Med Hx  Surg Hx  Fam Hx       Review of Systems   ROS COMP: Constitutional, HEENT, cardiovascular, pulmonary, gi and gu systems are negative, except as otherwise noted.      Objective    /88 (BP Location: Right arm, Patient Position: Sitting, Cuff Size: Adult Regular)   Pulse 103   Temp 98  F (36.7  C) (Oral)   Resp 16   Ht 1.626 m (5' 4\")   Wt 57.7 kg (127 lb 4.8 oz)   SpO2 99%   Breastfeeding? No   BMI 21.85 kg/m    Body mass index is 21.85 kg/m .  Physical Exam   GENERAL: healthy, alert and no distress  NECK: no adenopathy, no asymmetry, masses, or scars and thyroid normal to palpation  RESP: lungs clear to auscultation - no rales, rhonchi or wheezes  CV: regular rate and rhythm, normal S1 S2, no S3 or S4, no murmur, click or rub, no peripheral edema and peripheral pulses strong  ABDOMEN: soft, nontender, no hepatosplenomegaly, no masses and bowel sounds normal  MS: no gross musculoskeletal defects noted, no edema  PSYCH: " mentation appears normal, affect normal/bright    Diagnostic Test Results:  none         Assessment & Plan       ICD-10-CM    1. Moderate episode of recurrent major depressive disorder (H) F33.1 FLUoxetine (PROZAC) 20 MG capsule   2. Generalized anxiety disorder F41.1    3. Uncomplicated alcohol dependence (H) F10.20       1. increase Fluoxetine to 20 mg daily   Follow up in 6 weeks    Letter for rehab was provided.   Patient has only taken a few tablets from the Adderall prescription that was given last month and she is safe to stop it, in order to start rehab.   Tobacco Cessation:   reports that she has been smoking cigarettes.  She started smoking about 3 years ago. She has been smoking about 0.25 packs per day. She has never used smokeless tobacco.  Tobacco Cessation Action Plan: Information offered: Patient not interested at this time        No follow-ups on file.    Fatimah Hall PA-C  [facility]

## 2019-06-04 ENCOUNTER — OFFICE VISIT (OUTPATIENT)
Dept: FAMILY MEDICINE | Facility: CLINIC | Age: 34
End: 2019-06-04
Payer: COMMERCIAL

## 2019-06-04 VITALS
TEMPERATURE: 97 F | BODY MASS INDEX: 24.84 KG/M2 | OXYGEN SATURATION: 96 % | WEIGHT: 126.5 LBS | RESPIRATION RATE: 16 BRPM | HEIGHT: 60 IN | HEART RATE: 95 BPM | DIASTOLIC BLOOD PRESSURE: 82 MMHG | SYSTOLIC BLOOD PRESSURE: 113 MMHG

## 2019-06-04 DIAGNOSIS — F10.20 UNCOMPLICATED ALCOHOL DEPENDENCE (H): ICD-10-CM

## 2019-06-04 DIAGNOSIS — Z11.59 NEED FOR HEPATITIS B SCREENING TEST: ICD-10-CM

## 2019-06-04 DIAGNOSIS — Z00.00 ROUTINE GENERAL MEDICAL EXAMINATION AT A HEALTH CARE FACILITY: Primary | ICD-10-CM

## 2019-06-04 DIAGNOSIS — Z11.1 SCREENING EXAMINATION FOR PULMONARY TUBERCULOSIS: ICD-10-CM

## 2019-06-04 DIAGNOSIS — Z11.3 SCREEN FOR STD (SEXUALLY TRANSMITTED DISEASE): ICD-10-CM

## 2019-06-04 PROCEDURE — 86709 HEPATITIS A IGM ANTIBODY: CPT | Performed by: PHYSICIAN ASSISTANT

## 2019-06-04 PROCEDURE — 87340 HEPATITIS B SURFACE AG IA: CPT | Performed by: PHYSICIAN ASSISTANT

## 2019-06-04 PROCEDURE — 86706 HEP B SURFACE ANTIBODY: CPT | Performed by: PHYSICIAN ASSISTANT

## 2019-06-04 PROCEDURE — 86803 HEPATITIS C AB TEST: CPT | Performed by: PHYSICIAN ASSISTANT

## 2019-06-04 PROCEDURE — 36415 COLL VENOUS BLD VENIPUNCTURE: CPT | Performed by: PHYSICIAN ASSISTANT

## 2019-06-04 PROCEDURE — 87389 HIV-1 AG W/HIV-1&-2 AB AG IA: CPT | Performed by: PHYSICIAN ASSISTANT

## 2019-06-04 PROCEDURE — 86481 TB AG RESPONSE T-CELL SUSP: CPT | Performed by: PHYSICIAN ASSISTANT

## 2019-06-04 PROCEDURE — 99395 PREV VISIT EST AGE 18-39: CPT | Performed by: PHYSICIAN ASSISTANT

## 2019-06-04 PROCEDURE — 86708 HEPATITIS A ANTIBODY: CPT | Performed by: PHYSICIAN ASSISTANT

## 2019-06-04 ASSESSMENT — ANXIETY QUESTIONNAIRES
GAD7 TOTAL SCORE: 8
3. WORRYING TOO MUCH ABOUT DIFFERENT THINGS: NOT AT ALL
6. BECOMING EASILY ANNOYED OR IRRITABLE: SEVERAL DAYS
7. FEELING AFRAID AS IF SOMETHING AWFUL MIGHT HAPPEN: NOT AT ALL
2. NOT BEING ABLE TO STOP OR CONTROL WORRYING: NOT AT ALL
1. FEELING NERVOUS, ANXIOUS, OR ON EDGE: SEVERAL DAYS
IF YOU CHECKED OFF ANY PROBLEMS ON THIS QUESTIONNAIRE, HOW DIFFICULT HAVE THESE PROBLEMS MADE IT FOR YOU TO DO YOUR WORK, TAKE CARE OF THINGS AT HOME, OR GET ALONG WITH OTHER PEOPLE: SOMEWHAT DIFFICULT
5. BEING SO RESTLESS THAT IT IS HARD TO SIT STILL: NEARLY EVERY DAY

## 2019-06-04 ASSESSMENT — MIFFLIN-ST. JEOR: SCORE: 1202.36

## 2019-06-04 ASSESSMENT — PATIENT HEALTH QUESTIONNAIRE - PHQ9
5. POOR APPETITE OR OVEREATING: NEARLY EVERY DAY
SUM OF ALL RESPONSES TO PHQ QUESTIONS 1-9: 10

## 2019-06-04 NOTE — NURSING NOTE
"Chief Complaint   Patient presents with     Physical     initial /82 (BP Location: Left arm, Cuff Size: Adult Regular)   Pulse 95   Temp 97  F (36.1  C) (Oral)   Resp 16   Ht 1.511 m (4' 11.5\")   Wt 57.4 kg (126 lb 8 oz)   SpO2 96%   BMI 25.12 kg/m   Estimated body mass index is 25.12 kg/m  as calculated from the following:    Height as of this encounter: 1.511 m (4' 11.5\").    Weight as of this encounter: 57.4 kg (126 lb 8 oz).  BP completed using cuff size: regular.  L  arm      Health Maintenance that is potentially due pending provider review:  NONE    n/a    [facility] ma  "

## 2019-06-04 NOTE — PROGRESS NOTES
SUBJECTIVE:   CC: Will Dodson is an 33 year old woman who presents for preventive health visit.     Healthy Habits:     Getting at least 3 servings of Calcium per day:  Yes    Bi-annual eye exam:  NO    Dental care twice a year:  NO    Sleep apnea or symptoms of sleep apnea:  Daytime drowsiness    Diet:  Regular (no restrictions)    Frequency of exercise:  2-3 days/week    Duration of exercise:  15-30 minutes    Taking medications regularly:  Yes    Medication side effects:  Muscle aches and Lightheadedness    PHQ-2 Total Score: 2    Additional concerns today:  No    32 y/o female here for well exam.  She has been at [facility] for just about a week.  She does plan to stay for 30 days.  Things have been going well there, no concerns.  Due for some lab screening.      PROBLEMS TO ADD ON...    Today's PHQ-2 Score:   PHQ-2 ( 1999 Pf[employer]) 6/4/2019   Q1: Little interest or pleasure in doing things 1   Q2: Feeling down, depressed or hopeless 1   PHQ-2 Score 2   Q1: Little interest or pleasure in doing things Several days   Q2: Feeling down, depressed or hopeless Several days   PHQ-2 Score 2       Abuse: Current or Past(Physical, Sexual or Emotional)- No  Do you feel safe in your environment? Yes    Social History     Tobacco Use     Smoking status: Former Smoker     Packs/day: 0.25     Types: Cigarettes     Start date: 6/2/2015     Smokeless tobacco: Never Used   Substance Use Topics     Alcohol use: Yes     Alcohol/week: 0.0 oz     Comment: daily 3 drinks     If you drink alcohol do you typically have >3 drinks per day or >7 drinks per week? No    Alcohol Use 6/4/2019   Prescreen: >3 drinks/day or >7 drinks/week? Yes   Prescreen: >3 drinks/day or >7 drinks/week? -   AUDIT SCORE  23     AUDIT - Alcohol Use Disorders Identification Test - Reproduced from the World Health Organization Audit 2001 (Second Edition) 6/4/2019   1.  How often do you have a drink containing alcohol? 4 or more times a week   2.  How many  drinks containing alcohol do you have on a typical day when you are drinking? 1 or 2   3.  How often do you have five or more drinks on one occasion? Monthly   4.  How often during the last year have you found that you were not able to stop drinking once you had started? Less than monthly   5.  How often during the last year have you failed to do what was normally expected of you because of drinking? Monthly   6.  How often during the last year have you needed a first drink in the morning to get yourself going after a heavy drinking session? Weekly   7.  How often during the last year have you had a feeling of guilt or remorse after drinking? Weekly   8.  How often during the last year have you been unable to remember what happened the night before because of your drinking? Monthly   9.  Have you or someone else been injured because of your drinking? Yes, but not in the last year   10. Has a relative, friend, doctor or other health care worker been concerned about your drinking or suggested you cut down? Yes, during the last year   TOTAL SCORE 23       Reviewed orders with patient.  Reviewed health maintenance and updated orders accordingly - Yes  Lab work is in process    Mammogram not appropriate for this patient based on age.    Pertinent mammograms are reviewed under the imaging tab.  History of abnormal Pap smear: NO - age 30- 65 PAP every 3 years recommended  PAP / HPV Latest Ref Rng & Units 9/6/2018 1/29/2015   PAP - NIL NIL   HPV 16 DNA NEG:Negative Negative -   HPV 18 DNA NEG:Negative Negative -   OTHER HR HPV NEG:Negative Negative -     Reviewed and updated as needed this visit by clinical staff  Tobacco  Allergies  Meds         Reviewed and updated as needed this visit by Provider            Review of Systems  CONSTITUTIONAL: NEGATIVE for fever, chills, change in weight  INTEGUMENTARU/SKIN: NEGATIVE for worrisome rashes, moles or lesions  EYES: NEGATIVE for vision changes or irritation  ENT: NEGATIVE  "for ear, mouth and throat problems  RESP: NEGATIVE for significant cough or SOB  BREAST: NEGATIVE for masses, tenderness or discharge  CV: NEGATIVE for chest pain, palpitations or peripheral edema  GI: NEGATIVE for nausea, abdominal pain, heartburn, or change in bowel habits  : NEGATIVE for unusual urinary or vaginal symptoms. Periods are regular.  MUSCULOSKELETAL: NEGATIVE for significant arthralgias or myalgia  NEURO: NEGATIVE for weakness, dizziness or paresthesias  PSYCHIATRIC: NEGATIVE for changes in mood or affect     OBJECTIVE:   /82 (BP Location: Left arm, Cuff Size: Adult Regular)   Pulse 95   Temp 97  F (36.1  C) (Oral)   Resp 16   Ht 1.511 m (4' 11.5\")   Wt 57.4 kg (126 lb 8 oz)   SpO2 96%   BMI 25.12 kg/m    Physical Exam  GENERAL: alert and no distress  EYES: Eyes grossly normal to inspection, PERRL and conjunctivae and sclerae normal  HENT: ear canals and TM's normal, nose and mouth without ulcers or lesions  NECK: no adenopathy, no asymmetry, masses, or scars and thyroid normal to palpation  RESP: lungs clear to auscultation - no rales, rhonchi or wheezes  CV: regular rate and rhythm, normal S1 S2, no S3 or S4, no murmur, click or rub, no peripheral edema and peripheral pulses strong  MS: no gross musculoskeletal defects noted, no edema  SKIN: no suspicious lesions or rashes  PSYCH: mentation appears normal, affect normal/bright    Diagnostic Test Results:  Labs reviewed in Epic    ASSESSMENT/PLAN:   1. Routine general medical examination at a health care facility  Doing well.  Up to date with pap, immunizations.      2. Uncomplicated alcohol dependence (H)      3. Screening examination for pulmonary tuberculosis    - Quantiferon TB Gold Plus    4. Need for hepatitis B screening test    - Hepatitis Antibody A IgG  - **Hepatitis C Screen Reflex to RNA FUTURE anytime  - Hepatitis B surface antigen  - Hepatitis B Surface Antibody    5. Screen for STD (sexually transmitted disease)    - HIV " "Antigen Antibody Combo    COUNSELING:  Reviewed preventive health counseling, as reflected in patient instructions       Regular exercise       Healthy diet/nutrition       Safe sex practices/STD prevention       HIV screeninx in teen years, 1x in adult years, and at intervals if high risk    Estimated body mass index is 25.12 kg/m  as calculated from the following:    Height as of this encounter: 1.511 m (4' 11.5\").    Weight as of this encounter: 57.4 kg (126 lb 8 oz).    Weight management plan: Discussed healthy diet and exercise guidelines     reports that she has quit smoking. Her smoking use included cigarettes. She started smoking about 4 years ago. She smoked 0.25 packs per day. She has never used smokeless tobacco.  Tobacco Cessation Action Plan: currently quit while at [facility]    Counseling Resources:  ATP IV Guidelines  Pooled Cohorts Equation Calculator  Breast Cancer Risk Calculator  FRAX Risk Assessment  ICSI Preventive Guidelines  Dietary Guidelines for Americans,   USDA's MyPlate  ASA Prophylaxis  Lung CA Screening    Ej Brambila PA-C  [facility]  "

## 2019-06-05 LAB
HAV IGG SER QL IA: REACTIVE
HBV SURFACE AB SERPL IA-ACNC: 0.23 M[IU]/ML
HBV SURFACE AG SERPL QL IA: NONREACTIVE
HCV AB SERPL QL IA: NONREACTIVE
HIV 1+2 AB+HIV1 P24 AG SERPL QL IA: NONREACTIVE

## 2019-06-05 ASSESSMENT — ANXIETY QUESTIONNAIRES: GAD7 TOTAL SCORE: 8

## 2019-06-06 LAB
GAMMA INTERFERON BACKGROUND BLD IA-ACNC: 0.02 IU/ML
M TB IFN-G BLD-IMP: NEGATIVE
M TB IFN-G CD4+ BCKGRND COR BLD-ACNC: >10 IU/ML
MITOGEN IGNF BCKGRD COR BLD-ACNC: 0 IU/ML
MITOGEN IGNF BCKGRD COR BLD-ACNC: 0.01 IU/ML

## 2019-06-06 NOTE — RESULT ENCOUNTER NOTE
Would you be able to call lab and see if they can add Hep A IgM test.        Fabian Brambila PA-C

## 2019-06-07 LAB — HAV IGM SERPL QL IA: NONREACTIVE

## 2019-06-07 NOTE — RESULT ENCOUNTER NOTE
Dear Will    Your test results are attached, feel free to contact me via [employer]    Everything looks just fine on lab work.  It does show that you are immune to hepatitis A.    Fabian Brambila PA-C

## 2019-09-05 ENCOUNTER — TELEPHONE (OUTPATIENT)
Dept: FAMILY MEDICINE | Facility: CLINIC | Age: 34
End: 2019-09-05

## 2019-09-05 NOTE — TELEPHONE ENCOUNTER
Panel Management Review   One phone call and send letter if unable to reach them or [employer] message and send letter if not read after 2 weeks (You will get a message to your inbasket)      BP Readings from Last 1 Encounters:   06/04/19 113/82    , No results found for: A1C, 5/24/2019    Health Maintenance Due   Topic Date Due     INFLUENZA VACCINE (1) 09/01/2019        Fail List measure: depression         Patient is due/failing the following:   PHQ9    Action needed:   Patient needs to do PHQ9.    Type of outreach:    Sent [employer] message.    Questions for provider review:    None                                                                                       Chart routed to n/a Kevin Mcmillan

## 2019-09-13 ENCOUNTER — HOSPITAL ENCOUNTER (OUTPATIENT)
Dept: BEHAVIORAL HEALTH | Facility: CLINIC | Age: 34
Discharge: HOME | End: 2019-09-13
Attending: SOCIAL WORKER | Admitting: SOCIAL WORKER
Payer: COMMERCIAL

## 2019-09-13 VITALS
WEIGHT: 140 LBS | SYSTOLIC BLOOD PRESSURE: 113 MMHG | BODY MASS INDEX: 27.48 KG/M2 | DIASTOLIC BLOOD PRESSURE: 85 MMHG | HEIGHT: 60 IN | HEART RATE: 83 BPM

## 2019-09-13 PROCEDURE — H0001 ALCOHOL AND/OR DRUG ASSESS: HCPCS

## 2019-09-13 ASSESSMENT — ANXIETY QUESTIONNAIRES
1. FEELING NERVOUS, ANXIOUS, OR ON EDGE: MORE THAN HALF THE DAYS
7. FEELING AFRAID AS IF SOMETHING AWFUL MIGHT HAPPEN: MORE THAN HALF THE DAYS
5. BEING SO RESTLESS THAT IT IS HARD TO SIT STILL: NEARLY EVERY DAY
4. TROUBLE RELAXING: SEVERAL DAYS
GAD7 TOTAL SCORE: 11
2. NOT BEING ABLE TO STOP OR CONTROL WORRYING: SEVERAL DAYS
6. BECOMING EASILY ANNOYED OR IRRITABLE: SEVERAL DAYS
3. WORRYING TOO MUCH ABOUT DIFFERENT THINGS: SEVERAL DAYS

## 2019-09-13 ASSESSMENT — MIFFLIN-ST. JEOR: SCORE: 1266.54

## 2019-09-13 ASSESSMENT — PATIENT HEALTH QUESTIONNAIRE - PHQ9: SUM OF ALL RESPONSES TO PHQ QUESTIONS 1-9: 17

## 2019-09-13 ASSESSMENT — PAIN SCALES - GENERAL: PAINLEVEL: NO PAIN (0)

## 2019-09-13 NOTE — PROGRESS NOTES
"[address] [address]        Assessment and Placement Summary Update     Patient name:   Will Dodson   Patient phone: [phone]    Last #:      : 1985      PMI #: [id number]   Patient address:   [address] [place] [address]  [place] [address]     Date of Original Assessment / Last Update: 2019 Update Assessment Date: 2019   Updated by:   TOM Fontana    phone number: [phone]   Referred by:   [facility] CPS [facility] / phone number: [phone]   Referral to:   [facility] [place]   NPI: NPI [id number]   Summary:  This patient had a Assessment and Placement Summary Update on 2019 at [facility] in [place], [place] completed by Mallorie Roper MA [facility] .  The patient's Rule 25 Assessment completed on 2019 is in the patient's electronic medical record in Epic in the Chart Review section under the Notes/Trans Tab.    Reason for today's update: Ms. Will Dodson presents to [facility] for an outpatient evaluation of possible chemical dependency. The reason for the CD evaluation was due to the patient stating, \"treatment.  I need treatment. I don't want to relapse.\" She is looking for IOP CD treatment. She reports she is attending meetings and having a strong support group. She is also attending [employer]. She denies any alcohol being in her home.       Substance Use History Update:               X = Primary Drug Used    Age of First Use Most Recent Pattern of Use and Duration   Need enough information to show pattern (both frequency and amounts) and to show tolerance for each chemical that has a diagnosis    Date of last use and time, if needed    Withdrawal Potential? Requiring special care Method of use  (oral, smoked, snort, IV, etc)        Alcohol       14 Since being in [facility], she drank once on 2019.    **Per 2019 CD Eval:  Last use: " "5/15/19, three drinks, noon     Per EMR CD assessment on 3/18/19:  Per client:prior to a month ago, using not very often, once a year or so. Would consume a couple drinks when did.Last use date a month ago.      Per client: Since February of 2019 using every day, three drinks per day.     8/31/2019 no oral        Marijuana/  Hashish    No use                Cocaine/Crack       No use           X    Meth/  Amphetamines    15 Meth: Her last Use was on 5/31/2019    Adderall:   Last use: 5/15/19, for Adderall, as prescribed and directed, 10mg, morning    **Per 5/16/2019 CD Eval:  Last use: 5/10/19, couple hits, morning    Per EMR CD assessment on 3/18/19:     Per EMR 2/21/19 [facility] progress note:  \"Chemical Use Review:              Substance Use: increase in methamphetamine .  Client reports frequency of use Sporadic\".      Per client:prior to a month and half was a year ago for last use date. Sober for a year and then relapsed a month and a half ago. Relapse was on and off for couple weeks. Couple days of use and then off and would use a couple days. Use in a day, quarter gram.      Prescribed adderall in the past. Always took that as prescribed and directed. Six months ago picked up prescription for it but trying not to take any medications for it so cannot remember the last time I took it. Date of last use month and half ago\".      Per EMR discharge summary on 4/15/19:  CHEMICAL DEPENDENCY DISCHARGE SUMMARY   NAME: Will Dodson    PROGRAM:  [facility], Adult Intensive OP CD Program  ADMISSION DATE:  4/1/2019   DISCHARGE DATE:  4/15/2019  DISCHARGE STATUS: ASA due to stopping attendance and communication with staff. Client was referred to a higher level of care and did not follow through with this either.   LAST USE DATE:  3/1/2019 however there are concerns client possibly used more recently.     Per client:since 3/1/19: once a week used. Couple hits per time when do use.      Per " "client and EMR: prescribed: client reported taking one per day. Client denied abuse and reported taking it as prescribed and directed.   \"amphetamine-dextroamphetamine (ADDERALL XR) 10 MG 24 hr\".    2019                                                                                                                                                                                                           5/15/19, for Adderall, as prescribed and directed, 10mg, morning       no smoke        Heroin       No use                Other Opiates/  Synthetics    No use                Inhalants       No use                Benzodiazepines       No use                Hallucinogens       No use                Barbiturates/  Sedatives/  Hypnotics No use                Over-the-Counter Drugs    No use                Other       No use                Nicotine       13 Per client:cigarettes and e-cig, daily, half a pack per day.    2 weeks ago no smoke     Dimension: Severity Rating/ Reason for Changes from Previous Assessment:  Dimension I: Acute intoxication/Withdrawal potential     Previous ratin Current ratin   Comments:   Pt reports the last use of alcohol was 2019. She denies any current withdrawal symptoms at this time. Pt was given a breathalyzer during her evaluation and patient's JAKOB was 0.00. Pt was also given a UA during the evaluation and the UA was NEG for all 12 substances tested.     Dimension II: Biomedical Conditions and Complications     Previous ratin Current ratin   Comments:   Pt denies any current medical issues.  She attends [facility], but does not have a PCP.  She is not currently on any medications. At the time of the CD evaluation the patient's BP was 113/85 and Pulse was 83 BPM.  Pt denies having pain at this time and reports her pain level is a 0 on the 0-10 Pain Rating Scale. Pt reports she quit smoking cigarettes.     Dimension III: " "Emotional/Behavioral/Cognitive     Previous ratin Current ratin   Comments:   Pt's mental health dx includes: Major Depressive Disorder, Recurrent Episode, Moderate _ and With anxious distress; Generalized Anxiety Disorder; Posttraumatic Stress Disorder (includes Posttraumatic Stress Disorder for Children 6 Years and Younger)  Without dissociative symptoms. She is not currently working with a therapist, however, she has her first appointment with a therapist at [facility] on 2019 and she is excited about attending. She is not currently on any medications. At the time of the assessment, pt's PHQ-9 score was 17 (moderately severe depression) and her ERYN-7 score was 11 (moderate anxiety).  Pt lacks emotional and stress management skills. Pt denies SIB, SA, suicidal thoughts at this time. During pt's assessment, her [place]-Suicide Severity Rating Scale score was 0 - Very Low Risk.     Dimension IV: Readiness for Change     Previous rating:   3 Current ratin   Comments:   Pt is here because \"I need treatment. I don't want to relapse.\" She is looking for [facility] CD treatment. She reports she is attending meetings and having a strong support group. She is also attending [employer]. She denies any alcohol being in her home. Her [place] CPS worker is requesting she complete CD treatment.  What changes are you willing to make? \"absolutely everything. I want to do what I have to do to get my kids back.\" She wants to attend IOP CD treatment.   What are you willing to commit to in order to make this change? Attending IOP CD treatment.  What is blocking you from this change? \"it was a vehicle. My soon to be mother-in-law said I could use the car. I am holding off on a job.\"  On a scale 0-10, how confident are you in making this change? 10  Why are you a 10 and not a 8? \"because I hate addiction. I don't want to do it anymore. I have lived it too long. I want to grow and learn and help other " "people. I don't want my kids to go through any more than they already they have.\"     Dimension V: Relapse/Continued Use/Continued problem potential     Previous ratin Current ratin   Comments:   Pt reports she has attended 9 previous CD treatments. The first time she was in treatment was age 13.  She left aftercare at [facility] on 2019 and relapsed one time on 2019 with alcohol. She reports she attends [facility] meetings twice a week and the last meeting she attended was on Monday, 2019. She finds [facility] meetings very helpful.  Her longest sobriety was 2.5 years in . What worked? \"I am not really sure, I got sober on my own. I was an IV user at the time. I had my kids. I lived in sober housing which helped as well. It got loosy goosy and my kids are high needs and it got a little stressful.\" She identifies her relapse triggers as \"stress, dealing with emotions. I don't like being vulnerable, or rejection. I am learning when I am feeling those feeling will pass.\"     Dimension VI: Recovery environment    Previous ratin Current rating:   3   Comments:   The patient currently lives with her fiance of 6 months. She reports he is sober and in recovery as well. They go to [employer] and meetings together. The patient denied having any concerns regarding her immediate living environment or neighborhood. The patient reported having a relationship conflict with \"everybody\" due to her previous substance abuse issues. The patient reported having a history of legal issues, including being on probation in [facility] for a non-drug offense. She has an open CPS case in [facility]. It was opened in 2019. The patient is unemployed and she last worked 2019. The patient reported having a sober support network.       Summary of Assessment Update and Recommendations:   What was the outcome of last referral?  She attending [facility] program and completed 90 days of the 13 month after care program. She left [facility] aftercare program due to numerous deaths and she didn't feel she could grieve at  and wanted to be with her family. Her grandmother passed away and then a good friend passed away 2 weeks later.    The recommendations according to the 5/16/2019 CD eval were:  1. Abstain from using all non-prescribed mood altering chemicals and substances. Take all medication as prescribed and directed by licensed physicians.  2. Complete a residential treatment program such as [facility] or [facility]. Follow all recommendations and referrals made by treatment providers.  3. Follow all recommendations and referrals made by [facility] Probation and Child Protection Services (CPS) through [facility].  4. Start attending sober support group meetings weekly until admitted into a residential treatment program.   5. Continue care with all other licensed professional medical provider(s) such as pcp and continue to follow all recommendations and referral made by those provider(s).     Reason for changes in the Risk Description since last assessment?   DIM 2: Risk Level changed from a 1 to a 0 due to no current health issues.  DIM 4: Risk Level changed from a 3 to a 1 due to pt actively taking steps to ensure her sobriety.  DIM 5: Risk Level changed from a 4 to a 2 due to pt actively attending [facility] meetings and attending [employer].  DIM 6: Risk Level changed from a 4 to a 3 due to pt living in a sober home and having sober supports.  Pt is not working at this time and and probation and CPS involvement.      Recommendation and rationale for current request and significant issues that need to be addressed:    1)  Complete an Intensive Outpatient CD Treatment Program, such as [facility], [place].  2)  Abstain from all mood-altering chemicals unless prescribed by a licensed provider.   3)   Attend, at minimum, 2 weekly [facility] meetings.  4)  Actively work with a female mentor on a weekly basis.   5)  Follow all the recommendations of your treatment/medical providers.  6)  Remain law abiding and follow all recommendations of the Courts/PO/CPS.  7)  Attend your individual psychotherapy appointments.
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
## 2019-09-14 ASSESSMENT — ANXIETY QUESTIONNAIRES: GAD7 TOTAL SCORE: 11

## 2019-10-08 ENCOUNTER — OFFICE VISIT (OUTPATIENT)
Dept: FAMILY MEDICINE | Facility: CLINIC | Age: 34
End: 2019-10-08
Payer: MEDICAID

## 2019-10-08 VITALS
BODY MASS INDEX: 26.95 KG/M2 | SYSTOLIC BLOOD PRESSURE: 105 MMHG | OXYGEN SATURATION: 96 % | DIASTOLIC BLOOD PRESSURE: 68 MMHG | TEMPERATURE: 97.4 F | WEIGHT: 138 LBS | HEART RATE: 93 BPM

## 2019-10-08 DIAGNOSIS — L70.0 ACNE VULGARIS: Primary | ICD-10-CM

## 2019-10-08 PROCEDURE — 99213 OFFICE O/P EST LOW 20 MIN: CPT | Performed by: PREVENTIVE MEDICINE

## 2019-10-08 RX ORDER — MINOCYCLINE HYDROCHLORIDE 100 MG/1
100 TABLET ORAL 2 TIMES DAILY
Qty: 60 TABLET | Refills: 2 | Status: SHIPPED | OUTPATIENT
Start: 2019-10-08 | End: 2020-01-05

## 2019-10-08 RX ORDER — CLINDAMYCIN AND BENZOYL PEROXIDE 10; 50 MG/G; MG/G
GEL TOPICAL 2 TIMES DAILY
Qty: 35 G | Refills: 1 | Status: SHIPPED | OUTPATIENT
Start: 2019-10-08 | End: 2019-10-23

## 2019-10-08 ASSESSMENT — PAIN SCALES - GENERAL: PAINLEVEL: NO PAIN (0)

## 2019-10-08 NOTE — PATIENT INSTRUCTIONS
At [facility], we strive to deliver an exceptional experience to you, every time we see you.  If you receive a survey in the mail, please send us back your thoughts. We really do value your feedback.    Your care team:                            Family Medicine Internal Medicine   MD Herbert Pat MD Shantel Branch-Fleming, MD Katya Georgiev PA-C Megan Hill, APRN JAMIE Davenport MD Pediatrics   Harshal Padilla, WILBERT Boswell, MD Юлия Jin APRN CNP   MD Susie Norton MD Deborah Mielke, MD Viola Oakes, APRN [facility]      Clinic hours: Monday - Thursday 7 am-7 pm; Fridays 7 am-5 pm.   Urgent care: Monday - Friday 11 am-9 pm; Saturday and Sunday 9 am-5 pm.  Pharmacy : Monday -Thursday 8 am-8 pm; Friday 8 am-6 pm; Saturday and Sunday 9 am-5 pm.     Clinic: [phone]   Pharmacy: [phone]

## 2019-10-08 NOTE — PROGRESS NOTES
Subjective     Will Dodson is a 34 year old female who presents to clinic today for the following health issues:    HPI   Acne      Duration: x 3 months    Description (location/character/radiation): face    Intensity:  moderate    Accompanying signs and symptoms: none    History (similar episodes/previous evaluation): None    Precipitating or alleviating factors: None    Therapies tried and outcome: OTC not helping    OTC makes it worse    Ingrown hair    Mirena IUD    No changes in stress    Does become pustular     Thinks she has polycystic ovaries as her sister does    Plans to have Mirena removed        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     Depression with anxiety     Neck pain     TMJ (temporomandibular joint syndrome)     Uncomplicated alcohol dependence (H)     Past Surgical History:   Procedure Laterality Date     C/SECTION, LOW TRANSVERSE  ,     , Low Transverse     COSMETIC SURGERY       CYSTOSCOPY,REMV CALCULUS,SIMPLE       LAPAROSCOPIC CHOLECYSTECTOMY N/A 2017    Procedure: LAPAROSCOPIC CHOLECYSTECTOMY;  Laparoscopic Cholecystectomy;  Surgeon: Carson Rojas MD;  Location: [facility] OR     ORTHOPEDIC SURGERY         Social History     Tobacco Use     Smoking status: Former Smoker     Packs/day: 0.25     Types: Cigarettes     Start date: 2015     Smokeless tobacco: Never Used   Substance Use Topics     Alcohol use: Yes     Alcohol/week: 0.0 standard drinks     Comment: daily 3 drinks     Family History   Problem Relation Age of Onset     Breast Cancer Mother      Depression Mother      Substance Abuse Mother      Anxiety Disorder Mother      Hypertension Father       Substance Abuse Father      Colon Cancer Maternal Grandfather      Colon Cancer Paternal Grandfather      Other - See Comments Sister         epilepsy     Substance Abuse Sister         A&D         Current Outpatient Medications   Medication Sig Dispense Refill     clindamycin-benzoyl peroxide (BENZACLIN) 1-5 % external gel Apply topically 2 times daily 35 g 1     levonorgestrel (MIRENA) 20 MCG/24HR IUD 1 each (20 mcg) by Intrauterine route continuous       minocycline (DYNACIN) 100 MG tablet Take 1 tablet (100 mg) by mouth 2 times daily 60 tablet 2     Allergies   Allergen Reactions     Blood-Group Specific Substance      Patient has Probable passiive anti D Antibody. Blood Product orders may be delayed.  Draw one red top and two purple top tubes for ALL Type and Screen/ Type and Crossmatch orders.       Vicodin [Hydrocodone-Acetaminophen] Nausea and Vomiting     BP Readings from Last 3 Encounters:   10/08/19 105/68   06/04/19 113/82   05/24/19 131/88    Wt Readings from Last 3 Encounters:   10/08/19 62.6 kg (138 lb)   06/04/19 57.4 kg (126 lb 8 oz)   05/24/19 57.7 kg (127 lb 4.8 oz)                  Reviewed and updated as needed this visit by Provider  Tobacco  Allergies  Meds  Problems  Med Hx  Surg Hx  Fam Hx         Review of Systems   ROS COMP: Constitutional, HEENT, cardiovascular, pulmonary, gi and gu systems are negative, except as otherwise noted.      Objective    /68   Pulse 93   Temp 97.4  F (36.3  C) (Oral)   Wt 62.6 kg (138 lb)   SpO2 96%   Breastfeeding? No   BMI 26.95 kg/m    Body mass index is 26.95 kg/m .  Physical Exam   GENERAL APPEARANCE: healthy, alert and no distress  EYES: Eyes grossly normal to inspection and conjunctivae and sclerae normal  RESP: lungs clear to auscultation - no rales, rhonchi or wheezes  CV: regular rates and rhythm, normal S1 S2, no S3 or S4 and no murmur, click or rub  SKIN: pustular and cystic lesions mainly on the chin.   NEURO: Normal strength  and tone, mentation intact and speech normal  PSYCH: mentation appears normal      Diagnostic Test Results:  Labs reviewed in Epic  No results found for this or any previous visit (from the past 24 hour(s)).        Assessment & Plan     Will was seen today for derm problem.    Diagnoses and all orders for this visit:    Acne vulgaris  -     minocycline (DYNACIN) 100 MG tablet; Take 1 tablet (100 mg) by mouth 2 times daily  -     clindamycin-benzoyl peroxide (BENZACLIN) 1-5 % external gel; Apply topically 2 times daily  -if not better in 3 months then refer to Dermatology     Acne can take up to 12 weeks to show reponse to treatment.  There is also the possibility of a flare at 6-8 weeks into treatment.  Some patients can experience dryness or mild irritation, and this can be lessened with gentle cleanser (i.e. Cetaphil cleanser) and liberal use of non-comedogenic moisturizers.           BMI:   Estimated body mass index is 26.95 kg/m  as calculated from the following:    Height as of 9/13/19: 1.524 m (5').    Weight as of this encounter: 62.6 kg (138 lb).         Return in about 3 months (around 1/8/2020), or if symptoms worsen or fail to improve.    Mignon Ward MD MPH    [facility]

## 2019-10-21 ENCOUNTER — OFFICE VISIT (OUTPATIENT)
Dept: FAMILY MEDICINE | Facility: CLINIC | Age: 34
End: 2019-10-21
Payer: MEDICAID

## 2019-10-21 VITALS
BODY MASS INDEX: 23.6 KG/M2 | OXYGEN SATURATION: 96 % | SYSTOLIC BLOOD PRESSURE: 123 MMHG | WEIGHT: 138.2 LBS | RESPIRATION RATE: 16 BRPM | TEMPERATURE: 97.8 F | HEART RATE: 91 BPM | DIASTOLIC BLOOD PRESSURE: 83 MMHG | HEIGHT: 64 IN

## 2019-10-21 DIAGNOSIS — F51.02 ADJUSTMENT INSOMNIA: ICD-10-CM

## 2019-10-21 DIAGNOSIS — S46.811A TRAPEZIUS STRAIN, RIGHT, INITIAL ENCOUNTER: Primary | ICD-10-CM

## 2019-10-21 PROCEDURE — 99214 OFFICE O/P EST MOD 30 MIN: CPT | Performed by: FAMILY MEDICINE

## 2019-10-21 RX ORDER — METHOCARBAMOL 500 MG/1
500 TABLET, FILM COATED ORAL 4 TIMES DAILY PRN
Qty: 60 TABLET | Refills: 1 | Status: SHIPPED | OUTPATIENT
Start: 2019-10-21 | End: 2019-11-08

## 2019-10-21 RX ORDER — TRAZODONE HYDROCHLORIDE 50 MG/1
50 TABLET, FILM COATED ORAL
Qty: 20 TABLET | Refills: 0 | Status: SHIPPED | OUTPATIENT
Start: 2019-10-21 | End: 2019-11-08

## 2019-10-21 ASSESSMENT — MIFFLIN-ST. JEOR: SCORE: 1321.87

## 2019-10-21 ASSESSMENT — PAIN SCALES - GENERAL: PAINLEVEL: WORST PAIN (10)

## 2019-10-21 NOTE — PROGRESS NOTES
"Subjective     Will Dodson is a 34 year old female who presents to clinic today for the following health issues:    HPI   Joint Pain    Onset: 3 weeks ago    Description:   Location: right shoulder, Neck ,back  Character: Sharp, Dull ache and Burning    Intensity: severe    Progression of Symptoms: worse    Accompanying Signs & Symptoms:  Other symptoms: weakness of right side of arm and neck    History:   Previous similar pain: YES  , TMJ     Precipitating factors:   Trauma or overuse: no     Alleviating factors:  Improved by: heat     Therapies Tried and outcome: Heat     No specific trigger but history of this.  Has a knot next to shoulder blade.  Stiff and painful.  Tried ibuprofen and mother's muscle relaxer but not long lasting.  Heating pad helps as well.  Has not slept for the last 2 weeks due to the pain.      Reviewed and updated as needed this visit by Provider  Tobacco  Allergies  Meds  Problems  Med Hx  Surg Hx  Fam Hx         Review of Systems   ROS COMP: Constitutional, HEENT, cardiovascular, pulmonary, gi and gu systems are negative, except as otherwise noted.      Objective    /83 (BP Location: Left arm, Patient Position: Sitting, Cuff Size: Adult Regular)   Pulse 91   Temp 97.8  F (36.6  C) (Oral)   Resp 16   Ht 1.626 m (5' 4\")   Wt 62.7 kg (138 lb 3.2 oz)   SpO2 96%   BMI 23.72 kg/m    Body mass index is 23.72 kg/m .  Physical Exam   GENERAL: healthy, alert and no distress  NECK: no adenopathy, no asymmetry, masses, or scars and thyroid normal to palpation  RESP: lungs clear to auscultation - no rales, rhonchi or wheezes  CV: regular rate and rhythm, normal S1 S2, no S3 or S4, no murmur, click or rub, no peripheral edema and peripheral pulses strong  ABDOMEN: soft, nontender, no hepatosplenomegaly, no masses and bowel sounds normal  MS: tightness and tenderness of right trapezius muscle  NEURO: Normal strength and tone, mentation intact and speech normal  PSYCH: mentation " appears normal and affect flat    Diagnostic Test Results:  Labs reviewed in Epic        Assessment & Plan     1. Trapezius strain, right, initial encounter  Trial of medications below and massage referral (discussed may have poor coverage)  - diclofenac (VOLTAREN) 50 MG EC tablet; Take 1 tablet (50 mg) by mouth 3 times daily as needed for moderate pain (for pain.  Take with food.)  Dispense: 60 tablet; Refill: 1  - methocarbamol (ROBAXIN) 500 MG tablet; Take 1 tablet (500 mg) by mouth 4 times daily as needed for muscle spasms  Dispense: 60 tablet; Refill: 1  - MASSAGE THERAPY REFERRAL    2. Adjustment insomnia  Trial for short term use.  - traZODone (DESYREL) 50 MG tablet; Take 1 tablet (50 mg) by mouth nightly as needed for sleep  Dispense: 20 tablet; Refill: 0     The uses and side effects, including black box warnings as appropriate, were discussed in detail.  All patient questions were answered.  The patient was instructed to call immediately if any side effects developed.     Return in about 2 weeks (around 11/4/2019).    Brook Shukla MD  [facility]

## 2019-10-21 NOTE — PATIENT INSTRUCTIONS
At [facility], we strive to deliver an exceptional experience to you, every time we see you.  If you receive a survey in the mail, please send us back your thoughts. We really do value your feedback.    Your care team:                            Family Medicine Internal Medicine   MD Herbert Pat MD Shantel Branch-Fleming, MD Katya Georgiev PA-C Megan Hill, APRN JAMIE Davenport, MD Pediatrics   Harshal Padilla, WILBERT Boswell, MD Юлия Jin APRN CNP   MD Susie Norton MD Deborah Mielke, MD Viola Oakes, APRN [facility]      Clinic hours: Monday - Thursday 7 am-7 pm; Fridays 7 am-5 pm.   Urgent care: Monday - Friday 11 am-9 pm; Saturday and Sunday 9 am-5 pm.  Pharmacy : Monday -Thursday 8 am-8 pm; Friday 8 am-6 pm; Saturday and Sunday 9 am-5 pm.     Clinic: [phone]   Pharmacy: [phone]        Patient Education     Treating Strains and Sprains    Strains and sprains happen when muscles or other soft tissues near your bones stretch or tear. These injuries can cause bruising, swelling, and pain. To ease your discomfort and speed the healing of your strain or sprain, follow the tips below. Remember, a strain or sprain can take 6 to 8 weeks to heal.  Important Note: Do not give aspirin to children or teens without discussing it with your healthcare provider first.  Ice first, heat later    Use ice for the first 24 to 48 hours after injury. Ice helps prevent swelling and reduce pain. Ice the injury for no more than 20 minutes at a time and allow at least 20 minutes between icing sessions.    Apply heat after the first 72 hours, once the swelling has gone down. Heat relaxes muscles and increases blood flow. Soak the injured area in warm water or use a heating pad set on low for no more than 15 minutes at a time.  Wrap and elevate    Wrap an injured limb firmly with an elastic bandage. This provides support and helps  prevent swelling. Don t wear an elastic bandage overnight. Watch for tingling, numbness, or increased pain. Remove the bandage immediately if any of these occurs.    Elevate the injured area to help reduce swelling and throbbing. It s best to raise an injured limb above the level of your heart.  Medicines    Over-the-counter medicines such as acetaminophen or ibuprofen can help reduce pain. Some also help reduce swelling.    Take medicine only as directed.    Rest the area even if medicines are controlling the pain.  Rest    Rest the injured area by not using it for 24 hours.    When you re ready, return slowly to your normal activities. Rest the injured area often.    Don t use or walk on an injured limb if it hurts.  Date Last Reviewed: 1/1/2018 2000-2018 [employer]. [address] [place] [address], [place] [address]. All rights reserved. This information is not intended as a substitute for professional medical care. Always follow your healthcare professional's instructions.

## 2019-10-23 ENCOUNTER — TELEPHONE (OUTPATIENT)
Dept: FAMILY MEDICINE | Facility: CLINIC | Age: 34
End: 2019-10-23

## 2019-10-23 DIAGNOSIS — L70.0 ACNE VULGARIS: Primary | ICD-10-CM

## 2019-10-23 RX ORDER — CLINDAMYCIN PHOSPHATE 10 UG/ML
LOTION TOPICAL 2 TIMES DAILY
Qty: 60 ML | Refills: 1 | Status: SHIPPED | OUTPATIENT
Start: 2019-10-23 | End: 2020-04-29

## 2019-10-23 NOTE — TELEPHONE ENCOUNTER
I sent in 2 separate prescriptions, let's see if insurance covers them.  Thank you,  Mignon Ward MD MPH

## 2019-10-23 NOTE — TELEPHONE ENCOUNTER
Plan does not cover clindamycin-benzoyl peroxide (BENZACLIN) 1-5 % external gel.  Please go to [email or web address] to initiate Prior Auth or change med.    Insurance type and ID number: Key:  [id number]      Additional Information:     Martita Tse
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
## 2019-11-08 ENCOUNTER — OFFICE VISIT (OUTPATIENT)
Dept: FAMILY MEDICINE | Facility: CLINIC | Age: 34
End: 2019-11-08
Payer: COMMERCIAL

## 2019-11-08 ENCOUNTER — ANCILLARY PROCEDURE (OUTPATIENT)
Dept: GENERAL RADIOLOGY | Facility: CLINIC | Age: 34
End: 2019-11-08
Attending: PREVENTIVE MEDICINE
Payer: COMMERCIAL

## 2019-11-08 VITALS
SYSTOLIC BLOOD PRESSURE: 127 MMHG | OXYGEN SATURATION: 98 % | RESPIRATION RATE: 18 BRPM | TEMPERATURE: 98.2 F | HEART RATE: 104 BPM | WEIGHT: 138 LBS | HEIGHT: 64 IN | BODY MASS INDEX: 23.56 KG/M2 | DIASTOLIC BLOOD PRESSURE: 89 MMHG

## 2019-11-08 DIAGNOSIS — M62.838 MUSCLE SPASM: ICD-10-CM

## 2019-11-08 DIAGNOSIS — F51.02 ADJUSTMENT INSOMNIA: ICD-10-CM

## 2019-11-08 DIAGNOSIS — M54.59 MECHANICAL LOW BACK PAIN: Primary | ICD-10-CM

## 2019-11-08 DIAGNOSIS — M54.59 MECHANICAL LOW BACK PAIN: ICD-10-CM

## 2019-11-08 PROCEDURE — 72100 X-RAY EXAM L-S SPINE 2/3 VWS: CPT

## 2019-11-08 PROCEDURE — 99214 OFFICE O/P EST MOD 30 MIN: CPT | Performed by: PREVENTIVE MEDICINE

## 2019-11-08 RX ORDER — HYDROXYZINE HYDROCHLORIDE 25 MG/1
25-100 TABLET, FILM COATED ORAL
Qty: 30 TABLET | Refills: 0 | Status: SHIPPED | OUTPATIENT
Start: 2019-11-08 | End: 2020-04-29

## 2019-11-08 ASSESSMENT — MIFFLIN-ST. JEOR: SCORE: 1320.96

## 2019-11-08 ASSESSMENT — PAIN SCALES - GENERAL: PAINLEVEL: EXTREME PAIN (8)

## 2019-11-08 ASSESSMENT — PATIENT HEALTH QUESTIONNAIRE - PHQ9: SUM OF ALL RESPONSES TO PHQ QUESTIONS 1-9: 14

## 2019-11-08 NOTE — RESULT ENCOUNTER NOTE
Will,    X rays of the low back did not show any bony abnormalities. Plan of care and follow up as discussed in clinic.     Please do not hesitate to call us at [phone] if you have any questions or concerns.    Thank you,    Mignon Ward MD MPH

## 2019-11-08 NOTE — PROGRESS NOTES
Subjective     Will Dodson is a 34 year old female who presents to clinic today for the following health issues:    HPI   Joint Pain    Onset: Follow up form 10/21/2019    Description:   Location: Right shoulder, Neck, and lower back   Character: Sharp at random times and Dull ache constant pain     Intensity: severe    Progression of Symptoms: worse, intermittent    Accompanying Signs & Symptoms:  Other symptoms: Top of legs get hot and tingly     History:   Previous similar pain: YES- TMJ       Precipitating factors:   Trauma or overuse: no     Alleviating factors:  Improved by: nothing    Therapies Tried and outcome: Trazodone no relief at all     No specific injuries or falls  Wakes up in the middle of the night screaming in pain, happens every 3 hours   Has had disrupted sleep  Has had constant dull pain  No physical therapy  Felt better with medication for the first day, but no help after that.   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     Depression with anxiety     Neck pain     TMJ (temporomandibular joint syndrome)     Uncomplicated alcohol dependence (H)     Past Surgical History:   Procedure Laterality Date     C/SECTION, LOW TRANSVERSE  ,     , Low Transverse     COSMETIC SURGERY       CYSTOSCOPY,REMV CALCULUS,SIMPLE       LAPAROSCOPIC CHOLECYSTECTOMY N/A 2017    Procedure: LAPAROSCOPIC CHOLECYSTECTOMY;  Laparoscopic Cholecystectomy;  Surgeon: Carson Rojas MD;  Location: [facility]     ORTHOPEDIC SURGERY         Social History     Tobacco Use     Smoking status: Former Smoker     Packs/day: 0.25     Types:  Cigarettes     Start date: 6/2/2015     Smokeless tobacco: Never Used   Substance Use Topics     Alcohol use: Yes     Alcohol/week: 0.0 standard drinks     Comment: daily 3 drinks     Family History   Problem Relation Age of Onset     Breast Cancer Mother      Depression Mother      Substance Abuse Mother      Anxiety Disorder Mother      Hypertension Father      Substance Abuse Father      Colon Cancer Maternal Grandfather      Colon Cancer Paternal Grandfather      Other - See Comments Sister         epilepsy     Substance Abuse Sister         A&D         Current Outpatient Medications   Medication Sig Dispense Refill     benzoyl peroxide 5 % external liquid Apply sparingly once daily. 140 g 3     clindamycin (CLEOCIN T) 1 % external lotion Apply topically 2 times daily 60 mL 1     diclofenac (VOLTAREN) 50 MG EC tablet Take 1 tablet (50 mg) by mouth 3 times daily as needed for moderate pain (for pain.  Take with food.) 60 tablet 1     hydrOXYzine (ATARAX) 25 MG tablet Take 1-4 tablets ( mg) by mouth nightly as needed for other (insomnia) 30 tablet 0     tiZANidine (ZANAFLEX) 4 MG tablet Take 1 tablet (4 mg) by mouth 3 times daily as needed for muscle spasms 30 tablet 0     levonorgestrel (MIRENA) 20 MCG/24HR IUD 1 each (20 mcg) by Intrauterine route continuous (Patient not taking: Reported on 10/21/2019)       minocycline (DYNACIN) 100 MG tablet Take 1 tablet (100 mg) by mouth 2 times daily (Patient not taking: Reported on 10/21/2019) 60 tablet 2     Allergies   Allergen Reactions     Blood-Group Specific Substance      Patient has Probable passiive anti D Antibody. Blood Product orders may be delayed.  Draw one red top and two purple top tubes for ALL Type and Screen/ Type and Crossmatch orders.       Vicodin [Hydrocodone-Acetaminophen] Nausea and Vomiting     BP Readings from Last 3 Encounters:   11/08/19 127/89   10/21/19 123/83   10/08/19 105/68    Wt Readings from Last 3 Encounters:   11/08/19 62.6 kg  "(138 lb)   10/21/19 62.7 kg (138 lb 3.2 oz)   10/08/19 62.6 kg (138 lb)               Reviewed and updated as needed this visit by Provider  Tobacco  Allergies  Meds  Problems  Med Hx  Surg Hx  Fam Hx         Review of Systems   ROS COMP: Constitutional, HEENT, cardiovascular, pulmonary, gi and gu systems are negative, except as otherwise noted.      Objective    /89 (BP Location: Left arm, Patient Position: Sitting, Cuff Size: Adult Regular)   Pulse 104   Temp 98.2  F (36.8  C) (Oral)   Resp 18   Ht 1.626 m (5' 4\")   Wt 62.6 kg (138 lb)   LMP  (LMP Unknown)   SpO2 98%   Breastfeeding? No   BMI 23.69 kg/m    Body mass index is 23.69 kg/m .  Physical Exam   GENERAL APPEARANCE: healthy, alert and no distress, gait normal   EYES: Eyes grossly normal to inspection and conjunctivae and sclerae normal  RESP: lungs clear to auscultation - no rales, rhonchi or wheezes  CV: regular rates and rhythm, normal S1 S2, no S3 or S4 and no murmur, click or rub  ABDOMEN: soft, non-tender and no rebound or guarding   MS: extremities normal- no gross deformities noted and peripheral pulses normal  SKIN: no suspicious lesions or rashes  NEURO: Normal strength and tone, mentation intact and speech normal, able to heel and toe walk   PSYCH: mentation appears normal    Lumbar spine: No swelling, bruising, erythema atrophy or deformity.  Mild tenderness noted on palpation of spinous processes.  Range of motion of the lumbar spine is full in all directions without focal deficit.  Strength is full.  SLR negative bilaterally.  Distal motor and sensory exam is intact.  Reflexes are 2+ and symmetrical.  Distal pulses intact. No sacroiliac tenderness bilaterally.  Great toe extension normal.     Some tenderness along the right trapezius muscle  Right shoulder strength and range of motion intact         Diagnostic Test Results:  Labs reviewed in Epic  No results found for this or any previous visit (from the past 24 " hour(s)).        Assessment & Plan     Will was seen today for musculoskeletal problem, back pain and neck pain.    Diagnoses and all orders for this visit:    Mechanical low back pain  -     [facility] PT, HAND, AND CHIROPRACTIC REFERRAL; Future  -     XR Lumbar Spine 2/3 Views; Future  -Alternating heat and ice     Adjustment insomnia  -     hydrOXYzine (ATARAX) 25 MG tablet; Take 1-4 tablets ( mg) by mouth nightly as needed for other (insomnia)    Muscle spasm  -     [facility] PT, HAND, AND CHIROPRACTIC REFERRAL; Future  -     tiZANidine (ZANAFLEX) 4 MG tablet; Take 1 tablet (4 mg) by mouth 3 times daily as needed for muscle spasms          Return in about 4 weeks (around 12/6/2019), or if symptoms worsen or fail to improve.    Mignon Ward MD MPH    [facility]

## 2019-11-13 ENCOUNTER — OFFICE VISIT (OUTPATIENT)
Dept: FAMILY MEDICINE | Facility: CLINIC | Age: 34
End: 2019-11-13
Payer: COMMERCIAL

## 2019-11-13 VITALS
WEIGHT: 139 LBS | TEMPERATURE: 98.1 F | HEIGHT: 64 IN | BODY MASS INDEX: 23.73 KG/M2 | HEART RATE: 91 BPM | SYSTOLIC BLOOD PRESSURE: 117 MMHG | RESPIRATION RATE: 16 BRPM | OXYGEN SATURATION: 96 % | DIASTOLIC BLOOD PRESSURE: 81 MMHG

## 2019-11-13 DIAGNOSIS — F90.2 ADHD (ATTENTION DEFICIT HYPERACTIVITY DISORDER), COMBINED TYPE: Primary | ICD-10-CM

## 2019-11-13 PROCEDURE — 96127 BRIEF EMOTIONAL/BEHAV ASSMT: CPT | Performed by: PHYSICIAN ASSISTANT

## 2019-11-13 PROCEDURE — 99213 OFFICE O/P EST LOW 20 MIN: CPT | Performed by: PHYSICIAN ASSISTANT

## 2019-11-13 RX ORDER — DEXTROAMPHETAMINE SACCHARATE, AMPHETAMINE ASPARTATE MONOHYDRATE, DEXTROAMPHETAMINE SULFATE AND AMPHETAMINE SULFATE 2.5; 2.5; 2.5; 2.5 MG/1; MG/1; MG/1; MG/1
10 CAPSULE, EXTENDED RELEASE ORAL DAILY
Qty: 30 CAPSULE | Refills: 0 | Status: SHIPPED | OUTPATIENT
Start: 2019-11-13 | End: 2022-10-13

## 2019-11-13 ASSESSMENT — PAIN SCALES - GENERAL: PAINLEVEL: NO PAIN (0)

## 2019-11-13 ASSESSMENT — ANXIETY QUESTIONNAIRES
GAD7 TOTAL SCORE: 11
IF YOU CHECKED OFF ANY PROBLEMS ON THIS QUESTIONNAIRE, HOW DIFFICULT HAVE THESE PROBLEMS MADE IT FOR YOU TO DO YOUR WORK, TAKE CARE OF THINGS AT HOME, OR GET ALONG WITH OTHER PEOPLE: NOT DIFFICULT AT ALL
6. BECOMING EASILY ANNOYED OR IRRITABLE: MORE THAN HALF THE DAYS
5. BEING SO RESTLESS THAT IT IS HARD TO SIT STILL: NEARLY EVERY DAY
7. FEELING AFRAID AS IF SOMETHING AWFUL MIGHT HAPPEN: NOT AT ALL
3. WORRYING TOO MUCH ABOUT DIFFERENT THINGS: SEVERAL DAYS
2. NOT BEING ABLE TO STOP OR CONTROL WORRYING: SEVERAL DAYS
1. FEELING NERVOUS, ANXIOUS, OR ON EDGE: SEVERAL DAYS

## 2019-11-13 ASSESSMENT — PATIENT HEALTH QUESTIONNAIRE - PHQ9
5. POOR APPETITE OR OVEREATING: NEARLY EVERY DAY
SUM OF ALL RESPONSES TO PHQ QUESTIONS 1-9: 12

## 2019-11-13 ASSESSMENT — MIFFLIN-ST. JEOR: SCORE: 1325.5

## 2019-11-13 NOTE — PROGRESS NOTES
Subjective     Will Dodson is a 34 year old female who presents to clinic today for the following health issues:    HPI   Medication Followup of Adderall    Taking Medication as prescribed: NO-medication was stopped about 6 months ago    Side Effects:  None    Medication Helping Symptoms:  yes   Had been on them for years, last year stopped, then started again last spring while in school, then stopped again as didn't need as much concentration              Allergies   Allergen Reactions     Blood-Group Specific Substance      Patient has Probable passiive anti D Antibody. Blood Product orders may be delayed.  Draw one red top and two purple top tubes for ALL Type and Screen/ Type and Crossmatch orders.       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 (Patient not taking: Reported on 2019)  clindamycin (CLEOCIN T) 1 % external lotion, Apply topically 2 times daily (Patient not taking: Reported on 2019)  diclofenac (VOLTAREN) 50 MG EC tablet, Take 1 tablet (50 mg) by mouth 3 times daily as needed for moderate pain (for pain.  Take with food.) (Patient not  "taking: Reported on 11/13/2019)  hydrOXYzine (ATARAX) 25 MG tablet, Take 1-4 tablets ( mg) by mouth nightly as needed for other (insomnia) (Patient not taking: Reported on 11/13/2019)  tiZANidine (ZANAFLEX) 4 MG tablet, Take 1 tablet (4 mg) by mouth 3 times daily as needed for muscle spasms (Patient not taking: Reported on 11/13/2019)    No current facility-administered medications on file prior to visit.       Social History     Tobacco Use     Smoking status: Former Smoker     Packs/day: 0.25     Types: Cigarettes     Start date: 6/2/2015     Smokeless tobacco: Never Used   Substance Use Topics     Alcohol use: Yes     Alcohol/week: 0.0 standard drinks     Comment: daily 3 drinks     Drug use: Yes     Types: Methamphetamines     Comment: 5/10/19 once per week, couple hits.        ROS:   GEN: NO fevers  PSYCH ADHD as above  MUSC: hx neck pain      OBJECTIVE:  /81 (BP Location: Right arm, Patient Position: Chair, Cuff Size: Adult Regular)   Pulse 91   Temp 98.1  F (36.7  C) (Oral)   Resp 16   Ht 1.626 m (5' 4\")   Wt 63 kg (139 lb)   LMP  (LMP Unknown)   SpO2 96%   Breastfeeding No   BMI 23.86 kg/m     General:   awake, alert, and cooperative.  NAD.   Head: Normocephalic, atraumatic.  Eyes: Conjunctiva clear,   Lungs: Regular rate  Neuro: Alert and oriented - normal speech.    ASSESSMENT: restart adderrall    ICD-10-CM    1. ADHD (attention deficit hyperactivity disorder), combined type F90.2 amphetamine-dextroamphetamine (ADDERALL XR) 10 MG 24 hr capsule       PLAN:   Follow up: 6 months   Advised about symptoms which might herald more serious problems.              "

## 2019-11-13 NOTE — PATIENT INSTRUCTIONS
At [facility], we strive to deliver an exceptional experience to you, every time we see you.  If you receive a survey in the mail, please send us back your thoughts. We really do value your feedback.    Based on your medical history, these are the current health maintenance/preventive care services that you are due for (some may have been done at this visit.)  Health Maintenance Due   Topic Date Due     PNEUMOCOCCAL IMMUNIZATION 19-64 MEDIUM RISK (1 of 1 - PPSV23) 07/19/2006     INFLUENZA VACCINE (1) 09/01/2019         Suggested websites for health information:  Www.[employer].Alliance Card : Up to date and easily searchable information on multiple topics.  Www.[employer]gov : medication info, interactive tutorials, watch real surgeries online  Www.familydoctor.org : good info from the Academy of Family Physicians  Www.cdc.gov : public health info, travel advisories, epidemics (H1N1)  Www.aap.org : children's health info, normal development, vaccinations  Www.health.[facility].[place].us : [place] dept of health, public health issues in [place], N1N1    Your care team:                            Family Medicine Internal Medicine   MD Herbert Pat MD Shantel Branch-Fleming, MD Katya Georgiev PA-C Nam Ho, MD Pediatrics   WILBERT Reeder, JAMIE Alegre APRN CNP   MD Susie Norton MD Deborah Mielke, MD Kim Thein, APRN CNP      Clinic hours: Monday - Thursday 7 am-7 pm; Fridays 7 am-5 pm.   Urgent care: Monday - Friday 11 am-9 pm; Saturday and Sunday 9 am-5 pm.  Pharmacy : Monday -Thursday 8 am-8 pm; Friday 8 am-6 pm; Saturday and Sunday 9 am-5 pm.     Clinic: [phone]   Pharmacy: [phone]

## 2019-11-14 ASSESSMENT — ANXIETY QUESTIONNAIRES: GAD7 TOTAL SCORE: 11

## 2020-01-05 ENCOUNTER — MYC REFILL (OUTPATIENT)
Dept: FAMILY MEDICINE | Facility: CLINIC | Age: 35
End: 2020-01-05

## 2020-01-05 DIAGNOSIS — L70.0 ACNE VULGARIS: ICD-10-CM

## 2020-01-05 NOTE — LETTER
[address]  [address]  [phone]    January 15, 2020      Will Dodson  [address] [facility] [facility] [address]            Hi Will   Your provider gave you a 30 day merlin refill but says that you are due for an office visit for further refills. Please schedule this through My Chart or call   Us at [phone].   Thank You   [facility]

## 2020-01-06 NOTE — TELEPHONE ENCOUNTER
"Requested Prescriptions   Pending Prescriptions Disp Refills     minocycline (DYNACIN) 100 MG tablet 60 tablet 2     Sig: Take 1 tablet (100 mg) by mouth 2 times daily       Oral Acne/Rosacea Medications Protocol Failed - 1/5/2020 10:41 AM        Failed - Confirmation of diagnosis is required     Please confirm diagnosis is acne or rosacea.     If NOT acne or rosacea; refer request to provider for further evaluation.    If diagnosis IS acne or rosacea, OK to refill BASED ON PREVIOUS REFILL CLINICAL NOTE RECOMMENDATION.          Passed - Patient is 12 years of age or older        Passed - Recent (12 mo) or future (30 days) visit within the authorizing provider's specialty     Patient has had an office visit with the authorizing provider or a provider within the authorizing providers department within the previous 12 mos or has a future within next 30 days. See \"Patient Info\" tab in inbasket, or \"Choose Columns\" in Meds & Orders section of the refill encounter.              Passed - Medication is active on med list        Passed - No active pregnancy on record        Passed - No positive prenancy test is past 12 months        minocycline (DYNACIN) 100 MG tablet  Last Written Prescription Date:  10/8/19  Last Fill Quantity: 60,  # refills: 2   Last office visit: 11/13/2019 with prescribing provider:  JOSÉ MIGUEL Ward   Future Office Visit:      "

## 2020-01-07 RX ORDER — MINOCYCLINE HYDROCHLORIDE 100 MG/1
100 TABLET ORAL 2 TIMES DAILY
Qty: 60 TABLET | Refills: 0 | Status: SHIPPED | OUTPATIENT
Start: 2020-01-07 | End: 2020-04-29

## 2020-01-07 NOTE — TELEPHONE ENCOUNTER
Please schedule patient. Lurdes refill given, per last office visit notes patient due for follow up in January.     Needs appointment for further refills.     Parul Mares RN  [facility]

## 2020-01-08 NOTE — TELEPHONE ENCOUNTER
Sent a My Chart message-office visit due.  Nicki Monet MA  [facility]  2nd Floor  Primary Care

## 2020-01-23 NOTE — MR AVS SNAPSHOT
After Visit Summary   3/1/2018    Will Dodson    MRN: [id number]           Patient Information     Date Of Birth          1985        Visit Information        Provider Department      3/1/2018 9:40 AM Norbert Wooten MD [facility]        Today's Diagnoses     ADHD (attention deficit hyperactivity disorder), combined type    -  1    Moderate episode of recurrent major depressive disorder (H)        Posttraumatic stress disorder        Generalized anxiety disorder          Care Instructions    You can cut the seroquel in half to 25mg at bedtime so you are not too groggy.    Bring the adderall Rx to the pharmacy, first fill is 3/12      Thank you for choosing [facility].  You may be receiving a survey in the mail from [employer] regarding your visit today.  Please take a few minutes to complete and return the survey to let us know how we are doing.      If you have questions or concerns, please contact us via [employer] or you can contact your care team at [phone].    Our Clinic hours are:  Monday 6:40 am  to 7:00 pm  Tuesday -Friday 6:40 am to 5:00 pm    The [place] outpatient lab hours are:  Monday - Friday 6:10 am to 4:45 pm  Saturdays 7:00 am to 11:00 am  Appointments are required, call [phone]    If you have clinical questions after hours or would like to schedule an appointment,  call the clinic at [phone].          Follow-ups after your visit        Who to contact     If you have questions or need follow up information about today's clinic visit or your schedule please contact [facility] directly at [phone].  Normal or non-critical lab and imaging results will be communicated to you by MyChart, letter or phone within 4 business days after the clinic has received the results. If you do not hear from us within 7 days, please contact the clinic through [employer] or phone. If you have a critical or abnormal lab result, we will notify you by  "phone as soon as possible.  Submit refill requests through [employer] or call your pharmacy and they will forward the refill request to us. Please allow 3 business days for your refill to be completed.          Additional Information About Your Visit        [employer] Information     [employer] lets you send messages to your doctor, view your test results, renew your prescriptions, schedule appointments and more. To sign up, go to [email or web address] . Click on \"Log in\" on the left side of the screen, which will take you to the Welcome page. Then click on \"Sign up Now\" on the right side of the page.     You will be asked to enter the access code listed below, as well as some personal information. Please follow the directions to create your username and password.     Your access code is: [email or web address]  Expires: 3/21/2018 11:53 AM     Your access code will  in 90 days. If you need help or a new code, please call your [facility] clinic or [phone].        Care EveryWhere ID     This is your Care EveryWhere ID. This could be used by other organizations to access your [facility] medical records  [id number]        Your Vitals Were     Pulse Temperature Respirations Height Pulse Oximetry BMI (Body Mass Index)    78 99  F (37.2  C) (Tympanic) 26 5' (1.524 m) 100% 25.04 kg/m2       Blood Pressure from Last 3 Encounters:   18 116/76   18 121/89   17 99/63    Weight from Last 3 Encounters:   18 128 lb 3.2 oz (58.2 kg)   18 127 lb (57.6 kg)   17 128 lb 12.8 oz (58.4 kg)              Today, you had the following     No orders found for display         Today's Medication Changes          These changes are accurate as of 3/1/18 10:35 AM.  If you have any questions, ask your nurse or doctor.               Start taking these medicines.        Dose/Directions    * amphetamine-dextroamphetamine 20 MG per tablet   Commonly known as:  ADDERALL   Used for:  ADHD (attention deficit hyperactivity " Handoff disorder), combined type        Dose:  20 mg   Start taking on:  3/12/2018   Take 1 tablet (20 mg) by mouth 2 times daily   Quantity:  60 tablet   Refills:  0       * amphetamine-dextroamphetamine 20 MG per tablet   Commonly known as:  ADDERALL   Used for:  ADHD (attention deficit hyperactivity disorder), combined type        Dose:  20 mg   Start taking on:  4/12/2018   Take 1 tablet (20 mg) by mouth 2 times daily   Quantity:  60 tablet   Refills:  0       * amphetamine-dextroamphetamine 20 MG per tablet   Commonly known as:  ADDERALL   Used for:  ADHD (attention deficit hyperactivity disorder), combined type        Dose:  20 mg   Start taking on:  5/12/2018   Take 1 tablet (20 mg) by mouth 2 times daily   Quantity:  60 tablet   Refills:  0       * Notice:  This list has 3 medication(s) that are the same as other medications prescribed for you. Read the directions carefully, and ask your doctor or other care provider to review them with you.         Where to get your medicines      These medications were sent to [facility] [address] [place] [address] [address]  [address] [facility] [address]     Phone:  [phone]     FLUoxetine 40 MG capsule         Some of these will need a paper prescription and others can be bought over the counter.  Ask your nurse if you have questions.     Bring a paper prescription for each of these medications     amphetamine-dextroamphetamine 20 MG per tablet    amphetamine-dextroamphetamine 20 MG per tablet    amphetamine-dextroamphetamine 20 MG per tablet                Primary Care Provider Office Phone # Fax #    Norbert Wooten [phone] [address] [facility] [address]        Equal Access to Services     [facility] Hospital[facility]IAN[facility]A AH: Vidhi dorantes Sozita, waaxda luqadaha, qaybta kaalmada ashley hoffman. [facility] [phone].    ATENCIÓN: Si habla español, tiene a jarrett disposición servicios gratuitos de  asistencia lingüística. Clementina al [phone].    We comply with applicable federal civil rights laws and [place] laws. We do not discriminate on the basis of race, color, national origin, age, disability, sex, sexual orientation, or gender identity.            Thank you!     Thank you for choosing [facility]  for your care. Our goal is always to provide you with excellent care. Hearing back from our patients is one way we can continue to improve our services. Please take a few minutes to complete the written survey that you may receive in the mail after your visit with us. Thank you!             Your Updated Medication List - Protect others around you: Learn how to safely use, store and throw away your medicines at www.disposemymeds.org.          This list is accurate as of 3/1/18 10:35 AM.  Always use your most recent med list.                   Brand Name Dispense Instructions for use Diagnosis    albuterol 108 (90 BASE) MCG/ACT Inhaler    PROAIR HFA    1 Inhaler    Inhale 2 puffs into the lungs every 4 hours as needed for shortness of breath / dyspnea        * amphetamine-dextroamphetamine 20 MG per tablet   Start taking on:  3/12/2018    ADDERALL    60 tablet    Take 1 tablet (20 mg) by mouth 2 times daily    ADHD (attention deficit hyperactivity disorder), combined type       * amphetamine-dextroamphetamine 20 MG per tablet   Start taking on:  4/12/2018    ADDERALL    60 tablet    Take 1 tablet (20 mg) by mouth 2 times daily    ADHD (attention deficit hyperactivity disorder), combined type       * amphetamine-dextroamphetamine 20 MG per tablet   Start taking on:  5/12/2018    ADDERALL    60 tablet    Take 1 tablet (20 mg) by mouth 2 times daily    ADHD (attention deficit hyperactivity disorder), combined type       cloNIDine 0.1 MG tablet    CATAPRES     Take 1 tablet (0.1 mg) by mouth At Bedtime    Posttraumatic stress disorder       FLUoxetine 40 MG capsule    PROzac    90 capsule    Take 1  capsule (40 mg) by mouth daily    Moderate episode of recurrent major depressive disorder (H), Generalized anxiety disorder       levonorgestrel 20 MCG/24HR IUD    MIRENA     1 each (20 mcg) by Intrauterine route continuous    Encounter for IUD insertion       SEROQUEL 50 MG tablet   Generic drug:  QUEtiapine     30 tablet    Take 0.5-1 tablets (25-50 mg) by mouth nightly as needed    Moderate episode of recurrent major depressive disorder (H), Generalized anxiety disorder       * Notice:  This list has 3 medication(s) that are the same as other medications prescribed for you. Read the directions carefully, and ask your doctor or other care provider to review them with you.

## 2020-01-29 ENCOUNTER — OFFICE VISIT (OUTPATIENT)
Dept: FAMILY MEDICINE | Facility: CLINIC | Age: 35
End: 2020-01-29
Payer: COMMERCIAL

## 2020-01-29 VITALS
DIASTOLIC BLOOD PRESSURE: 83 MMHG | BODY MASS INDEX: 23.9 KG/M2 | HEIGHT: 64 IN | OXYGEN SATURATION: 95 % | HEART RATE: 95 BPM | SYSTOLIC BLOOD PRESSURE: 124 MMHG | WEIGHT: 140 LBS | TEMPERATURE: 97.6 F

## 2020-01-29 DIAGNOSIS — L70.9 ACNE, UNSPECIFIED ACNE TYPE: ICD-10-CM

## 2020-01-29 DIAGNOSIS — F90.2 ADHD (ATTENTION DEFICIT HYPERACTIVITY DISORDER), COMBINED TYPE: Primary | ICD-10-CM

## 2020-01-29 DIAGNOSIS — F33.1 MODERATE EPISODE OF RECURRENT MAJOR DEPRESSIVE DISORDER (H): ICD-10-CM

## 2020-01-29 DIAGNOSIS — F41.8 DEPRESSION WITH ANXIETY: ICD-10-CM

## 2020-01-29 PROCEDURE — 99214 OFFICE O/P EST MOD 30 MIN: CPT | Performed by: PHYSICIAN ASSISTANT

## 2020-01-29 RX ORDER — DEXTROAMPHETAMINE SACCHARATE, AMPHETAMINE ASPARTATE, DEXTROAMPHETAMINE SULFATE AND AMPHETAMINE SULFATE 5; 5; 5; 5 MG/1; MG/1; MG/1; MG/1
20 TABLET ORAL DAILY
Qty: 30 TABLET | Refills: 0 | Status: SHIPPED | OUTPATIENT
Start: 2020-01-29 | End: 2020-02-28

## 2020-01-29 RX ORDER — DEXTROAMPHETAMINE SACCHARATE, AMPHETAMINE ASPARTATE MONOHYDRATE, DEXTROAMPHETAMINE SULFATE AND AMPHETAMINE SULFATE 2.5; 2.5; 2.5; 2.5 MG/1; MG/1; MG/1; MG/1
10 CAPSULE, EXTENDED RELEASE ORAL DAILY
Qty: 30 CAPSULE | Refills: 0 | Status: CANCELLED | OUTPATIENT
Start: 2020-01-29

## 2020-01-29 ASSESSMENT — MIFFLIN-ST. JEOR: SCORE: 1330.04

## 2020-01-29 ASSESSMENT — PAIN SCALES - GENERAL: PAINLEVEL: NO PAIN (0)

## 2020-01-29 NOTE — PATIENT INSTRUCTIONS
At [facility], we strive to deliver an exceptional experience to you, every time we see you. If you receive a survey, please complete it as we do value your feedback.  If you have MyChart, you can expect to receive results automatically within 24 hours of their completion.  Your provider will send a note interpreting your results as well.   If you do not have MyChart, you should receive your results in about a week by mail.    Your care team:                            Family Medicine Internal Medicine   MD Herbert Pat MD Shantel Branch-Fleming, MD Katya Georgiev PA-C Megan Hill, APRJEAN CARLOS Davenport, MD Pediatrics   Harshal Padilla, PAGraysonC  Virgen Boswell, MD Юлия Jin APRN CNP   MD Susie Norton MD Deborah Mielke, MD Kim Thein, APRN CNP      Clinic hours: Monday - Thursday 7 am-7 pm; Fridays 7 am-5 pm.   Urgent care: Monday - Friday 11 am-9 pm; Saturday and Sunday 9 am-5 pm.  Pharmacy : Monday -Thursday 8 am-8 pm; Friday 8 am-6 pm; Saturday and Sunday 9 am-5 pm.     Clinic: [phone]   Pharmacy: [phone]

## 2020-01-29 NOTE — PROGRESS NOTES
Subjective     Will Dodson is a 34 year old female who presents to clinic today for the following health issues:    HPI   Medication Followup of Adderall    Taking Medication as prescribed: yes    Side Effects:  None    Medication Helping Symptoms:  NO    Mood generally is doing well.  Had prev been on 20 mgs, prior to that 30 mg.      Has run out and wasn't very helpful    did get refill for minocycline for acne- doing much better on meds              Allergies   Allergen Reactions     Blood-Group Specific Substance      Patient has Probable passiive anti D Antibody. Blood Product orders may be delayed.  Draw one red top and two purple top tubes for ALL Type and Screen/ Type and Crossmatch orders.       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  clindamycin (CLEOCIN T) 1 % external lotion, Apply topically 2 times daily  diclofenac (VOLTAREN) 50 MG EC tablet, Take 1 tablet (50 mg) by mouth 3 times daily as needed for moderate pain (for pain.  Take with food.)  hydrOXYzine (ATARAX) 25 MG tablet, Take 1-4 tablets ( mg) by mouth nightly as needed for other (insomnia)  levonorgestrel (MIRENA) 20 MCG/24HR  "IUD, 1 each (20 mcg) by Intrauterine route continuous  minocycline (DYNACIN) 100 MG tablet, Take 1 tablet (100 mg) by mouth 2 times daily  tiZANidine (ZANAFLEX) 4 MG tablet, Take 1 tablet (4 mg) by mouth 3 times daily as needed for muscle spasms    No current facility-administered medications on file prior to visit.       Social History     Tobacco Use     Smoking status: Former Smoker     Packs/day: 0.25     Types: Cigarettes     Start date: 6/2/2015     Smokeless tobacco: Never Used   Substance Use Topics     Alcohol use: Yes     Alcohol/week: 0.0 standard drinks     Comment: daily 3 drinks     Drug use: Yes     Types: Methamphetamines     Comment: 5/10/19 once per week, couple hits.        ROS:   GEN: NO fevers  PSYCH mood as above  SKIN; acne as abobve    OBJECTIVE:  /83 (BP Location: Right arm, Patient Position: Chair, Cuff Size: Adult Regular)   Pulse 95   Temp 97.6  F (36.4  C) (Oral)   Ht 1.626 m (5' 4\")   Wt 63.5 kg (140 lb)   SpO2 95%   BMI 24.03 kg/m     General:   awake, alert, and cooperative.  NAD.   Head: Normocephalic, atraumatic.  Eyes: Conjunctiva clear,   Lungs: Regular rate  Neuro: Alert and oriented - normal speech.    ASSESSMENT:well appearing failed long acting 10 mg adderal, will go back to 20 mg immediate release    ICD-10-CM    1. ADHD (attention deficit hyperactivity disorder), combined type F90.2 amphetamine-dextroamphetamine (ADDERALL) 20 MG tablet   2. Acne, unspecified acne type L70.9    3. Depression with anxiety F41.8    4. Moderate episode of recurrent major depressive disorder (H) F33.1        PLAN:   Follow up: 6 months   Advised about symptoms which might herald more serious problems.              "

## 2020-03-06 ENCOUNTER — OFFICE VISIT (OUTPATIENT)
Dept: FAMILY MEDICINE | Facility: CLINIC | Age: 35
End: 2020-03-06
Payer: COMMERCIAL

## 2020-03-06 VITALS
SYSTOLIC BLOOD PRESSURE: 120 MMHG | TEMPERATURE: 98.7 F | HEIGHT: 61 IN | BODY MASS INDEX: 26.21 KG/M2 | HEART RATE: 94 BPM | OXYGEN SATURATION: 97 % | DIASTOLIC BLOOD PRESSURE: 78 MMHG | WEIGHT: 138.8 LBS

## 2020-03-06 DIAGNOSIS — R35.0 URINARY FREQUENCY: Primary | ICD-10-CM

## 2020-03-06 LAB
ALBUMIN UR-MCNC: NEGATIVE MG/DL
APPEARANCE UR: CLEAR
BILIRUB UR QL STRIP: NEGATIVE
COLOR UR AUTO: YELLOW
GLUCOSE UR STRIP-MCNC: NEGATIVE MG/DL
HGB UR QL STRIP: NEGATIVE
KETONES UR STRIP-MCNC: ABNORMAL MG/DL
LEUKOCYTE ESTERASE UR QL STRIP: NEGATIVE
NITRATE UR QL: NEGATIVE
PH UR STRIP: 6 PH (ref 5–7)
SOURCE: ABNORMAL
SP GR UR STRIP: >1.03 (ref 1–1.03)
UROBILINOGEN UR STRIP-ACNC: 0.2 EU/DL (ref 0.2–1)

## 2020-03-06 PROCEDURE — 81003 URINALYSIS AUTO W/O SCOPE: CPT | Performed by: NURSE PRACTITIONER

## 2020-03-06 PROCEDURE — 99213 OFFICE O/P EST LOW 20 MIN: CPT | Performed by: NURSE PRACTITIONER

## 2020-03-06 ASSESSMENT — MIFFLIN-ST. JEOR: SCORE: 1269.03

## 2020-03-06 ASSESSMENT — PAIN SCALES - GENERAL: PAINLEVEL: MODERATE PAIN (4)

## 2020-03-06 NOTE — LETTER
March 11, 2020      Will Dodson  [address] [place] [address]        Hi Will,     Your urine showed trace Ketones but was otherwise normal.  Be sure to stay well hydrated and don't skip meals.  Lt me know if you cotninue to have symptoms.     Feel free to contact me with any questions or concerns.  Thank you for allowing me to participate in your care.         Nancy WELCH, CNP     Resulted Orders   UA reflex to Microscopic and Culture   Result Value Ref Range    Color Urine Yellow     Appearance Urine Clear     Glucose Urine Negative NEG^Negative mg/dL    Bilirubin Urine Negative NEG^Negative    Ketones Urine Trace (A) NEG^Negative mg/dL    Specific Gravity Urine >1.030 1.003 - 1.035    Blood Urine Negative NEG^Negative    pH Urine 6.0 5.0 - 7.0 pH    Protein Albumin Urine Negative NEG^Negative mg/dL    Urobilinogen Urine 0.2 0.2 - 1.0 EU/dL    Nitrite Urine Negative NEG^Negative    Leukocyte Esterase Urine Negative NEG^Negative    Source Midstream Urine        If you have any questions or concerns, please call the clinic at the number listed above.       Sincerely,        Nancy Oakes, YURI CNP

## 2020-03-06 NOTE — PROGRESS NOTES
Subjective     Will Dodson is a 34 year old female who presents to clinic today for the following health issues:    HPI   ABDOMINAL   PAIN     Onset: 1 week    Description:   Character: Dull ache, Stabbing and Cramping, intermittent, doesn't always occur with urination  Location: suprapubic region  Radiation: None    Intensity: moderate, 4/10    Progression of Symptoms:  worsening    Accompanying Signs & Symptoms:  Fever/Chills?: no   Gas/Bloating: YES  Nausea: no   Vomitting: no   Diarrhea?: no   Constipation:no   Dysuria or Hematuria: no    History:   Trauma: no   Previous similar pain: YES   Previous tests done: none    Precipitating factors:   Does the pain change with:     Food: no      BM: no     Urination: no     Alleviating factors:  none    Therapies Tried and outcome: none    LMP:  not applicable   She had urinary frequency/urgency, no dysuria,  on  but this has improved since then.      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     Depression with anxiety     Neck pain     TMJ (temporomandibular joint syndrome)     Uncomplicated alcohol dependence (H)     Acne, unspecified acne type     Past Surgical History:   Procedure Laterality Date     C/SECTION, LOW TRANSVERSE  ,     , Low Transverse     COSMETIC SURGERY       CYSTOSCOPY,REMV CALCULUS,SIMPLE       LAPAROSCOPIC CHOLECYSTECTOMY N/A 2017    Procedure: LAPAROSCOPIC CHOLECYSTECTOMY;  Laparoscopic Cholecystectomy;  Surgeon: Carson Rojas MD;  Location: [facility]     ORTHOPEDIC SURGERY         Social History     Tobacco Use     Smoking status: Former Smoker     Packs/day: 0.25     Types: Cigarettes     Start date:  6/2/2015     Smokeless tobacco: Never Used   Substance Use Topics     Alcohol use: Yes     Alcohol/week: 0.0 standard drinks     Comment: daily 3 drinks     Family History   Problem Relation Age of Onset     Breast Cancer Mother      Depression Mother      Substance Abuse Mother      Anxiety Disorder Mother      Hypertension Father      Substance Abuse Father      Colon Cancer Maternal Grandfather      Colon Cancer Paternal Grandfather      Other - See Comments Sister         epilepsy     Substance Abuse Sister         A&D         Current Outpatient Medications   Medication Sig Dispense Refill     amphetamine-dextroamphetamine (ADDERALL XR) 10 MG 24 hr capsule Take 1 capsule (10 mg) by mouth daily 30 capsule 0     benzoyl peroxide 5 % external liquid Apply sparingly once daily. 140 g 3     clindamycin (CLEOCIN T) 1 % external lotion Apply topically 2 times daily 60 mL 1     diclofenac (VOLTAREN) 50 MG EC tablet Take 1 tablet (50 mg) by mouth 3 times daily as needed for moderate pain (for pain.  Take with food.) 60 tablet 1     hydrOXYzine (ATARAX) 25 MG tablet Take 1-4 tablets ( mg) by mouth nightly as needed for other (insomnia) 30 tablet 0     levonorgestrel (MIRENA) 20 MCG/24HR IUD 1 each (20 mcg) by Intrauterine route continuous       tiZANidine (ZANAFLEX) 4 MG tablet Take 1 tablet (4 mg) by mouth 3 times daily as needed for muscle spasms 30 tablet 0     minocycline (DYNACIN) 100 MG tablet Take 1 tablet (100 mg) by mouth 2 times daily (Patient not taking: Reported on 3/6/2020) 60 tablet 0     BP Readings from Last 3 Encounters:   03/06/20 120/78   01/29/20 124/83   11/13/19 117/81    Wt Readings from Last 3 Encounters:   03/06/20 63 kg (138 lb 12.8 oz)   01/29/20 63.5 kg (140 lb)   11/13/19 63 kg (139 lb)            Reviewed and updated as needed this visit by Provider         Review of Systems   ROS COMP: Constitutional, HEENT, cardiovascular, pulmonary, gi and gu systems are negative, except as otherwise  "noted.      Objective    /78 (BP Location: Left arm, Patient Position: Chair, Cuff Size: Adult Regular)   Pulse 94   Temp 98.7  F (37.1  C) (Oral)   Ht 1.537 m (5' 0.5\")   Wt 63 kg (138 lb 12.8 oz)   SpO2 97%   BMI 26.66 kg/m    Body mass index is 26.66 kg/m .  Physical Exam   GENERAL: healthy, alert and no distress  EYES: Eyes grossly normal to inspection, PERRL and conjunctivae and sclerae normal  HENT: ear canals and TM's normal, nose and mouth without ulcers or lesions  NECK: no adenopathy, no asymmetry, masses, or scars and thyroid normal to palpation  RESP: lungs clear to auscultation - no rales, rhonchi or wheezes  CV: regular rate and rhythm, normal S1 S2, no S3 or S4, no murmur, click or rub, no peripheral edema and peripheral pulses strong  ABDOMEN: soft, nontender, no hepatosplenomegaly, no masses and bowel sounds normal  MS: no gross musculoskeletal defects noted, no edema  SKIN: no suspicious lesions or rashes  NEURO: Normal strength and tone, mentation intact and speech normal  BACK: no CVA tenderness, no paralumbar tenderness  PSYCH: mentation appears normal, affect normal/bright  LYMPH: normal ant/post cervical, supraclavicular nodes    Diagnostic Test Results:  Labs reviewed in Epic  Results for orders placed or performed in visit on 03/06/20   UA reflex to Microscopic and Culture     Status: Abnormal    Specimen: Midstream Urine   Result Value Ref Range    Color Urine Yellow     Appearance Urine Clear     Glucose Urine Negative NEG^Negative mg/dL    Bilirubin Urine Negative NEG^Negative    Ketones Urine Trace (A) NEG^Negative mg/dL    Specific Gravity Urine >1.030 1.003 - 1.035    Blood Urine Negative NEG^Negative    pH Urine 6.0 5.0 - 7.0 pH    Protein Albumin Urine Negative NEG^Negative mg/dL    Urobilinogen Urine 0.2 0.2 - 1.0 EU/dL    Nitrite Urine Negative NEG^Negative    Leukocyte Esterase Urine Negative NEG^Negative    Source Midstream Urine            Assessment & Plan     1. " "Urinary frequency  Continue ti drink plenty of fluids, reviewed genital hygiene, return to clinic if not improved, new, or worsening symptoms.   - UA reflex to Microscopic and Culture     BMI:   Estimated body mass index is 26.66 kg/m  as calculated from the following:    Height as of this encounter: 1.537 m (5' 0.5\").    Weight as of this encounter: 63 kg (138 lb 12.8 oz).   Weight management plan: Discussed healthy diet and exercise guidelines        Work on weight loss  Regular exercise  See Patient Instructions    No follow-ups on file.    YURI Iqbal [facility]        "

## 2020-03-06 NOTE — PATIENT INSTRUCTIONS
At [facility], we strive to deliver an exceptional experience to you, every time we see you.  If you receive a survey in the mail, please send us back your thoughts. We really do value your feedback.    Based on your medical history, these are the current health maintenance/preventive care services that you are due for (some may have been done at this visit.)  Health Maintenance Due   Topic Date Due     PNEUMOCOCCAL IMMUNIZATION 19-64 MEDIUM RISK (1 of 1 - PPSV23) 07/19/2006     INFLUENZA VACCINE (1) 09/01/2019         Suggested websites for health information:  Www.[employer].Com : Up to date and easily searchable information on multiple topics.  Www.[employer].gov : medication info, interactive tutorials, watch real surgeries online  Www.familydoctor.org : good info from the Academy of Family Physicians  Www.cdc.gov : public health info, travel advisories, epidemics (H1N1)  Www.aap.org : children's health info, normal development, vaccinations  Www.health.[facility].[place].us : [place] dept of health, public health issues in [place], N1N1    Your care team:                            Family Medicine Internal Medicine   MD Herbert Pat MD Shantel Branch-Fleming, MD Katya Georgiev PA-C Nam Ho, MD Pediatrics   WILBERT Reeder, JAMIE Alegre APRN CNP   MD Susie Norton MD Deborah Mielke, MD Kim Thein, APRN CNP      Clinic hours: Monday - Thursday 7 am-7 pm; Fridays 7 am-5 pm.   Urgent care: Monday - Friday 11 am-9 pm; Saturday and Sunday 9 am-5 pm.  Pharmacy : Monday -Thursday 8 am-8 pm; Friday 8 am-6 pm; Saturday and Sunday 9 am-5 pm.     Clinic: [phone]   Pharmacy: [phone]

## 2020-03-07 ENCOUNTER — NURSE TRIAGE (OUTPATIENT)
Dept: NURSING | Facility: CLINIC | Age: 35
End: 2020-03-07

## 2020-04-29 ENCOUNTER — VIRTUAL VISIT (OUTPATIENT)
Dept: FAMILY MEDICINE | Facility: CLINIC | Age: 35
End: 2020-04-29
Payer: COMMERCIAL

## 2020-04-29 DIAGNOSIS — J01.90 ACUTE SINUSITIS, RECURRENCE NOT SPECIFIED, UNSPECIFIED LOCATION: Primary | ICD-10-CM

## 2020-04-29 PROCEDURE — 99213 OFFICE O/P EST LOW 20 MIN: CPT | Mod: 95 | Performed by: NURSE PRACTITIONER

## 2020-04-29 NOTE — PATIENT INSTRUCTIONS
At [facility], we strive to deliver an exceptional experience to you, every time we see you. If you receive a survey, please complete it as we do value your feedback.  If you have MyChart, you can expect to receive results automatically within 24 hours of their completion.  Your provider will send a note interpreting your results as well.   If you do not have MyChart, you should receive your results in about a week by mail.    Your care team:                            Family Medicine Internal Medicine   MD Herbert Pat MD Shantel Branch-Fleming, MD Katya Georgiev PA-C Megan Hill, APRN CNP    Zechariah Davenport, MD Pediatrics   Harshal Padilla, PAGraysonC  Virgen Boswell, MD Юлия Jin APRN CNP   MD Susie Norton MD Deborah Mielke, MD Kim Thein, APRN [facility]      Clinic hours: Monday - Thursday 7 am-7 pm; Fridays 7 am-5 pm.   Urgent care: Monday - Friday 11 am-9 pm; Saturday and Sunday 9 am-5 pm.    Clinic: [phone]       [facility]: Monday - Thursday 8 am - 7 pm; Friday 8 am - 6 pm  [facility]: [phone]     Use www.oncare.org for 24/7 diagnosis and treatment of dozens of conditions.    Patient Education     Sinusitis (Antibiotic Treatment)    The sinuses are air-filled spaces within the bones of the face. They connect to the inside of the nose. Sinusitis is an inflammation of the tissue that lines the sinuses. Sinusitis can occur during a cold. It can also happen due to allergies to pollens and other particles in the air. Sinusitis can cause symptoms of sinus congestion and a feeling of fullness. A sinus infection causes fever, headache, and facial pain. There is often green or yellow fluid draining from the nose or into the back of the throat (post-nasal drip). You have been given antibiotics to treat this condition.  Home care    Take the full course of antibiotics as instructed. Do not  stop taking them, even when you feel better.    Drink plenty of water, hot tea, and other liquids. This may help thin nasal mucus. It also may help your sinuses drain fluids.    Heat may help soothe painful areas of your face. Use a towel soaked in hot water. Or,  the shower and direct the warm spray onto your face. Using a vaporizer along with a menthol rub at night may also help soothe symptoms.     An expectorant with guaifenesin may help thin nasal mucus and help your sinuses drain fluids.    You can use an over-the-counter decongestant, unless a similar medicine was prescribed to you. Nasal sprays work the fastest. Use one that contains phenylephrine or oxymetazoline. First blow your nose gently. Then use the spray. Do not use these medicines more often than directed on the label. If you do, your symptoms may get worse. You may also take pills that contain pseudoephedrine. Don t use products that combine multiple medicines. This is because side effects may be increased. Read labels. You can also ask the pharmacist for help. (People with high blood pressure should not use decongestants. They can raise blood pressure.)    Over-the-counter antihistamines may help if allergies contributed to your sinusitis.      Do not use nasal rinses or irrigation during an acute sinus infection, unless your healthcare provider tells you to. Rinsing may spread the infection to other areas in your sinuses.    Use acetaminophen or ibuprofen to control pain, unless another pain medicine was prescribed to you. If you have chronic liver or kidney disease or ever had a stomach ulcer, talk with your healthcare provider before using these medicines. (Aspirin should never be taken by anyone under age 18 who is ill with a fever. It may cause severe liver damage.)    Don't smoke. This can make symptoms worse.  Follow-up care  Follow up with your healthcare provider or our staff if you are not better in 1 week.  When to seek medical  advice  Call your healthcare provider if any of these occur:    Facial pain or headache that gets worse    Stiff neck    Unusual drowsiness or confusion    Swelling of your forehead or eyelids    Vision problems, such as blurred or double vision    Fever of 100.4 F (38 C) or higher, or as directed by your healthcare provider    Seizure    Breathing problems    Symptoms don't go away in 10 days  Prevention  Here are steps you can take to help prevent an infection:    Keep good hand washing habits.    Don t have close contact with people who have sore throats, colds, or other upper respiratory infections.    Don t smoke, and stay away from secondhand smoke.    Stay up to date with of your vaccines.  Date Last Reviewed: 11/1/2017 2000-2019 [employer]. [address] [place] [address], [place] [address]. All rights reserved. This information is not intended as a substitute for professional medical care. Always follow your healthcare professional's instructions.

## 2020-04-29 NOTE — PROGRESS NOTES
"Will Dodson is a 34 year old female who is being evaluated via a billable telephone visit.      The patient has been notified of following:     \"This telephone visit will be conducted via a call between you and your physician/provider. We have found that certain health care needs can be provided without the need for a physical exam.  This service lets us provide the care you need with a short phone conversation.  If a prescription is necessary we can send it directly to your pharmacy.  If lab work is needed we can place an order for that and you can then stop by our lab to have the test done at a later time.    Telephone visits are billed at different rates depending on your insurance coverage. During this emergency period, for some insurers they may be billed the same as an in-person visit.  Please reach out to your insurance provider with any questions.    If during the course of the call the physician/provider feels a telephone visit is not appropriate, you will not be charged for this service.\"    Patient has given verbal consent for Telephone visit?  Yes    How would you like to obtain your AVS? Ranjithart    Subjective     Will Dodson is a 34 year old female who presents to clinic today for the following health issues:    HPI  Acute Illness   Acute illness concerns: sinus infection  Onset: 2 week    Fever: no    Chills/Sweats: no    Headache (location?): YES-frontal and posterior neck    Sinus Pressure:YES-maxillary and frontal bialterally    Conjunctivitis:  no    Ear Pain: YES-  Ears feel full, hearing is muffled    Rhinorrhea: no    Congestion: YES with bloody nose, otherwise no nasal discharge.    Sore Throat: no    She is nauseated but denies vomiting, no aguesia.     Cough: no    Wheeze: no    Decreased Appetite: no    Nausea: YES    Vomiting: no    Diarrhea:  YES    Dysuria/Freq.: no    Fatigue/Achiness: no    Sick/Strep Exposure: no     Therapies Tried and outcome: nasal spray- no help, Sudafed- made " symptoms worse.    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     Depression with anxiety     Neck pain     TMJ (temporomandibular joint syndrome)     Uncomplicated alcohol dependence (H)     Acne, unspecified acne type     Past Surgical History:   Procedure Laterality Date     C/SECTION, LOW TRANSVERSE  ,     , Low Transverse     COSMETIC SURGERY       CYSTOSCOPY,REMV CALCULUS,SIMPLE       LAPAROSCOPIC CHOLECYSTECTOMY N/A 2017    Procedure: LAPAROSCOPIC CHOLECYSTECTOMY;  Laparoscopic Cholecystectomy;  Surgeon: Carson Rojas MD;  Location: [facility]     ORTHOPEDIC SURGERY         Social History     Tobacco Use     Smoking status: Former Smoker     Packs/day: 0.25     Types: Cigarettes     Start date: 2015     Smokeless tobacco: Never Used   Substance Use Topics     Alcohol use: Yes     Alcohol/week: 0.0 standard drinks     Comment: daily 3 drinks     Family History   Problem Relation Age of Onset     Breast Cancer Mother      Depression Mother      Substance Abuse Mother      Anxiety Disorder Mother      Hypertension Father      Substance Abuse Father      Colon Cancer Maternal Grandfather      Colon Cancer Paternal Grandfather      Other - See Comments Sister         epilepsy     Substance Abuse Sister         A&D         Current Outpatient Medications   Medication Sig Dispense Refill     amphetamine-dextroamphetamine (ADDERALL XR) 10 MG 24 hr capsule Take 1 capsule (10 mg) by mouth daily 30 capsule 0     levonorgestrel (MIRENA) 20 MCG/24HR IUD 1 each (20 mcg) by Intrauterine route continuous       BP Readings from Last 3 Encounters:   20 120/78   20 124/83   19  117/81    Wt Readings from Last 3 Encounters:   03/06/20 63 kg (138 lb 12.8 oz)   01/29/20 63.5 kg (140 lb)   11/13/19 63 kg (139 lb)                    Reviewed and updated as needed this visit by Provider         Review of Systems   ROS COMP: Constitutional, HEENT, cardiovascular, pulmonary, gi and gu systems are negative, except as otherwise noted.       Objective   Reported vitals:  There were no vitals taken for this visit.   alert, no distress and cooperative  PSYCH: Alert and oriented times 3; coherent speech, normal   rate and volume, able to articulate logical thoughts, able   to abstract reason, no tangential thoughts, no hallucinations   or delusions  Her affect is normal  RESP: No cough, no audible wheezing, able to talk in full sentences  Remainder of exam unable to be completed due to telephone visits    Diagnostic Test Results:  Labs reviewed in Epic  none         Assessment/Plan:  1. Acute sinusitis, recurrence not specified, unspecified location  SINUSITIS     Antibiotics indicated, see orders.  We discussed the pathophysiology of sinus pressure/sinusitis and treatment options with emphasis on healthy lifestyle and opening up natural drainage passages,discussed the risks and benefits of various over the counter and prescription treatments including short courses of decongestant nasal sprays.  If new, worsening or persistent symptoms, the patient is to call or return for a recheck.        - amoxicillin-clavulanate (AUGMENTIN) 875-125 MG tablet; Take 1 tablet by mouth 2 times daily for 10 days  Dispense: 20 tablet; Refill: 0    No follow-ups on file.      Phone call duration:  7 minutes    YURI Iqbal CNP

## 2020-05-08 ENCOUNTER — VIRTUAL VISIT (OUTPATIENT)
Dept: FAMILY MEDICINE | Facility: CLINIC | Age: 35
End: 2020-05-08
Payer: COMMERCIAL

## 2020-05-08 ENCOUNTER — ANCILLARY PROCEDURE (OUTPATIENT)
Dept: GENERAL RADIOLOGY | Facility: CLINIC | Age: 35
End: 2020-05-08
Payer: COMMERCIAL

## 2020-05-08 DIAGNOSIS — J01.90 ACUTE SINUSITIS, RECURRENCE NOT SPECIFIED, UNSPECIFIED LOCATION: Primary | ICD-10-CM

## 2020-05-08 DIAGNOSIS — J01.90 ACUTE SINUSITIS, RECURRENCE NOT SPECIFIED, UNSPECIFIED LOCATION: ICD-10-CM

## 2020-05-08 DIAGNOSIS — R04.0 EPISTAXIS: ICD-10-CM

## 2020-05-08 PROCEDURE — 99213 OFFICE O/P EST LOW 20 MIN: CPT | Mod: 95 | Performed by: PREVENTIVE MEDICINE

## 2020-05-08 PROCEDURE — 70220 X-RAY EXAM OF SINUSES: CPT

## 2020-05-08 RX ORDER — PREDNISONE 20 MG/1
20 TABLET ORAL 2 TIMES DAILY
Qty: 10 TABLET | Refills: 0 | Status: SHIPPED | OUTPATIENT
Start: 2020-05-08 | End: 2020-05-13

## 2020-05-08 RX ORDER — HYDROXYZINE HYDROCHLORIDE 25 MG/1
TABLET, FILM COATED ORAL
COMMUNITY
Start: 2020-05-05 | End: 2024-04-30

## 2020-05-08 NOTE — PROGRESS NOTES
"Will Dodson is a 34 year old female who is being evaluated via a billable telephone visit.      The patient has been notified of following:     \"This telephone visit will be conducted via a call between you and your physician/provider. We have found that certain health care needs can be provided without the need for a physical exam.  This service lets us provide the care you need with a short phone conversation.  If a prescription is necessary we can send it directly to your pharmacy.  If lab work is needed we can place an order for that and you can then stop by our lab to have the test done at a later time.    Telephone visits are billed at different rates depending on your insurance coverage. During this emergency period, for some insurers they may be billed the same as an in-person visit.  Please reach out to your insurance provider with any questions.    If during the course of the call the physician/provider feels a telephone visit is not appropriate, you will not be charged for this service.\"    Patient has given verbal consent for Telephone visit?  Yes    What phone number would you like to be contacted at? [phone]    How would you like to obtain your AVS? Josefina    Subjective     Will Dodson is a 34 year old female who presents to clinic today for the following health issues:      HPI      Had a Virtual visit on 4/29/2020 and was started on Augmentin for sinusitis, has about 5 more days of medication     Concern - Nose bleeds  Onset: 2 weeks     Description:   Nose bleeds on wiping and blowing the nose    Intensity: moderate    Progression of Symptoms:  same    Accompanying Signs & Symptoms:  Stuffy+     Previous history of similar problem:   None     Precipitating factors:   Worsened by: none    Alleviating factors:  Improved by: None     Therapies Tried and outcome: Antibiotics     2 weeks daily  Stuffy+  Feels in the sinus too  Feels like there are lumps in the neck  Nosebleed is more on the right " side but otherwise both  Feels one side of the face is swollen  No fever  More so on wiping or blowing the nose  Saline sinus rinse+  Now coughing up blood too  Some wheezing  Some shortness of breath  No emesis  No bowel changes  Some ear pain  Some pressure in the ears  No past nosebleeds  No bleeding elsewhere  No increased bruising    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     Depression with anxiety     Neck pain     TMJ (temporomandibular joint syndrome)     Uncomplicated alcohol dependence (H)     Acne, unspecified acne type     Past Surgical History:   Procedure Laterality Date     C/SECTION, LOW TRANSVERSE  ,     , Low Transverse     COSMETIC SURGERY       CYSTOSCOPY,REMV CALCULUS,SIMPLE       LAPAROSCOPIC CHOLECYSTECTOMY N/A 2017    Procedure: LAPAROSCOPIC CHOLECYSTECTOMY;  Laparoscopic Cholecystectomy;  Surgeon: Carson Rojas MD;  Location: [facility]     ORTHOPEDIC SURGERY         Social History     Tobacco Use     Smoking status: Former Smoker     Packs/day: 0.25     Types: Cigarettes     Start date: 2015     Smokeless tobacco: Never Used   Substance Use Topics     Alcohol use: Yes     Alcohol/week: 0.0 standard drinks     Comment: daily 3 drinks     Family History   Problem Relation Age of Onset     Breast Cancer Mother      Depression Mother      Substance Abuse Mother      Anxiety Disorder Mother      Hypertension Father      Substance Abuse Father      Colon Cancer Maternal Grandfather      Colon Cancer Paternal Grandfather      Other - See Comments Sister         epilepsy     Substance Abuse Sister         A&D         Current Outpatient Medications    Medication Sig Dispense Refill     amoxicillin-clavulanate (AUGMENTIN) 875-125 MG tablet Take 1 tablet by mouth 2 times daily for 10 days 20 tablet 0     amphetamine-dextroamphetamine (ADDERALL XR) 10 MG 24 hr capsule Take 1 capsule (10 mg) by mouth daily 30 capsule 0     levonorgestrel (MIRENA) 20 MCG/24HR IUD 1 each (20 mcg) by Intrauterine route continuous       predniSONE (DELTASONE) 20 MG tablet Take 1 tablet (20 mg) by mouth 2 times daily for 5 days 10 tablet 0     hydrOXYzine (ATARAX) 25 MG tablet        Allergies   Allergen Reactions     Blood-Group Specific Substance      Patient has Probable passiive anti D Antibody. Blood Product orders may be delayed.  Draw one red top and two purple top tubes for ALL Type and Screen/ Type and Crossmatch orders.       Vicodin [Hydrocodone-Acetaminophen] Nausea and Vomiting     BP Readings from Last 3 Encounters:   03/06/20 120/78   01/29/20 124/83   11/13/19 117/81    Wt Readings from Last 3 Encounters:   03/06/20 63 kg (138 lb 12.8 oz)   01/29/20 63.5 kg (140 lb)   11/13/19 63 kg (139 lb)           Reviewed and updated as needed this visit by Provider  Tobacco  Allergies  Meds  Problems  Med Hx  Surg Hx  Fam Hx         Review of Systems   ROS COMP: Constitutional, HEENT, cardiovascular, pulmonary, gi and gu systems are negative, except as otherwise noted.       Objective   Reported vitals:  There were no vitals taken for this visit.   healthy, alert and no distress  PSYCH: Alert and oriented times 3; coherent speech, normal   rate and volume, able to articulate logical thoughts, able   to abstract reason, no tangential thoughts, no hallucinations   or delusions  Her affect is normal  RESP: No cough, no audible wheezing, able to talk in full sentences  Remainder of exam unable to be completed due to telephone visits    Diagnostic Test Results:  Labs reviewed in Epic  No results found for this or any previous visit (from the past 24 hour(s)).         Assessment/Plan:  1. Acute sinusitis, recurrence not specified, unspecified location  -complete course of Augmentin  -await results of imaging  -if not better in 5 days then see ENT   - OTOLARYNGOLOGY REFERRAL  - predniSONE (DELTASONE) 20 MG tablet; Take 1 tablet (20 mg) by mouth 2 times daily for 5 days  Dispense: 10 tablet; Refill: 0  - XR Sinus Complete G/E 3 Views; Future    2. Epistaxis  -Mainly on wiping and blowing nose  -Saline nasal spray  -use a humidifier  -ER precautions: if increased bleeding that you are not able to stop with pressure for 15 minutes   -most likely due to sinus infection and nasal dryness but if not better then needs to see ENT   - OTOLARYNGOLOGY REFERRAL    Return in about 5 days (around 5/13/2020), or if symptoms worsen or fail to improve.      Phone call duration:  11:30 minutes      Mignon Ward MD MPH

## 2020-05-08 NOTE — RESULT ENCOUNTER NOTE
Will,     Sinus X rays did not show any concerning changes. Plan of care and follow up as discussed over the phone today.     Please do not hesitate to call us at [phone] if you have any questions or concerns.    Thank you,    Mignon Ward MD MPH

## 2020-05-08 NOTE — PATIENT INSTRUCTIONS
At [facility], we strive to deliver an exceptional experience to you, every time we see you.  If you receive a survey in the mail, please send us back your thoughts. We really do value your feedback.    Based on your medical history, these are the current health maintenance/preventive care services that you are due for (some may have been done at this visit.)  Health Maintenance Due   Topic Date Due     PNEUMOCOCCAL IMMUNIZATION 19-64 MEDIUM RISK (1 of 1 - PPSV23) 07/19/2006     PHQ-9  05/13/2020     PREVENTIVE CARE VISIT  06/04/2020         Suggested websites for health information:  Www.Datalot.Recordant : Up to date and easily searchable information on multiple topics.  Www.medlineplus.gov : medication info, interactive tutorials, watch real surgeries online  Www.familydoctor.org : good info from the Academy of Family Physicians  Www.cdc.gov : public health info, travel advisories, epidemics (H1N1)  Www.aap.org : children's health info, normal development, vaccinations  Www.health.[facility].[place].us : [place] dept of health, public health issues in [place], N1N1    Your care team:                            Family Medicine Internal Medicine   MD Herbert Pat MD Shantel Branch-Fleming, MD Katya Georgiev PA-C Nam Ho, MD Pediatrics   WILBERT Reeder, MD Susie Mccray CNP, MD Deborah Mielke, MD Kim Thein, APRN CNP      Clinic hours: Monday - Thursday 7 am-7 pm; Fridays 7 am-5 pm.   Urgent care: Monday - Friday 11 am-9 pm; Saturday and Sunday 9 am-5 pm.  Pharmacy : Monday -Thursday 8 am-8 pm; Friday 8 am-6 pm; Saturday and Sunday 9 am-5 pm.     Clinic: [phone]   Pharmacy: [phone]

## 2020-05-15 ENCOUNTER — VIRTUAL VISIT (OUTPATIENT)
Dept: OTOLARYNGOLOGY | Facility: CLINIC | Age: 35
End: 2020-05-15
Payer: COMMERCIAL

## 2020-05-15 DIAGNOSIS — H69.93 DYSFUNCTION OF BOTH EUSTACHIAN TUBES: ICD-10-CM

## 2020-05-15 DIAGNOSIS — M26.609 TMJ (TEMPOROMANDIBULAR JOINT SYNDROME): Primary | ICD-10-CM

## 2020-05-15 PROCEDURE — 99213 OFFICE O/P EST LOW 20 MIN: CPT | Mod: 95 | Performed by: OTOLARYNGOLOGY

## 2020-05-15 RX ORDER — FLUTICASONE PROPIONATE 50 MCG
1-2 SPRAY, SUSPENSION (ML) NASAL DAILY
Qty: 16 G | Refills: 3 | Status: SHIPPED | OUTPATIENT
Start: 2020-05-15 | End: 2024-04-30

## 2020-05-15 RX ORDER — DOXYCYCLINE 100 MG/1
100 CAPSULE ORAL
COMMUNITY
Start: 2020-05-09 | End: 2020-05-19

## 2020-05-15 RX ORDER — CYCLOBENZAPRINE HCL 5 MG
5 TABLET ORAL 2 TIMES DAILY PRN
Qty: 42 TABLET | Refills: 0 | Status: SHIPPED | OUTPATIENT
Start: 2020-05-15 | End: 2022-10-13

## 2020-05-15 ASSESSMENT — ENCOUNTER SYMPTOMS
SHORTNESS OF BREATH: 0
SINUS PAIN: 1
HEARTBURN: 0
STRIDOR: 0
COUGH: 0
BRUISES/BLEEDS EASILY: 0
VOMITING: 0
DIZZINESS: 0
BLURRED VISION: 0
HEMOPTYSIS: 0
DOUBLE VISION: 0
SPUTUM PRODUCTION: 0
CONSTITUTIONAL NEGATIVE: 1
HEADACHES: 0
TREMORS: 0
SORE THROAT: 0
TINGLING: 0
NAUSEA: 0

## 2020-05-15 NOTE — NURSING NOTE
Will Dodson's goals for this visit include:   Chief Complaint   Patient presents with     Sinus Problem     sinusitis x 2 months       She requests these members of her care team be copied on today's visit information:     PCP: Norbert Wooten    Referring Provider:  No referring provider defined for this encounter.    There were no vitals taken for this visit.    Do you need any medication refills at today's visit? No    Kaylyn Tanner RN

## 2020-05-15 NOTE — PROGRESS NOTES
"Will Dodson is a 34 year old female who is being evaluated via a billable video visit.      The patient has been notified of following:     \"This video visit will be conducted via a call between you and your physician/provider. We have found that certain health care needs can be provided without the need for an in-person physical exam.  This service lets us provide the care you need with a video conversation.  If a prescription is necessary we can send it directly to your pharmacy.  If lab work is needed we can place an order for that and you can then stop by our lab to have the test done at a later time.    Video visits are billed at different rates depending on your insurance coverage.  Please reach out to your insurance provider with any questions.    If during the course of the call the physician/provider feels a video visit is not appropriate, you will not be charged for this service.\"    Patient has given verbal consent for Video visit? Yes    How would you like to obtain your AVS? [email or web address]    Patient would like the video invitation sent by: Text to cell phone: [phone]    Will anyone else be joining your video visit? No        Video-Visit Details    Type of service:  Video Visit    Video Start Time: 8:20  Video End Time: 8:37    Originating Location (pt. Location): Home    Distant Location (provider location):  [facility]     Platform used for Video Visit: Mirella Rivers MD      "
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
## 2020-07-28 ENCOUNTER — TRANSFERRED RECORDS (OUTPATIENT)
Dept: HEALTH INFORMATION MANAGEMENT | Facility: CLINIC | Age: 35
End: 2020-07-28

## 2020-07-28 LAB
ALT SERPL-CCNC: 29 IU/L (ref 8–45)
AST SERPL-CCNC: 32 IU/L (ref 2–40)
CREAT SERPL-MCNC: 0.71 MG/DL (ref 0.57–1.11)
GFR SERPL CREATININE-BSD FRML MDRD: >60 ML/MIN/1.73M2
GLUCOSE SERPL-MCNC: 101 MG/DL (ref 65–100)
POTASSIUM SERPL-SCNC: 4.5 MMOL/L (ref 3.5–5)

## 2020-12-13 ENCOUNTER — HEALTH MAINTENANCE LETTER (OUTPATIENT)
Age: 35
End: 2020-12-13

## 2021-04-21 ENCOUNTER — TELEPHONE (OUTPATIENT)
Dept: FAMILY MEDICINE | Facility: CLINIC | Age: 36
End: 2021-04-21

## 2021-04-21 NOTE — TELEPHONE ENCOUNTER
Patient Quality Outreach      Summary:    Patient has the following on her problem list/HM:     Depression / Dysthymia review    6 Month Remission: 4-8 month window range:   12 Month Remission: 10-14 month window range:        PHQ-9 SCORE 6/4/2019 11/8/2019 11/13/2019   PHQ-9 Total Score - - -   PHQ-9 Total Score 10 14 12   Some encounter information is confidential and restricted. Go to Review Flowsheets activity to see all data.       If PHQ-9 recheck is 5 or more, route to provider for next steps.    Patient is due/failing the following:   PHQ-9 Needed    Type of outreach:    Sent Boxbee message.    Questions for provider review:    None                                                                                                                                     Kaylyn Kendall

## 2021-06-01 ENCOUNTER — TELEPHONE (OUTPATIENT)
Dept: BEHAVIORAL HEALTH | Facility: CLINIC | Age: 36
End: 2021-06-01

## 2021-06-02 ENCOUNTER — TELEPHONE (OUTPATIENT)
Dept: BEHAVIORAL HEALTH | Facility: CLINIC | Age: 36
End: 2021-06-02

## 2021-06-02 NOTE — TELEPHONE ENCOUNTER
Tried calling patient again to check them in for their mh eval today, but no answer so another voice message was left for patient to return call.

## 2021-06-02 NOTE — TELEPHONE ENCOUNTER
Tried reaching out to patient to check them in for their virtual MH eval today, 6/2/2021 at 0800. Called their listed mobile number, but no answer so a voice message was left for patient to return call to check in. Also called their listed home number, [phone], but it came back that number is not in service.

## 2021-06-16 ENCOUNTER — E-VISIT (OUTPATIENT)
Dept: URGENT CARE | Facility: CLINIC | Age: 36
End: 2021-06-16
Payer: COMMERCIAL

## 2021-06-16 DIAGNOSIS — J06.9 VIRAL URI: Primary | ICD-10-CM

## 2021-06-16 PROCEDURE — 99421 OL DIG E/M SVC 5-10 MIN: CPT | Performed by: NURSE PRACTITIONER

## 2021-06-16 NOTE — PATIENT INSTRUCTIONS
Use Netti pot twice a day for the next 3-5 days. Use distilled water and the packets of powder included with the pot.    Take Sudafed (Pseudoephredine) 30mg every 6 hours while awake for the next 3-5 days. Do not take this if you have a history of high blood pressure or take medications for high blood pressure.    Take Tylenol or Ibuprofen as needed for fever or pain.    Drink Plenty of water and rest.     The symptoms you describe suggest a viral cause, which is much more common than a bacterial cause. Antibiotics will treat bacterial infections, but have no effect on viral infections. If possible, especially if improving, start with symptom care for the first 7-10 days, then consider seeking further treatment or taking an antibiotic if symptoms last greater than 10 days.     I d think you should be tested for COVID 19 particularly because you have diarrhea with the respiratory symptoms. I have entered an order for this test. Please call [phone] to schedule.

## 2021-06-24 ENCOUNTER — OFFICE VISIT (OUTPATIENT)
Dept: FAMILY MEDICINE | Facility: CLINIC | Age: 36
End: 2021-06-24
Payer: COMMERCIAL

## 2021-06-24 VITALS
TEMPERATURE: 99.4 F | DIASTOLIC BLOOD PRESSURE: 88 MMHG | SYSTOLIC BLOOD PRESSURE: 124 MMHG | WEIGHT: 155 LBS | HEART RATE: 76 BPM | OXYGEN SATURATION: 98 % | BODY MASS INDEX: 29.77 KG/M2

## 2021-06-24 DIAGNOSIS — R09.1 PLEURISY: Primary | ICD-10-CM

## 2021-06-24 DIAGNOSIS — J06.9 VIRAL UPPER RESPIRATORY INFECTION: ICD-10-CM

## 2021-06-24 PROCEDURE — 99213 OFFICE O/P EST LOW 20 MIN: CPT | Performed by: FAMILY MEDICINE

## 2021-06-24 NOTE — PROGRESS NOTES
Assessment & Plan     Pleurisy  No distress. No sign of pneumonia or acute pulmonary disease on exam/auscultation.  Discussed with patient causes, course and treatment.  No strong indication for imaging.  OTC NSAIDs prn pain.  Return precautions discussed and given to patient.    Viral upper respiratory infection  Discussed symptoms are likely due to viral etiology. Discussed usual course of viral infections; self-limited.   Advised to take plenty of oral fluids. Advised adequate rest. General hygiene.     Return precautions discussed and given to patient.  Patient Instructions       Patient Education     Viral Upper Respiratory Illness (Adult)    You have a viral upper respiratory illness (URI), which is another term for the common cold. This illness is contagious during the first few days. It is spread through the air by coughing and sneezing. It may also be spread by direct contact (touching the sick person and then touching your own eyes, nose, or mouth). Frequent handwashing will decrease risk of spread. Most viral illnesses go away within 7 to 10 days with rest and simple home remedies. Sometimes the illness may last for several weeks. Antibiotics will not kill a virus, and they are generally not prescribed for this condition.  Home care    If symptoms are severe, rest at home for the first 2 to 3 days. When you resume activity, don't let yourself get too tired.    Don't smoke. If you need help stopping, talk with your healthcare provider.    Avoid being exposed to cigarette smoke (yours or others ).    You may use acetaminophen or ibuprofen to control pain and fever, unless another medicine was prescribed. If you have chronic liver or kidney disease, have ever had a stomach ulcer or gastrointestinal bleeding, or are taking blood-thinning medicines, talk with your healthcare provider before using these medicines. Aspirin should never be given to anyone under 18 years of age who is ill with a viral  infection or fever. It may cause severe liver or brain damage.    Your appetite may be poor, so a light diet is fine. Stay well hydrated by drinking 6 to 8 glasses of fluids per day (water, soft drinks, juices, tea, or soup). Extra fluids will help loosen secretions in the nose and lungs.    Over-the-counter cold medicines will not shorten the length of time you re sick, but they may be helpful for the following symptoms: cough, sore throat, and nasal and sinus congestion. If you take prescription medicines, ask your healthcare provider or pharmacist which over-the-counter medicines are safe to use. (Note: Don't use decongestants if you have high blood pressure.)  Follow-up care  Follow up with your healthcare provider, or as advised.  When to seek medical advice  Call your healthcare provider right away if any of these occur:    Cough with lots of colored sputum (mucus)    Severe headache; face, neck, or ear pain    Difficulty swallowing due to throat pain    Fever of 100.4 F (38 C) or higher, or as directed by your healthcare provider  Call 911  Call 911 if any of these occur:    Chest pain, shortness of breath, wheezing, or difficulty breathing    Coughing up blood    Very severe pain with swallowing, especially if it goes along with a muffled voice   [employer] last reviewed this educational content on 6/1/2018 2000-2021 [employer]. All rights reserved. This information is not intended as a substitute for professional medical care. Always follow your healthcare professional's instructions.           Patient Education     Pleurisy    If you have pleurisy, the lining around your lungs is inflamed. This is most often due to a viral infection or pneumonia. It usually lasts for 10 to 14 days. It may cause sharp pain with breathing, coughing, sneezing, and movement. Antibiotics are usually not prescribed for this condition unless bacterial pneumonia is also present.  The following tips will help you  "care for your condition at home:    If symptoms are severe, rest at home for the first 2 to 3 days. When you resume activity, don't let yourself get too tired.    Don't smoke. Also stay away from secondhand smoke.    You may use over-the-counter medicines to control pain, unless another pain medicine was prescribed. (Note: If you have chronic liver or kidney disease or have ever had a stomach ulcer or gastrointestinal bleeding, talk with your healthcare provider before using these medicines. Also talk to your provider if you are taking medicine to prevent blood clots.) Aspirin should never be given to anyone younger than 18 years of age who is ill with a viral infection or fever. It may cause severe liver or brain damage.  Follow-up care  Follow up with your healthcare provider, or as advised.  When to seek medical advice  Call your healthcare provider right away if any of these occur:    Fever of 100.4 F (38 C) or higher, or as directed by your healthcare provider    Coughing up lots of colored sputum (mucus) or light, blood-tinged sputum    Redness, pain, or swelling of the leg  Call 911  Call 911 if any of these occur:    Increasing shortness of breath    Increasing chest pain, or pain that spreads to the neck, arm, or back    Coughing up blood  [employer] last reviewed this educational content on 6/1/2018 2000-2021 [employer]. All rights reserved. This information is not intended as a substitute for professional medical care. Always follow your healthcare professional's instructions.               Return in about 1 week (around 7/1/2021) for In-clinic visit for if your symptoms worsen.    Herbert Diaz MD  [facility]    Kay Solis is a 35 year old who presents for the following health issues     HPI     Acute Illness  \"For the last couple weeks I have had ear tingling, numbness, pain, and ringing. Pain in neck , & throat.  accompanied by lumps & the feeling " "something is sick in my throat. Difficulty swallowing.\"    Was evaluated virtually 06/16/21    Onset/Duration:  2 weeks  Symptoms:  Fever: no  Chills/Sweats: no  Headache (location?): YES, but has this with weather changes. In between eye and base of skull.  Sinus Pressure: YES  Conjunctivitis:  no  Ear Pain: Bilateral ears, right one is worse. Tingling, numbness, and ringing as well  Rhinorrhea: YES  Congestion: YES  Sore Throat: YES  Cough: YES productive (green or yellow in color occasionally). Strings of blood in phlegm this morning. Also had a nasal bleed a few days ago.  Wheeze: no  Decreased Appetite: no  Nausea: no  Vomiting: no  Diarrhea: Yes. States that this has been an issue since having gallbladder removed. Frequent during the day.  Dysuria/Freq.: no  Dysuria or Hematuria: no  Fatigue/Achiness: YES, neck pain  Sick/Strep Exposure: no  Therapies tried and outcome:  Mucinex and flonase      Review of Systems   C: NEGATIVE for fever, chills, change in weight  I: NEGATIVE for worrisome rashes, moles or lesions  E: NEGATIVE for vision changes or irritation  ENT/MOUTH: see above  RESP:as above  CV: NEGATIVE for chest pain, palpitations or peripheral edema  GI: NEGATIVE for nausea, abdominal pain, heartburn, or change in bowel habits  M: NEGATIVE for significant arthralgias or myalgia      Objective    /88   Pulse 76   Temp 99.4  F (37.4  C) (Tympanic)   Wt 70.3 kg (155 lb)   SpO2 98%   BMI 29.77 kg/m    Body mass index is 29.77 kg/m .  Physical Exam   GENERAL: overweight,  alert and no distress  EYES: pink conjunctivae, no icterus  NECK: no cervical adenopathy, nontender  HEENT: tympanic membrane intact and pearly bilaterally, nose with mild congestion, no sinus tenderness, throat mildly erythematous, no exudates, no oral ulcers  RESP: lungs clear to auscultation - no rales, no crackles, no rhonchi, no wheezes  CV: regular rates and rhythm, normal S1 S2, no S3 or S4 and no murmur  SKIN:  Good " turgor, no rashes    No results found for any visits on 06/24/21.

## 2021-06-24 NOTE — PATIENT INSTRUCTIONS
Patient Education     Viral Upper Respiratory Illness (Adult)    You have a viral upper respiratory illness (URI), which is another term for the common cold. This illness is contagious during the first few days. It is spread through the air by coughing and sneezing. It may also be spread by direct contact (touching the sick person and then touching your own eyes, nose, or mouth). Frequent handwashing will decrease risk of spread. Most viral illnesses go away within 7 to 10 days with rest and simple home remedies. Sometimes the illness may last for several weeks. Antibiotics will not kill a virus, and they are generally not prescribed for this condition.  Home care    If symptoms are severe, rest at home for the first 2 to 3 days. When you resume activity, don't let yourself get too tired.    Don't smoke. If you need help stopping, talk with your healthcare provider.    Avoid being exposed to cigarette smoke (yours or others ).    You may use acetaminophen or ibuprofen to control pain and fever, unless another medicine was prescribed. If you have chronic liver or kidney disease, have ever had a stomach ulcer or gastrointestinal bleeding, or are taking blood-thinning medicines, talk with your healthcare provider before using these medicines. Aspirin should never be given to anyone under 18 years of age who is ill with a viral infection or fever. It may cause severe liver or brain damage.    Your appetite may be poor, so a light diet is fine. Stay well hydrated by drinking 6 to 8 glasses of fluids per day (water, soft drinks, juices, tea, or soup). Extra fluids will help loosen secretions in the nose and lungs.    Over-the-counter cold medicines will not shorten the length of time you re sick, but they may be helpful for the following symptoms: cough, sore throat, and nasal and sinus congestion. If you take prescription medicines, ask your healthcare provider or pharmacist which over-the-counter medicines are safe to  use. (Note: Don't use decongestants if you have high blood pressure.)  Follow-up care  Follow up with your healthcare provider, or as advised.  When to seek medical advice  Call your healthcare provider right away if any of these occur:    Cough with lots of colored sputum (mucus)    Severe headache; face, neck, or ear pain    Difficulty swallowing due to throat pain    Fever of 100.4 F (38 C) or higher, or as directed by your healthcare provider  Call 911  Call 911 if any of these occur:    Chest pain, shortness of breath, wheezing, or difficulty breathing    Coughing up blood    Very severe pain with swallowing, especially if it goes along with a muffled voice   [facility]` last reviewed this educational content on 6/1/2018 2000-2021 [employer]. All rights reserved. This information is not intended as a substitute for professional medical care. Always follow your healthcare professional's instructions.           Patient Education     Pleurisy    If you have pleurisy, the lining around your lungs is inflamed. This is most often due to a viral infection or pneumonia. It usually lasts for 10 to 14 days. It may cause sharp pain with breathing, coughing, sneezing, and movement. Antibiotics are usually not prescribed for this condition unless bacterial pneumonia is also present.  The following tips will help you care for your condition at home:    If symptoms are severe, rest at home for the first 2 to 3 days. When you resume activity, don't let yourself get too tired.    Don't smoke. Also stay away from secondhand smoke.    You may use over-the-counter medicines to control pain, unless another pain medicine was prescribed. (Note: If you have chronic liver or kidney disease or have ever had a stomach ulcer or gastrointestinal bleeding, talk with your healthcare provider before using these medicines. Also talk to your provider if you are taking medicine to prevent blood clots.) Aspirin should never be  given to anyone younger than 18 years of age who is ill with a viral infection or fever. It may cause severe liver or brain damage.  Follow-up care  Follow up with your healthcare provider, or as advised.  When to seek medical advice  Call your healthcare provider right away if any of these occur:    Fever of 100.4 F (38 C) or higher, or as directed by your healthcare provider    Coughing up lots of colored sputum (mucus) or light, blood-tinged sputum    Redness, pain, or swelling of the leg  Call 911  Call 911 if any of these occur:    Increasing shortness of breath    Increasing chest pain, or pain that spreads to the neck, arm, or back    Coughing up blood  [employer] last reviewed this educational content on 6/1/2018 2000-2021 [employer]. All rights reserved. This information is not intended as a substitute for professional medical care. Always follow your healthcare professional's instructions.

## 2021-06-24 NOTE — NURSING NOTE
"Initial /88   Pulse 76   Temp 99.4  F (37.4  C) (Tympanic)   Wt 70.3 kg (155 lb)   SpO2 98%   BMI 29.77 kg/m   Estimated body mass index is 29.77 kg/m  as calculated from the following:    Height as of 3/6/20: 1.537 m (5' 0.5\").    Weight as of this encounter: 70.3 kg (155 lb). .      "

## 2021-09-07 ENCOUNTER — APPOINTMENT (OUTPATIENT)
Dept: URGENT CARE | Facility: CLINIC | Age: 36
End: 2021-09-07
Payer: COMMERCIAL

## 2021-09-26 ENCOUNTER — HEALTH MAINTENANCE LETTER (OUTPATIENT)
Age: 36
End: 2021-09-26

## 2022-01-16 ENCOUNTER — HEALTH MAINTENANCE LETTER (OUTPATIENT)
Age: 37
End: 2022-01-16
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
## 2022-10-12 ENCOUNTER — HOSPITAL ENCOUNTER (EMERGENCY)
Facility: CLINIC | Age: 37
Discharge: HOME | End: 2022-10-13
Attending: FAMILY MEDICINE | Admitting: FAMILY MEDICINE
Payer: COMMERCIAL

## 2022-10-12 DIAGNOSIS — R05.1 ACUTE COUGH: ICD-10-CM

## 2022-10-12 DIAGNOSIS — F10.920 ALCOHOLIC INTOXICATION WITHOUT COMPLICATION (H): ICD-10-CM

## 2022-10-12 LAB
ALBUMIN SERPL-MCNC: 3.2 G/DL (ref 3.4–5)
ALP SERPL-CCNC: 97 U/L (ref 40–150)
ALT SERPL W P-5'-P-CCNC: 41 U/L (ref 0–50)
ANION GAP SERPL CALCULATED.3IONS-SCNC: 9 MMOL/L (ref 3–14)
AST SERPL W P-5'-P-CCNC: 84 U/L (ref 0–45)
B-HCG SERPL-ACNC: <1 IU/L (ref 0–5)
BASOPHILS # BLD AUTO: 0 10E3/UL (ref 0–0.2)
BASOPHILS NFR BLD AUTO: 0 %
BILIRUB SERPL-MCNC: 0.2 MG/DL (ref 0.2–1.3)
BUN SERPL-MCNC: 17 MG/DL (ref 7–30)
CALCIUM SERPL-MCNC: 8.4 MG/DL (ref 8.5–10.1)
CHLORIDE BLD-SCNC: 112 MMOL/L (ref 94–109)
CO2 SERPL-SCNC: 20 MMOL/L (ref 20–32)
CREAT SERPL-MCNC: 0.52 MG/DL (ref 0.52–1.04)
EOSINOPHIL # BLD AUTO: 0.1 10E3/UL (ref 0–0.7)
EOSINOPHIL NFR BLD AUTO: 1 %
ERYTHROCYTE [DISTWIDTH] IN BLOOD BY AUTOMATED COUNT: 13.1 % (ref 10–15)
ETHANOL SERPL-MCNC: 0.41 G/DL
GFR SERPL CREATININE-BSD FRML MDRD: >90 ML/MIN/1.73M2
GLUCOSE BLD-MCNC: 119 MG/DL (ref 70–99)
HCT VFR BLD AUTO: 41.6 % (ref 35–47)
HGB BLD-MCNC: 14.3 G/DL (ref 11.7–15.7)
IMM GRANULOCYTES # BLD: 0 10E3/UL
IMM GRANULOCYTES NFR BLD: 0 %
LIPASE SERPL-CCNC: 190 U/L (ref 73–393)
LYMPHOCYTES # BLD AUTO: 3.2 10E3/UL (ref 0.8–5.3)
LYMPHOCYTES NFR BLD AUTO: 31 %
MCH RBC QN AUTO: 32 PG (ref 26.5–33)
MCHC RBC AUTO-ENTMCNC: 34.4 G/DL (ref 31.5–36.5)
MCV RBC AUTO: 93 FL (ref 78–100)
MONOCYTES # BLD AUTO: 0.7 10E3/UL (ref 0–1.3)
MONOCYTES NFR BLD AUTO: 7 %
NEUTROPHILS # BLD AUTO: 6.3 10E3/UL (ref 1.6–8.3)
NEUTROPHILS NFR BLD AUTO: 61 %
NRBC # BLD AUTO: 0 10E3/UL
NRBC BLD AUTO-RTO: 0 /100
PLATELET # BLD AUTO: 291 10E3/UL (ref 150–450)
POTASSIUM BLD-SCNC: 6.5 MMOL/L (ref 3.4–5.3)
PROT SERPL-MCNC: 7.4 G/DL (ref 6.8–8.8)
RBC # BLD AUTO: 4.47 10E6/UL (ref 3.8–5.2)
SODIUM SERPL-SCNC: 141 MMOL/L (ref 133–144)
TROPONIN I SERPL HS-MCNC: <3 NG/L
WBC # BLD AUTO: 10.3 10E3/UL (ref 4–11)

## 2022-10-12 PROCEDURE — 82077 ASSAY SPEC XCP UR&BREATH IA: CPT | Performed by: FAMILY MEDICINE

## 2022-10-12 PROCEDURE — 84702 CHORIONIC GONADOTROPIN TEST: CPT | Performed by: FAMILY MEDICINE

## 2022-10-12 PROCEDURE — 36415 COLL VENOUS BLD VENIPUNCTURE: CPT | Performed by: FAMILY MEDICINE

## 2022-10-12 PROCEDURE — 258N000003 HC RX IP 258 OP 636: Performed by: FAMILY MEDICINE

## 2022-10-12 PROCEDURE — 99284 EMERGENCY DEPT VISIT MOD MDM: CPT | Mod: CS | Performed by: FAMILY MEDICINE

## 2022-10-12 PROCEDURE — 99284 EMERGENCY DEPT VISIT MOD MDM: CPT | Mod: CS,25 | Performed by: FAMILY MEDICINE

## 2022-10-12 PROCEDURE — 80053 COMPREHEN METABOLIC PANEL: CPT | Performed by: FAMILY MEDICINE

## 2022-10-12 PROCEDURE — 84484 ASSAY OF TROPONIN QUANT: CPT | Performed by: FAMILY MEDICINE

## 2022-10-12 PROCEDURE — 250N000011 HC RX IP 250 OP 636

## 2022-10-12 PROCEDURE — 96361 HYDRATE IV INFUSION ADD-ON: CPT | Performed by: FAMILY MEDICINE

## 2022-10-12 PROCEDURE — 96374 THER/PROPH/DIAG INJ IV PUSH: CPT | Performed by: FAMILY MEDICINE

## 2022-10-12 PROCEDURE — C9803 HOPD COVID-19 SPEC COLLECT: HCPCS | Performed by: FAMILY MEDICINE

## 2022-10-12 PROCEDURE — 83690 ASSAY OF LIPASE: CPT | Performed by: FAMILY MEDICINE

## 2022-10-12 PROCEDURE — 96375 TX/PRO/DX INJ NEW DRUG ADDON: CPT | Performed by: FAMILY MEDICINE

## 2022-10-12 PROCEDURE — 250N000011 HC RX IP 250 OP 636: Performed by: FAMILY MEDICINE

## 2022-10-12 PROCEDURE — 85025 COMPLETE CBC W/AUTO DIFF WBC: CPT | Performed by: FAMILY MEDICINE

## 2022-10-12 RX ORDER — SODIUM CHLORIDE 9 MG/ML
INJECTION, SOLUTION INTRAVENOUS CONTINUOUS
Status: DISCONTINUED | OUTPATIENT
Start: 2022-10-12 | End: 2022-10-13

## 2022-10-12 RX ORDER — ONDANSETRON 2 MG/ML
4 INJECTION INTRAMUSCULAR; INTRAVENOUS EVERY 30 MIN PRN
Status: DISCONTINUED | OUTPATIENT
Start: 2022-10-12 | End: 2022-10-13 | Stop reason: HOSPADM

## 2022-10-12 RX ORDER — THIAMINE HYDROCHLORIDE 100 MG/ML
100 INJECTION, SOLUTION INTRAMUSCULAR; INTRAVENOUS ONCE
Status: COMPLETED | OUTPATIENT
Start: 2022-10-12 | End: 2022-10-12

## 2022-10-12 RX ORDER — ONDANSETRON 2 MG/ML
INJECTION INTRAMUSCULAR; INTRAVENOUS
Status: COMPLETED
Start: 2022-10-12 | End: 2022-10-12

## 2022-10-12 RX ADMIN — ONDANSETRON 4 MG: 2 INJECTION INTRAMUSCULAR; INTRAVENOUS at 22:23

## 2022-10-12 RX ADMIN — SODIUM CHLORIDE 1000 ML: 9 INJECTION, SOLUTION INTRAVENOUS at 22:28

## 2022-10-12 RX ADMIN — THIAMINE HYDROCHLORIDE 100 MG: 100 INJECTION, SOLUTION INTRAMUSCULAR; INTRAVENOUS at 23:25

## 2022-10-12 ASSESSMENT — ENCOUNTER SYMPTOMS
NAUSEA: 1
ABDOMINAL PAIN: 1
VOMITING: 1
COUGH: 1

## 2022-10-12 ASSESSMENT — ACTIVITIES OF DAILY LIVING (ADL): ADLS_ACUITY_SCORE: 35

## 2022-10-13 ENCOUNTER — APPOINTMENT (OUTPATIENT)
Dept: GENERAL RADIOLOGY | Facility: CLINIC | Age: 37
End: 2022-10-13
Attending: FAMILY MEDICINE
Payer: COMMERCIAL

## 2022-10-13 ENCOUNTER — PATIENT OUTREACH (OUTPATIENT)
Dept: FAMILY MEDICINE | Facility: CLINIC | Age: 37
End: 2022-10-13

## 2022-10-13 VITALS
TEMPERATURE: 96.7 F | WEIGHT: 150 LBS | RESPIRATION RATE: 15 BRPM | DIASTOLIC BLOOD PRESSURE: 78 MMHG | SYSTOLIC BLOOD PRESSURE: 106 MMHG | HEART RATE: 101 BPM | BODY MASS INDEX: 28.81 KG/M2 | OXYGEN SATURATION: 97 %

## 2022-10-13 LAB
ALBUMIN UR-MCNC: NEGATIVE MG/DL
AMPHETAMINES UR QL: NOT DETECTED
ANION GAP SERPL CALCULATED.3IONS-SCNC: 8 MMOL/L (ref 3–14)
APPEARANCE UR: CLEAR
BARBITURATES UR QL SCN: NOT DETECTED
BENZODIAZ UR QL SCN: NOT DETECTED
BILIRUB UR QL STRIP: NEGATIVE
BUN SERPL-MCNC: 13 MG/DL (ref 7–30)
BUPRENORPHINE UR QL: NOT DETECTED
CALCIUM SERPL-MCNC: 7.6 MG/DL (ref 8.5–10.1)
CANNABINOIDS UR QL: NOT DETECTED
CHLORIDE BLD-SCNC: 115 MMOL/L (ref 94–109)
CO2 SERPL-SCNC: 20 MMOL/L (ref 20–32)
COCAINE UR QL SCN: NOT DETECTED
COLOR UR AUTO: YELLOW
CREAT SERPL-MCNC: 0.48 MG/DL (ref 0.52–1.04)
D-METHAMPHET UR QL: NOT DETECTED
ETHANOL SERPL-MCNC: 0.25 G/DL
FLUAV RNA SPEC QL NAA+PROBE: NEGATIVE
FLUBV RNA RESP QL NAA+PROBE: NEGATIVE
GFR SERPL CREATININE-BSD FRML MDRD: >90 ML/MIN/1.73M2
GLUCOSE BLD-MCNC: 142 MG/DL (ref 70–99)
GLUCOSE UR STRIP-MCNC: NEGATIVE MG/DL
HGB UR QL STRIP: NEGATIVE
HYALINE CASTS: 1 /LPF
KETONES UR STRIP-MCNC: NEGATIVE MG/DL
LEUKOCYTE ESTERASE UR QL STRIP: NEGATIVE
METHADONE UR QL SCN: NOT DETECTED
MUCOUS THREADS #/AREA URNS LPF: PRESENT /LPF
NITRATE UR QL: NEGATIVE
OPIATES UR QL SCN: NOT DETECTED
OXYCODONE UR QL SCN: NOT DETECTED
PCP UR QL SCN: NOT DETECTED
PH UR STRIP: 5 [PH] (ref 5–7)
POTASSIUM BLD-SCNC: 4 MMOL/L (ref 3.4–5.3)
PROPOXYPH UR QL: NOT DETECTED
RBC URINE: 0 /HPF
RSV RNA SPEC NAA+PROBE: NEGATIVE
SARS-COV-2 RNA RESP QL NAA+PROBE: NEGATIVE
SODIUM SERPL-SCNC: 143 MMOL/L (ref 133–144)
SP GR UR STRIP: 1.01 (ref 1–1.03)
SQUAMOUS EPITHELIAL: 2 /HPF
TRICYCLICS UR QL SCN: NOT DETECTED
UROBILINOGEN UR STRIP-MCNC: NORMAL MG/DL
WBC URINE: <1 /HPF

## 2022-10-13 PROCEDURE — 87637 SARSCOV2&INF A&B&RSV AMP PRB: CPT | Performed by: FAMILY MEDICINE

## 2022-10-13 PROCEDURE — 71045 X-RAY EXAM CHEST 1 VIEW: CPT

## 2022-10-13 PROCEDURE — 80306 DRUG TEST PRSMV INSTRMNT: CPT | Performed by: FAMILY MEDICINE

## 2022-10-13 PROCEDURE — 36415 COLL VENOUS BLD VENIPUNCTURE: CPT | Performed by: FAMILY MEDICINE

## 2022-10-13 PROCEDURE — 81001 URINALYSIS AUTO W/SCOPE: CPT | Mod: XU | Performed by: FAMILY MEDICINE

## 2022-10-13 PROCEDURE — C9803 HOPD COVID-19 SPEC COLLECT: HCPCS | Performed by: FAMILY MEDICINE

## 2022-10-13 PROCEDURE — 82077 ASSAY SPEC XCP UR&BREATH IA: CPT | Performed by: FAMILY MEDICINE

## 2022-10-13 PROCEDURE — 82310 ASSAY OF CALCIUM: CPT | Performed by: FAMILY MEDICINE

## 2022-10-13 PROCEDURE — 250N000013 HC RX MED GY IP 250 OP 250 PS 637: Performed by: FAMILY MEDICINE

## 2022-10-13 RX ORDER — DIPHENHYDRAMINE HCL 25 MG
50 CAPSULE ORAL ONCE
Status: COMPLETED | OUTPATIENT
Start: 2022-10-13 | End: 2022-10-13

## 2022-10-13 RX ORDER — LORATADINE 10 MG/1
10 TABLET ORAL DAILY
Qty: 30 TABLET | Refills: 0 | Status: SHIPPED | OUTPATIENT
Start: 2022-10-13

## 2022-10-13 RX ADMIN — DIPHENHYDRAMINE HYDROCHLORIDE 50 MG: 25 CAPSULE ORAL at 03:00

## 2022-10-13 ASSESSMENT — ACTIVITIES OF DAILY LIVING (ADL)
ADLS_ACUITY_SCORE: 35

## 2022-10-13 NOTE — DISCHARGE INSTRUCTIONS
Your lab work and chest x-ray results were reassuring.  Your COVID and influenza test were negative.  It is quite possible of the cough that you have been dealing with is due to mold exposure/allergies.  You may find Claritin or another nonsedating antihistamine helpful.  You can fill the prescription or also purchase it over-the-counter.  Try to avoid that potentially moldy environment.    Recheck in clinic if persistent problems.  Return to the ED if worse/concerns.    Thank you for choosing [facility]. We appreciate the opportunity to meet your urgent medical needs. Please let us know if we could have done anything to make your stay more satisfying.    After discharge, please closely monitor for any new or worsening symptoms. Return to the Emergency Department if you develop any acute worsening signs or symptoms.    If you had lab work, cultures or imaging studies done during your stay, the final results may still be pending. We will call you if your plan of care needs to change. However, if you are not improving as expected, please follow up with your primary care provider or clinic.     Start any prescription medications that were prescribed to you and take them as directed.     Please see additional handouts that may be pertinent to your condition.
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
## 2022-10-13 NOTE — ED TRIAGE NOTES
Patient found at the [facility] very intoxicated. Patient reported chest pain prior to vomiting all over PD and EMS.

## 2022-10-13 NOTE — TELEPHONE ENCOUNTER
ED / Discharge Outreach Protocol    Patient Contact    Attempt # 1    Was call answered?  No.  Left message on voicemail with information to call me back.  Mobile was disconnected, so left general VM on home phone.     Deepika Means RN  [facility]

## 2022-10-13 NOTE — ED PROVIDER NOTES
History     Chief Complaint   Patient presents with     Alcohol Intoxication     HPI  Will Dodson is a 37 year old female who presents to the ED with her friend, Harshal, with a 1 week history of chest pain worse with cough.  Her cough is been deep and she is bringing up some phlegm.  Subjective fever earlier in the week.  Harshal states that she felt very hot to touch and they are wondering if there might be black mold in the place that they have been staying.  He has been coughing as well.  She is thinks that alcohol helps with her throat and chest pain.  He left her for a while to do some shopping and she drank a fair amount of alcohol in his absence.  Denies any other chemical ingestion.  She states that her gallbladder is gone.  She cannot tell me if she still has a uterus.    Allergies:  Allergies   Allergen Reactions     Blood-Group Specific Substance      Patient has Probable passiive anti D Antibody. Blood Product orders may be delayed.  Draw one red top and two purple top tubes for ALL Type and Screen/ Type and Crossmatch orders.       Vicodin [Hydrocodone-Acetaminophen] Nausea and Vomiting       Problem List:    Patient Active Problem List    Diagnosis Date Noted     Acne, unspecified acne type 01/29/2020     Priority: Medium     Uncomplicated alcohol dependence (H) 05/24/2019     Priority: Medium     Neck pain 04/01/2019     Priority: Medium     TMJ (temporomandibular joint syndrome) 04/01/2019     Priority: Medium     Depression with anxiety 08/24/2018     Priority: Medium     Posttraumatic stress disorder 12/21/2017     Priority: Medium     Moderate episode of recurrent major depressive disorder (H) 05/18/2017     Priority: Medium     RhD negative 02/03/2017     Priority: Medium     With passive D antibody       ADHD (attention deficit hyperactivity disorder), combined type 01/19/2017     Priority: Medium     Patient is followed by [facility] for ongoing prescription of stimulants.  All refills  should be approved by this provider, or covering partner.    Medication(s): Adderall XR 30mg.   Maximum quantity per month: #30  Clinic visit frequency required: Q 3 months     Controlled substance agreement on file: Yes       Date(s): needs to be done  Neuropsych evaluation for ADD completed:  Yes, completed 17 with Jose Matta, on file and diagnosis confirmed    Last [facility] website verification:  done on 3/9/17   [email or web address]           Panic attack 10/25/2016     Priority: Medium     Generalized anxiety disorder 2016     Priority: Medium     H/O:  2016     Priority: Medium     C/sec x 2       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 2007     Priority: Medium        Past Medical History:    Past Medical History:   Diagnosis Date     Chickenpox      Depression      Depressive disorder      Kidney stone        Past Surgical History:    Past Surgical History:   Procedure Laterality Date     C/SECTION, LOW TRANSVERSE  ,     , Low Transverse     COSMETIC SURGERY       CYSTOSCOPY,REMV CALCULUS,SIMPLE       LAPAROSCOPIC CHOLECYSTECTOMY N/A 2017    Procedure: LAPAROSCOPIC CHOLECYSTECTOMY;  Laparoscopic Cholecystectomy;  Surgeon: Carson Rojas MD;  Location: [facility] OR     ORTHOPEDIC SURGERY         Family History:    Family History   Problem Relation Age of Onset     Breast Cancer Mother      Depression Mother      Substance Abuse Mother      Anxiety Disorder Mother      Hypertension Father      Substance Abuse Father      Colon Cancer Maternal Grandfather      Colon Cancer Paternal Grandfather      Other - See Comments Sister         epilepsy     Substance Abuse Sister         A&D       Social History:  Marital Status:  Single [1]  Social History     Tobacco Use     Smoking status: Former     Packs/day: 0.25     Types: Cigarettes     Start date: 2015     Smokeless tobacco: Never   Substance Use Topics     Alcohol use: Yes      Alcohol/week: 0.0 standard drinks     Comment: daily 3 drinks     Drug use: Yes     Types: Methamphetamines     Comment: 5/10/19 once per week, couple hits.         Medications:    loratadine (CLARITIN) 10 MG tablet  fluticasone (FLONASE) 50 MCG/ACT nasal spray  hydrOXYzine (ATARAX) 25 MG tablet  levonorgestrel (MIRENA) 20 MCG/24HR IUD          Review of Systems   Respiratory: Positive for cough.    Cardiovascular: Positive for chest pain.   Gastrointestinal: Positive for abdominal pain, nausea and vomiting.   All other systems reviewed and are negative.      Physical Exam   BP: (!) 88/65  Pulse: 79  Temp: (!) 96.7  F (35.9  C)  Resp: 16  Weight: 68 kg (150 lb)  SpO2: 97 %      Physical Exam  Constitutional:       Comments: Intoxicated but alert to voice and answers questions;   HENT:      Mouth/Throat:      Mouth: Mucous membranes are moist.      Pharynx: Oropharynx is clear.   Eyes:      Extraocular Movements: Extraocular movements intact.      Pupils: Pupils are equal, round, and reactive to light.   Cardiovascular:      Rate and Rhythm: Normal rate and regular rhythm.   Pulmonary:      Effort: Pulmonary effort is normal.      Breath sounds: Normal breath sounds.   Abdominal:      Palpations: Abdomen is soft.      Tenderness: There is abdominal tenderness ( moderate diffuse).   Musculoskeletal:         General: Normal range of motion.   Skin:     General: Skin is warm and dry.   Neurological:      General: No focal deficit present.   Psychiatric:         Behavior: Behavior is agitated.      Comments: Intoxicated         ED Course                 Procedures              Critical Care time:  none               Results for orders placed or performed during the hospital encounter of 10/12/22 (from the past 24 hour(s))   CBC with platelets differential    Narrative    The following orders were created for panel order CBC with platelets differential.  Procedure                               Abnormality         Status                      ---------                               -----------         ------                     CBC with platelets and d...[[id number]]                      Final result                 Please view results for these tests on the individual orders.   Comprehensive metabolic panel   Result Value Ref Range    Sodium 141 133 - 144 mmol/L    Potassium 6.5 (HH) 3.4 - 5.3 mmol/L    Chloride 112 (H) 94 - 109 mmol/L    Carbon Dioxide (CO2) 20 20 - 32 mmol/L    Anion Gap 9 3 - 14 mmol/L    Urea Nitrogen 17 7 - 30 mg/dL    Creatinine 0.52 0.52 - 1.04 mg/dL    Calcium 8.4 (L) 8.5 - 10.1 mg/dL    Glucose 119 (H) 70 - 99 mg/dL    Alkaline Phosphatase 97 40 - 150 U/L    AST 84 (H) 0 - 45 U/L    ALT 41 0 - 50 U/L    Protein Total 7.4 6.8 - 8.8 g/dL    Albumin 3.2 (L) 3.4 - 5.0 g/dL    Bilirubin Total 0.2 0.2 - 1.3 mg/dL    GFR Estimate >90 >60 mL/min/1.73m2   Lipase   Result Value Ref Range    Lipase 190 73 - 393 U/L   Troponin I   Result Value Ref Range    Troponin I High Sensitivity <3 <54 ng/L   Ethyl Alcohol Level   Result Value Ref Range    Alcohol ethyl 0.41 (HH) <=0.01 g/dL   HCG quantitative pregnancy   Result Value Ref Range    hCG Quantitative <1 0 - 5 IU/L   CBC with platelets and differential   Result Value Ref Range    WBC Count 10.3 4.0 - 11.0 10e3/uL    RBC Count 4.47 3.80 - 5.20 10e6/uL    Hemoglobin 14.3 11.7 - 15.7 g/dL    Hematocrit 41.6 35.0 - 47.0 %    MCV 93 78 - 100 fL    MCH 32.0 26.5 - 33.0 pg    MCHC 34.4 31.5 - 36.5 g/dL    RDW 13.1 10.0 - 15.0 %    Platelet Count 291 150 - 450 10e3/uL    % Neutrophils 61 %    % Lymphocytes 31 %    % Monocytes 7 %    % Eosinophils 1 %    % Basophils 0 %    % Immature Granulocytes 0 %    NRBCs per 100 WBC 0 <1 /100    Absolute Neutrophils 6.3 1.6 - 8.3 10e3/uL    Absolute Lymphocytes 3.2 0.8 - 5.3 10e3/uL    Absolute Monocytes 0.7 0.0 - 1.3 10e3/uL    Absolute Eosinophils 0.1 0.0 - 0.7 10e3/uL    Absolute Basophils 0.0 0.0 - 0.2 10e3/uL    Absolute Immature  Granulocytes 0.0 <=0.4 10e3/uL    Absolute NRBCs 0.0 10e3/uL   UA with Microscopic reflex to Culture    Specimen: Urine, NOS   Result Value Ref Range    Color Urine Yellow Colorless, Straw, Light Yellow, Yellow    Appearance Urine Clear Clear    Glucose Urine Negative Negative mg/dL    Bilirubin Urine Negative Negative    Ketones Urine Negative Negative mg/dL    Specific Gravity Urine 1.012 1.003 - 1.035    Blood Urine Negative Negative    pH Urine 5.0 5.0 - 7.0    Protein Albumin Urine Negative Negative mg/dL    Urobilinogen Urine Normal Normal, 2.0 mg/dL    Nitrite Urine Negative Negative    Leukocyte Esterase Urine Negative Negative    Mucus Urine Present (A) None Seen /LPF    RBC Urine 0 <=2 /HPF    WBC Urine <1 <=5 /HPF    Squamous Epithelials Urine 2 (H) <=1 /HPF    Hyaline Casts Urine 1 <=2 /LPF    Narrative    Urine Culture not indicated   Urine Drugs of Abuse Screen    Narrative    The following orders were created for panel order Urine Drugs of Abuse Screen.  Procedure                               Abnormality         Status                     ---------                               -----------         ------                     Urine Drugs of Abuse Scr...[[id number]]  Normal              Final result                 Please view results for these tests on the individual orders.   Urine Drugs of Abuse Screen Panel 13   Result Value Ref Range    Cannabinoids (16-vue-3-carboxy-9-THC) Not Detected Not Detected, Indeterminate    Phencyclidine Not Detected Not Detected, Indeterminate    Cocaine (Benzoylecgonine) Not Detected Not Detected, Indeterminate    Methamphetamine (d-Methamphetamine) Not Detected Not Detected, Indeterminate    Opiates (Morphine) Not Detected Not Detected, Indeterminate    Amphetamine (d-Amphetamine) Not Detected Not Detected, Indeterminate    Benzodiazepines (Nordiazepam) Not Detected Not Detected, Indeterminate    Tricyclic Antidepressants (Desipramine) Not Detected Not Detected,  Indeterminate    Methadone Not Detected Not Detected, Indeterminate    Barbiturates (Butalbital) Not Detected Not Detected, Indeterminate    Oxycodone Not Detected Not Detected, Indeterminate    Propoxyphene (Norpropoxyphene) Not Detected Not Detected, Indeterminate    Buprenorphine Not Detected Not Detected, Indeterminate   XR Chest Port 1 View    Narrative    EXAM: XR CHEST PORT 1 VIEW  LOCATION: [facility]  DATE/TIME: 10/13/2022 2:02 AM    INDICATION: cough x  1 week, chest pain, subjective fevers  COMPARISON: 3/4/2019      Impression    IMPRESSION: Negative chest.   Basic metabolic panel   Result Value Ref Range    Sodium 143 133 - 144 mmol/L    Potassium 4.0 3.4 - 5.3 mmol/L    Chloride 115 (H) 94 - 109 mmol/L    Carbon Dioxide (CO2) 20 20 - 32 mmol/L    Anion Gap 8 3 - 14 mmol/L    Urea Nitrogen 13 7 - 30 mg/dL    Creatinine 0.48 (L) 0.52 - 1.04 mg/dL    Calcium 7.6 (L) 8.5 - 10.1 mg/dL    Glucose 142 (H) 70 - 99 mg/dL    GFR Estimate >90 >60 mL/min/1.73m2   Ethyl Alcohol Level   Result Value Ref Range    Alcohol ethyl 0.25 (H) <=0.01 g/dL   Symptomatic; Unknown Influenza A/B & SARS-CoV2 (COVID-19) Virus PCR Multiplex Nose    Specimen: Nose; Swab   Result Value Ref Range    Influenza A PCR Negative Negative    Influenza B PCR Negative Negative    RSV PCR Negative Negative    SARS CoV2 PCR Negative Negative    Narrative    Testing was performed using the Xpert Xpress CoV2/Flu/RSV Assay on the [employer] GeneXpert Instrument. This test should be ordered for the detection of SARS-CoV-2 and influenza viruses in individuals who meet clinical and/or epidemiological criteria. Test performance is unknown in asymptomatic patients. This test is for in vitro diagnostic use under the FDA EUA for laboratories certified under CLIA to perform high or moderate complexity testing. This test has not been FDA cleared or approved. A negative result does not rule out the presence of PCR inhibitors in  the specimen or target RNA in concentration below the limit of detection for the assay. If only one viral target is positive but coinfection with multiple targets is suspected, the sample should be re-tested with another FDA cleared, approved, or authorized test, if coinfection would change clinical management. This test was validated by the [facility] [email or web address]. These laboratories are certified under the Clinical Laboratory Improvement Amendments of 1988 (CLIA-88) as qualified to perform high complexity laboratory testing.       Medications   ondansetron (ZOFRAN) injection 4 mg (4 mg Intravenous Given 10/12/22 2223)   0.9% sodium chloride BOLUS (0 mLs Intravenous Stopped 10/12/22 2328)   thiamine (B-1) injection 100 mg (100 mg Intravenous Given 10/12/22 2325)   diphenhydrAMINE (BENADRYL) capsule 50 mg (50 mg Oral Given 10/13/22 0300)       Assessments & Plan (with Medical Decision Making)  37-year-old female in with a friend 1 week history of chest pain and productive cough.  Subjective fevers at home may be exposed to black mold.  Has been using alcohol to self medicate and drink too much tonight and then was intoxicated.  Emesis here in the ED.  Feels little bit better actually after that.  Alcohol level markedly elevated 0.41.  Her potassium came back at 6.5 but her renal function is normal.  I suspect this is a lab error.  Repeat potassium was normal at 4.0.  LFTs and lipase were unremarkable.  Her AST is up just slightly which is not surprising.  Repeat alcohol level several hours later is down to 0.25.  Chest x-ray was clear.  White count normal with a normal differential.  Her ride is not able to come until the morning.  We will let her sleep here until then.     I have reviewed the nursing notes.    I have reviewed the findings, diagnosis, plan and need for follow up with the patient.       New Prescriptions    LORATADINE (CLARITIN) 10 MG TABLET    Take 1 tablet (10 mg) by mouth daily       Final  diagnoses:   Alcoholic intoxication without complication (H) - 0.41   Acute cough - possibly due to allergy/mold exposure       10/12/2022   [facility] EMERGENCY DEPT     Brayden Dalal MD  10/13/22 0693

## 2023-01-14 ENCOUNTER — HEALTH MAINTENANCE LETTER (OUTPATIENT)
Age: 38
End: 2023-01-14

## 2023-02-14 ENCOUNTER — ALLIED HEALTH/NURSE VISIT (OUTPATIENT)
Dept: RESEARCH | Facility: CLINIC | Age: 38
End: 2023-02-14
Payer: COMMERCIAL

## 2023-02-14 VITALS
HEIGHT: 59 IN | OXYGEN SATURATION: 98 % | WEIGHT: 150 LBS | BODY MASS INDEX: 30.24 KG/M2 | SYSTOLIC BLOOD PRESSURE: 110 MMHG | DIASTOLIC BLOOD PRESSURE: 79 MMHG | HEART RATE: 81 BPM | RESPIRATION RATE: 18 BRPM

## 2023-02-14 DIAGNOSIS — Z00.6 RESEARCH SUBJECT: Primary | ICD-10-CM

## 2023-02-14 PROCEDURE — 99207 PR NO CHARGE NURSE ONLY: CPT

## 2023-02-14 NOTE — PROGRESS NOTES
"[facility] In-Person Study Note    Study Description: The purpose of this study is to collect sleep data for product research and development. We will compare the performance of the Smartwatch to a medical-grade Home Sleep Test (HST). We hope to learn whether the Smartwatch can be used to track sleep quality at home.       Subject ID:     SCREENING     Demographic Info  Will Dodson   1985          37 year old  female    Race: White  Ethnicity: Non-/     Adkins Scale: OBAN Adkins: 3    Medical History:  None           Sleep Apnea Diagnosis: No    Allergies:  Allergies   Allergen Reactions     Blood-Group Specific Substance      Patient has Probable passiive anti D Antibody. Blood Product orders may be delayed.  Draw one red top and two purple top tubes for ALL Type and Screen/ Type and Crossmatch orders.       Vicodin [Hydrocodone-Acetaminophen] Nausea and Vomiting        Current Medications:     Review of your medicines   None       Accurate as of February 14, 2023  9:14 AM. If you have any questions, ask your nurse or doctor.                Past Surgical History:  Past Surgical History:   Procedure Laterality Date     C/SECTION, LOW TRANSVERSE  2007, 2008     COSMETIC SURGERY       CYSTOSCOPY,REMV CALCULUS,SIMPLE  2010     LAPAROSCOPIC CHOLECYSTECTOMY N/A 8/31/2017     ORTHOPEDIC SURGERY                Vitals:  Time Subject Sat: 0915   /79   Pulse 81   Resp 18   Ht 1.499 m (4' 11\")   Wt 68 kg (150 lb)   SpO2 98%   BMI 30.30 kg/m         Lifestyle:  Occupation: Manufacturing    Do you have a Bed Partner/Co-Sleeper? No   Previous Sleep Study?: No       (If Yes) Initial AHI Score: N/A    Alcohol Use: 1 drinks/week  Smoking History: 1 cigarettes/week      Measurements & Preferences:  Dominant Hand: Right   Preferred Watch Wrist: Left    Left Wrist:  Circumference (mm): 150   Hairiness: A: Thin Hair, Low Density   Tattoos, Moles, Scars? None    Right Wrist:   Circumference " (mm): 150   Hairiness: A: Thin Hair, Low Density   Tattoos, Moles, Scars? None    Was data collected?: Yes    ENROLLMENT & DEVICES      Device IDs:  Watch ID: [id number]    Watch Size: 44 mm   Band Size: M/L  Natural Notch: 4   Secure Notch: 4   Watch Setting Worn: 4   Phone ID: [id number]  Nox ID: [id number]     Has the Subject Enrolled in the Study [place]: Yes   Confirmation of Enrollment?: Yes   Enrollment Date: 14-FEB-2023  Smartwatch Training Completed? Yes   Smartwatch Training Date: 14-FEB-2023  HSAT Training Completed? Yes   HSAT Training Date: 14-FEB-2023 14-FEB-2023  Yajaira Maldonado RN

## 2023-02-14 NOTE — PROGRESS NOTES
"  [place] Sleep Study Inclusion/Exclusion Criteria:    Study Name: [place]  : Herminio Guzman MD    Study Description: The purpose of this study is to collect sleep data for product research and development. We will compare the performance of the Smartwatch to a medical-grade Home Sleep Test (HST). We hope to learn whether the Smartwatch can be used to track sleep quality at home.    Protocol Version: 3.0  22-DEC-2022     Criteria #  Inclusion Criteria (ALL MUST BE YES)  YES/NO/N/A   1  Adult (age > 18 years old) Yes   2  Subject has a reliable WiFi network (examples of \"reliable\": able to video-conference call with a clear image, able to stream movies or video without interruption, play online computer games) Yes   3  Subject has access to a computer or smartphone with audiovisual capabilities (e.g., webcam and microphone/speaker*)  Yes   4  Subject is willing to comply with the study procedures    Yes         * applicable for subjects that are remotely consented and/or trained.     Criteria # Exclusion Criteria (ALL MUST BE NO) YES/NO/N/A   1  Active COVID infection   No   2  Decisionally impaired participants (no surrogate consenting is allowed)    No   3  Not understanding written and spoken English     No   4  Open wounds(s) on the wrist and/or forearm (in area where Smartwatch would be worn) No   5  Tattoos, large moles or scars on the dorsal aspect of wrist where the Smartwatch would be worn; individuals with tattoo on only one wrist may participate, if Smartwatch is worn on non-tattooed wrist    No   6  Known sensitivity or allergy to component of the Smartwatch   No   7  Planned travel across 2 or more times zones during study participation   No   8  Planned travel away from home for more than 5 days during the study period No   9  Overnight rotating shift work     No   10  Active and adherent to treatment for sleep apnea   (e.g., CPAP, dental appliance, Hypoglossal nerve stimulator)    " No   11  Plans to start treatment for apnea within the study timeframe    No   12  Overnight supplemental oxygen use   No   13    Employed by a company that develops or sells medical and/or fitness devices (e.g., ECG monitors, wearable fitness bands, sleep monitors, etc.) or are technology journalists (e.g., professional bloggers, TV, magazine, newspaper reporters, etc.) No   14    Participated in a previous sleep study that used a wrist-worn sensor device by same sponsor  No     Patient does fulfill study inclusion criteria and no exclusion criteria are found.     Herminio Guzman MD    14-FEB-2023    Yajaira Maldonado, SHALONDA

## 2023-02-14 NOTE — PROGRESS NOTES
[place] Study Consent    Study Description: The purpose of this study is to collect sleep data for product research and development. We will compare the performance of the Smartwatch to a medical-grade Home Sleep Test (HST). We hope to learn whether the Smartwatch can be used to track sleep quality at home.        Will Dodson a 37 year old female, was on-site  today to discuss participation in the [place] sleep data collection study.     The consent form was reviewed with the patient.     The review of the study included:    Study Purpose     COVID-19 Criteria     Participant Responsibilities      Study Data and Devices    Benefits and Risks of Participation    Compensation and Costs of Participation    Voluntary Participation    Confidentiality     Injury and Legal Rights    Protocol Version: 3.0    The subject was queried in regards to her willingness to continue and her questions were answered to her satisfaction.     The patient has given her agreement to volunteer and participate in the above noted study.     The Consent  and HIPAA form version 3.0 6-JAN-2023 was signed at 09:10  on  14-FEB-2023 with the Clinical Research Unit of [facility].     A copy of the [place] consent will be placed in subject's medical record. A copy of the consent form was given to the subject today.    Study data is directly entered into Epic and [employer] per protocol.     No study procedures were done prior to Will Dodson providing informed consent.       14-FEB-2023    Yajaira Maldonado RN

## 2023-02-17 ENCOUNTER — VIRTUAL VISIT (OUTPATIENT)
Dept: RESEARCH | Facility: CLINIC | Age: 38
End: 2023-02-17
Payer: COMMERCIAL

## 2023-02-17 DIAGNOSIS — Z00.6 RESEARCH SUBJECT: Primary | ICD-10-CM

## 2023-02-17 PROCEDURE — 99207 PR NO CHARGE NURSE ONLY: CPT | Mod: 93

## 2023-02-17 NOTE — PROGRESS NOTES
[place] HSAT Phone Visit    Study Description: The purpose of this study is to collect sleep data for product research and development. We will compare the performance of the Smartwatch to a medical-grade Home Sleep Test (HST). We hope to learn whether the Smartwatch can be used to track sleep quality at home.      Subject Number:     Study Day: Day 4    [place] Study Subject was called today to review HSAT expectations and requirements prior to their first night.     Are you on your home WiFi and have you been completing your Evening/Morning survey? Yes    Reminders Checklist:   [x] Complete Evening Task prior to HSAT Workflow/Watching the video   [x]Make sure Red light comes on, if not DO NOT ATTEMPT  [x] Secure nasal cannula and finger sensor with medical tape  [x] Nox recording turned off in the morning following HSAT night   [x] Ensure morning survey is completed for participant's data upload    Helpful Hints:  -Wear a T-shirt over the heart monitor   -Double tape device tubing/wires     Adverse Events & Con Med Assessment Performed?   [x] None    (If yes, please generate adverse event report for PI to cosign)      17-FEB-2023    Yajaira Maldonado RN

## 2023-02-20 ENCOUNTER — ALLIED HEALTH/NURSE VISIT (OUTPATIENT)
Dept: RESEARCH | Facility: CLINIC | Age: 38
End: 2023-02-20
Payer: COMMERCIAL

## 2023-02-20 DIAGNOSIS — Z00.6 EXAMINATION OF PARTICIPANT OR CONTROL IN CLINICAL RESEARCH: Primary | ICD-10-CM

## 2023-02-20 PROCEDURE — 99207 PR NO CHARGE NURSE ONLY: CPT

## 2023-02-20 NOTE — PROGRESS NOTES
. [place] HSAT Kit Return  Study Description: The purpose of this study is to collect sleep data for product research and development. We will compare the performance of the Smartwatch to a medical-grade Home Sleep Test (HST). We hope to learn whether the Smartwatch can be used to track sleep quality at home.      Subject Number:     Study Day: Week 2    Tracking Number: N/A    Compliance Questions:  Did you wear a T-Shirt over the heart monitor? No   Did you double tape device tubing/wires?No   Did you turn-off your heart monitor each morning after collection?Yes  Did all equipment function correctly and stay-on throughout night?Yes  Did you see the red light underneath the watch turn-on both nights?Yes      Participant returned HSAT kit with all devices returned. Participant verbalized understanding that if their data is unusable per sponsor, they will conduct one additional set of HSAT recordings. All other questions/concerns addressed.     Adverse Events & Con Med Assessment Performed?   [x]     (If yes, please generate adverse event report for PI to cosign)    20-FEB-2023    Napoleon Cross

## 2023-02-28 ENCOUNTER — APPOINTMENT (OUTPATIENT)
Dept: RESEARCH | Facility: CLINIC | Age: 38
End: 2023-02-28
Payer: COMMERCIAL

## 2023-03-02 ENCOUNTER — TELEPHONE (OUTPATIENT)
Dept: RESEARCH | Facility: CLINIC | Age: 38
End: 2023-03-02
Payer: COMMERCIAL

## 2023-03-02 NOTE — TELEPHONE ENCOUNTER
[place] HSAT Results Phone Call  Study Description: The purpose of this study is to collect sleep data for product research and development. We will compare the performance of the Smartwatch to a medical-grade Home Sleep Test (HST). We hope to learn whether the Smartwatch can be used to track sleep quality at home.      Will Dodson was called today to review results of her Home Sleep Test (HSAT).     A voicemail was left with the significance of the results. I also informed them that a copy of the results are available in their chart for review by their provider.     Additional Comments:  Lowest O2 68%- likely due to poor signal due to severe desaturations without clear signs of apnea to johnathan events. Frequent movement and tachycardia noted: HR  >90 bpm for 267 minutes and > 100 bpm for 126 minutes    AHI Score: AHI less than 5  ; only 1 night; scores 0.0 and 1.1    Results:  NIGHT 1  Was it scorable?: Yes     Channel Presence: Scored and all 6 channels present  Select Missing Channels: N/A     (If applicable)      Analysis Start Date: 2/18/23   Analysis Duration: 5hr 50min  Analysis Start Time: 1:57 am       Analysis Stop Time: 7:47 am   Est Total Sleep Time: 4 hr 39 min      NIGHT 1 Scorer 1 Scorer 2   Respiratory Parameters   Apnea + Hypopneas (AH)   Total    0 /h   1.1 /h   Supine    0 /h   0.9 /h   Non-Supine   0 /h   1.2 /h   Count   0    5        Total Total    Obstructive (OA)    0 /h   0 /h   Hypopneas    0 /h   1.1 /h   Central Apnea Hypopnea (CA+CH)    0 /h   0 /h   Oxygen Saturation (SpO2)   Oxygen Desaturation Index (PATTI)    2.8 /h   1.9 /h   Minimum SpO2    76 %   68 %   SpO2 Duration < 88%    0.1 %   11.2 %   Average Desat Drop    4.3 %   6.9 %   Position & Analysis Time   Supine (in TST) Duration    134.1 min   134.1 min       NIGHT 2  Was it scorable?: No (If no, delete the table below)     02-MAR-2023    Bri Arrieta PA-C

## 2023-03-07 ENCOUNTER — VIRTUAL VISIT (OUTPATIENT)
Dept: RESEARCH | Facility: CLINIC | Age: 38
End: 2023-03-07
Payer: COMMERCIAL

## 2023-03-07 DIAGNOSIS — Z00.6 EXAMINATION OF PARTICIPANT OR CONTROL IN CLINICAL RESEARCH: Primary | ICD-10-CM

## 2023-03-07 PROCEDURE — 99207 PR NO CHARGE NURSE ONLY: CPT | Mod: 93

## 2023-03-07 NOTE — PROGRESS NOTES
" [place] Study Phone Visit    Study Description: The purpose of this study is to collect sleep data for product research and development. We will compare the performance of the Smartwatch to a medical-grade Home Sleep Test (HST). We hope to learn whether the Smartwatch can be used to track sleep quality at home.      Subject Number:     Study Day: Week 4    [place] Study Subject was called today to:    Review Compliance     Answer subject questions    Deliver study protocol reminders to subject    Reminders Checklist:   [x]  Connected to WiFi  [x]  Completing both morning and evening surveys  [x]  Watch and Phone on  near each other after completed morning survey   [x]  Take devices off before showering/getting them wet  [x]  Make sure to dismiss any update notifications. -Tap \"Not Now\"  [x]  Good HSAT   [x]  Remind them of next appointment with us     Subject coming on 3/10/2023 for HSAT redeployment    Adverse Events & Con Med Assessment Performed?   [x]     (If yes, please generate adverse event report for PI to cosign)      7-MAR-2023    Pily Vicente      "

## 2023-03-13 ENCOUNTER — ALLIED HEALTH/NURSE VISIT (OUTPATIENT)
Dept: RESEARCH | Facility: CLINIC | Age: 38
End: 2023-03-13
Payer: COMMERCIAL

## 2023-03-13 DIAGNOSIS — Z00.6 EXAMINATION OF PARTICIPANT OR CONTROL IN CLINICAL RESEARCH: Primary | ICD-10-CM

## 2023-03-13 PROCEDURE — 99207 PR NO CHARGE NURSE ONLY: CPT

## 2023-03-13 NOTE — PROGRESS NOTES
[facility] HSAT Redeployment Phone Visit    Study Description: The purpose of this study is to collect sleep data for product research and development. We will compare the performance of the Smartwatch to a medical-grade Home Sleep Test (HST). We hope to learn whether the Smartwatch can be used to track sleep quality at home.      Subject Number:     Study Day: Week 5    [facility] Study Subject was called today to inform them their data from their first set of HSAT nights was unusable thus necessitating another set of HSAT nights. Issues with participant's first set of HSAT recordings reviewed.      Alive Juices Tracking #: N/A  NOX ID: [id number]  Nonin ID: [id number]    Reeducation Checklist:   [x] Complete Evening Task prior to HSAT Workflow  [x] Secure nasal cannula and finger sense with medical tape  [x] Did you wear a second T-Shirt over the Nox?   [x] Nox recording turned off in the morning following HSAT night   [x] Ensure morning survey is completed for participant's data upload    Adverse Events & Con Med Assessment Performed?   [x]     (If yes, please generate adverse event report for PI to cosign)      13-MAR-2023     Don Salcedo

## 2023-03-14 ENCOUNTER — VIRTUAL VISIT (OUTPATIENT)
Dept: RESEARCH | Facility: CLINIC | Age: 38
End: 2023-03-14
Payer: COMMERCIAL

## 2023-03-14 DIAGNOSIS — Z00.6 EXAMINATION OF PARTICIPANT OR CONTROL IN CLINICAL RESEARCH: Primary | ICD-10-CM

## 2023-03-14 PROCEDURE — 99207 PR NO CHARGE NURSE ONLY: CPT | Mod: 93

## 2023-03-14 NOTE — PROGRESS NOTES
" [place] Study Phone Visit    Study Description: The purpose of this study is to collect sleep data for product research and development. We will compare the performance of the Smartwatch to a medical-grade Home Sleep Test (HST). We hope to learn whether the Smartwatch can be used to track sleep quality at home.      Subject Number:     Study Day: Week 5    [place] Study Subject was called today to:    Review Compliance     Answer subject questions    Deliver study protocol reminders to subject    Reminders Checklist:   [x]  Connected to WiFi  [x]  Completing both morning and evening surveys  [x]  Watch and Phone on  near each other after completed morning survey   [x]  Take devices off before showering/getting them wet  [x]  Make sure to dismiss any update notifications. -Tap \"Not Now\"  [x]  Good HSAT   [x]  Remind them of next appointment with us     (Applicable during last 4 days)  [x]  NO WATCH WEAR JUST SURVEY     Adverse Events & Con Med Assessment Performed?   [x]     (If yes, please generate adverse event report for PI to cosign)      14-MAR-2023    Mundo Hatch      "

## 2023-03-16 ENCOUNTER — E-VISIT (OUTPATIENT)
Dept: FAMILY MEDICINE | Facility: CLINIC | Age: 38
End: 2023-03-16
Payer: COMMERCIAL

## 2023-03-16 DIAGNOSIS — T36.95XA ANTIBIOTIC-INDUCED YEAST INFECTION: Primary | ICD-10-CM

## 2023-03-16 DIAGNOSIS — B37.9 ANTIBIOTIC-INDUCED YEAST INFECTION: Primary | ICD-10-CM

## 2023-03-16 DIAGNOSIS — N39.0 ACUTE UTI (URINARY TRACT INFECTION): ICD-10-CM

## 2023-03-16 PROCEDURE — 99421 OL DIG E/M SVC 5-10 MIN: CPT | Performed by: FAMILY MEDICINE

## 2023-03-16 RX ORDER — FLUCONAZOLE 150 MG/1
150 TABLET ORAL ONCE
Qty: 1 TABLET | Refills: 0 | Status: SHIPPED | OUTPATIENT
Start: 2023-03-16 | End: 2023-03-16

## 2023-03-16 RX ORDER — SULFAMETHOXAZOLE/TRIMETHOPRIM 800-160 MG
1 TABLET ORAL 2 TIMES DAILY
Qty: 6 TABLET | Refills: 0 | Status: SHIPPED | OUTPATIENT
Start: 2023-03-16 | End: 2023-03-19

## 2023-03-16 NOTE — PATIENT INSTRUCTIONS
Dear Will Dodson    After reviewing your responses, I've been able to diagnose you with a urinary tract infection, which is a common infection of the bladder with bacteria.  This is not a sexually transmitted infection, though urinating immediately after intercourse can help prevent infections.  Drinking lots of fluids is also helpful to clear your current infection and prevent the next one.      I have sent a prescription for antibiotics to your pharmacy to treat this infection. I also sent the fluconazole to treat yeast infection.  I did order a urine sample, if you are able to leave a urine sample prior to starting the antibiotic, that would be great, but if not then you don't need to.    It is important that you take all of your prescribed medication even if your symptoms are improving after a few doses.  Taking all of your medicine helps prevent the symptoms from returning.     If your symptoms worsen, you develop pain in your back or stomach, develop fevers, or are not improving in 5 days, please contact your primary care provider for an appointment or visit any of our convenient Walk-in or Urgent Care Centers to be seen, which can be found on our website here.    Thanks again for choosing us as your health care partner,    Norbert Wooten MD    Urinary Tract Infections in Women  Urinary tract infections (UTIs) are most often caused by bacteria. These bacteria enter the urinary tract. The bacteria may come from inside the body. Or they may travel from the skin outside the rectum or vagina into the urethra. Female anatomy makes it easy for bacteria from the bowel to enter a woman s urinary tract, which is the most common source of UTI. This means women develop UTIs more often than men. Pain in or around the urinary tract is a common UTI symptom. But the only way to know for sure if you have a UTI for the healthcare provider to test your urine. The two tests that may be done are the urinalysis and urine  culture.    Types of UTIs  Cystitis. A bladder infection (cystitis) is the most common UTI in women. You may have urgent or frequent need to pee. You may also have pain, burning when you pee, and bloody urine.  Urethritis. This is an inflamed urethra, which is the tube that carries urine from the bladder to outside the body. You may have lower stomach or back pain. You may also have urgent or frequent need to pee.  Pyelonephritis. This is a kidney infection. If not treated, it can be serious and damage your kidneys. In severe cases, you may need to stay in the hospital. You may have a fever and lower back pain.    Medicines to treat a UTI  Most UTIs are treated with antibiotics. These kill the bacteria. The length of time you need to take them depends on the type of infection. It may be as short as 3 days. If you have repeated UTIs, you may need a low-dose antibiotic for several months. Take antibiotics exactly as directed. Don t stop taking them until all of the medicine is gone, even if you feel better. If you stop taking the antibiotic too soon, the infection may not go away. You may also develop a resistance to the antibiotic. This can make it much harder to treat.  Lifestyle changes to treat and prevent UTIs  The lifestyle changes below will help get rid of your UTI. They may also help prevent future UTIs.  Drink plenty of fluids. This includes water, juice, or other caffeine-free drinks. Fluids help flush bacteria out of your body.  Empty your bladder. Always empty your bladder when you feel the urge to pee. And always pee before going to sleep. Urine that stays in your bladder can lead to infection. Try to pee before and after sex as well.  Practice good personal hygiene. Wipe yourself from front to back after using the toilet. This helps keep bacteria from getting into the urethra.  Wear cotton underwear. Don't wear synthetic or tight-fitting underwear that can trap moisture. Change out of wet bathing suits  and workout clothing quickly.  Take showers. Showers are better than baths for preventing UTIs.  Use condoms during sex. These help prevent UTIs caused by sexually transmitted bacteria. Also don't use spermicides during sex. These can increase the risk for UTIs. Choose other forms of birth control instead. For women who tend to get UTIs after sex, a low-dose of a preventive antibiotic may be used. Be sure to discuss this option with your healthcare provider.  Follow up with your healthcare provider as directed. They may test to make sure the infection has cleared. If needed, more treatment may be started.  No last reviewed this educational content on 9/1/2021 2000-2022 [employer]. All rights reserved. This information is not intended as a substitute for professional medical care. Always follow your healthcare professional's instructions.

## 2023-03-20 ENCOUNTER — ALLIED HEALTH/NURSE VISIT (OUTPATIENT)
Dept: RESEARCH | Facility: CLINIC | Age: 38
End: 2023-03-20
Payer: COMMERCIAL

## 2023-03-20 DIAGNOSIS — Z00.6 EXAMINATION OF PARTICIPANT OR CONTROL IN CLINICAL RESEARCH: Primary | ICD-10-CM

## 2023-03-20 PROCEDURE — 99207 PR NO CHARGE NURSE ONLY: CPT

## 2023-03-20 NOTE — PROGRESS NOTES
[facility] Study End Note    Study Description: The purpose of this study is to collect sleep data for product research and development. We will compare the performance of the Smartwatch to a medical-grade Home Sleep Test (HST). We hope to learn whether the Smartwatch can be used to track sleep quality at home.        Study Termination/Completion Date: 20-MAR-2023  Reason for Termination (If Applicable): Completed     Subject returned all study devices today and this completes this study for the subject.    Adverse Events & Con Med Assessment Performed?   [x]       20-MAR-2023    Mundo Hatch

## 2023-03-26 ENCOUNTER — TELEPHONE (OUTPATIENT)
Dept: RESEARCH | Facility: CLINIC | Age: 38
End: 2023-03-26
Payer: COMMERCIAL

## 2023-03-26 NOTE — TELEPHONE ENCOUNTER
[facility] HSAT Results Phone Call  Study Description: The purpose of this study is to collect sleep data for product research and development. We will compare the performance of the Smartwatch to a medical-grade Home Sleep Test (HST). We hope to learn whether the Smartwatch can be used to track sleep quality at home.      Will Dodson was called today to review results of her Home Sleep Test (HSAT).     They were informed of the significance of the results and they affirmed understanding. Additionally I informed them that these results are available for review by their provider in their chart.  They had no further questions at this time.    Additional Comments:  only night 1: Lowest O2 92%; Snore 17.1%; Supine 65.7%; HR  NSR with runs of sinus tachycardia noted.    AHI Score: AHI less than 5  : scores 0.7 and 0.8    Results:  NIGHT 1  Was it scorable?: Yes     Channel Presence: Scored and all 6 channels present  Select Missing Channels: N/A     (If applicable)      Analysis Start Date: 3/14/23   Analysis Duration: 12hr 17min  Analysis Start Time: 2:15 am       Analysis Stop Time: 2:32 pm   Est Total Sleep Time: 12 hr 11 min      NIGHT 1 Scorer 1 Scorer 2   Respiratory Parameters   Apnea + Hypopneas (AH)   Total    0.7 /h   0.8 /h   Supine    0.6 /h   0.5 /h   Non-Supine   1 /h   1.4 /h   Count   9    10        Total Total    Obstructive (OA)    0.2 /h   0.2 /h   Hypopneas    0.2 /h   0.1 /h   Central Apnea Hypopnea (CA+CH)    0.3 /h   0.5 /h   Oxygen Saturation (SpO2)   Oxygen Desaturation Index (PATTI)    0.7 /h   0.7 /h   Minimum SpO2    92 %   92 %   SpO2 Duration < 88%    0 %   0 %   Average Desat Drop    4.2 %   4.5 %   Position & Analysis Time   Supine (in TST) Duration    480.7 min   480.7 min       NIGHT 2  Was it scorable?: No (If no, delete the table below)     27-MAR-2023    Bri Arrieta PA-C

## 2023-04-14 NOTE — TELEPHONE ENCOUNTER
Acute Otitis Media:    Antibiotics as directed  Ibuprofen or Tylenol as directed  Zyrtec or generic in the evening as directed  Decongestant like Dayquil or plain Sudafed during the day may be helpful.    Warm compresses may help discomfort.  Chewing, swallowing and yawning may decrease pressure in the affected ear.      Your hearing may be muffled for a few weeks, as there is fluid in the middle ear that is not resolved right away with antibiotics.    Call your provider if: ear pain not improved or worsening, fever not improved or is worse within 24 hours and you are unable to see your PCP within 2-3 days, call the Health Advisor at [phone] ([phone]).   Go to the ER if: fever > 102, nausea, vomiting, worsening or acute ear pain or hearing change.     SORE THROAT    All medications are recommended if you have been told by a provider that they are acceptable for you to take, if you are not sure, please contact your provider to check or for alternatives.      Ibuprofen or Tylenol as directed  Dayquil or generics as directed- this has Tylenol in it, so no extra Tylenol (acetaminophen).  Mucinex plain blue box as directed with lots of water for congestion and cough  Cepacol throat lozenges for sore throat as needed  Humidifier    Be sure to check any cold medicines for ibuprofen (Advil, Motrin) or acetaminophen (Tylenol) and do not take additional ibuprofen or acetaminophen if it is on the ingredient list.    Rest more and avoid intense physical activity until you are feeling better.  Increase clear fluids, especially if you have a fever, you will need more fluids to replenish those lost due to elevated body temperature. Warm liquids can also help to alleviate throat pain.  Advise good hand washing frequently, hand , and Chlorox/Lysol disinfectant use.    SORE THROAT RELIEF:  May take Tylenol every 4-6 hours or Ibuprofen every 6 hours.  No Aspirin if under 19 years old due to the risk of Reye’s  I left a message requesting a call back from client requesting a call back to further discuss her request to go to an inpatient program. I provided my direct contact information for a return call.   Syndrome.  Gargle with warm salt water (1 teaspoon salt in 1 cup hot water) for sore throat/ tonsil swelling.  CEPACOL Sore Throat Lozenges.  THROAT COAT TEA by [employer] can help to soothe sore throat -  in the tea aisle of the grocery store.    If you have been diagnosed with strep throat, you may want to replace your toothbrush in 3 days to avoid possible recontamination with the strep bacteria.    Please call your doctor if your symptoms are not improving or worsen over the next 2-3 days.    Go to the ER if drooling, increased fever, inability to swallow or open jaw, or difficulty breathing or talking, stiff neck, severe headache, or if new rash or rash looking like bruise.   Go directly to the ER with any severe worsening of symptoms, chest pain, shortness of breath or increased wheezing.

## 2023-04-17 ENCOUNTER — HOSPITAL ENCOUNTER (EMERGENCY)
Facility: CLINIC | Age: 38
Discharge: HOME | End: 2023-04-17
Attending: EMERGENCY MEDICINE | Admitting: EMERGENCY MEDICINE
Payer: COMMERCIAL

## 2023-04-17 ENCOUNTER — APPOINTMENT (OUTPATIENT)
Dept: CT IMAGING | Facility: CLINIC | Age: 38
End: 2023-04-17
Attending: EMERGENCY MEDICINE
Payer: COMMERCIAL

## 2023-04-17 VITALS
WEIGHT: 150 LBS | HEART RATE: 74 BPM | OXYGEN SATURATION: 96 % | DIASTOLIC BLOOD PRESSURE: 71 MMHG | HEIGHT: 60 IN | BODY MASS INDEX: 29.45 KG/M2 | TEMPERATURE: 98.3 F | SYSTOLIC BLOOD PRESSURE: 109 MMHG

## 2023-04-17 DIAGNOSIS — R10.9 FLANK PAIN: ICD-10-CM

## 2023-04-17 DIAGNOSIS — R10.9 LEFT SIDED ABDOMINAL PAIN: ICD-10-CM

## 2023-04-17 DIAGNOSIS — R35.0 URINARY FREQUENCY: ICD-10-CM

## 2023-04-17 LAB
ALBUMIN UR-MCNC: NEGATIVE MG/DL
ANION GAP SERPL CALCULATED.3IONS-SCNC: 13 MMOL/L (ref 7–15)
APPEARANCE UR: CLEAR
BASOPHILS # BLD AUTO: 0 10E3/UL (ref 0–0.2)
BASOPHILS NFR BLD AUTO: 0 %
BILIRUB UR QL STRIP: NEGATIVE
BUN SERPL-MCNC: 13.1 MG/DL (ref 6–20)
CALCIUM SERPL-MCNC: 8.7 MG/DL (ref 8.6–10)
CHLORIDE SERPL-SCNC: 104 MMOL/L (ref 98–107)
COLOR UR AUTO: YELLOW
CREAT SERPL-MCNC: 0.57 MG/DL (ref 0.51–0.95)
DEPRECATED HCO3 PLAS-SCNC: 19 MMOL/L (ref 22–29)
EOSINOPHIL # BLD AUTO: 0.1 10E3/UL (ref 0–0.7)
EOSINOPHIL NFR BLD AUTO: 1 %
ERYTHROCYTE [DISTWIDTH] IN BLOOD BY AUTOMATED COUNT: 13.7 % (ref 10–15)
GFR SERPL CREATININE-BSD FRML MDRD: >90 ML/MIN/1.73M2
GLUCOSE BLDC GLUCOMTR-MCNC: 95 MG/DL (ref 70–99)
GLUCOSE SERPL-MCNC: 118 MG/DL (ref 70–99)
GLUCOSE UR STRIP-MCNC: NEGATIVE MG/DL
HCG UR QL: NEGATIVE
HCT VFR BLD AUTO: 39.7 % (ref 35–47)
HGB BLD-MCNC: 13.2 G/DL (ref 11.7–15.7)
HGB UR QL STRIP: NEGATIVE
HOLD SPECIMEN: NORMAL
HOLD SPECIMEN: NORMAL
IMM GRANULOCYTES # BLD: 0 10E3/UL
IMM GRANULOCYTES NFR BLD: 0 %
KETONES UR STRIP-MCNC: NEGATIVE MG/DL
LEUKOCYTE ESTERASE UR QL STRIP: NEGATIVE
LYMPHOCYTES # BLD AUTO: 3.2 10E3/UL (ref 0.8–5.3)
LYMPHOCYTES NFR BLD AUTO: 39 %
MCH RBC QN AUTO: 31.1 PG (ref 26.5–33)
MCHC RBC AUTO-ENTMCNC: 33.2 G/DL (ref 31.5–36.5)
MCV RBC AUTO: 94 FL (ref 78–100)
MONOCYTES # BLD AUTO: 0.7 10E3/UL (ref 0–1.3)
MONOCYTES NFR BLD AUTO: 9 %
NEUTROPHILS # BLD AUTO: 4.1 10E3/UL (ref 1.6–8.3)
NEUTROPHILS NFR BLD AUTO: 51 %
NITRATE UR QL: NEGATIVE
NRBC # BLD AUTO: 0 10E3/UL
NRBC BLD AUTO-RTO: 0 /100
PH UR STRIP: 7 [PH] (ref 5–7)
PLATELET # BLD AUTO: 270 10E3/UL (ref 150–450)
POTASSIUM SERPL-SCNC: 3.8 MMOL/L (ref 3.4–5.3)
RBC # BLD AUTO: 4.24 10E6/UL (ref 3.8–5.2)
RBC URINE: 1 /HPF
SODIUM SERPL-SCNC: 136 MMOL/L (ref 136–145)
SP GR UR STRIP: 1.01 (ref 1–1.03)
SQUAMOUS EPITHELIAL: <1 /HPF
UROBILINOGEN UR STRIP-MCNC: NORMAL MG/DL
WBC # BLD AUTO: 8 10E3/UL (ref 4–11)
WBC URINE: 0 /HPF

## 2023-04-17 PROCEDURE — 81025 URINE PREGNANCY TEST: CPT

## 2023-04-17 PROCEDURE — 250N000013 HC RX MED GY IP 250 OP 250 PS 637: Performed by: FAMILY MEDICINE

## 2023-04-17 PROCEDURE — 99284 EMERGENCY DEPT VISIT MOD MDM: CPT | Performed by: EMERGENCY MEDICINE

## 2023-04-17 PROCEDURE — 82310 ASSAY OF CALCIUM: CPT | Performed by: EMERGENCY MEDICINE

## 2023-04-17 PROCEDURE — 81001 URINALYSIS AUTO W/SCOPE: CPT

## 2023-04-17 PROCEDURE — 85025 COMPLETE CBC W/AUTO DIFF WBC: CPT | Performed by: EMERGENCY MEDICINE

## 2023-04-17 PROCEDURE — 99284 EMERGENCY DEPT VISIT MOD MDM: CPT | Mod: 25 | Performed by: EMERGENCY MEDICINE

## 2023-04-17 PROCEDURE — 82962 GLUCOSE BLOOD TEST: CPT

## 2023-04-17 PROCEDURE — 74176 CT ABD & PELVIS W/O CONTRAST: CPT

## 2023-04-17 PROCEDURE — 36415 COLL VENOUS BLD VENIPUNCTURE: CPT | Performed by: EMERGENCY MEDICINE

## 2023-04-17 RX ORDER — ACETAMINOPHEN 500 MG
1000 TABLET ORAL ONCE
Status: COMPLETED | OUTPATIENT
Start: 2023-04-17 | End: 2023-04-17

## 2023-04-17 RX ORDER — IBUPROFEN 200 MG
400 TABLET ORAL ONCE
Status: COMPLETED | OUTPATIENT
Start: 2023-04-17 | End: 2023-04-17

## 2023-04-17 RX ADMIN — IBUPROFEN 400 MG: 200 TABLET, FILM COATED ORAL at 16:51

## 2023-04-17 RX ADMIN — ACETAMINOPHEN 1000 MG: 500 TABLET ORAL at 16:50

## 2023-04-17 ASSESSMENT — ACTIVITIES OF DAILY LIVING (ADL)
ADLS_ACUITY_SCORE: 35
ADLS_ACUITY_SCORE: 35

## 2023-04-17 NOTE — ED NOTES
Patient presented to urgent care for complaint of urinary frequency, abdominal pain, generalized fatigue, heart racing and multiple other symptoms.  Capabilities of the urgent care were explained to the patient and explained that her complaints are beyond the capabilities of the urgent care given the available resources.  It was recommended that patient be evaluated in the ER today, which she is agreeable to be to.     Dennis Lujan, YURI CNP  04/17/23 [id number]

## 2023-04-17 NOTE — ED TRIAGE NOTES
"  Pt states she is having abd pain ULQ that started today at 5am.  Pt states she is voiding freq and is feeling \"run down\".  Pt c/o bilat flank pain that started this am too.   Pt has not taken anything for comfort.  Pt now states L sided CP that comes and goes and started this AM as well.   Pt denies being around others that are ill, denies cough, denies congestion.       Pt currently eating 2 bags of Cheetos        " History of MRSA infection L groin 2013 reported to Dwayne العراقي RN, [place].

## 2023-04-18 NOTE — ED PROVIDER NOTES
"  History     Chief Complaint   Patient presents with     Urinary Frequency     Pt reports urinating [id number]. No other urinary symptoms.   Pt reports that \"my glucose is always messed up\". Pt states that she has had kidney and bladder infections.     HPI  Will Dodson is a 37 year old female with past medical history significant for anxiety disorder ADHD depression history of kidney stones who presents to the emergency department complaining of left-sided abdominal pain flank pain and frequency.  Patient states symptoms began around 5:00 this morning with some nausea left flank pain.  Throughout the day she has had frequency with multiple episodes of urination.  She denies any chest pain to me.  She had stated in previous interviews she had had chest pain but she denies this at this time she has not had a fever denies any headache or visual changes.  She has not had any neck pain.  She denies any significant loose stools or diarrhea.  Has not had any bowel or bladder dysfunction.  She denies a rash.    Allergies:  Allergies   Allergen Reactions     Blood-Group Specific Substance      Patient has Probable passiive anti D Antibody. Blood Product orders may be delayed.  Draw one red top and two purple top tubes for ALL Type and Screen/ Type and Crossmatch orders.       Vicodin [Hydrocodone-Acetaminophen] Nausea and Vomiting       Problem List:    Patient Active Problem List    Diagnosis Date Noted     Acne, unspecified acne type 01/29/2020     Priority: Medium     Uncomplicated alcohol dependence (H) 05/24/2019     Priority: Medium     Neck pain 04/01/2019     Priority: Medium     TMJ (temporomandibular joint syndrome) 04/01/2019     Priority: Medium     Depression with anxiety 08/24/2018     Priority: Medium     Posttraumatic stress disorder 12/21/2017     Priority: Medium     Moderate episode of recurrent major depressive disorder (H) 05/18/2017     Priority: Medium     RhD negative 02/03/2017     Priority: " Medium     With passive D antibody       ADHD (attention deficit hyperactivity disorder), combined type 2017     Priority: Medium     Patient is followed by MERVAT LUONG for ongoing prescription of stimulants.  All refills should be approved by this provider, or covering partner.    Medication(s): Adderall XR 30mg.   Maximum quantity per month: #30  Clinic visit frequency required: Q 3 months     Controlled substance agreement on file: Yes       Date(s): needs to be done  Neuropsych evaluation for ADD completed:  Yes, completed 17 with Jose Matta, on file and diagnosis confirmed    Last [facility] website verification:  done on 3/9/17   [email or web address]           Panic attack 10/25/2016     Priority: Medium     Generalized anxiety disorder 2016     Priority: Medium     H/O:  2016     Priority: Medium     C/sec x 2       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 2007     Priority: Medium        Past Medical History:    Past Medical History:   Diagnosis Date     Chickenpox      Depression      Depressive disorder      Kidney stone        Past Surgical History:    Past Surgical History:   Procedure Laterality Date     C/SECTION, LOW TRANSVERSE  ,     , Low Transverse     COSMETIC SURGERY       CYSTOSCOPY,REMV CALCULUS,SIMPLE       LAPAROSCOPIC CHOLECYSTECTOMY N/A 2017    Procedure: LAPAROSCOPIC CHOLECYSTECTOMY;  Laparoscopic Cholecystectomy;  Surgeon: Carson Rojas MD;  Location: [facility] OR     ORTHOPEDIC SURGERY         Family History:    Family History   Problem Relation Age of Onset     Breast Cancer Mother      Depression Mother      Substance Abuse Mother      Anxiety Disorder Mother      Hypertension Father      Substance Abuse Father      Colon Cancer Maternal Grandfather      Colon Cancer Paternal Grandfather      Other - See Comments Sister         epilepsy     Substance Abuse Sister         A&D       Social  History:  Marital Status:  Single [1]  Social History     Tobacco Use     Smoking status: Former     Packs/day: 0.25     Types: Cigarettes     Start date: 6/2/2015     Smokeless tobacco: Never   Substance Use Topics     Alcohol use: Yes     Alcohol/week: 0.0 standard drinks of alcohol     Comment: daily 3 drinks     Drug use: Yes     Types: Methamphetamines     Comment: 5/10/19 once per week, couple hits.         Medications:    fluticasone (FLONASE) 50 MCG/ACT nasal spray  hydrOXYzine (ATARAX) 25 MG tablet  levonorgestrel (MIRENA) 20 MCG/24HR IUD  loratadine (CLARITIN) 10 MG tablet          Review of Systems  All systems reviewed and other than pertinent positives and negatives in HPI all other systems are negative.  Physical Exam   BP: 118/88  Pulse: 81  Temp: 98.3  F (36.8  C)  Height: 152.4 cm (5')  Weight: 68 kg (150 lb)  SpO2: 99 %      Physical Exam  Vitals and nursing note reviewed.   Constitutional:       General: She is not in acute distress.     Appearance: Normal appearance. She is not ill-appearing, toxic-appearing or diaphoretic.   HENT:      Head: Normocephalic and atraumatic.      Nose: Nose normal.      Mouth/Throat:      Mouth: Mucous membranes are moist.      Pharynx: Oropharynx is clear.   Eyes:      Conjunctiva/sclera: Conjunctivae normal.   Cardiovascular:      Rate and Rhythm: Normal rate and regular rhythm.      Pulses: Normal pulses.      Heart sounds: Normal heart sounds. No murmur heard.  Pulmonary:      Effort: Pulmonary effort is normal.      Breath sounds: Normal breath sounds. No stridor. No wheezing or rhonchi.   Chest:      Chest wall: No tenderness.   Abdominal:      Comments: Soft, nondistended there is mild tenderness palpation of the suprapubic and left lower quadrant of the abdomen.  There is also bilateral flank pain present.  Left greater than right.  No masses or hernias are palpated there is no guarding or rebound noted.   Musculoskeletal:         General: No swelling or  tenderness. Normal range of motion.      Cervical back: Normal range of motion and neck supple.      Right lower leg: No edema.      Left lower leg: No edema.   Skin:     General: Skin is warm and dry.      Capillary Refill: Capillary refill takes less than 2 seconds.      Findings: No rash.   Neurological:      General: No focal deficit present.      Mental Status: She is alert and oriented to person, place, and time.      Sensory: No sensory deficit.      Motor: No weakness.      Coordination: Coordination normal.   Psychiatric:         Mood and Affect: Mood normal.         ED Course                 Procedures              Critical Care time:  none               Results for orders placed or performed during the hospital encounter of 04/17/23 (from the past 24 hour(s))   UA with Microscopic reflex to Culture    Specimen: Urine, Midstream   Result Value Ref Range    Color Urine Yellow Colorless, Straw, Light Yellow, Yellow    Appearance Urine Clear Clear    Glucose Urine Negative Negative mg/dL    Bilirubin Urine Negative Negative    Ketones Urine Negative Negative mg/dL    Specific Gravity Urine 1.014 1.003 - 1.035    Blood Urine Negative Negative    pH Urine 7.0 5.0 - 7.0    Protein Albumin Urine Negative Negative mg/dL    Urobilinogen Urine Normal Normal, 2.0 mg/dL    Nitrite Urine Negative Negative    Leukocyte Esterase Urine Negative Negative    RBC Urine 1 <=2 /HPF    WBC Urine 0 <=5 /HPF    Squamous Epithelials Urine <1 <=1 /HPF    Narrative    Urine Culture not indicated   HCG qualitative urine (UPT)   Result Value Ref Range    hCG Urine Qualitative Negative Negative   Glucose by meter   Result Value Ref Range    GLUCOSE BY METER POCT 95 70 - 99 mg/dL   CBC with platelets differential    Narrative    The following orders were created for panel order CBC with platelets differential.  Procedure                               Abnormality         Status                     ---------                                -----------         ------                     CBC with platelets and d...[[id number]]                      Final result                 Please view results for these tests on the individual orders.   Basic metabolic panel   Result Value Ref Range    Sodium 136 136 - 145 mmol/L    Potassium 3.8 3.4 - 5.3 mmol/L    Chloride 104 98 - 107 mmol/L    Carbon Dioxide (CO2) 19 (L) 22 - 29 mmol/L    Anion Gap 13 7 - 15 mmol/L    Urea Nitrogen 13.1 6.0 - 20.0 mg/dL    Creatinine 0.57 0.51 - 0.95 mg/dL    Calcium 8.7 8.6 - 10.0 mg/dL    Glucose 118 (H) 70 - 99 mg/dL    GFR Estimate >90 >60 mL/min/1.73m2   [place] Draw    Narrative    The following orders were created for panel order [place] Draw.  Procedure                               Abnormality         Status                     ---------                               -----------         ------                     Extra Blue Top Tube[[id number]]                              Final result               Extra Red Top Tube[[id number]]                               Final result               Extra Green Top (Lithium...[[id number]]                                                 Extra Purple Top Tube[[id number]]                                                         Please view results for these tests on the individual orders.   CBC with platelets and differential   Result Value Ref Range    WBC Count 8.0 4.0 - 11.0 10e3/uL    RBC Count 4.24 3.80 - 5.20 10e6/uL    Hemoglobin 13.2 11.7 - 15.7 g/dL    Hematocrit 39.7 35.0 - 47.0 %    MCV 94 78 - 100 fL    MCH 31.1 26.5 - 33.0 pg    MCHC 33.2 31.5 - 36.5 g/dL    RDW 13.7 10.0 - 15.0 %    Platelet Count 270 150 - 450 10e3/uL    % Neutrophils 51 %    % Lymphocytes 39 %    % Monocytes 9 %    % Eosinophils 1 %    % Basophils 0 %    % Immature Granulocytes 0 %    NRBCs per 100 WBC 0 <1 /100    Absolute Neutrophils 4.1 1.6 - 8.3 10e3/uL    Absolute Lymphocytes 3.2 0.8 - 5.3 10e3/uL    Absolute Monocytes 0.7 0.0 - 1.3 10e3/uL    Absolute  Eosinophils 0.1 0.0 - 0.7 10e3/uL    Absolute Basophils 0.0 0.0 - 0.2 10e3/uL    Absolute Immature Granulocytes 0.0 <=0.4 10e3/uL    Absolute NRBCs 0.0 10e3/uL   Extra Blue Top Tube   Result Value Ref Range    Hold Specimen JIC    Extra Red Top Tube   Result Value Ref Range    Hold Specimen JIC    CT Abdomen Pelvis w/o Contrast    Narrative    EXAM: CT ABDOMEN PELVIS W/O CONTRAST  LOCATION: [facility]  DATE/TIME: 4/17/2023 8:18 PM CDT    INDICATION: Flank pain without obvious source  COMPARISON: Ultrasound 08/11/2017  TECHNIQUE: CT scan of the abdomen and pelvis was performed without IV contrast. Multiplanar reformats were obtained. Dose reduction techniques were used.  CONTRAST: None.    FINDINGS:   LOWER CHEST: Unremarkable.    HEPATOBILIARY: Cholecystectomy.    PANCREAS: Normal.    SPLEEN: Normal.    ADRENAL GLANDS: Normal.    KIDNEYS/BLADDER: No nephroureterolithiasis or hydronephrosis. Urinary bladder is decompressed but otherwise unremarkable.    BOWEL: Diverticulosis of the colon. No acute inflammatory change. No obstruction. Normal appendix.    LYMPH NODES: No suspicious lymphadenopathy.    VASCULATURE: Unremarkable.    PELVIC ORGANS: Intrauterine device in appropriate position. Small pelvic phleboliths noted.    MUSCULOSKELETAL: No acute bony abnormality.      Impression    IMPRESSION:   1.  No visualized explanation for patient's pain. Specifically, no nephroureterolithiasis or hydronephrosis.         Medications   ibuprofen (ADVIL/MOTRIN) tablet 400 mg (400 mg Oral $Given 4/17/23 1651)   acetaminophen (TYLENOL) tablet 1,000 mg (1,000 mg Oral $Given 4/17/23 1650)       Assessments & Plan (with Medical Decision Making) records were reviewed.  Patient was given ibuprofen for pain.  Labs were obtained.  CBC was unremarkable.  Basic metabolic panel with a bicarb of 19.  Pregnancy test was negative UA was unremarkable.  Findings discussed in detail with patient she is eager to leave  at this time but I did discuss that I cannot explain her pain or frequency at this time and therefore risks and benefits of a CT of the abdomen pelvis without contrast was ordered.  Patient went down for a images and after having the test done I was in the room with another patient when I was informed patient was signing out AMA by the time I got to the room patient had left.  I had reviewed the CT scan and it was unremarkable.  Patient had been informed of this by nursing staff.  She understood per nursing staff the risks and benefits of leaving with an work-up was not completely done.  I feel live more than likely would have discharged her at this time but was then able to talk to her about discharge.  I attempted to call patient but did not get through at this point.  I will continue to try to contact patient.  No obvious source of her pain was noted urine was unremarkable.  Patient signed out AMA.     I have reviewed the nursing notes.    I have reviewed the findings, diagnosis, plan and need for follow up with the patient.             Discharge Medication List as of 4/17/2023  8:52 PM          Final diagnoses:   Flank pain   Left sided abdominal pain   Urinary frequency       4/17/2023   [facility] EMERGENCY DEPT     Nicol, Carmine Page MD  04/18/23 [id number]

## 2023-04-18 NOTE — ED NOTES
Writer went into patient's room and pt was eating 4-5 packets of sugar.  Advised pt, that eating sugar may be causing some of the abdominal pain, pt denied.    Pt offered sandwich, declined.

## 2023-04-18 NOTE — ED NOTES
Pt continues to come out of room multiple times and to nurses station.  Pt requesting water.  CT unremarkable, water given.

## 2023-04-18 NOTE — ED NOTES
Pt keeps coming out of the room and to nurses station asking how much longer it will be and pt requesting to leave.  Offered AMA form.   Pt turned around and walked back into room.

## 2023-04-23 ENCOUNTER — HEALTH MAINTENANCE LETTER (OUTPATIENT)
Age: 38
End: 2023-04-23

## 2023-12-22 ENCOUNTER — TELEPHONE (OUTPATIENT)
Dept: FAMILY MEDICINE | Facility: CLINIC | Age: 38
End: 2023-12-22
Payer: COMMERCIAL

## 2023-12-22 NOTE — LETTER
December 22, 2023      Will Dodson  [address] [place] [id number]        Dear Will,     Your team at [facility] cares about your health. We have reviewed your chart and based on our findings; we are making the following recommendations to better manage your health.     You are in particular need of attention regarding the following:     Schedule a primary care office visit with your provider for a Pap Smear to screen for Cervical Cancer.  PREVENTATIVE VISIT: Physical    If you have already completed these items, please contact the clinic via phone or   Play for [employer] so your care team can review and update your records. Thank you for   choosing [facility] for your healthcare needs. For any questions,   concerns, or to schedule an appointment please contact our clinic.    Healthy Regards,      Your [facility] Care Team

## 2023-12-22 NOTE — TELEPHONE ENCOUNTER
Patient Quality Outreach    Patient is due for the following:   Cervical Cancer Screening - PAP Needed  Physical Preventive Adult Physical    Next Steps:   Schedule a Adult Preventative    Type of outreach:    Sent letter.      Questions for provider review:    None           Chastity Rousseau

## 2024-04-30 ENCOUNTER — VIRTUAL VISIT (OUTPATIENT)
Dept: FAMILY MEDICINE | Facility: CLINIC | Age: 39
End: 2024-04-30
Payer: MEDICAID

## 2024-04-30 DIAGNOSIS — G43.E09 CHRONIC MIGRAINE WITH AURA WITHOUT STATUS MIGRAINOSUS, NOT INTRACTABLE: Primary | ICD-10-CM

## 2024-04-30 PROCEDURE — 99214 OFFICE O/P EST MOD 30 MIN: CPT | Mod: 95 | Performed by: FAMILY MEDICINE

## 2024-04-30 PROCEDURE — G2211 COMPLEX E/M VISIT ADD ON: HCPCS | Mod: 95 | Performed by: FAMILY MEDICINE

## 2024-04-30 ASSESSMENT — ENCOUNTER SYMPTOMS: HEADACHES: 1

## 2024-04-30 ASSESSMENT — PATIENT HEALTH QUESTIONNAIRE - PHQ9
10. IF YOU CHECKED OFF ANY PROBLEMS, HOW DIFFICULT HAVE THESE PROBLEMS MADE IT FOR YOU TO DO YOUR WORK, TAKE CARE OF THINGS AT HOME, OR GET ALONG WITH OTHER PEOPLE: SOMEWHAT DIFFICULT
SUM OF ALL RESPONSES TO PHQ QUESTIONS 1-9: 6
SUM OF ALL RESPONSES TO PHQ QUESTIONS 1-9: 6

## 2024-04-30 NOTE — PROGRESS NOTES
Will is a 38 year old who is being evaluated via a billable video visit.    How would you like to obtain your AVS? MyChart  If the video visit is dropped, the invitation should be resent by: Send to e-mail at: [email or web address]  Will anyone else be joining your video visit? No      Assessment & Plan     Chronic migraine with aura without status migrainosus, not intractable: not controlled with headaches about 4 per week.  Discussed medication options that Neurology listed including atenolol and amitriptyline, plan amitriptyline after discussion.  -discussed risks, benefits, and side effects of this new medication. Patient understands and is in agreement.  Recheck in 4 weeks virtual.  - amitriptyline (ELAVIL) 25 MG tablet; Take 0.5 tablets (12.5 mg) by mouth at bedtime for 7 days, THEN 1 tablet (25 mg) at bedtime for 60 days.    The longitudinal plan of care for the diagnosis(es)/condition(s) as documented were addressed during this visit. Due to the added complexity in care, I will continue to support Will in the subsequent management and with ongoing continuity of care.      Nicotine/Tobacco Cessation  She reports that she has been smoking cigarettes. She started smoking about 8 years ago. She has a 1.1 pack-year smoking history. She has never used smokeless tobacco.  Nicotine/Tobacco Cessation Plan  Self help information given to patient        See Patient Instructions    Subjective   Will is a 38 year old, presenting for the following health issues:  Headache (Being treated at [facility]. Needs PCP to manage medications./)      4/30/2024    12:38 PM   Additional Questions   Roomed by Chastity Rousseau   Accompanied by self         4/30/2024    12:38 PM   Patient Reported Additional Medications   Patient reports taking the following new medications none     Headache     History of Present Illness       Headaches:   Since the patient's last clinic visit, headaches are: worsened  The patient is getting headaches:  4  "times a week  She is not able to do normal daily activities when she has a migraine.  The patient is taking the following rescue/relief medications:  Ibuprofen (Advil, Motrin), Tylenol and Excedrin   Patient states \"The relief is inconsistent\" from the rescue/relief medications.   The patient is taking the following medications to prevent migraines:  No medications to prevent migraines  In the past 4 weeks, the patient has gone to an Urgent Care or Emergency Room 0 times times due to headaches.    She eats 0-1 servings of fruits and vegetables daily.She consumes 1 sweetened beverage(s) daily.She exercises with enough effort to increase her heart rate 20 to 29 minutes per day.  She exercises with enough effort to increase her heart rate 4 days per week.   She is taking medications regularly.     Seen my [facility] 3/5/24 for headaches, diagnosed with chronic daily headache with chonic migraine. Had normal head CT and blood work including TSH, CMP, CBC diff, Vit B12.    Recently headaches are 4 times a week.  Takes tylenol and IBP and excedrin aspirin free.   Switched to use naproxen which sometimes works and sometimes doesn't.    Prior headache prevention medications tried: propranolol, metoprolol, timolol, gabapenin, fluoxetine, sertraline, effexor with no improvement.            Objective    Vitals - Patient Reported  Pain Score: Mild Pain (2)  Pain Loc: Head        Physical Exam   GENERAL: alert and no distress  EYES: Eyes grossly normal to inspection.  No discharge or erythema, or obvious scleral/conjunctival abnormalities.  RESP: No audible wheeze, cough, or visible cyanosis.    SKIN: Visible skin clear. No significant rash, abnormal pigmentation or lesions.  NEURO: Cranial nerves grossly intact.  Mentation and speech appropriate for age.  PSYCH: Appropriate affect, tone, and pace of words          Video-Visit Details    Type of service:  Video Visit   Originating Location (pt. Location): Home    Distant " Location (provider location):  On-site  Platform used for Video Visit: Mirella  Signed Electronically by: Norbert Wooten MD

## 2024-05-15 ENCOUNTER — TELEPHONE (OUTPATIENT)
Dept: FAMILY MEDICINE | Facility: CLINIC | Age: 39
End: 2024-05-15
Payer: MEDICAID

## 2024-05-15 NOTE — TELEPHONE ENCOUNTER
Patient Quality Outreach    Patient is due for the following:   Cervical Cancer Screening - PAP Needed  Physical Preventive Adult Physical    Next Steps:   Patient has upcoming appointment, these items will be addressed at that time.  Upcoming appointment on 5/28/2024.    Type of outreach:    Chart review performed, no outreach needed.      Questions for provider review:    None           Chastity Rousseau

## 2024-06-03 ENCOUNTER — VIRTUAL VISIT (OUTPATIENT)
Dept: URGENT CARE | Facility: CLINIC | Age: 39
End: 2024-06-03
Payer: MEDICAID

## 2024-06-03 DIAGNOSIS — H57.12 DISCOMFORT OF LEFT EYE: Primary | ICD-10-CM

## 2024-06-03 PROCEDURE — 99212 OFFICE O/P EST SF 10 MIN: CPT | Mod: 95

## 2024-06-03 NOTE — PROGRESS NOTES
Subjective   HPI    Not pregnant or breast feeding  3 days ago used her sister's mascara  Started having left eye discomfort and redness  No blurring of vision no photophobia no eye pain no feeling of foreign body no history of eye trauma, No contact lens wearer  But then this morning seemed worse  This evening much better     Denies any trauma, doesn't think she scratched the eye    Patient Active Problem List   Diagnosis    H/O:     Generalized anxiety disorder    Panic attack    ADHD (attention deficit hyperactivity disorder), combined type    RhD negative    Moderate episode of recurrent major depressive disorder (H)    Posttraumatic stress disorder    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    Depression with anxiety    Neck pain    TMJ (temporomandibular joint syndrome)    Uncomplicated alcohol dependence (H)    Acne, unspecified acne type     Past Surgical History:   Procedure Laterality Date    C/SECTION, LOW TRANSVERSE  ,     , Low Transverse    COSMETIC SURGERY      CYSTOSCOPY,REMV CALCULUS,SIMPLE      LAPAROSCOPIC CHOLECYSTECTOMY N/A 2017    Procedure: LAPAROSCOPIC CHOLECYSTECTOMY;  Laparoscopic Cholecystectomy;  Surgeon: Carson Rojas MD;  Location: [facility] OR    ORTHOPEDIC SURGERY         Social History     Tobacco Use    Smoking status: Every Day     Current packs/day: 0.12     Average packs/day: 0.1 packs/day for 9.0 years (1.1 ttl pk-yrs)     Types: Cigarettes     Start date: 2015    Smokeless tobacco: Never   Substance Use Topics    Alcohol use: Yes     Alcohol/week: 0.0 standard drinks of alcohol     Comment: daily 3 drinks     Family History   Problem Relation Age of Onset    Breast Cancer Mother     Depression Mother     Substance Abuse Mother     Anxiety Disorder Mother     Hypertension Father     Substance Abuse Father     Colon Cancer Maternal Grandfather     Colon Cancer Paternal Grandfather     Other - See Comments Sister          epilepsy    Substance Abuse Sister         A&D           Reviewed and updated as needed this visit by Provider    Allergies                Review of Systems   Constitutional, HEENT, cardiovascular, pulmonary, GI, , musculoskeletal, neuro, skin, endocrine and psych systems are negative, except as otherwise noted.       Objective   Reported vitals:  There were no vitals taken for this visit.   healthy, alert, and no distress  PSYCH: Alert and oriented times 3; coherent speech, normal   rate and volume, able to articulate logical thoughts, able   to abstract reason, no tangential thoughts, no hallucinations   or delusions  Her affect is normal  RESP: No cough, no audible wheezing, able to talk in full sentences  Additional exam:  Unable to really see the eye - video was very pixelated and poor quality  Remainder of exam unable to be completed due to telephone visits    Diagnostic Test Results:  Labs reviewed in Epic        Assessment/Plan:      ICD-10-CM    1. Discomfort of left eye  H57.12         Per patient already improving  I recommended in person evaluation if symptoms were persisting, especially if concerns for possible scratched eye, trauma, or pain.  Patient states no, and it is improving now and prefers to observe this evening - if worsening in am she states she will get seen or if not improving.  if with any worsening symptoms, pain, photophobia, worsening redness, swelling, feeling of foreign body go to ER or see your eye doctor      Joined the call at 6/3/2024, 6:01:14 pm.  Left the call at 6/3/2024, 6:07:45 pm.  You were on the call for 6 minutes 31 seconds   Video done via:  Smava  Originating site: patient home   Provider location: provider home    Katrin Manjarrez MD

## 2024-06-30 ENCOUNTER — HEALTH MAINTENANCE LETTER (OUTPATIENT)
Age: 39
End: 2024-06-30

## 2024-07-17 ENCOUNTER — MYC MEDICAL ADVICE (OUTPATIENT)
Dept: FAMILY MEDICINE | Facility: CLINIC | Age: 39
End: 2024-07-17
Payer: MEDICAID

## 2024-08-23 ENCOUNTER — TELEPHONE (OUTPATIENT)
Dept: FAMILY MEDICINE | Facility: CLINIC | Age: 39
End: 2024-08-23
Payer: COMMERCIAL

## 2024-08-23 NOTE — MR AVS SNAPSHOT
After Visit Summary   4/13/2017    Will Dodson    MRN: [id number]           Patient Information     Date Of Birth          1985        Visit Information        Provider Department      4/13/2017 9:40 AM Norbert Wooten MD [facility]        Today's Diagnoses     ADHD (attention deficit hyperactivity disorder), combined type    -  1      Care Instructions      Next clinic visit in 1 month    ADHD (attention deficit hyperactivity disorder), combined type  -     amphetamine-dextroamphetamine (ADDERALL XR) 30 MG per 24 hr capsule; Take 1 capsule (30 mg) by mouth daily          Thank you for choosing [facility].  You may be receiving a survey in the mail from [employer] regarding your visit today.  Please take a few minutes to complete and return the survey to let us know how we are doing.      If you have questions or concerns, please contact us via Edgeware or you can contact your care team at [phone].    Our Clinic hours are:  Monday 6:40 am  to 7:00 pm  Tuesday -Friday 6:40 am to 5:00 pm    The [place] outpatient lab hours are:  Monday - Friday 6:10 am to 4:45 pm  Saturdays 7:00 am to 11:00 am  Appointments are required, call [phone]    If you have clinical questions after hours or would like to schedule an appointment,  call the clinic at [phone].        Follow-ups after your visit        Who to contact     If you have questions or need follow up information about today's clinic visit or your schedule please contact [facility] directly at [phone].  Normal or non-critical lab and imaging results will be communicated to you by [employer], letter or phone within 4 business days after the clinic has received the results. If you do not hear from us within 7 days, please contact the clinic through Edge[employer] or phone. If you have a critical or abnormal lab result, we will notify you by phone as soon as possible.  Submit refill requests through [employer]  Care Transitions Note    Follow Up Call     Attempted to reach patient for transitions of care follow up.  Unable to reach patient.      Outreach Attempts:   HIPAA compliant voicemail left for patient.     Care Summary Note: 1st attempt to contact pt for care transition follow up.  Unable to reach pt.  Left message with contact information and request for call back.      Follow Up Appointment:   Future Appointments         Provider Specialty Dept Phone    8/27/2024 10:15 AM Madai Vitale, ALL - [facility] General Surgery [phone]    6/30/2025 8:00 AM Iliana Mejia MD Family Medicine [phone]            Plan for follow-up call in 6-10 days based on severity of symptoms and risk factors. Plan for next call: symptom management-incision, s/s of infection, any new or worsening symptoms      Danni Morales RN         "or call your pharmacy and they will forward the refill request to us. Please allow 3 business days for your refill to be completed.          Additional Information About Your Visit        MyChart Information     [employer] lets you send messages to your doctor, view your test results, renew your prescriptions, schedule appointments and more. To sign up, go to [email or web address] . Click on \"Log in\" on the left side of the screen, which will take you to the Welcome page. Then click on \"Sign up Now\" on the right side of the page.     You will be asked to enter the access code listed below, as well as some personal information. Please follow the directions to create your username and password.     Your access code is: [email or web address]  Expires: 2017 12:05 PM     Your access code will  in 90 days. If you need help or a new code, please call your [place] clinic or [phone].        Care EveryWhere ID     This is your Care EveryWhere ID. This could be used by other organizations to access your [place] medical records  [id number]        Your Vitals Were     Pulse Temperature Height Pulse Oximetry BMI (Body Mass Index)       102 98.3  F (36.8  C) (Tympanic) 5' (1.524 m) 100% 22.5 kg/m2        Blood Pressure from Last 3 Encounters:   17 121/81   17 102/70   17 97/62    Weight from Last 3 Encounters:   17 115 lb 3.2 oz (52.3 kg)   17 126 lb (57.2 kg)   17 116 lb 12.8 oz (53 kg)              Today, you had the following     No orders found for display         Today's Medication Changes          These changes are accurate as of: 17 10:07 AM.  If you have any questions, ask your nurse or doctor.               Start taking these medicines.        Dose/Directions    amphetamine-dextroamphetamine 30 MG per 24 hr capsule   Commonly known as:  ADDERALL XR   Used for:  ADHD (attention deficit hyperactivity disorder), combined type   Started by:  Norbert Wooten MD        " Dose:  30 mg   Take 1 capsule (30 mg) by mouth daily   Quantity:  30 capsule   Refills:  0            Where to get your medicines      Some of these will need a paper prescription and others can be bought over the counter.  Ask your nurse if you have questions.     Bring a paper prescription for each of these medications     amphetamine-dextroamphetamine 30 MG per 24 hr capsule                Primary Care Provider Office Phone #    [facility] [phone] [address] [facility] [address]        Thank you!     Thank you for choosing [facility]  for your care. Our goal is always to provide you with excellent care. Hearing back from our patients is one way we can continue to improve our services. Please take a few minutes to complete the written survey that you may receive in the mail after your visit with us. Thank you!             Your Updated Medication List - Protect others around you: Learn how to safely use, store and throw away your medicines at www.disposemymeds.org.          This list is accurate as of: 4/13/17 10:07 AM.  Always use your most recent med list.                   Brand Name Dispense Instructions for use    albuterol 108 (90 BASE) MCG/ACT Inhaler    albuterol    1 Inhaler    Inhale 2 puffs into the lungs every 4 hours as needed for shortness of breath / dyspnea       amphetamine-dextroamphetamine 30 MG per 24 hr capsule    ADDERALL XR    30 capsule    Take 1 capsule (30 mg) by mouth daily       levonorgestrel 20 MCG/24HR IUD    MIRENA     1 each (20 mcg) by Intrauterine route continuous

## 2024-08-23 NOTE — TELEPHONE ENCOUNTER
Reason for Call:  Appointment Request    Patient requesting this type of appt:  Pain    Requested provider: Norbert Wooten    Reason patient unable to be scheduled: Not within requested timeframe    When does patient want to be seen/preferred time:  ASAP    Comments: Migraines, looking for medication.    Could we send this information to you in MusicIP or would you prefer to receive a phone call?:   Patient would like to be contacted via Music[employer]    Call taken on 8/23/2024 at 3:05 PM by Gloria Huynh

## 2024-08-26 NOTE — TELEPHONE ENCOUNTER
Left message for patient to callback.     Nick ODOM RN  [facility]

## 2024-08-27 NOTE — TELEPHONE ENCOUNTER
This is the second attempt to reach patient. No answer. Left message on answering machine for patient to call back.     Patient does have appointment with PCP on 9/24/2024 to address migraines.     Closing encounter.     Irlanda HILLS RN  [facility]  [phone]

## 2024-09-04 ENCOUNTER — TELEPHONE (OUTPATIENT)
Dept: FAMILY MEDICINE | Facility: CLINIC | Age: 39
End: 2024-09-04
Payer: COMMERCIAL

## 2024-09-04 NOTE — TELEPHONE ENCOUNTER
Patient Quality Outreach    Patient is due for the following:   Cervical Cancer Screening - PAP Needed  Physical Preventive Adult Physical      Topic Date Due    Pneumococcal Vaccine (1 of 2 - PCV) Never done    Hepatitis B Vaccine (1 of 3 - 19+ 3-dose series) Never done    Flu Vaccine (1) 09/01/2024    COVID-19 Vaccine (2 - 2023-24 season) 09/01/2024       Next Steps:   Schedule a Adult Preventative, pap smear and immunizations    Type of outreach:    Sent [employer] message.      Questions for provider review:    None           Bisi Levy CMA

## 2024-12-11 ENCOUNTER — TELEPHONE (OUTPATIENT)
Dept: FAMILY MEDICINE | Facility: CLINIC | Age: 39
End: 2024-12-11
Payer: COMMERCIAL

## 2024-12-11 NOTE — TELEPHONE ENCOUNTER
Patient Quality Outreach    Patient is due for the following:   Cervical Cancer Screening - PAP Needed  Physical Preventive Adult Physical    Action(s) Taken:   Schedule a Adult Preventative    Type of outreach:    Sent [employer] message.    Questions for provider review:    None           Chastity Rousseau

## 2025-03-19 ENCOUNTER — TELEPHONE (OUTPATIENT)
Dept: FAMILY MEDICINE | Facility: CLINIC | Age: 40
End: 2025-03-19
Payer: COMMERCIAL

## 2025-03-19 NOTE — TELEPHONE ENCOUNTER
Patient Quality Outreach    Patient is due for the following:   Cervical Cancer Screening - PAP Needed  Physical Preventive Adult Physical    Action(s) Taken:   Schedule a Adult Preventative    Type of outreach:    Sent letter.    Questions for provider review:    None         Chastity Rousseau  Chart routed to none.

## 2025-03-19 NOTE — LETTER
March 19, 2025      Will Dodson  [address] [place] [id number]        Dear Will,     Your team at [facility] cares about your health. We have reviewed your chart and based on our findings; we are making the following recommendations to better manage your health.     You are in particular need of attention regarding the following:     Schedule a primary care office visit with your provider for a Pap Smear to screen for Cervical Cancer.  PREVENTATIVE VISIT: Physical    If you have already completed these items, please contact the clinic via phone or   [employer] so your care team can review and update your records. Thank you for   choosing [facility] for your healthcare needs. For any questions,   concerns, or to schedule an appointment please contact our clinic.    Healthy Regards,      Your [facility] Care Team

## 2025-07-13 ENCOUNTER — HEALTH MAINTENANCE LETTER (OUTPATIENT)
Age: 40
End: 2025-07-13

## (undated) DEVICE — DECANTER VIAL 2006S

## (undated) DEVICE — SU MONOCRYL 4-0 PS-2 18" UND Y496G

## (undated) DEVICE — GOWN XLG DISP 9545

## (undated) DEVICE — CLIP APPLIER ENDO 05MM MED/LG 176630

## (undated) DEVICE — GOWN LG DISP 9515

## (undated) DEVICE — GLOVE PROTEXIS W/NEU-THERA 6.5  2D73TE65

## (undated) DEVICE — GLOVE PROTEXIS BLUE W/NEU-THERA 6.5  2D73EB65

## (undated) DEVICE — SOL NACL 0.9% IRRIG 1000ML BOTTLE 07138-09

## (undated) DEVICE — ENDO SHEARS RENEW LAP ENDOCUT SCISSOR TIP 16.5MM 3142

## (undated) DEVICE — ESU HOLDER LAP INST DISP PURPLE LONG 330MM H-PRO-330

## (undated) DEVICE — SOL WATER IRRIG 1000ML BOTTLE 07139-09

## (undated) DEVICE — Device

## (undated) DEVICE — ENDO CANNULA 05MM VERSAONE UNIVERSAL UNVCA5STF

## (undated) DEVICE — GLOVE PROTEXIS BLUE W/NEU-THERA 7.0  2D73EB70

## (undated) DEVICE — GLOVE PROTEXIS W/NEU-THERA 8.0  2D73TE80

## (undated) DEVICE — GLOVE PROTEXIS MICRO 7.0  2D73PM70

## (undated) DEVICE — SYR 05ML LL W/O NDL

## (undated) DEVICE — GLOVE PROTEXIS BLUE W/NEU-THERA 8.0  2D73EB80

## (undated) DEVICE — ADHESIVE SWIFTSET 0.8ML OCTYL SS6

## (undated) DEVICE — ENDO TROCAR 12MM VERSAONE BLADELESS W/STD FIX CAN NONB12STF

## (undated) DEVICE — PREP CHLORAPREP 26ML TINTED ORANGE  260815

## (undated) DEVICE — STOCKING SLEEVE COMPRESSION CALF MED

## (undated) DEVICE — ENDO TROCAR OPTICAL 05MM VERSAPORT PLUS W/FIX CAN ONB5STF

## (undated) DEVICE — NDL INSUFFLATION 120MM VERRES 172015

## (undated) DEVICE — ENDO POUCH GOLD 10MM ECATCH 173050G

## (undated) RX ORDER — ONDANSETRON 2 MG/ML
INJECTION INTRAMUSCULAR; INTRAVENOUS
Status: DISPENSED
Start: 2017-08-31

## (undated) RX ORDER — GLYCOPYRROLATE 0.2 MG/ML
INJECTION, SOLUTION INTRAMUSCULAR; INTRAVENOUS
Status: DISPENSED
Start: 2017-08-31

## (undated) RX ORDER — DEXAMETHASONE SODIUM PHOSPHATE 4 MG/ML
INJECTION, SOLUTION INTRA-ARTICULAR; INTRALESIONAL; INTRAMUSCULAR; INTRAVENOUS; SOFT TISSUE
Status: DISPENSED
Start: 2017-08-31

## (undated) RX ORDER — KETOROLAC TROMETHAMINE 30 MG/ML
INJECTION, SOLUTION INTRAMUSCULAR; INTRAVENOUS
Status: DISPENSED
Start: 2017-08-31

## (undated) RX ORDER — PROPOFOL 10 MG/ML
INJECTION, EMULSION INTRAVENOUS
Status: DISPENSED
Start: 2017-08-31

## (undated) RX ORDER — HYDROMORPHONE HYDROCHLORIDE 1 MG/ML
INJECTION, SOLUTION INTRAMUSCULAR; INTRAVENOUS; SUBCUTANEOUS
Status: DISPENSED
Start: 2017-08-31

## (undated) RX ORDER — HYDROXYZINE HYDROCHLORIDE 50 MG/1
TABLET, FILM COATED ORAL
Status: DISPENSED
Start: 2017-08-31

## (undated) RX ORDER — FENTANYL CITRATE 50 UG/ML
INJECTION, SOLUTION INTRAMUSCULAR; INTRAVENOUS
Status: DISPENSED
Start: 2017-08-31

## (undated) RX ORDER — BUPIVACAINE HYDROCHLORIDE AND EPINEPHRINE 5; 5 MG/ML; UG/ML
INJECTION, SOLUTION PERINEURAL
Status: DISPENSED
Start: 2017-08-31

## (undated) RX ORDER — OXYCODONE AND ACETAMINOPHEN 5; 325 MG/1; MG/1
TABLET ORAL
Status: DISPENSED
Start: 2017-08-31

## (undated) RX ORDER — NEOSTIGMINE METHYLSULFATE 5 MG/5 ML
SYRINGE (ML) INTRAVENOUS
Status: DISPENSED
Start: 2017-08-31